# Patient Record
Sex: MALE | Race: WHITE
[De-identification: names, ages, dates, MRNs, and addresses within clinical notes are randomized per-mention and may not be internally consistent; named-entity substitution may affect disease eponyms.]

---

## 2017-06-08 ENCOUNTER — HOSPITAL ENCOUNTER (OUTPATIENT)
Dept: HOSPITAL 79 - RT | Age: 57
End: 2017-06-08
Attending: INTERNAL MEDICINE
Payer: MEDICARE

## 2017-06-08 DIAGNOSIS — R09.02: Primary | ICD-10-CM

## 2017-07-27 ENCOUNTER — APPOINTMENT (OUTPATIENT)
Dept: GENERAL RADIOLOGY | Facility: HOSPITAL | Age: 57
End: 2017-07-27

## 2017-07-27 ENCOUNTER — HOSPITAL ENCOUNTER (EMERGENCY)
Facility: HOSPITAL | Age: 57
Discharge: HOME | End: 2017-07-27
Attending: EMERGENCY MEDICINE | Admitting: EMERGENCY MEDICINE

## 2017-07-27 VITALS
WEIGHT: 296 LBS | RESPIRATION RATE: 18 BRPM | HEART RATE: 82 BPM | DIASTOLIC BLOOD PRESSURE: 76 MMHG | OXYGEN SATURATION: 97 % | SYSTOLIC BLOOD PRESSURE: 116 MMHG | BODY MASS INDEX: 34.95 KG/M2 | TEMPERATURE: 97.9 F | HEIGHT: 77 IN

## 2017-07-27 DIAGNOSIS — J18.9 PNEUMONIA OF BOTH LOWER LOBES DUE TO INFECTIOUS ORGANISM: Primary | ICD-10-CM

## 2017-07-27 LAB
A-A DO2: 15.2 MMHG (ref 0–300)
ALBUMIN SERPL-MCNC: 4.7 G/DL (ref 3.5–5)
ALBUMIN/GLOB SERPL: 1.5 G/DL (ref 1.5–2.5)
ALP SERPL-CCNC: 91 U/L (ref 40–129)
ALT SERPL W P-5'-P-CCNC: 43 U/L (ref 10–44)
ANION GAP SERPL CALCULATED.3IONS-SCNC: 6.8 MMOL/L (ref 3.6–11.2)
ARTERIAL PATENCY WRIST A: POSITIVE
AST SERPL-CCNC: 30 U/L (ref 10–34)
ATMOSPHERIC PRESS: 727 MMHG
BASE EXCESS BLDA CALC-SCNC: 0.8 MMOL/L
BASOPHILS # BLD AUTO: 0.03 10*3/MM3 (ref 0–0.3)
BASOPHILS NFR BLD AUTO: 0.4 % (ref 0–2)
BDY SITE: ABNORMAL
BILIRUB SERPL-MCNC: 0.7 MG/DL (ref 0.2–1.8)
BODY TEMPERATURE: 98.6 C
BUN BLD-MCNC: 14 MG/DL (ref 7–21)
BUN/CREAT SERPL: 13.3 (ref 7–25)
CALCIUM SPEC-SCNC: 10 MG/DL (ref 7.7–10)
CHLORIDE SERPL-SCNC: 103 MMOL/L (ref 99–112)
CO2 SERPL-SCNC: 28.2 MMOL/L (ref 24.3–31.9)
COHGB MFR BLD: 1.8 % (ref 0–5)
CREAT BLD-MCNC: 1.05 MG/DL (ref 0.43–1.29)
CRP SERPL-MCNC: 8.67 MG/DL (ref 0–0.99)
D-LACTATE SERPL-SCNC: 1.2 MMOL/L (ref 0.5–2)
DEPRECATED RDW RBC AUTO: 44 FL (ref 37–54)
EOSINOPHIL # BLD AUTO: 0.43 10*3/MM3 (ref 0–0.7)
EOSINOPHIL NFR BLD AUTO: 5.1 % (ref 0–5)
ERYTHROCYTE [DISTWIDTH] IN BLOOD BY AUTOMATED COUNT: 14.5 % (ref 11.5–14.5)
GFR SERPL CREATININE-BSD FRML MDRD: 72 ML/MIN/1.73
GLOBULIN UR ELPH-MCNC: 3.2 GM/DL
GLUCOSE BLD-MCNC: 131 MG/DL (ref 70–110)
HCO3 BLDA-SCNC: 24.2 MMOL/L (ref 22–26)
HCT VFR BLD AUTO: 46.5 % (ref 42–52)
HCT VFR BLD CALC: 44 % (ref 42–52)
HGB BLD-MCNC: 14.9 G/DL (ref 14–18)
HGB BLDA-MCNC: 14.9 G/DL (ref 12–16)
HOROWITZ INDEX BLD+IHG-RTO: 21 %
IMM GRANULOCYTES # BLD: 0.02 10*3/MM3 (ref 0–0.03)
IMM GRANULOCYTES NFR BLD: 0.2 % (ref 0–0.5)
LYMPHOCYTES # BLD AUTO: 2.51 10*3/MM3 (ref 1–3)
LYMPHOCYTES NFR BLD AUTO: 29.7 % (ref 21–51)
MCH RBC QN AUTO: 26.5 PG (ref 27–33)
MCHC RBC AUTO-ENTMCNC: 32 G/DL (ref 33–37)
MCV RBC AUTO: 82.6 FL (ref 80–94)
METHGB BLD QL: 0.2 % (ref 0–3)
MODALITY: ABNORMAL
MONOCYTES # BLD AUTO: 0.78 10*3/MM3 (ref 0.1–0.9)
MONOCYTES NFR BLD AUTO: 9.2 % (ref 0–10)
NEUTROPHILS # BLD AUTO: 4.69 10*3/MM3 (ref 1.4–6.5)
NEUTROPHILS NFR BLD AUTO: 55.4 % (ref 30–70)
OSMOLALITY SERPL CALC.SUM OF ELEC: 278 MOSM/KG (ref 273–305)
OXYHGB MFR BLDV: 95.1 % (ref 85–100)
PCO2 BLDA: 35.2 MM HG (ref 35–45)
PH BLDA: 7.46 PH UNITS (ref 7.35–7.45)
PLATELET # BLD AUTO: 174 10*3/MM3 (ref 130–400)
PMV BLD AUTO: 9.8 FL (ref 6–10)
PO2 BLDA: 85.4 MM HG (ref 80–100)
POTASSIUM BLD-SCNC: 3.8 MMOL/L (ref 3.5–5.3)
PROT SERPL-MCNC: 7.9 G/DL (ref 6–8)
RBC # BLD AUTO: 5.63 10*6/MM3 (ref 4.7–6.1)
SAO2 % BLDCOA: 97 % (ref 90–100)
SODIUM BLD-SCNC: 138 MMOL/L (ref 135–153)
TROPONIN I SERPL-MCNC: <0.006 NG/ML
WBC NRBC COR # BLD: 8.46 10*3/MM3 (ref 4.5–12.5)

## 2017-07-27 PROCEDURE — 25010000002 CEFTRIAXONE: Performed by: PHYSICIAN ASSISTANT

## 2017-07-27 PROCEDURE — 86140 C-REACTIVE PROTEIN: CPT | Performed by: PHYSICIAN ASSISTANT

## 2017-07-27 PROCEDURE — 94640 AIRWAY INHALATION TREATMENT: CPT

## 2017-07-27 PROCEDURE — 83605 ASSAY OF LACTIC ACID: CPT | Performed by: PHYSICIAN ASSISTANT

## 2017-07-27 PROCEDURE — 80053 COMPREHEN METABOLIC PANEL: CPT | Performed by: PHYSICIAN ASSISTANT

## 2017-07-27 PROCEDURE — 93010 ELECTROCARDIOGRAM REPORT: CPT | Performed by: INTERNAL MEDICINE

## 2017-07-27 PROCEDURE — 82805 BLOOD GASES W/O2 SATURATION: CPT | Performed by: PHYSICIAN ASSISTANT

## 2017-07-27 PROCEDURE — 82375 ASSAY CARBOXYHB QUANT: CPT | Performed by: PHYSICIAN ASSISTANT

## 2017-07-27 PROCEDURE — 71020 XR CHEST 2 VW: CPT | Performed by: RADIOLOGY

## 2017-07-27 PROCEDURE — 96361 HYDRATE IV INFUSION ADD-ON: CPT

## 2017-07-27 PROCEDURE — 93005 ELECTROCARDIOGRAM TRACING: CPT | Performed by: PHYSICIAN ASSISTANT

## 2017-07-27 PROCEDURE — 36600 WITHDRAWAL OF ARTERIAL BLOOD: CPT | Performed by: PHYSICIAN ASSISTANT

## 2017-07-27 PROCEDURE — 85025 COMPLETE CBC W/AUTO DIFF WBC: CPT | Performed by: PHYSICIAN ASSISTANT

## 2017-07-27 PROCEDURE — 87040 BLOOD CULTURE FOR BACTERIA: CPT | Performed by: PHYSICIAN ASSISTANT

## 2017-07-27 PROCEDURE — 96365 THER/PROPH/DIAG IV INF INIT: CPT

## 2017-07-27 PROCEDURE — 71020 HC CHEST PA AND LATERAL: CPT

## 2017-07-27 PROCEDURE — 84484 ASSAY OF TROPONIN QUANT: CPT | Performed by: PHYSICIAN ASSISTANT

## 2017-07-27 PROCEDURE — 99283 EMERGENCY DEPT VISIT LOW MDM: CPT

## 2017-07-27 PROCEDURE — 83050 HGB METHEMOGLOBIN QUAN: CPT | Performed by: PHYSICIAN ASSISTANT

## 2017-07-27 RX ORDER — GUAIFENESIN 600 MG/1
1200 TABLET, EXTENDED RELEASE ORAL 2 TIMES DAILY
COMMUNITY

## 2017-07-27 RX ORDER — ALBUTEROL SULFATE 90 UG/1
2 AEROSOL, METERED RESPIRATORY (INHALATION) EVERY 4 HOURS PRN
Qty: 1 INHALER | Refills: 0 | Status: SHIPPED | OUTPATIENT
Start: 2017-07-27

## 2017-07-27 RX ORDER — IPRATROPIUM BROMIDE AND ALBUTEROL SULFATE 2.5; .5 MG/3ML; MG/3ML
3 SOLUTION RESPIRATORY (INHALATION) ONCE
Status: COMPLETED | OUTPATIENT
Start: 2017-07-27 | End: 2017-07-27

## 2017-07-27 RX ORDER — HYDROCODONE BITARTRATE AND ACETAMINOPHEN 7.5; 325 MG/1; MG/1
1 TABLET ORAL EVERY 8 HOURS PRN
COMMUNITY

## 2017-07-27 RX ORDER — LEVOFLOXACIN 500 MG/1
500 TABLET, FILM COATED ORAL DAILY
COMMUNITY

## 2017-07-27 RX ORDER — DOXYCYCLINE 100 MG/1
100 CAPSULE ORAL 2 TIMES DAILY
Qty: 20 CAPSULE | Refills: 0 | Status: SHIPPED | OUTPATIENT
Start: 2017-07-27 | End: 2017-08-06

## 2017-07-27 RX ORDER — SODIUM CHLORIDE 0.9 % (FLUSH) 0.9 %
10 SYRINGE (ML) INJECTION AS NEEDED
Status: DISCONTINUED | OUTPATIENT
Start: 2017-07-27 | End: 2017-07-27 | Stop reason: HOSPADM

## 2017-07-27 RX ORDER — RANITIDINE HCL 75 MG
75 TABLET ORAL 2 TIMES DAILY
COMMUNITY

## 2017-07-27 RX ADMIN — CEFTRIAXONE 1 G: 1 INJECTION, POWDER, FOR SOLUTION INTRAMUSCULAR; INTRAVENOUS at 15:12

## 2017-07-27 RX ADMIN — SODIUM CHLORIDE 1000 ML: 9 INJECTION, SOLUTION INTRAVENOUS at 13:02

## 2017-07-27 RX ADMIN — IPRATROPIUM BROMIDE AND ALBUTEROL SULFATE 3 ML: .5; 3 SOLUTION RESPIRATORY (INHALATION) at 13:10

## 2017-07-27 NOTE — DISCHARGE INSTRUCTIONS

## 2017-07-27 NOTE — ED PROVIDER NOTES
Subjective   HPI Comments: 57-year-old male who presents to the ED today due to having pneumonia.  He states he has been sick for about 6 days with a productive cough of yellow and green sputum as well as shortness of breath.  He saw his primary care provider 2 days ago and was diagnosed with a left-sided pneumonia.  He was started on Levaquin.  He states he has taken 2 doses but is not getting any better.  He states since starting the Levaquin he has felt very shaky so he decided to not take a dose today.  He states he is having bilateral lower chest pain as well as shortness of breath.  He states at times he also has a headache and feels dizzy.  He denies any sick contacts.  He has not been using any inhalers.  He states he does have a history of black lung disease.  He states he has had a subjective fever and has been having sweats.    Patient is a 57 y.o. male presenting with URI.   History provided by:  Patient  URI   Presenting symptoms: congestion, cough and fever    Severity:  Moderate  Onset quality:  Gradual  Duration:  6 days  Timing:  Constant  Progression:  Unchanged  Chronicity:  New  Relieved by:  Nothing  Worsened by:  Nothing  Ineffective treatments: Levaquin.  Associated symptoms: headaches    Risk factors: chronic respiratory disease    Risk factors: no recent travel and no sick contacts        Review of Systems   Constitutional: Positive for diaphoresis and fever.   HENT: Positive for congestion.    Eyes: Negative.    Respiratory: Positive for cough and shortness of breath.    Cardiovascular: Positive for chest pain.   Gastrointestinal: Negative.    Genitourinary: Negative.    Musculoskeletal: Negative.    Skin: Negative.    Neurological: Positive for dizziness, tremors and headaches.   Psychiatric/Behavioral: Negative.    All other systems reviewed and are negative.      Past Medical History:   Diagnosis Date   • Black lung disease    • GERD (gastroesophageal reflux disease)        No Known  Allergies    History reviewed. No pertinent surgical history.    History reviewed. No pertinent family history.    Social History     Social History   • Marital status:      Spouse name: N/A   • Number of children: N/A   • Years of education: N/A     Social History Main Topics   • Smoking status: Current Every Day Smoker   • Smokeless tobacco: Current User     Types: Chew   • Alcohol use No   • Drug use: No   • Sexual activity: Defer     Other Topics Concern   • None     Social History Narrative   • None           Objective   Physical Exam   Constitutional: He is oriented to person, place, and time. He appears well-developed and well-nourished. No distress.   Pt is mildly shaky   HENT:   Head: Normocephalic and atraumatic.   Right Ear: External ear normal.   Left Ear: External ear normal.   Nose: Nose normal.   Mouth/Throat: Oropharynx is clear and moist.   Eyes: Conjunctivae and EOM are normal. Pupils are equal, round, and reactive to light.   Neck: Normal range of motion. Neck supple.   Cardiovascular: Normal rate, regular rhythm, normal heart sounds and intact distal pulses.    Pulmonary/Chest: Effort normal and breath sounds normal. No stridor. No respiratory distress. He has no wheezes. He exhibits no tenderness.   Abdominal: Soft. Bowel sounds are normal. There is no tenderness.   Musculoskeletal: Normal range of motion. He exhibits no edema.   Neurological: He is alert and oriented to person, place, and time.   Skin: Skin is warm and dry.   Psychiatric: He has a normal mood and affect. His behavior is normal. Judgment and thought content normal.   Nursing note and vitals reviewed.      Procedures         ED Course  ED Course   Value Comment By Time   ECG 12 Lead 12:59 - sinus rhythm, rate of 77, no acute ischemia per MARAH Hawk 07/27 1300    Pt found to have bilateral pneumonia on x-ray.  Pt is not hypoxia, lab work is unremarkable.  He states he is feeling better after fluids and a  neb treatment.  Will discharge the patient home.  He and his wife are requesting an antibiotic change because he is concerned about possible side effects. MARAH Cabrera 07/27 1530                  MDM  Number of Diagnoses or Management Options  Pneumonia of both lower lobes due to infectious organism:      Amount and/or Complexity of Data Reviewed  Clinical lab tests: reviewed  Tests in the radiology section of CPT®: reviewed  Tests in the medicine section of CPT®: reviewed    Patient Progress  Patient progress: improved      Final diagnoses:   Pneumonia of both lower lobes due to infectious organism            MARAH Cabrera  07/27/17 1640

## 2017-08-01 LAB — BACTERIA SPEC AEROBE CULT: NORMAL

## 2017-08-07 LAB — BACTERIA SPEC AEROBE CULT: NORMAL

## 2022-12-15 ENCOUNTER — APPOINTMENT (OUTPATIENT)
Dept: GENERAL RADIOLOGY | Facility: HOSPITAL | Age: 62
End: 2022-12-15

## 2022-12-15 ENCOUNTER — HOSPITAL ENCOUNTER (EMERGENCY)
Facility: HOSPITAL | Age: 62
Discharge: HOME | End: 2022-12-15
Attending: STUDENT IN AN ORGANIZED HEALTH CARE EDUCATION/TRAINING PROGRAM | Admitting: STUDENT IN AN ORGANIZED HEALTH CARE EDUCATION/TRAINING PROGRAM

## 2022-12-15 VITALS
BODY MASS INDEX: 34.36 KG/M2 | TEMPERATURE: 97.8 F | SYSTOLIC BLOOD PRESSURE: 142 MMHG | RESPIRATION RATE: 16 BRPM | WEIGHT: 291 LBS | HEART RATE: 101 BPM | HEIGHT: 77 IN | DIASTOLIC BLOOD PRESSURE: 91 MMHG | OXYGEN SATURATION: 94 %

## 2022-12-15 DIAGNOSIS — J18.9 PNEUMONIA OF BOTH LUNGS DUE TO INFECTIOUS ORGANISM, UNSPECIFIED PART OF LUNG: ICD-10-CM

## 2022-12-15 DIAGNOSIS — R00.0 SINUS TACHYCARDIA: Primary | ICD-10-CM

## 2022-12-15 DIAGNOSIS — J60 BLACK LUNG DISEASE: ICD-10-CM

## 2022-12-15 LAB
ALBUMIN SERPL-MCNC: 3.62 G/DL (ref 3.5–5.2)
ALBUMIN/GLOB SERPL: 1.1 G/DL
ALP SERPL-CCNC: 90 U/L (ref 39–117)
ALT SERPL W P-5'-P-CCNC: 27 U/L (ref 1–41)
ANION GAP SERPL CALCULATED.3IONS-SCNC: 13.6 MMOL/L (ref 5–15)
APTT PPP: 32.8 SECONDS (ref 26.5–34.5)
AST SERPL-CCNC: 22 U/L (ref 1–40)
BASOPHILS # BLD AUTO: 0.05 10*3/MM3 (ref 0–0.2)
BASOPHILS NFR BLD AUTO: 0.3 % (ref 0–1.5)
BILIRUB SERPL-MCNC: 0.7 MG/DL (ref 0–1.2)
BUN SERPL-MCNC: 13 MG/DL (ref 8–23)
BUN/CREAT SERPL: 14.4 (ref 7–25)
CALCIUM SPEC-SCNC: 10.2 MG/DL (ref 8.6–10.5)
CHLORIDE SERPL-SCNC: 101 MMOL/L (ref 98–107)
CO2 SERPL-SCNC: 22.4 MMOL/L (ref 22–29)
CREAT SERPL-MCNC: 0.9 MG/DL (ref 0.76–1.27)
CRP SERPL-MCNC: 10.72 MG/DL (ref 0–0.5)
D-LACTATE SERPL-SCNC: 1.9 MMOL/L (ref 0.5–2)
DEPRECATED RDW RBC AUTO: 43.1 FL (ref 37–54)
EGFRCR SERPLBLD CKD-EPI 2021: 95.4 ML/MIN/1.73
EOSINOPHIL # BLD AUTO: 0.09 10*3/MM3 (ref 0–0.4)
EOSINOPHIL NFR BLD AUTO: 0.6 % (ref 0.3–6.2)
ERYTHROCYTE [DISTWIDTH] IN BLOOD BY AUTOMATED COUNT: 15.1 % (ref 12.3–15.4)
FLUAV SUBTYP SPEC NAA+PROBE: NOT DETECTED
FLUBV RNA ISLT QL NAA+PROBE: NOT DETECTED
GLOBULIN UR ELPH-MCNC: 3.4 GM/DL
GLUCOSE SERPL-MCNC: 156 MG/DL (ref 65–99)
HCT VFR BLD AUTO: 50.9 % (ref 37.5–51)
HGB BLD-MCNC: 15.9 G/DL (ref 13–17.7)
HOLD SPECIMEN: NORMAL
HOLD SPECIMEN: NORMAL
IMM GRANULOCYTES # BLD AUTO: 0.06 10*3/MM3 (ref 0–0.05)
IMM GRANULOCYTES NFR BLD AUTO: 0.4 % (ref 0–0.5)
INR PPP: 1.04 (ref 0.9–1.1)
LYMPHOCYTES # BLD AUTO: 1.25 10*3/MM3 (ref 0.7–3.1)
LYMPHOCYTES NFR BLD AUTO: 8.5 % (ref 19.6–45.3)
MCH RBC QN AUTO: 25.6 PG (ref 26.6–33)
MCHC RBC AUTO-ENTMCNC: 31.2 G/DL (ref 31.5–35.7)
MCV RBC AUTO: 81.8 FL (ref 79–97)
MONOCYTES # BLD AUTO: 1.12 10*3/MM3 (ref 0.1–0.9)
MONOCYTES NFR BLD AUTO: 7.6 % (ref 5–12)
NEUTROPHILS NFR BLD AUTO: 12.18 10*3/MM3 (ref 1.7–7)
NEUTROPHILS NFR BLD AUTO: 82.6 % (ref 42.7–76)
NRBC BLD AUTO-RTO: 0 /100 WBC (ref 0–0.2)
PLATELET # BLD AUTO: 283 10*3/MM3 (ref 140–450)
PMV BLD AUTO: 10.4 FL (ref 6–12)
POTASSIUM SERPL-SCNC: 4.1 MMOL/L (ref 3.5–5.2)
PROT SERPL-MCNC: 7 G/DL (ref 6–8.5)
PROTHROMBIN TIME: 13.8 SECONDS (ref 12.1–14.7)
QT INTERVAL: 300 MS
QTC INTERVAL: 409 MS
RBC # BLD AUTO: 6.22 10*6/MM3 (ref 4.14–5.8)
SARS-COV-2 RNA PNL SPEC NAA+PROBE: NOT DETECTED
SODIUM SERPL-SCNC: 137 MMOL/L (ref 136–145)
TROPONIN T SERPL-MCNC: <0.01 NG/ML (ref 0–0.03)
WBC NRBC COR # BLD: 14.75 10*3/MM3 (ref 3.4–10.8)
WHOLE BLOOD HOLD COAG: NORMAL
WHOLE BLOOD HOLD SPECIMEN: NORMAL

## 2022-12-15 PROCEDURE — 99284 EMERGENCY DEPT VISIT MOD MDM: CPT

## 2022-12-15 PROCEDURE — 93005 ELECTROCARDIOGRAM TRACING: CPT | Performed by: INTERNAL MEDICINE

## 2022-12-15 PROCEDURE — 83605 ASSAY OF LACTIC ACID: CPT | Performed by: STUDENT IN AN ORGANIZED HEALTH CARE EDUCATION/TRAINING PROGRAM

## 2022-12-15 PROCEDURE — 96375 TX/PRO/DX INJ NEW DRUG ADDON: CPT

## 2022-12-15 PROCEDURE — 85730 THROMBOPLASTIN TIME PARTIAL: CPT | Performed by: STUDENT IN AN ORGANIZED HEALTH CARE EDUCATION/TRAINING PROGRAM

## 2022-12-15 PROCEDURE — 93010 ELECTROCARDIOGRAM REPORT: CPT | Performed by: SPECIALIST

## 2022-12-15 PROCEDURE — 36415 COLL VENOUS BLD VENIPUNCTURE: CPT

## 2022-12-15 PROCEDURE — 85025 COMPLETE CBC W/AUTO DIFF WBC: CPT | Performed by: STUDENT IN AN ORGANIZED HEALTH CARE EDUCATION/TRAINING PROGRAM

## 2022-12-15 PROCEDURE — 93005 ELECTROCARDIOGRAM TRACING: CPT | Performed by: STUDENT IN AN ORGANIZED HEALTH CARE EDUCATION/TRAINING PROGRAM

## 2022-12-15 PROCEDURE — 93010 ELECTROCARDIOGRAM REPORT: CPT | Performed by: INTERNAL MEDICINE

## 2022-12-15 PROCEDURE — 96365 THER/PROPH/DIAG IV INF INIT: CPT

## 2022-12-15 PROCEDURE — 86140 C-REACTIVE PROTEIN: CPT | Performed by: STUDENT IN AN ORGANIZED HEALTH CARE EDUCATION/TRAINING PROGRAM

## 2022-12-15 PROCEDURE — 85610 PROTHROMBIN TIME: CPT | Performed by: STUDENT IN AN ORGANIZED HEALTH CARE EDUCATION/TRAINING PROGRAM

## 2022-12-15 PROCEDURE — 87636 SARSCOV2 & INF A&B AMP PRB: CPT | Performed by: STUDENT IN AN ORGANIZED HEALTH CARE EDUCATION/TRAINING PROGRAM

## 2022-12-15 PROCEDURE — 25010000002 CEFTRIAXONE PER 250 MG: Performed by: STUDENT IN AN ORGANIZED HEALTH CARE EDUCATION/TRAINING PROGRAM

## 2022-12-15 PROCEDURE — 87040 BLOOD CULTURE FOR BACTERIA: CPT | Performed by: STUDENT IN AN ORGANIZED HEALTH CARE EDUCATION/TRAINING PROGRAM

## 2022-12-15 PROCEDURE — 25010000002 METHYLPREDNISOLONE PER 125 MG: Performed by: STUDENT IN AN ORGANIZED HEALTH CARE EDUCATION/TRAINING PROGRAM

## 2022-12-15 PROCEDURE — 71045 X-RAY EXAM CHEST 1 VIEW: CPT | Performed by: RADIOLOGY

## 2022-12-15 PROCEDURE — 71045 X-RAY EXAM CHEST 1 VIEW: CPT

## 2022-12-15 PROCEDURE — 84484 ASSAY OF TROPONIN QUANT: CPT | Performed by: STUDENT IN AN ORGANIZED HEALTH CARE EDUCATION/TRAINING PROGRAM

## 2022-12-15 PROCEDURE — 80053 COMPREHEN METABOLIC PANEL: CPT | Performed by: STUDENT IN AN ORGANIZED HEALTH CARE EDUCATION/TRAINING PROGRAM

## 2022-12-15 RX ORDER — AZITHROMYCIN 250 MG/1
250 TABLET, FILM COATED ORAL DAILY
Qty: 4 TABLET | Refills: 0 | Status: SHIPPED | OUTPATIENT
Start: 2022-12-14 | End: 2022-12-18

## 2022-12-15 RX ORDER — CEFDINIR 300 MG/1
300 CAPSULE ORAL 2 TIMES DAILY
Qty: 12 CAPSULE | Refills: 0 | Status: SHIPPED | OUTPATIENT
Start: 2022-12-16 | End: 2022-12-22

## 2022-12-15 RX ORDER — AZITHROMYCIN 250 MG/1
500 TABLET, FILM COATED ORAL ONCE
Status: COMPLETED | OUTPATIENT
Start: 2022-12-15 | End: 2022-12-15

## 2022-12-15 RX ORDER — METHYLPREDNISOLONE SODIUM SUCCINATE 125 MG/2ML
125 INJECTION, POWDER, LYOPHILIZED, FOR SOLUTION INTRAMUSCULAR; INTRAVENOUS ONCE
Status: COMPLETED | OUTPATIENT
Start: 2022-12-15 | End: 2022-12-15

## 2022-12-15 RX ORDER — METOPROLOL SUCCINATE 25 MG/1
25 TABLET, EXTENDED RELEASE ORAL ONCE
Status: COMPLETED | OUTPATIENT
Start: 2022-12-15 | End: 2022-12-15

## 2022-12-15 RX ORDER — SODIUM CHLORIDE 0.9 % (FLUSH) 0.9 %
10 SYRINGE (ML) INJECTION AS NEEDED
Status: DISCONTINUED | OUTPATIENT
Start: 2022-12-15 | End: 2022-12-15 | Stop reason: HOSPADM

## 2022-12-15 RX ORDER — METOPROLOL SUCCINATE 25 MG/1
25 TABLET, EXTENDED RELEASE ORAL DAILY
Qty: 30 TABLET | Refills: 0 | Status: SHIPPED | OUTPATIENT
Start: 2022-12-16 | End: 2023-01-15

## 2022-12-15 RX ORDER — ASPIRIN 81 MG/1
324 TABLET, CHEWABLE ORAL ONCE
Status: COMPLETED | OUTPATIENT
Start: 2022-12-15 | End: 2022-12-15

## 2022-12-15 RX ADMIN — METOPROLOL SUCCINATE 25 MG: 25 TABLET, EXTENDED RELEASE ORAL at 17:40

## 2022-12-15 RX ADMIN — ASPIRIN 324 MG: 81 TABLET, CHEWABLE ORAL at 14:29

## 2022-12-15 RX ADMIN — METHYLPREDNISOLONE SODIUM SUCCINATE 125 MG: 125 INJECTION, POWDER, FOR SOLUTION INTRAMUSCULAR; INTRAVENOUS at 15:45

## 2022-12-15 RX ADMIN — AZITHROMYCIN 500 MG: 250 TABLET, FILM COATED ORAL at 15:45

## 2022-12-15 RX ADMIN — CEFTRIAXONE SODIUM 1 G: 1 INJECTION, POWDER, FOR SOLUTION INTRAMUSCULAR; INTRAVENOUS at 15:46

## 2022-12-15 NOTE — ED PROVIDER NOTES
[facility]  emergency department encounter        Pt Name: Rory Rios Jr.  MRN: [id number]  Birthdate 1960  Date of evaluation: 12/15/2022    Chief Complaint   Patient presents with   • Chest Pain   • Shortness of Breath             HISTORY OF PRESENT ILLNESS:   Rory Rios Jr. is a 62 y.o. male PMH DM, GERD, black lung disease.  Patient wears CPAP nightly.    Patient presents for smothering/shortness of breath.  Onset 2 to 3 days ago, constant, progressively worsening.  Patient endorses cough and productive sputum.  Patient reports he had multiple brief transient episodes of chest pain last night and this morning.  Pains are right-sided, pinpoint, sharp.  Last episode at 6 AM this morning.  Denies fever chills congestion rhinorrhea abdominal pain nausea vomiting diarrhea dysuria.    Patient recently started by a primary care provider on lisinopril 5 mg daily for blood pressure and propranolol 10 mg once daily for tachycardia.      No other exacerbating or alleviating factors other than as noted above.  Severity: Severe    PCP: Alejandro Monique MD          REVIEW OF SYSTEMS:     Review of Systems   Constitutional: Negative for fever.   HENT: Negative for congestion and rhinorrhea.    Eyes: Negative for visual disturbance.   Respiratory: Positive for cough and shortness of breath.    Cardiovascular: Positive for chest pain (Not active). Negative for leg swelling.   Gastrointestinal: Negative for abdominal pain, nausea and vomiting.   Genitourinary: Negative for dysuria.   Musculoskeletal: Negative for myalgias.   Skin: Negative for rash.   Neurological: Negative for headaches.   Psychiatric/Behavioral: Negative for confusion.       Review of systems otherwise as per HPI.          PREVIOUS HISTORY:         Past Medical History:   Diagnosis Date   • Black lung disease (CMS/HCC)    • GERD (gastroesophageal reflux disease)          No past surgical history on file.        Social History      Socioeconomic History   • Marital status:    Tobacco Use   • Smoking status: Every Day   • Smokeless tobacco: Current     Types: Chew   Substance and Sexual Activity   • Alcohol use: No   • Drug use: No   • Sexual activity: Defer         No family history on file.      Current Outpatient Medications   Medication Instructions   • albuterol (PROVENTIL HFA;VENTOLIN HFA) 108 (90 BASE) MCG/ACT inhaler 2 puffs, Inhalation, Every 4 Hours PRN   • azithromycin (ZITHROMAX) 250 mg, Oral, Daily   • [START ON 12/16/2022] cefdinir (OMNICEF) 300 mg, Oral, 2 Times Daily   • esomeprazole (NEXIUM) 20 mg, Oral, Every Morning Before Breakfast   • guaiFENesin (MUCINEX) 1,200 mg, Oral, 2 Times Daily   • HYDROcodone-acetaminophen (NORCO) 7.5-325 MG per tablet 1 tablet, Oral, Every 8 Hours PRN   • levoFLOXacin (LEVAQUIN) 500 mg, Oral, Daily   • [START ON 12/16/2022] metoprolol succinate XL (TOPROL-XL) 25 mg, Oral, Daily   • raNITIdine (ZANTAC) 75 mg, Oral, 2 Times Daily         Allergies:  Patient has no known allergies.    Medications, allergies and past medical, surgical, family, and social history reviewed.        PHYSICAL EXAM:     Patient Vitals for the past 24 hrs:   BP Temp Temp src Pulse Resp SpO2 Height Weight   12/15/22 1750 142/91 97.8 °F (36.6 °C) Oral 101 16 94 % -- --   12/15/22 1740 132/90 -- -- 93 -- 92 % -- --   12/15/22 1716 139/91 -- -- 96 -- 92 % -- --   12/15/22 1701 142/71 -- -- 93 -- 92 % -- --   12/15/22 1646 (!) 138/102 -- -- (!) 137 -- 94 % -- --   12/15/22 1631 135/82 -- -- (!) 154 -- 96 % -- --   12/15/22 1621 -- -- -- 90 -- 97 % -- --   12/15/22 1620 -- -- -- 109 -- 95 % -- --   12/15/22 1619 -- -- -- 105 -- 94 % -- --   12/15/22 1618 -- -- -- 97 -- 93 % -- --   12/15/22 1617 -- -- -- 90 -- 94 % -- --   12/15/22 1616 124/81 -- -- 104 -- 93 % -- --   12/15/22 1602 -- -- -- 104 -- 93 % -- --   12/15/22 1601 128/80 -- -- 117 -- 94 % -- --   12/15/22 1600 -- -- -- 99 -- 93 % -- --   12/15/22 1547 -- --  "-- 107 -- -- -- --   12/15/22 1546 124/81 -- -- 112 -- 96 % -- --   12/15/22 1531 132/80 -- -- 105 -- 96 % -- --   12/15/22 1530 -- -- -- 113 -- 97 % -- --   12/15/22 1319 115/68 97.8 °F (36.6 °C) Tympanic 106 20 95 % 195.6 cm (77\") 132 kg (291 lb)       Physical Exam  Vitals and nursing note reviewed.   Constitutional:       General: He is not in acute distress.     Appearance: Normal appearance.   HENT:      Head: Normocephalic and atraumatic.   Eyes:      Extraocular Movements: Extraocular movements intact.      Conjunctiva/sclera: Conjunctivae normal.   Cardiovascular:      Rate and Rhythm: Regular rhythm. Tachycardia present.   Pulmonary:      Effort: Pulmonary effort is normal. No respiratory distress.      Breath sounds: No stridor. No wheezing, rhonchi or rales.   Abdominal:      General: Abdomen is flat. There is no distension.   Musculoskeletal:         General: No deformity. Normal range of motion.      Cervical back: Normal range of motion. No rigidity.      Right lower leg: No edema.      Left lower leg: No edema.   Skin:     Coloration: Skin is not jaundiced.      Findings: No rash.   Neurological:      General: No focal deficit present.      Mental Status: He is alert and oriented to person, place, and time.   Psychiatric:         Mood and Affect: Mood normal.         Behavior: Behavior normal.             COMPLETED DIAGNOSTIC STUDIES AND INTERVENTIONS:     Lab Results (last 24 hours)     Procedure Component Value Units Date/Time    COVID PRE-OP / PRE-PROCEDURE SCREENING ORDER (NO ISOLATION) - Swab, Nasopharynx [[id number]]  (Normal) Collected: 12/15/22 1356    Specimen: Swab from Nasopharynx Updated: 12/15/22 1448    Narrative:      The following orders were created for panel order COVID PRE-OP / PRE-PROCEDURE SCREENING ORDER (NO ISOLATION) - Swab, Nasopharynx.  Procedure                               Abnormality         Status                     ---------                               -----------    "      ------                     COVID-19 and FLU A/B PCR...[[id number]]  Normal              Final result                 Please view results for these tests on the individual orders.    aPTT [[id number]]  (Normal) Collected: 12/15/22 1356    Specimen: Blood Updated: 12/15/22 1422     PTT 32.8 seconds     Narrative:      PTT Heparin Therapeutic Range:  59 - 95 seconds      CBC & Differential [[id number]]  (Abnormal) Collected: 12/15/22 1356    Specimen: Blood Updated: 12/15/22 1411    Narrative:      The following orders were created for panel order CBC & Differential.  Procedure                               Abnormality         Status                     ---------                               -----------         ------                     CBC Auto Differential[[id number]]        Abnormal            Final result                 Please view results for these tests on the individual orders.    Protime-INR [[id number]]  (Normal) Collected: 12/15/22 1356    Specimen: Blood Updated: 12/15/22 1422     Protime 13.8 Seconds      INR 1.04    Narrative:      Suggested INR therapeutic range for stable oral anticoagulant therapy:    Low Intensity therapy:   1.5-2.0  Moderate Intensity therapy:   2.0-3.0  High Intensity therapy:   2.5-4.0    COVID-19 and FLU A/B PCR - Swab, Nasopharynx [[id number]]  (Normal) Collected: 12/15/22 1356    Specimen: Swab from Nasopharynx Updated: 12/15/22 1448     COVID19 Not Detected     Influenza A PCR Not Detected     Influenza B PCR Not Detected    Narrative:      Fact sheet for providers: https://www.fda.gov/media/571881/download    Fact sheet for patients: https://www.fda.gov/media/503811/download    Test performed by PCR.    CBC Auto Differential [[id number]]  (Abnormal) Collected: 12/15/22 1356    Specimen: Blood Updated: 12/15/22 1411     WBC 14.75 10*3/mm3      RBC 6.22 10*6/mm3      Hemoglobin 15.9 g/dL      Hematocrit 50.9 %      MCV 81.8 fL      MCH 25.6 pg      MCHC 31.2 g/dL      RDW 15.1 %       RDW-SD 43.1 fl      MPV 10.4 fL      Platelets 283 10*3/mm3      Neutrophil % 82.6 %      Lymphocyte % 8.5 %      Monocyte % 7.6 %      Eosinophil % 0.6 %      Basophil % 0.3 %      Immature Grans % 0.4 %      Neutrophils, Absolute 12.18 10*3/mm3      Lymphocytes, Absolute 1.25 10*3/mm3      Monocytes, Absolute 1.12 10*3/mm3      Eosinophils, Absolute 0.09 10*3/mm3      Basophils, Absolute 0.05 10*3/mm3      Immature Grans, Absolute 0.06 10*3/mm3      nRBC 0.0 /100 WBC     Comprehensive Metabolic Panel [[id number]]  (Abnormal) Collected: 12/15/22 1445    Specimen: Blood from Hand, Left Updated: 12/15/22 1517     Glucose 156 mg/dL      BUN 13 mg/dL      Creatinine 0.90 mg/dL      Sodium 137 mmol/L      Potassium 4.1 mmol/L      Chloride 101 mmol/L      CO2 22.4 mmol/L      Calcium 10.2 mg/dL      Total Protein 7.0 g/dL      Albumin 3.62 g/dL      ALT (SGPT) 27 U/L      AST (SGOT) 22 U/L      Alkaline Phosphatase 90 U/L      Total Bilirubin 0.7 mg/dL      Globulin 3.4 gm/dL      A/G Ratio 1.1 g/dL      BUN/Creatinine Ratio 14.4     Anion Gap 13.6 mmol/L      eGFR 95.4 mL/min/1.73      Comment: National Kidney Foundation and American Society of Nephrology (ASN) Task Force recommended calculation based on the Chronic Kidney Disease Epidemiology Collaboration (CKD-EPI) equation refit without adjustment for race.       Narrative:      GFR Normal >60  Chronic Kidney Disease <60  Kidney Failure <15      Troponin [[id number]]  (Normal) Collected: 12/15/22 1445    Specimen: Blood from Hand, Left Updated: 12/15/22 1517     Troponin T <0.010 ng/mL     Narrative:      Troponin T Reference Range:  <= 0.03 ng/mL-   Negative for AMI  >0.03 ng/mL-     Abnormal for myocardial necrosis.  Clinicians would have to utilize clinical acumen, EKG, Troponin and serial changes to determine if it is an Acute Myocardial Infarction or myocardial injury due to an underlying chronic condition.       Results may be falsely decreased if patient  taking Biotin.      C-reactive Protein [[id number]]  (Abnormal) Collected: 12/15/22 1445    Specimen: Blood from Hand, Left Updated: 12/15/22 1643     C-Reactive Protein 10.72 mg/dL     Lactic Acid, Plasma [[id number]]  (Normal) Collected: 12/15/22 1544    Specimen: Blood from Arm, Right Updated: 12/15/22 1614     Lactate 1.9 mmol/L     Blood Culture - Blood, Arm, Right [[id number]] Collected: 12/15/22 1544    Specimen: Blood from Arm, Right Updated: 12/15/22 1553    Blood Culture - Blood, Hand, Right [[id number]] Collected: 12/15/22 1544    Specimen: Blood from Hand, Right Updated: 12/15/22 1553            XR Chest 1 View   Final Result   Extensive bilateral consolidation       This report was finalized on 12/15/2022 2:49 PM by Dr. Jamie Smith MD.                New Medications Ordered This Visit   Medications   • aspirin chewable tablet 324 mg   • methylPREDNISolone sodium succinate (SOLU-Medrol) injection 125 mg   • cefTRIAXone (ROCEPHIN) 1 g in sodium chloride 0.9 % 100 mL IVPB-VTB   • azithromycin (ZITHROMAX) tablet 500 mg   • metoprolol succinate XL (TOPROL-XL) 24 hr tablet 25 mg   • metoprolol succinate XL (TOPROL-XL) 25 MG 24 hr tablet     Sig: Take 1 tablet by mouth Daily for 30 days.     Dispense:  30 tablet     Refill:  0   • cefdinir (OMNICEF) 300 MG capsule     Sig: Take 1 capsule by mouth 2 (Two) Times a Day for 6 days.     Dispense:  12 capsule     Refill:  0   • azithromycin (ZITHROMAX) 250 MG tablet     Sig: Take 1 tablet by mouth Daily for 4 days.     Dispense:  4 tablet     Refill:  0         Procedures            MEDICAL DECISION-MAKING AND ED COURSE:     ED Course as of 12/15/22 2005   u Dec 15, 2022   1313 EKG at 1309 sinus tachycardia 112 bpm, , QRS 70, QTc 409, questionable axis based on this EKG, no significant ST deviation or T wave abnormalities concerning for acute ischemia.  No STEMI. [KP]   1452 COVID19: Not Detected [KP]   1452 Influenza A PCR: Not Detected [KP]   1452 Influenza  B PCR: Not Detected [KP]   1452 XR Chest 1 View  Extensive bilateral consolidation [KP]   1516 62-year-old male with PMH significant for black lung disease presents with shortness of breath smothering over the past 2 days consistent with exacerbation of his prior lung disease.  Reports his imaging chronically appears consistent with pneumonia.  He has concomitant cough and productive sputum.  Imaging with bilateral consolidations significantly worse compared to prior imaging in 2017.  No recent hospitalizations.  We will treat prickly for community-acquired pneumonia initially treated with ceftriaxone and azithromycin.  Patient denies active chest pain though he noted having some chest pains last night and this morning, last episode 6 AM.  Pains atypical.  Initial troponin negative, EKG nonischemic.  Heart score 2. [KP]   1518 Troponin T: <0.010 [KP]   1518 Creatinine: 0.90 [KP]   1636 Patient noted to have heart rate up to 140.  Remained steady for multiple minutes at 140/141.  Heart rate then dropped back into the 90s and then jumping into the teens, 120s, 130s on occasion.  Appears to be going in and out of atrial fibrillation and flutter from sinus.  Repeat EKG pending.  Patient denies prior major bleeding or predisposition.  Takes an occasional aspirin but otherwise no regular use of antiplatelet medications or NSAIDs.  Estimates drinking 1 alcoholic beverage a year. [KP]   2002 Multiple EKGs completed, none showing evidence of irregular rhythm. EKGs sent to Dr. Powers, cardiology, reported sinus tach. Recommended discontinue propranolol 10mg daily and start Metoprolol XL 25mg daily, first dose in ED. Pt to f/u with primary care for discussion of heart monitor. Pt will take abx cefdinir and azithromycin for pna. Will return here for recurrent, new onset, or worsening symptoms. Pt agreeable to plan, all questions answered. HR 90s at time of discharge. Last episode of chest pain roughly 12 hours prior to  arrival, and not consistent with ACS. No indication for repeat troponin. [KP]      ED Course User Index  [KP] Bud Fernandez MD       ?      FINAL IMPRESSION:       1. Sinus tachycardia    2. Pneumonia of both lungs due to infectious organism, unspecified part of lung    3. Black lung disease (HCC)         The complaints listed here are new problems to this examiner.      FOLLOW-UP  No follow-up provider specified.    DISPOSITION  ED Disposition     ED Disposition   Discharge    Condition   Stable    Comment   --                 Please note that portions of this note were completed with a voice recognition program.      Bud Fernandez MD  20:05 EST  12/15/2022             Bud Fernandez MD  12/15/22 2005

## 2022-12-15 NOTE — DISCHARGE INSTRUCTIONS
Take antibiotic as prescribed for pneumonia.  Follow-up with your lung doctor.    For her heart rate, recommend discontinue propanolol.  Take metoprolol XL 25 mg once daily.  Please follow-up with your primary care provider to discuss.  Also recommend that you discuss with your primary care provider regarding an outpatient heart monitor.  Please inform your primary care provider that there was evidence of irregular heart rate but we were not able to clearly catch evidence of atrial fibrillation versus premature contractions.    Do not hesitate to return here for any new onset or worsening symptoms.

## 2022-12-17 LAB
QT INTERVAL: 274 MS
QT INTERVAL: 282 MS
QT INTERVAL: 316 MS
QTC INTERVAL: 403 MS
QTC INTERVAL: 415 MS
QTC INTERVAL: 494 MS

## 2022-12-19 DIAGNOSIS — J41.0 SIMPLE CHRONIC BRONCHITIS: Primary | ICD-10-CM

## 2022-12-20 LAB
BACTERIA SPEC AEROBE CULT: NORMAL
BACTERIA SPEC AEROBE CULT: NORMAL

## 2023-02-09 ENCOUNTER — APPOINTMENT (OUTPATIENT)
Dept: GENERAL RADIOLOGY | Facility: HOSPITAL | Age: 63
End: 2023-02-09
Payer: MEDICARE

## 2023-02-09 ENCOUNTER — APPOINTMENT (OUTPATIENT)
Dept: CT IMAGING | Facility: HOSPITAL | Age: 63
End: 2023-02-09
Payer: MEDICARE

## 2023-02-09 ENCOUNTER — HOSPITAL ENCOUNTER (EMERGENCY)
Facility: HOSPITAL | Age: 63
Discharge: HOME | End: 2023-02-09
Attending: STUDENT IN AN ORGANIZED HEALTH CARE EDUCATION/TRAINING PROGRAM | Admitting: STUDENT IN AN ORGANIZED HEALTH CARE EDUCATION/TRAINING PROGRAM
Payer: MEDICARE

## 2023-02-09 VITALS
WEIGHT: 295 LBS | DIASTOLIC BLOOD PRESSURE: 81 MMHG | OXYGEN SATURATION: 94 % | BODY MASS INDEX: 34.83 KG/M2 | HEART RATE: 95 BPM | RESPIRATION RATE: 20 BRPM | HEIGHT: 77 IN | TEMPERATURE: 97.9 F | SYSTOLIC BLOOD PRESSURE: 123 MMHG

## 2023-02-09 DIAGNOSIS — R07.9 NONSPECIFIC CHEST PAIN: ICD-10-CM

## 2023-02-09 DIAGNOSIS — J18.9 PNEUMONIA OF BOTH LUNGS DUE TO INFECTIOUS ORGANISM, UNSPECIFIED PART OF LUNG: Primary | ICD-10-CM

## 2023-02-09 LAB
A-A DO2: 111.8 MMHG (ref 0–300)
ALBUMIN SERPL-MCNC: 3.9 G/DL (ref 3.5–5.2)
ALBUMIN/GLOB SERPL: 1.2 G/DL
ALP SERPL-CCNC: 91 U/L (ref 39–117)
ALT SERPL W P-5'-P-CCNC: 26 U/L (ref 1–41)
ANION GAP SERPL CALCULATED.3IONS-SCNC: 10.9 MMOL/L (ref 5–15)
APTT PPP: 31.4 SECONDS (ref 26.5–34.5)
ARTERIAL PATENCY WRIST A: ABNORMAL
AST SERPL-CCNC: 23 U/L (ref 1–40)
ATMOSPHERIC PRESS: 724 MMHG
BASE EXCESS BLDA CALC-SCNC: 2.5 MMOL/L (ref 0–2)
BASOPHILS # BLD AUTO: 0.03 10*3/MM3 (ref 0–0.2)
BASOPHILS NFR BLD AUTO: 0.2 % (ref 0–1.5)
BDY SITE: ABNORMAL
BILIRUB SERPL-MCNC: 0.8 MG/DL (ref 0–1.2)
BODY TEMPERATURE: 0 C
BUN SERPL-MCNC: 10 MG/DL (ref 8–23)
BUN/CREAT SERPL: 12.8 (ref 7–25)
CALCIUM SPEC-SCNC: 9.8 MG/DL (ref 8.6–10.5)
CHLORIDE SERPL-SCNC: 97 MMOL/L (ref 98–107)
CO2 BLDA-SCNC: 29.2 MMOL/L (ref 22–33)
CO2 SERPL-SCNC: 27.1 MMOL/L (ref 22–29)
COHGB MFR BLD: 1.5 % (ref 0–5)
CREAT SERPL-MCNC: 0.78 MG/DL (ref 0.76–1.27)
CRP SERPL-MCNC: 7.33 MG/DL (ref 0–0.5)
D DIMER PPP FEU-MCNC: 1.61 MCGFEU/ML (ref 0–0.65)
D-LACTATE SERPL-SCNC: 2.3 MMOL/L (ref 0.5–2)
D-LACTATE SERPL-SCNC: 2.5 MMOL/L (ref 0.5–2)
DEPRECATED RDW RBC AUTO: 44 FL (ref 37–54)
EGFRCR SERPLBLD CKD-EPI 2021: 99 ML/MIN/1.73
EOSINOPHIL # BLD AUTO: 0.02 10*3/MM3 (ref 0–0.4)
EOSINOPHIL NFR BLD AUTO: 0.2 % (ref 0.3–6.2)
ERYTHROCYTE [DISTWIDTH] IN BLOOD BY AUTOMATED COUNT: 14.9 % (ref 12.3–15.4)
FLUAV RNA RESP QL NAA+PROBE: NOT DETECTED
FLUBV RNA RESP QL NAA+PROBE: NOT DETECTED
GAS FLOW AIRWAY: 3.5 LPM
GEN 5 2HR TROPONIN T REFLEX: 32 NG/L
GLOBULIN UR ELPH-MCNC: 3.2 GM/DL
GLUCOSE SERPL-MCNC: 142 MG/DL (ref 65–99)
HCO3 BLDA-SCNC: 27.8 MMOL/L (ref 20–26)
HCT VFR BLD AUTO: 49 % (ref 37.5–51)
HCT VFR BLD CALC: 47.9 % (ref 38–51)
HGB BLD-MCNC: 15.5 G/DL (ref 13–17.7)
HGB BLDA-MCNC: 15.6 G/DL (ref 14–18)
HOLD SPECIMEN: NORMAL
IMM GRANULOCYTES # BLD AUTO: 0.06 10*3/MM3 (ref 0–0.05)
IMM GRANULOCYTES NFR BLD AUTO: 0.5 % (ref 0–0.5)
INHALED O2 CONCENTRATION: 34 %
INR PPP: 1.04 (ref 0.9–1.1)
LYMPHOCYTES # BLD AUTO: 0.9 10*3/MM3 (ref 0.7–3.1)
LYMPHOCYTES NFR BLD AUTO: 7 % (ref 19.6–45.3)
Lab: ABNORMAL
MCH RBC QN AUTO: 25.8 PG (ref 26.6–33)
MCHC RBC AUTO-ENTMCNC: 31.6 G/DL (ref 31.5–35.7)
MCV RBC AUTO: 81.7 FL (ref 79–97)
METHGB BLD QL: 0.1 % (ref 0–3)
MODALITY: ABNORMAL
MONOCYTES # BLD AUTO: 0.63 10*3/MM3 (ref 0.1–0.9)
MONOCYTES NFR BLD AUTO: 4.9 % (ref 5–12)
NEUTROPHILS NFR BLD AUTO: 11.16 10*3/MM3 (ref 1.7–7)
NEUTROPHILS NFR BLD AUTO: 87.2 % (ref 42.7–76)
NOTE: ABNORMAL
NRBC BLD AUTO-RTO: 0 /100 WBC (ref 0–0.2)
NT-PROBNP SERPL-MCNC: 444.4 PG/ML (ref 0–900)
OXYHGB MFR BLDV: 93.2 % (ref 94–99)
PCO2 BLDA: 44.2 MM HG (ref 35–45)
PCO2 TEMP ADJ BLD: ABNORMAL MM[HG]
PH BLDA: 7.41 PH UNITS (ref 7.35–7.45)
PH, TEMP CORRECTED: ABNORMAL
PLATELET # BLD AUTO: 245 10*3/MM3 (ref 140–450)
PMV BLD AUTO: 9.3 FL (ref 6–12)
PO2 BLDA: 69.3 MM HG (ref 83–108)
PO2 TEMP ADJ BLD: ABNORMAL MM[HG]
POTASSIUM SERPL-SCNC: 4.1 MMOL/L (ref 3.5–5.2)
PROT SERPL-MCNC: 7.1 G/DL (ref 6–8.5)
PROTHROMBIN TIME: 13.8 SECONDS (ref 12.1–14.7)
RBC # BLD AUTO: 6 10*6/MM3 (ref 4.14–5.8)
SAO2 % BLDCOA: 94.7 % (ref 94–99)
SARS-COV-2 RNA RESP QL NAA+PROBE: NOT DETECTED
SODIUM SERPL-SCNC: 135 MMOL/L (ref 136–145)
TROPONIN T DELTA: 1 NG/L
TROPONIN T SERPL HS-MCNC: 31 NG/L
VENTILATOR MODE: ABNORMAL
WBC NRBC COR # BLD: 12.8 10*3/MM3 (ref 3.4–10.8)
WHOLE BLOOD HOLD COAG: NORMAL
WHOLE BLOOD HOLD SPECIMEN: NORMAL

## 2023-02-09 PROCEDURE — 93010 ELECTROCARDIOGRAM REPORT: CPT | Performed by: INTERNAL MEDICINE

## 2023-02-09 PROCEDURE — 83880 ASSAY OF NATRIURETIC PEPTIDE: CPT | Performed by: STUDENT IN AN ORGANIZED HEALTH CARE EDUCATION/TRAINING PROGRAM

## 2023-02-09 PROCEDURE — 80053 COMPREHEN METABOLIC PANEL: CPT | Performed by: STUDENT IN AN ORGANIZED HEALTH CARE EDUCATION/TRAINING PROGRAM

## 2023-02-09 PROCEDURE — 82805 BLOOD GASES W/O2 SATURATION: CPT

## 2023-02-09 PROCEDURE — 36600 WITHDRAWAL OF ARTERIAL BLOOD: CPT

## 2023-02-09 PROCEDURE — 0 IOPAMIDOL PER 1 ML: Performed by: STUDENT IN AN ORGANIZED HEALTH CARE EDUCATION/TRAINING PROGRAM

## 2023-02-09 PROCEDURE — 94640 AIRWAY INHALATION TREATMENT: CPT

## 2023-02-09 PROCEDURE — 87636 SARSCOV2 & INF A&B AMP PRB: CPT | Performed by: STUDENT IN AN ORGANIZED HEALTH CARE EDUCATION/TRAINING PROGRAM

## 2023-02-09 PROCEDURE — 71275 CT ANGIOGRAPHY CHEST: CPT

## 2023-02-09 PROCEDURE — 85730 THROMBOPLASTIN TIME PARTIAL: CPT | Performed by: STUDENT IN AN ORGANIZED HEALTH CARE EDUCATION/TRAINING PROGRAM

## 2023-02-09 PROCEDURE — 82375 ASSAY CARBOXYHB QUANT: CPT

## 2023-02-09 PROCEDURE — 71045 X-RAY EXAM CHEST 1 VIEW: CPT

## 2023-02-09 PROCEDURE — 36415 COLL VENOUS BLD VENIPUNCTURE: CPT

## 2023-02-09 PROCEDURE — 25010000002 AZITHROMYCIN PER 500 MG: Performed by: STUDENT IN AN ORGANIZED HEALTH CARE EDUCATION/TRAINING PROGRAM

## 2023-02-09 PROCEDURE — 93005 ELECTROCARDIOGRAM TRACING: CPT | Performed by: STUDENT IN AN ORGANIZED HEALTH CARE EDUCATION/TRAINING PROGRAM

## 2023-02-09 PROCEDURE — 85025 COMPLETE CBC W/AUTO DIFF WBC: CPT | Performed by: STUDENT IN AN ORGANIZED HEALTH CARE EDUCATION/TRAINING PROGRAM

## 2023-02-09 PROCEDURE — 85379 FIBRIN DEGRADATION QUANT: CPT | Performed by: STUDENT IN AN ORGANIZED HEALTH CARE EDUCATION/TRAINING PROGRAM

## 2023-02-09 PROCEDURE — 71275 CT ANGIOGRAPHY CHEST: CPT | Performed by: RADIOLOGY

## 2023-02-09 PROCEDURE — 84484 ASSAY OF TROPONIN QUANT: CPT | Performed by: STUDENT IN AN ORGANIZED HEALTH CARE EDUCATION/TRAINING PROGRAM

## 2023-02-09 PROCEDURE — 71045 X-RAY EXAM CHEST 1 VIEW: CPT | Performed by: RADIOLOGY

## 2023-02-09 PROCEDURE — 99284 EMERGENCY DEPT VISIT MOD MDM: CPT

## 2023-02-09 PROCEDURE — 83605 ASSAY OF LACTIC ACID: CPT | Performed by: STUDENT IN AN ORGANIZED HEALTH CARE EDUCATION/TRAINING PROGRAM

## 2023-02-09 PROCEDURE — 85610 PROTHROMBIN TIME: CPT | Performed by: STUDENT IN AN ORGANIZED HEALTH CARE EDUCATION/TRAINING PROGRAM

## 2023-02-09 PROCEDURE — 83050 HGB METHEMOGLOBIN QUAN: CPT

## 2023-02-09 PROCEDURE — 87040 BLOOD CULTURE FOR BACTERIA: CPT | Performed by: STUDENT IN AN ORGANIZED HEALTH CARE EDUCATION/TRAINING PROGRAM

## 2023-02-09 PROCEDURE — 94799 UNLISTED PULMONARY SVC/PX: CPT

## 2023-02-09 PROCEDURE — 96367 TX/PROPH/DG ADDL SEQ IV INF: CPT

## 2023-02-09 PROCEDURE — 86140 C-REACTIVE PROTEIN: CPT | Performed by: STUDENT IN AN ORGANIZED HEALTH CARE EDUCATION/TRAINING PROGRAM

## 2023-02-09 PROCEDURE — 25010000002 CEFTRIAXONE PER 250 MG: Performed by: STUDENT IN AN ORGANIZED HEALTH CARE EDUCATION/TRAINING PROGRAM

## 2023-02-09 PROCEDURE — 96365 THER/PROPH/DIAG IV INF INIT: CPT

## 2023-02-09 RX ORDER — ASPIRIN 81 MG/1
324 TABLET, CHEWABLE ORAL ONCE
Status: COMPLETED | OUTPATIENT
Start: 2023-02-09 | End: 2023-02-09

## 2023-02-09 RX ORDER — AZITHROMYCIN 500 MG/1
500 TABLET, FILM COATED ORAL DAILY
Qty: 4 TABLET | Refills: 0 | Status: SHIPPED | OUTPATIENT
Start: 2023-02-09 | End: 2023-02-13

## 2023-02-09 RX ORDER — CEFDINIR 300 MG/1
300 CAPSULE ORAL 2 TIMES DAILY
Qty: 14 CAPSULE | Refills: 0 | Status: SHIPPED | OUTPATIENT
Start: 2023-02-09 | End: 2023-02-16

## 2023-02-09 RX ORDER — SODIUM CHLORIDE 0.9 % (FLUSH) 0.9 %
10 SYRINGE (ML) INJECTION AS NEEDED
Status: DISCONTINUED | OUTPATIENT
Start: 2023-02-09 | End: 2023-02-09 | Stop reason: HOSPADM

## 2023-02-09 RX ORDER — IPRATROPIUM BROMIDE AND ALBUTEROL SULFATE 2.5; .5 MG/3ML; MG/3ML
3 SOLUTION RESPIRATORY (INHALATION) ONCE
Status: COMPLETED | OUTPATIENT
Start: 2023-02-09 | End: 2023-02-09

## 2023-02-09 RX ADMIN — ASPIRIN 243 MG: 81 TABLET, CHEWABLE ORAL at 11:54

## 2023-02-09 RX ADMIN — IPRATROPIUM BROMIDE AND ALBUTEROL SULFATE 3 ML: .5; 2.5 SOLUTION RESPIRATORY (INHALATION) at 12:11

## 2023-02-09 RX ADMIN — SODIUM CHLORIDE, POTASSIUM CHLORIDE, SODIUM LACTATE AND CALCIUM CHLORIDE 1000 ML: 600; 310; 30; 20 INJECTION, SOLUTION INTRAVENOUS at 14:39

## 2023-02-09 RX ADMIN — IOPAMIDOL 86 ML: 755 INJECTION, SOLUTION INTRAVENOUS at 12:50

## 2023-02-09 RX ADMIN — AZITHROMYCIN MONOHYDRATE 500 MG: 500 INJECTION, POWDER, LYOPHILIZED, FOR SOLUTION INTRAVENOUS at 14:39

## 2023-02-09 RX ADMIN — CEFTRIAXONE 2 G: 2 INJECTION, POWDER, FOR SOLUTION INTRAMUSCULAR; INTRAVENOUS at 15:51

## 2023-02-09 NOTE — ED PROVIDER NOTES
"  [facility]  emergency department encounter        Pt Name: Rory Rios Jr.  MRN: [id number]  Birthdate 1960  Date of evaluation: 2/9/2023    Chief Complaint   Patient presents with   • Shortness of Breath   • Chest Pain             HISTORY OF PRESENT ILLNESS:   Rory Rios Jr. is a 63 y.o. male PMH black lung disease, GERD, Paroxysmal atrial tachycardia/atrial flutter, HTN, arthritis, nocturnal hypoxemia, COOPER on CPAP, COPD, T2DM, complicated pneumoconiosis, TMJ dysfunction, degenerative disc disease.  Chronic hypoxic respiratory failure on 3 L baseline nasal cannula at home.    Patient presents with predominant complaint of chest pain and smothering sensation.  New onset last night while at rest watching TV.  Symptoms constant, progressively worsening.  Pain right anterior chest, nonradiating, nondescript, patient only able to describe it as \"it hurts\".  Worse with deep respiration.  Endorses chills throughout last night.  Worsening of his chronic cough with productive sputum.  Endorses headache, myalgias, rhinorrhea.  No abdominal pain nausea vomiting diarrhea dysuria, leg swelling.  Family reports patient was so short of breath he had difficulty speaking and walking across her room.    Tachypneic and tachycardic on arrival.  Patient had single dose of baby aspirin this morning.    Patient declines prior cardiac catheterization.  Stress test 2 weeks ago reported by family as reassuring.        PCP: Alejandro Monique MD          REVIEW OF SYSTEMS:     Review of Systems   Constitutional: Positive for chills. Negative for fever.   HENT: Positive for rhinorrhea. Negative for congestion.    Respiratory: Positive for cough and shortness of breath.    Cardiovascular: Positive for chest pain. Negative for leg swelling.   Gastrointestinal: Negative for abdominal pain, nausea and vomiting.   Genitourinary: Negative for dysuria.   Musculoskeletal: Positive for myalgias.   Neurological: Positive for " "headaches.   Psychiatric/Behavioral: Negative for confusion.       Review of systems otherwise as per HPI.          PREVIOUS HISTORY:         Past Medical History:   Diagnosis Date   • Black lung disease (CMS/HCC)    • GERD (gastroesophageal reflux disease)          No past surgical history on file.        Social History     Socioeconomic History   • Marital status:    Tobacco Use   • Smoking status: Every Day   • Smokeless tobacco: Current     Types: Chew   Substance and Sexual Activity   • Alcohol use: No   • Drug use: No   • Sexual activity: Defer         No family history on file.      Current Outpatient Medications   Medication Instructions   • albuterol (PROVENTIL HFA;VENTOLIN HFA) 108 (90 BASE) MCG/ACT inhaler 2 puffs, Inhalation, Every 4 Hours PRN   • azithromycin (ZITHROMAX) 500 mg, Oral, Daily   • cefdinir (OMNICEF) 300 mg, Oral, 2 Times Daily   • esomeprazole (NEXIUM) 20 mg, Oral, Every Morning Before Breakfast   • guaiFENesin (MUCINEX) 1,200 mg, Oral, 2 Times Daily   • HYDROcodone-acetaminophen (NORCO) 7.5-325 MG per tablet 1 tablet, Oral, Every 8 Hours PRN   • levoFLOXacin (LEVAQUIN) 500 mg, Oral, Daily   • metoprolol succinate XL (TOPROL-XL) 25 mg, Oral, Daily   • raNITIdine (ZANTAC) 75 mg, Oral, 2 Times Daily         Allergies:  Patient has no known allergies.          PHYSICAL EXAM:     Patient Vitals for the past 24 hrs:   BP Temp Temp src Pulse Resp SpO2 Height Weight   02/09/23 1640 123/81 97.9 °F (36.6 °C) Oral 95 20 94 % -- --   02/09/23 1219 -- -- -- 100 24 -- -- --   02/09/23 1211 -- -- -- 106 24 94 % -- --   02/09/23 1125 135/88 96.8 °F (36 °C) Infrared 106 22 95 % 195.6 cm (77\") 134 kg (295 lb)       Physical Exam  Vitals and nursing note reviewed.   Constitutional:       General: He is not in acute distress.     Appearance: Normal appearance. He is obese. He is ill-appearing. He is not toxic-appearing.   HENT:      Head: Normocephalic and atraumatic.   Eyes:      Extraocular " Movements: Extraocular movements intact.      Conjunctiva/sclera: Conjunctivae normal.   Cardiovascular:      Rate and Rhythm: Regular rhythm. Tachycardia present.   Pulmonary:      Effort: No respiratory distress.      Breath sounds: No wheezing or rales.      Comments: Not in respiratory distress but tachypneic with increased respiratory effort.  Lungs clear, no increased expiratory phase or wheeze noted.  Abdominal:      General: Abdomen is flat. There is no distension.   Musculoskeletal:         General: No deformity. Normal range of motion.      Cervical back: Normal range of motion. No rigidity.      Right lower leg: No edema.      Left lower leg: No edema.   Skin:     Coloration: Skin is not jaundiced.      Findings: No rash.   Neurological:      General: No focal deficit present.      Mental Status: He is alert and oriented to person, place, and time.   Psychiatric:         Mood and Affect: Mood normal.         Behavior: Behavior normal.             COMPLETED DIAGNOSTIC STUDIES AND INTERVENTIONS:     Lab Results (last 24 hours)     Procedure Component Value Units Date/Time    Blood Gas, Arterial With Co-Ox [[id number]]  (Abnormal) Collected: 02/09/23 1142    Specimen: Arterial Blood Updated: 02/09/23 1143     Site Left Brachial     Mike's Test N/A     pH, Arterial 7.407 pH units      pCO2, Arterial 44.2 mm Hg      pO2, Arterial 69.3 mm Hg      Comment: 84 Value below reference range        HCO3, Arterial 27.8 mmol/L      Comment: 83 Value above reference range        Base Excess, Arterial 2.5 mmol/L      O2 Saturation, Arterial 94.7 %      Hemoglobin, Blood Gas 15.6 g/dL      Hematocrit, Blood Gas 47.9 %      Oxyhemoglobin 93.2 %      Comment: 84 Value below reference range        Methemoglobin 0.10 %      Carboxyhemoglobin 1.5 %      A-a DO2 111.8 mmHg      CO2 Content 29.2 mmol/L      Temperature 0.0 C      Barometric Pressure for Blood Gas 724 mmHg      Modality Nasal Cannula     FIO2 34 %      Flow Rate  3.5 lpm      Ventilator Mode NA     Note --     Collected by [id number]     Comment: Meter: [id number]     :  [id number]        pH, Temp Corrected --     pCO2, Temperature Corrected --     pO2, Temperature Corrected --    aPTT [[id number]]  (Normal) Collected: 02/09/23 1155    Specimen: Blood from Arm, Right Updated: 02/09/23 1217     PTT 31.4 seconds     Narrative:      PTT Heparin Therapeutic Range:  59 - 95 seconds      CBC & Differential [[id number]]  (Abnormal) Collected: 02/09/23 1155    Specimen: Blood from Arm, Right Updated: 02/09/23 1204    Narrative:      The following orders were created for panel order CBC & Differential.  Procedure                               Abnormality         Status                     ---------                               -----------         ------                     CBC Auto Differential[[id number]]        Abnormal            Final result                 Please view results for these tests on the individual orders.    Comprehensive Metabolic Panel [[id number]]  (Abnormal) Collected: 02/09/23 1155    Specimen: Blood from Arm, Right Updated: 02/09/23 1232     Glucose 142 mg/dL      BUN 10 mg/dL      Creatinine 0.78 mg/dL      Sodium 135 mmol/L      Potassium 4.1 mmol/L      Chloride 97 mmol/L      CO2 27.1 mmol/L      Calcium 9.8 mg/dL      Total Protein 7.1 g/dL      Albumin 3.9 g/dL      ALT (SGPT) 26 U/L      AST (SGOT) 23 U/L      Alkaline Phosphatase 91 U/L      Total Bilirubin 0.8 mg/dL      Globulin 3.2 gm/dL      A/G Ratio 1.2 g/dL      BUN/Creatinine Ratio 12.8     Anion Gap 10.9 mmol/L      eGFR 99.0 mL/min/1.73     Narrative:      GFR Normal >60  Chronic Kidney Disease <60  Kidney Failure <15      High Sensitivity Troponin T [[id number]]  (Abnormal) Collected: 02/09/23 1155    Specimen: Blood from Arm, Right Updated: 02/09/23 1233     HS Troponin T 31 ng/L     Narrative:      High Sensitive Troponin T Reference Range:  <10.0 ng/L- Negative Female for AMI  <15.0  ng/L- Negative Male for AMI  >=10 - Abnormal Female indicating possible myocardial injury.  >=15 - Abnormal Male indicating possible myocardial injury.   Clinicians would have to utilize clinical acumen, EKG, Troponin, and serial changes to determine if it is an Acute Myocardial Infarction or myocardial injury due to an underlying chronic condition.         Protime-INR [[id number]]  (Normal) Collected: 02/09/23 1155    Specimen: Blood from Arm, Right Updated: 02/09/23 1217     Protime 13.8 Seconds      INR 1.04    Narrative:      Suggested INR therapeutic range for stable oral anticoagulant therapy:    Low Intensity therapy:   1.5-2.0  Moderate Intensity therapy:   2.0-3.0  High Intensity therapy:   2.5-4.0    CBC Auto Differential [[id number]]  (Abnormal) Collected: 02/09/23 1155    Specimen: Blood from Arm, Right Updated: 02/09/23 1204     WBC 12.80 10*3/mm3      RBC 6.00 10*6/mm3      Hemoglobin 15.5 g/dL      Hematocrit 49.0 %      MCV 81.7 fL      MCH 25.8 pg      MCHC 31.6 g/dL      RDW 14.9 %      RDW-SD 44.0 fl      MPV 9.3 fL      Platelets 245 10*3/mm3      Neutrophil % 87.2 %      Lymphocyte % 7.0 %      Monocyte % 4.9 %      Eosinophil % 0.2 %      Basophil % 0.2 %      Immature Grans % 0.5 %      Neutrophils, Absolute 11.16 10*3/mm3      Lymphocytes, Absolute 0.90 10*3/mm3      Monocytes, Absolute 0.63 10*3/mm3      Eosinophils, Absolute 0.02 10*3/mm3      Basophils, Absolute 0.03 10*3/mm3      Immature Grans, Absolute 0.06 10*3/mm3      nRBC 0.0 /100 WBC     BNP [[id number]]  (Normal) Collected: 02/09/23 1155    Specimen: Blood from Arm, Right Updated: 02/09/23 1230     proBNP 444.4 pg/mL     Narrative:      Among patients with dyspnea, NT-proBNP is highly sensitive for the detection of acute congestive heart failure. In addition NT-proBNP of <300 pg/ml effectively rules out acute congestive heart failure with 99% negative predictive value.      D-dimer, Quantitative [[id number]]  (Abnormal) Collected:  "02/09/23 1155    Specimen: Blood from Arm, Right Updated: 02/09/23 1217     D-Dimer, Quantitative 1.61 MCGFEU/mL     Narrative:      According to the assay 's published package insert, a normal (<0.50 MCGFEU/mL) D-dimer result in conjunction with a non-high clinical probability assessment, excludes deep vein thrombosis (DVT) and pulmonary embolism (PE) with high sensitivity.    D-dimer values increase with age and this can make VTE exclusion of an older population difficult. To address this, the [employer] College [employer], based on best available evidence and recent guidelines, recommends that clinicians use age-adjusted D-dimer thresholds in patients greater than 50 years of age with: a) a low probability of PE who do not meet all Pulmonary Embolism Rule Out Criteria, or b) in those with intermediate probability of PE.   The formula for an age-adjusted D-dimer cut-off is \"age/100\".  For example, a 60 year old patient would have an age-adjusted cut-off of 0.60 MCGFEU/mL and an 80 year old 0.80 MCGFEU/mL.    C-reactive Protein [[id number]]  (Abnormal) Collected: 02/09/23 1155    Specimen: Blood from Arm, Right Updated: 02/09/23 1309     C-Reactive Protein 7.33 mg/dL     Lactic Acid, Plasma [[id number]]  (Abnormal) Collected: 02/09/23 1155    Specimen: Blood from Arm, Right Updated: 02/09/23 1425     Lactate 2.3 mmol/L     COVID PRE-OP / PRE-PROCEDURE SCREENING ORDER (NO ISOLATION) - Swab, Nasopharynx [[id number]]  (Normal) Collected: 02/09/23 1157    Specimen: Swab from Nasopharynx Updated: 02/09/23 1223    Narrative:      The following orders were created for panel order COVID PRE-OP / PRE-PROCEDURE SCREENING ORDER (NO ISOLATION) - Swab, Nasopharynx.  Procedure                               Abnormality         Status                     ---------                               -----------         ------                     COVID-19 and FLU A/B PCR...[[id number]]  Normal              Final result           "       Please view results for these tests on the individual orders.    COVID-19 and FLU A/B PCR - Swab, Nasopharynx [[id number]]  (Normal) Collected: 02/09/23 1157    Specimen: Swab from Nasopharynx Updated: 02/09/23 1223     COVID19 Not Detected     Influenza A PCR Not Detected     Influenza B PCR Not Detected    Narrative:      Fact sheet for providers: https://www.fda.gov/media/985170/download    Fact sheet for patients: https://www.fda.gov/media/956937/download    Test performed by PCR.    Blood Culture - Blood, Arm, Left [[id number]] Collected: 02/09/23 1354    Specimen: Blood from Arm, Left Updated: 02/09/23 1435    High Sensitivity Troponin T 2Hr [[id number]]  (Abnormal) Collected: 02/09/23 1359    Specimen: Blood from Hand, Left Updated: 02/09/23 1428     HS Troponin T 32 ng/L      Troponin T Delta 1 ng/L     Narrative:      High Sensitive Troponin T Reference Range:  <10.0 ng/L- Negative Female for AMI  <15.0 ng/L- Negative Male for AMI  >=10 - Abnormal Female indicating possible myocardial injury.  >=15 - Abnormal Male indicating possible myocardial injury.   Clinicians would have to utilize clinical acumen, EKG, Troponin, and serial changes to determine if it is an Acute Myocardial Infarction or myocardial injury due to an underlying chronic condition.         Blood Culture - Blood, Hand, Left [[id number]] Collected: 02/09/23 1359    Specimen: Blood from Hand, Left Updated: 02/09/23 1435    STAT Lactic Acid, Reflex [[id number]]  (Abnormal) Collected: 02/09/23 1541    Specimen: Blood from Hand, Left Updated: 02/09/23 1728     Lactate 2.5 mmol/L             CT Angiogram Chest Pulmonary Embolism   Final Result   No PE   1.  Small left pleural effusion and tiny right pleural effusion.   2.  Bilateral hilar region fairly consolidative appearing airspace   disease possibly representing bilateral pneumonia.   3.  Right lower lobe pulmonary nodule measuring 1.7 cm that should be   followed up on resolution of  symptoms.       This report was finalized on 2/9/2023 1:12 PM by Dr. Sanya Zavala MD.          XR Chest 1 View   Final Result   1.  Again is parahilar slightly worsened bilateral airspace disease.   2.  Small left pleural effusion.       This report was finalized on 2/9/2023 12:05 PM by Dr. Sanya Zavala MD.                New Medications Ordered This Visit   Medications   • aspirin chewable tablet 324 mg   • ipratropium-albuterol (DUO-NEB) nebulizer solution 3 mL   • iopamidol (ISOVUE-370) 76 % injection 100 mL   • cefTRIAXone (ROCEPHIN) 2 g in sodium chloride 0.9 % 100 mL IVPB-VTB   • azithromycin (ZITHROMAX) 500 mg in sodium chloride 0.9 % 250 mL IVPB-VTB   • lactated ringers bolus 1,000 mL   • cefdinir (OMNICEF) 300 MG capsule     Sig: Take 1 capsule by mouth 2 (Two) Times a Day for 7 days.     Dispense:  14 capsule     Refill:  0   • azithromycin (ZITHROMAX) 500 MG tablet     Sig: Take 1 tablet by mouth Daily for 4 days.     Dispense:  4 tablet     Refill:  0         Procedures            MEDICAL DECISION-MAKING AND ED COURSE:     ED Course as of 02/09/23 2018   u Feb 09, 2023   1132 EKG at 11:24 AM sinus tachycardia 107 bpm, , QRS 66, QTc 437, regular axis, poor R wave progression, no significant ST deviation or T wave abnormalities concerning for acute ischemia. [KP]   1152 63-year-old male with prolific right anterior nonradiating chest pain and smothering sensation, short of breath tachypneic.  Endorses chills headache myalgias.  Predominant concern for pneumonia, less likely VTE.  No history of CHF, no leg swelling.  Symptoms not typical of ACS, EKG nonischemic, troponins pending.  D-dimer pending. [KP]   1153 Modality: Nasal Cannula [KP]   1153 Flow Rate: 3.5 [KP]   1153 pCO2, Arterial: 44.2 [KP]   1153 pO2, Arterial(!): 69.3 [KP]   1153 O2 Saturation, Arterial: 94.7 [KP]   1208 XR Chest 1 View  1.  Again is parahilar slightly worsened bilateral airspace disease.  2.  Small left pleural effusion.  [KP]   1221 D-Dimer, Quant(!): 1.61 [KP]   1253 HS Troponin T(!): 31 [KP]   1254 Heart Score 4 [KP]   1254 COVID19: Not Detected [KP]   1254 Influenza A PCR: Not Detected [KP]   1254 Influenza B PCR: Not Detected [KP]   1254 proBNP: 444.4 [KP]   1254 Creatinine: 0.78 [KP]   1336 C-Reactive Protein(!): 7.33 [KP]   1336 CT Angiogram Chest Pulmonary Embolism  1.  Small left pleural effusion and tiny right pleural effusion.  2.  Bilateral hilar region fairly consolidative appearing airspace  disease possibly representing bilateral pneumonia.  3.  Right lower lobe pulmonary nodule measuring 1.7 cm that should be  followed up on resolution of symptoms. [KP]   1337 Treat for community-acquired pneumonia, ceftriaxone and azithromycin ordered.  1 L LR IVF.  Blood cultures and lactic acid ordered. [KP]   2016 Discussed case with Dr. Powers, no need for admission given recent reassuring stress test and symptoms not consistent with ACS. Discussed and offered admission. Pt ambulated feeling significantly better than this morning and chose to be discharged home and will maintain 3L NC, return as needed for worsening or new onset symptoms, otherwise will follow-up with pcp in a timely manner. Cefdinir and azithromycin for discharge. [KP]      ED Course User Index  [KP] Bud Fernandez MD       ?      FINAL IMPRESSION:       1. Pneumonia of both lungs due to infectious organism, unspecified part of lung    2. Nonspecific chest pain         The complaints listed here are new problems to this examiner.       Medication List      New Prescriptions    azithromycin 500 MG tablet  Commonly known as: ZITHROMAX  Take 1 tablet by mouth Daily for 4 days.     cefdinir 300 MG capsule  Commonly known as: OMNICEF  Take 1 capsule by mouth 2 (Two) Times a Day for 7 days.           Where to Get Your Medications      These medications were sent to [id number] - [place], [place] - [facility] [address] [facility] [address]  - [phone]  - [phone] FX  [address] [facility] [address]    Phone: [phone]   · azithromycin 500 MG tablet  · cefdinir 300 MG capsule         FOLLOW-UP  Alejandro Monique MD  [address] [place] [id number] [phone]    Schedule an appointment as soon as possible for a visit       [facility] Emergency Department  [address] [place] [id number] [phone]    If symptoms worsen      DISPOSITION  ED Disposition     ED Disposition   Discharge    Condition   Stable    Comment   --                 Please note that portions of this note were completed with a voice recognition program.      Bud Fernandez MD  20:18 EST  2/9/2023             Bud Fernandez MD  02/09/23 2018

## 2023-02-09 NOTE — DISCHARGE INSTRUCTIONS
Take antibiotics as prescribed.  Do not hesitate to call 911 or return here for any new onset or worsening symptoms.

## 2023-02-09 NOTE — ED NOTES
Pt ambulated around the room at this time on his normal 3L via NC, pt tolerated walking test and denies SOA or fatigue.  Provider made aware.

## 2023-02-13 LAB
QT INTERVAL: 328 MS
QTC INTERVAL: 437 MS

## 2023-02-14 LAB
BACTERIA SPEC AEROBE CULT: NORMAL
BACTERIA SPEC AEROBE CULT: NORMAL

## 2023-04-20 ENCOUNTER — HOSPITAL ENCOUNTER (INPATIENT)
Facility: HOSPITAL | Age: 63
LOS: 7 days | Discharge: HOME | DRG: 187 | End: 2023-04-27
Attending: EMERGENCY MEDICINE | Admitting: STUDENT IN AN ORGANIZED HEALTH CARE EDUCATION/TRAINING PROGRAM
Payer: OTHER MISCELLANEOUS

## 2023-04-20 ENCOUNTER — APPOINTMENT (OUTPATIENT)
Dept: GENERAL RADIOLOGY | Facility: HOSPITAL | Age: 63
DRG: 187 | End: 2023-04-20
Payer: OTHER MISCELLANEOUS

## 2023-04-20 DIAGNOSIS — R06.89 DYSPNEA AND RESPIRATORY ABNORMALITIES: ICD-10-CM

## 2023-04-20 DIAGNOSIS — J84.10 PULMONARY FIBROSIS: ICD-10-CM

## 2023-04-20 DIAGNOSIS — J96.11 CHRONIC RESPIRATORY FAILURE WITH HYPOXIA: ICD-10-CM

## 2023-04-20 DIAGNOSIS — J60 COAL WORKERS PNEUMOCONIOSIS: ICD-10-CM

## 2023-04-20 DIAGNOSIS — R91.8 LUNG MASS: ICD-10-CM

## 2023-04-20 DIAGNOSIS — R06.00 DYSPNEA AND RESPIRATORY ABNORMALITIES: ICD-10-CM

## 2023-04-20 DIAGNOSIS — J60 BLACK LUNG: ICD-10-CM

## 2023-04-20 DIAGNOSIS — J90 RECURRENT LEFT PLEURAL EFFUSION: Primary | ICD-10-CM

## 2023-04-20 PROBLEM — I47.19 PAT (PAROXYSMAL ATRIAL TACHYCARDIA): Status: ACTIVE | Noted: 2023-02-07

## 2023-04-20 PROBLEM — I47.1 PAT (PAROXYSMAL ATRIAL TACHYCARDIA): Status: ACTIVE | Noted: 2023-02-07

## 2023-04-20 PROBLEM — I10 PRIMARY HYPERTENSION: Status: ACTIVE | Noted: 2023-02-07

## 2023-04-20 PROBLEM — J44.9 CHRONIC OBSTRUCTIVE PULMONARY DISEASE: Status: ACTIVE | Noted: 2023-04-20

## 2023-04-20 LAB
ALBUMIN SERPL-MCNC: 3.9 G/DL (ref 3.5–5.2)
ALBUMIN/GLOB SERPL: 1.3 G/DL
ALP SERPL-CCNC: 85 U/L (ref 39–117)
ALT SERPL W P-5'-P-CCNC: 20 U/L (ref 1–41)
ANION GAP SERPL CALCULATED.3IONS-SCNC: 10 MMOL/L (ref 5–15)
AST SERPL-CCNC: 20 U/L (ref 1–40)
BASOPHILS # BLD AUTO: 0.06 10*3/MM3 (ref 0–0.2)
BASOPHILS NFR BLD AUTO: 0.7 % (ref 0–1.5)
BILIRUB SERPL-MCNC: 0.3 MG/DL (ref 0–1.2)
BUN SERPL-MCNC: 11 MG/DL (ref 8–23)
BUN/CREAT SERPL: 11.8 (ref 7–25)
CALCIUM SPEC-SCNC: 9.8 MG/DL (ref 8.6–10.5)
CHLORIDE SERPL-SCNC: 104 MMOL/L (ref 98–107)
CO2 SERPL-SCNC: 28 MMOL/L (ref 22–29)
CREAT SERPL-MCNC: 0.93 MG/DL (ref 0.76–1.27)
CRP SERPL-MCNC: 1.15 MG/DL (ref 0–0.5)
DEPRECATED RDW RBC AUTO: 44 FL (ref 37–54)
EGFRCR SERPLBLD CKD-EPI 2021: 91.1 ML/MIN/1.73
EOSINOPHIL # BLD AUTO: 0.41 10*3/MM3 (ref 0–0.4)
EOSINOPHIL NFR BLD AUTO: 4.7 % (ref 0.3–6.2)
ERYTHROCYTE [DISTWIDTH] IN BLOOD BY AUTOMATED COUNT: 14.6 % (ref 12.3–15.4)
ERYTHROCYTE [SEDIMENTATION RATE] IN BLOOD: 67 MM/HR (ref 0–20)
GLOBULIN UR ELPH-MCNC: 2.9 GM/DL
GLUCOSE BLDC GLUCOMTR-MCNC: 86 MG/DL (ref 70–130)
GLUCOSE SERPL-MCNC: 96 MG/DL (ref 65–99)
HCT VFR BLD AUTO: 45.8 % (ref 37.5–51)
HGB BLD-MCNC: 13.7 G/DL (ref 13–17.7)
HOLD SPECIMEN: NORMAL
IMM GRANULOCYTES # BLD AUTO: 0.02 10*3/MM3 (ref 0–0.05)
IMM GRANULOCYTES NFR BLD AUTO: 0.2 % (ref 0–0.5)
LYMPHOCYTES # BLD AUTO: 1.72 10*3/MM3 (ref 0.7–3.1)
LYMPHOCYTES NFR BLD AUTO: 19.9 % (ref 19.6–45.3)
MCH RBC QN AUTO: 24.9 PG (ref 26.6–33)
MCHC RBC AUTO-ENTMCNC: 29.9 G/DL (ref 31.5–35.7)
MCV RBC AUTO: 83.1 FL (ref 79–97)
MONOCYTES # BLD AUTO: 0.74 10*3/MM3 (ref 0.1–0.9)
MONOCYTES NFR BLD AUTO: 8.6 % (ref 5–12)
NEUTROPHILS NFR BLD AUTO: 5.7 10*3/MM3 (ref 1.7–7)
NEUTROPHILS NFR BLD AUTO: 65.9 % (ref 42.7–76)
NRBC BLD AUTO-RTO: 0 /100 WBC (ref 0–0.2)
NT-PROBNP SERPL-MCNC: 117.7 PG/ML (ref 0–900)
PLATELET # BLD AUTO: 248 10*3/MM3 (ref 140–450)
PMV BLD AUTO: 9.3 FL (ref 6–12)
POTASSIUM SERPL-SCNC: 3.8 MMOL/L (ref 3.5–5.2)
PROT SERPL-MCNC: 6.8 G/DL (ref 6–8.5)
QT INTERVAL: 382 MS
QTC INTERVAL: 438 MS
RBC # BLD AUTO: 5.51 10*6/MM3 (ref 4.14–5.8)
SODIUM SERPL-SCNC: 142 MMOL/L (ref 136–145)
TROPONIN T SERPL HS-MCNC: 21 NG/L
WBC NRBC COR # BLD: 8.65 10*3/MM3 (ref 3.4–10.8)
WHOLE BLOOD HOLD COAG: NORMAL
WHOLE BLOOD HOLD SPECIMEN: NORMAL

## 2023-04-20 PROCEDURE — 83880 ASSAY OF NATRIURETIC PEPTIDE: CPT

## 2023-04-20 PROCEDURE — 93005 ELECTROCARDIOGRAM TRACING: CPT | Performed by: PEDIATRICS

## 2023-04-20 PROCEDURE — 94640 AIRWAY INHALATION TREATMENT: CPT

## 2023-04-20 PROCEDURE — 99284 EMERGENCY DEPT VISIT MOD MDM: CPT

## 2023-04-20 PROCEDURE — 99222 1ST HOSP IP/OBS MODERATE 55: CPT | Performed by: PEDIATRICS

## 2023-04-20 PROCEDURE — 85652 RBC SED RATE AUTOMATED: CPT | Performed by: NURSE PRACTITIONER

## 2023-04-20 PROCEDURE — 94660 CPAP INITIATION&MGMT: CPT

## 2023-04-20 PROCEDURE — 82962 GLUCOSE BLOOD TEST: CPT

## 2023-04-20 PROCEDURE — 71045 X-RAY EXAM CHEST 1 VIEW: CPT

## 2023-04-20 PROCEDURE — G0378 HOSPITAL OBSERVATION PER HR: HCPCS

## 2023-04-20 PROCEDURE — 86140 C-REACTIVE PROTEIN: CPT | Performed by: NURSE PRACTITIONER

## 2023-04-20 PROCEDURE — 94799 UNLISTED PULMONARY SVC/PX: CPT

## 2023-04-20 PROCEDURE — 80053 COMPREHEN METABOLIC PANEL: CPT

## 2023-04-20 PROCEDURE — 85025 COMPLETE CBC W/AUTO DIFF WBC: CPT

## 2023-04-20 PROCEDURE — 84484 ASSAY OF TROPONIN QUANT: CPT

## 2023-04-20 PROCEDURE — 94761 N-INVAS EAR/PLS OXIMETRY MLT: CPT

## 2023-04-20 PROCEDURE — 93005 ELECTROCARDIOGRAM TRACING: CPT

## 2023-04-20 RX ORDER — ACETAMINOPHEN 325 MG/1
650 TABLET ORAL EVERY 4 HOURS PRN
Status: DISCONTINUED | OUTPATIENT
Start: 2023-04-20 | End: 2023-04-27 | Stop reason: HOSPADM

## 2023-04-20 RX ORDER — GLYBURIDE-METFORMIN HYDROCHLORIDE 5; 500 MG/1; MG/1
1 TABLET ORAL
Status: ON HOLD | COMMUNITY
End: 2023-04-20

## 2023-04-20 RX ORDER — METOPROLOL SUCCINATE 50 MG/1
50 TABLET, EXTENDED RELEASE ORAL DAILY
Status: DISCONTINUED | OUTPATIENT
Start: 2023-04-21 | End: 2023-04-20

## 2023-04-20 RX ORDER — NABUMETONE 750 MG/1
750 TABLET, FILM COATED ORAL 2 TIMES DAILY
COMMUNITY
End: 2023-04-27 | Stop reason: HOSPADM

## 2023-04-20 RX ORDER — ONDANSETRON 2 MG/ML
4 INJECTION INTRAMUSCULAR; INTRAVENOUS EVERY 6 HOURS PRN
Status: DISCONTINUED | OUTPATIENT
Start: 2023-04-20 | End: 2023-04-27 | Stop reason: HOSPADM

## 2023-04-20 RX ORDER — BUSPIRONE HYDROCHLORIDE 5 MG/1
5 TABLET ORAL NIGHTLY
Status: DISCONTINUED | OUTPATIENT
Start: 2023-04-20 | End: 2023-04-27 | Stop reason: HOSPADM

## 2023-04-20 RX ORDER — GLIPIZIDE 5 MG/1
5 TABLET, FILM COATED, EXTENDED RELEASE ORAL EVERY MORNING
COMMUNITY

## 2023-04-20 RX ORDER — BUSPIRONE HYDROCHLORIDE 5 MG/1
5 TABLET ORAL
COMMUNITY

## 2023-04-20 RX ORDER — LISINOPRIL 10 MG/1
10 TABLET ORAL NIGHTLY
Status: DISCONTINUED | OUTPATIENT
Start: 2023-04-20 | End: 2023-04-27 | Stop reason: HOSPADM

## 2023-04-20 RX ORDER — PANTOPRAZOLE SODIUM 40 MG/1
40 TABLET, DELAYED RELEASE ORAL DAILY
Status: ON HOLD | COMMUNITY
End: 2023-04-20

## 2023-04-20 RX ORDER — INSULIN LISPRO 100 [IU]/ML
0-9 INJECTION, SOLUTION INTRAVENOUS; SUBCUTANEOUS
Status: DISCONTINUED | OUTPATIENT
Start: 2023-04-20 | End: 2023-04-27 | Stop reason: HOSPADM

## 2023-04-20 RX ORDER — AMOXICILLIN 250 MG
2 CAPSULE ORAL 2 TIMES DAILY PRN
Status: DISCONTINUED | OUTPATIENT
Start: 2023-04-20 | End: 2023-04-27 | Stop reason: HOSPADM

## 2023-04-20 RX ORDER — BISACODYL 10 MG
10 SUPPOSITORY, RECTAL RECTAL DAILY PRN
Status: DISCONTINUED | OUTPATIENT
Start: 2023-04-20 | End: 2023-04-27 | Stop reason: HOSPADM

## 2023-04-20 RX ORDER — IPRATROPIUM BROMIDE AND ALBUTEROL SULFATE 2.5; .5 MG/3ML; MG/3ML
3 SOLUTION RESPIRATORY (INHALATION)
Status: DISCONTINUED | OUTPATIENT
Start: 2023-04-20 | End: 2023-04-27 | Stop reason: HOSPADM

## 2023-04-20 RX ORDER — IPRATROPIUM BROMIDE AND ALBUTEROL SULFATE 2.5; .5 MG/3ML; MG/3ML
3 SOLUTION RESPIRATORY (INHALATION) EVERY 6 HOURS PRN
Status: DISCONTINUED | OUTPATIENT
Start: 2023-04-20 | End: 2023-04-27 | Stop reason: HOSPADM

## 2023-04-20 RX ORDER — OMEPRAZOLE 40 MG/1
40 CAPSULE, DELAYED RELEASE ORAL DAILY
COMMUNITY

## 2023-04-20 RX ORDER — SODIUM CHLORIDE 0.9 % (FLUSH) 0.9 %
10 SYRINGE (ML) INJECTION EVERY 12 HOURS SCHEDULED
Status: DISCONTINUED | OUTPATIENT
Start: 2023-04-20 | End: 2023-04-27 | Stop reason: HOSPADM

## 2023-04-20 RX ORDER — ROFLUMILAST 250 UG/1
250 TABLET ORAL DAILY
COMMUNITY

## 2023-04-20 RX ORDER — METOPROLOL SUCCINATE 50 MG/1
50 TABLET, EXTENDED RELEASE ORAL DAILY
Status: DISCONTINUED | OUTPATIENT
Start: 2023-04-20 | End: 2023-04-27 | Stop reason: HOSPADM

## 2023-04-20 RX ORDER — POLYETHYLENE GLYCOL 3350 17 G/17G
17 POWDER, FOR SOLUTION ORAL DAILY PRN
Status: DISCONTINUED | OUTPATIENT
Start: 2023-04-20 | End: 2023-04-27 | Stop reason: HOSPADM

## 2023-04-20 RX ORDER — HYDROXYZINE HYDROCHLORIDE 25 MG/1
25 TABLET, FILM COATED ORAL 3 TIMES DAILY PRN
Status: DISCONTINUED | OUTPATIENT
Start: 2023-04-20 | End: 2023-04-27 | Stop reason: HOSPADM

## 2023-04-20 RX ORDER — SODIUM CHLORIDE 9 MG/ML
40 INJECTION, SOLUTION INTRAVENOUS AS NEEDED
Status: DISCONTINUED | OUTPATIENT
Start: 2023-04-20 | End: 2023-04-27 | Stop reason: HOSPADM

## 2023-04-20 RX ORDER — DEXTROSE MONOHYDRATE 25 G/50ML
25 INJECTION, SOLUTION INTRAVENOUS
Status: DISCONTINUED | OUTPATIENT
Start: 2023-04-20 | End: 2023-04-27 | Stop reason: HOSPADM

## 2023-04-20 RX ORDER — IBUPROFEN 800 MG/1
800 TABLET ORAL 3 TIMES DAILY
COMMUNITY

## 2023-04-20 RX ORDER — HYDROCODONE BITARTRATE AND ACETAMINOPHEN 7.5; 325 MG/1; MG/1
1 TABLET ORAL EVERY 8 HOURS PRN
Status: DISCONTINUED | OUTPATIENT
Start: 2023-04-20 | End: 2023-04-27 | Stop reason: HOSPADM

## 2023-04-20 RX ORDER — AZITHROMYCIN 500 MG/1
500 TABLET, FILM COATED ORAL 3 TIMES WEEKLY
Status: ON HOLD | COMMUNITY
End: 2023-04-20

## 2023-04-20 RX ORDER — KETOROLAC TROMETHAMINE 15 MG/ML
15 INJECTION, SOLUTION INTRAMUSCULAR; INTRAVENOUS EVERY 6 HOURS PRN
Status: DISPENSED | OUTPATIENT
Start: 2023-04-20 | End: 2023-04-22

## 2023-04-20 RX ORDER — ACETAMINOPHEN 650 MG/1
650 SUPPOSITORY RECTAL EVERY 4 HOURS PRN
Status: DISCONTINUED | OUTPATIENT
Start: 2023-04-20 | End: 2023-04-27 | Stop reason: HOSPADM

## 2023-04-20 RX ORDER — ACETAMINOPHEN 160 MG/5ML
650 SOLUTION ORAL EVERY 4 HOURS PRN
Status: DISCONTINUED | OUTPATIENT
Start: 2023-04-20 | End: 2023-04-27 | Stop reason: HOSPADM

## 2023-04-20 RX ORDER — SODIUM CHLORIDE 0.9 % (FLUSH) 0.9 %
10 SYRINGE (ML) INJECTION AS NEEDED
Status: DISCONTINUED | OUTPATIENT
Start: 2023-04-20 | End: 2023-04-27 | Stop reason: HOSPADM

## 2023-04-20 RX ORDER — NICOTINE POLACRILEX 4 MG
15 LOZENGE BUCCAL
Status: DISCONTINUED | OUTPATIENT
Start: 2023-04-20 | End: 2023-04-27 | Stop reason: HOSPADM

## 2023-04-20 RX ORDER — LISINOPRIL 10 MG/1
10 TABLET ORAL NIGHTLY
COMMUNITY

## 2023-04-20 RX ORDER — BISACODYL 5 MG/1
5 TABLET, DELAYED RELEASE ORAL DAILY PRN
Status: DISCONTINUED | OUTPATIENT
Start: 2023-04-20 | End: 2023-04-27 | Stop reason: HOSPADM

## 2023-04-20 RX ORDER — IBUPROFEN 600 MG/1
1 TABLET ORAL
Status: DISCONTINUED | OUTPATIENT
Start: 2023-04-20 | End: 2023-04-27 | Stop reason: HOSPADM

## 2023-04-20 RX ORDER — ONDANSETRON 4 MG/1
4 TABLET, FILM COATED ORAL EVERY 6 HOURS PRN
Status: DISCONTINUED | OUTPATIENT
Start: 2023-04-20 | End: 2023-04-27 | Stop reason: HOSPADM

## 2023-04-20 RX ORDER — ASPIRIN 81 MG/1
81 TABLET ORAL DAILY
COMMUNITY

## 2023-04-20 RX ORDER — PANTOPRAZOLE SODIUM 40 MG/1
40 TABLET, DELAYED RELEASE ORAL
Status: DISCONTINUED | OUTPATIENT
Start: 2023-04-20 | End: 2023-04-27 | Stop reason: HOSPADM

## 2023-04-20 RX ORDER — IPRATROPIUM BROMIDE AND ALBUTEROL SULFATE 2.5; .5 MG/3ML; MG/3ML
3 SOLUTION RESPIRATORY (INHALATION) EVERY 4 HOURS PRN
COMMUNITY

## 2023-04-20 RX ADMIN — BUSPIRONE HYDROCHLORIDE 5 MG: 5 TABLET ORAL at 20:22

## 2023-04-20 RX ADMIN — Medication 10 ML: at 20:22

## 2023-04-20 RX ADMIN — LISINOPRIL 10 MG: 10 TABLET ORAL at 20:22

## 2023-04-20 RX ADMIN — PANTOPRAZOLE SODIUM 40 MG: 40 TABLET, DELAYED RELEASE ORAL at 18:31

## 2023-04-20 RX ADMIN — METOPROLOL SUCCINATE 50 MG: 50 TABLET, EXTENDED RELEASE ORAL at 19:39

## 2023-04-20 RX ADMIN — IPRATROPIUM BROMIDE AND ALBUTEROL SULFATE 3 ML: 2.5; .5 SOLUTION RESPIRATORY (INHALATION) at 21:26

## 2023-04-20 NOTE — PLAN OF CARE
Problem: Adult Inpatient Plan of Care  Goal: Plan of Care Review  Outcome: Ongoing, Progressing  Goal: Patient-Specific Goal (Individualized)  Outcome: Ongoing, Progressing  Goal: Absence of Hospital-Acquired Illness or Injury  Outcome: Ongoing, Progressing  Intervention: Identify and Manage Fall Risk  Recent Flowsheet Documentation  Taken 4/20/2023 1800 by Zee Bryant RN  Safety Promotion/Fall Prevention:   activity supervised   assistive device/personal items within reach   clutter free environment maintained   elopement precautions   fall prevention program maintained  Intervention: Prevent Skin Injury  Recent Flowsheet Documentation  Taken 4/20/2023 1800 by Zee Bryant RN  Body Position: position changed independently  Intervention: Prevent and Manage VTE (Venous Thromboembolism) Risk  Recent Flowsheet Documentation  Taken 4/20/2023 1800 by Zee Bryant RN  Activity Management: ambulated in room  Intervention: Prevent Infection  Recent Flowsheet Documentation  Taken 4/20/2023 1800 by Zee Bryant RN  Infection Prevention:   environmental surveillance performed   equipment surfaces disinfected   hand hygiene promoted   rest/sleep promoted  Goal: Optimal Comfort and Wellbeing  Outcome: Ongoing, Progressing  Intervention: Provide Person-Centered Care  Recent Flowsheet Documentation  Taken 4/20/2023 1600 by Zee Bryant RN  Trust Relationship/Rapport:   care explained   choices provided   questions answered  Goal: Readiness for Transition of Care  Outcome: Ongoing, Progressing     Problem: Diabetes Comorbidity  Goal: Blood Glucose Level Within Targeted Range  Outcome: Ongoing, Progressing     Problem: Hypertension Comorbidity  Goal: Blood Pressure in Desired Range  Outcome: Ongoing, Progressing  Intervention: Maintain Blood Pressure Management  Recent Flowsheet Documentation  Taken 4/20/2023 1800 by Zee Bryant RN  Medication Review/Management: medications reviewed      Problem: Obstructive Sleep Apnea Risk or Actual Comorbidity Management  Goal: Unobstructed Breathing During Sleep  Outcome: Ongoing, Progressing     Problem: Pain Chronic (Persistent) (Comorbidity Management)  Goal: Acceptable Pain Control and Functional Ability  Outcome: Ongoing, Progressing  Intervention: Manage Persistent Pain  Recent Flowsheet Documentation  Taken 4/20/2023 1800 by Zee Bryant, RN  Medication Review/Management: medications reviewed  Taken 4/20/2023 1600 by Zee Bryant RN  Sleep/Rest Enhancement: awakenings minimized   Goal Outcome Evaluation:

## 2023-04-20 NOTE — H&P
[facility] Medicine Services  HISTORY AND PHYSICAL    Patient Name: Rory Riso Jr.  : 1960  MRN: [id number]  Primary Care Physician: Alejandro Monique MD  Date of admission: 2023    Subjective   Subjective     Chief Complaint:  SOB    HPI:  Rory Rios Jr. is a 63 y.o. male with PMH of black lung, GERD, DM recent thoracentesis 23 with 2L taken off, home oxygen 2L. Patient has been noticing increasing soa since thoracentesis. Yesterday he had severe  dyspnea with any ADL's and left sided chest pain with any deep breath. He stayed that way all day. He went to bed  Early and wore his cpap. This am he felt ok when he woke up but when he got up to get dressed got very SOA again. They called their pulmonologist who recommended for them to come to ED since they had a appt with  for possible VATS. He has been using his inhaler and nebs and had to increase his oxygen up to 3-4 L due to SOA; he states fluid from thoracentesis was exudate and cytology and microbiology was all negative. He has been worked up by Cardiology for CHF and cleared. He has been noticing some LE mild edema. He denies any fevers, chills, N/V/D.     In ED: K 3.8, creat 0.93, WBC 8.65, hg b5.51        Review of Systems   Constitutional: Negative for chills and fever.   Respiratory: Positive for cough and shortness of breath.    Cardiovascular: Positive for chest pain. Negative for leg swelling.   Gastrointestinal: Negative for diarrhea, nausea and vomiting.   Genitourinary: Negative for dysuria.            Personal History     Past Medical History:   Diagnosis Date   • Arthritis    • Black lung    • Black lung disease    • Diabetes mellitus    • GERD (gastroesophageal reflux disease)    • Hypertension    • Pneumonia              Past Surgical History:   Procedure Laterality Date   • FOOT SURGERY Left        Family History:  family history includes Diabetes in his brother, mother, and sister; Heart  disease in his brother; Heart failure in his father.     Social History:  reports that he has never smoked. He has never been exposed to tobacco smoke. His smokeless tobacco use includes chew. He reports that he does not drink alcohol and does not use drugs.  Social History     Social History Narrative   • Not on file       Medications:  HYDROcodone-acetaminophen, albuterol sulfate HFA, esomeprazole, lisinopril, metoprolol succinate XL, and omeprazole    No Known Allergies    Objective   Objective     Vital Signs:   Temp:  [97.4 °F (36.3 °C)] 97.4 °F (36.3 °C)  Heart Rate:  [76-84] 84  Resp:  [22] 22  BP: (125-134)/(76-89) 134/89    Physical Exam   Constitutional: Awake, alert  Eyes: PERRLA, sclerae anicteric, no conjunctival injection  HENT: NCAT, mucous membranes moist  Neck: Supple, no thyromegaly, no lymphadenopathy, trachea midline  Respiratory: RML, RLL decreased  nonlabored respirations 2L 94%  Cardiovascular: RRR, no murmurs, rubs, or gallops, palpable pedal pulses bilaterally  Gastrointestinal: Positive bowel sounds, soft, nontender, nondistended  Musculoskeletal: mild bilateral ankle edema, no clubbing or cyanosis to extremities  Psychiatric: Appropriate affect, cooperative  Neurologic: Oriented x 3, strength symmetric in all extremities, Cranial Nerves grossly intact to confrontation, speech clear  Skin: No rashes      Result Review:  I have personally reviewed the results from the time of this admission to 4/20/2023 17:19 EDT and agree with these findings:  [x]  Laboratory list / accordion  []  Microbiology  []  Radiology  []  EKG/Telemetry   []  Cardiology/Vascular   []  Pathology  []  Old records  []  Other:  Most notable findings include:     LAB RESULTS:      Lab 04/20/23  1430   WBC 8.65   HEMOGLOBIN 13.7   HEMATOCRIT 45.8   PLATELETS 248   NEUTROS ABS 5.70   IMMATURE GRANS (ABS) 0.02   LYMPHS ABS 1.72   MONOS ABS 0.74   EOS ABS 0.41*   MCV 83.1   SED RATE 67*         Lab 04/20/23  1430   SODIUM 142    POTASSIUM 3.8   CHLORIDE 104   CO2 28.0   ANION GAP 10.0   BUN 11   CREATININE 0.93   EGFR 91.1   GLUCOSE 96   CALCIUM 9.8         Lab 04/20/23  1430   TOTAL PROTEIN 6.8   ALBUMIN 3.9   GLOBULIN 2.9   ALT (SGPT) 20   AST (SGOT) 20   BILIRUBIN 0.3   ALK PHOS 85         Lab 04/20/23  1430   PROBNP 117.7   HSTROP T 21*                 Brief Urine Lab Results     None        Microbiology Results (last 10 days)     ** No results found for the last 240 hours. **          XR Chest 1 View    Result Date: 4/20/2023  XR CHEST 1 VW Date of Exam: 4/20/2023 2:13 PM EDT Indication: SOA triage protocol. Comparison: Chest CT 2/9/2023 , chest radiograph 2/9/2023. Findings: Heart size is stable. The masslike perihilar infiltrates in the lungs appear unchanged on the right and slightly improved on the left. There is at least a moderate dependent left-sided pleural effusion with some compressive atelectasis in the left lung base.      Impression: Impression: 1. No change in the masslike nodular appearing infiltrate in the right perihilar region. 2. Improvement in the left perihilar infiltrate compared with the last study with improvement in aeration at the left base. 3. Moderate pleural effusion, unchanged. Electronically Signed: Miguelito Deras  4/20/2023 2:22 PM EDT  Workstation ID: [id number]          Assessment & Plan   Assessment & Plan       Recurrent left pleural effusion    Recurrent left Pleural effusion  Black Lung   - Left thoracentesis on 4/5 with 1.5L removed exudate and cytology and microbiology was all negative  -- consult CTS for possible VATS   - NEBs prn and oxygen to keep sat > 90%  -- npo after midnight for possible surgery in am   -- check sed rate and CRP  -- toradol and lortab for pain     HTN  -- cont lisinopril and toprol     GERD  -- cont PPI     DM  -- hold home meds  --MDSSI        DVT prophylaxis:  [facility]     CODE STATUS:  Full code   Level Of Support Discussed With: Patient  Code Status (Patient has no pulse  and is not breathing): CPR (Attempt to Resuscitate)  Medical Interventions (Patient has pulse or is breathing): Full Support      Expected Discharge  Expected Discharge Date and Time     Expected Discharge Date Expected Discharge Time    Apr 23, 2023             This note has been completed as part of a split-shared workflow.     Signature: Electronically signed by Agnieszka Lemus, DILMA, 04/20/23, 4:04 PM EDT.  Time spent by APC 75 min

## 2023-04-21 ENCOUNTER — APPOINTMENT (OUTPATIENT)
Dept: CT IMAGING | Facility: HOSPITAL | Age: 63
DRG: 187 | End: 2023-04-21
Payer: OTHER MISCELLANEOUS

## 2023-04-21 LAB
ANION GAP SERPL CALCULATED.3IONS-SCNC: 11 MMOL/L (ref 5–15)
BUN SERPL-MCNC: 11 MG/DL (ref 8–23)
BUN/CREAT SERPL: 15.1 (ref 7–25)
CALCIUM SPEC-SCNC: 9.3 MG/DL (ref 8.6–10.5)
CHLORIDE SERPL-SCNC: 106 MMOL/L (ref 98–107)
CO2 SERPL-SCNC: 25 MMOL/L (ref 22–29)
CREAT SERPL-MCNC: 0.73 MG/DL (ref 0.76–1.27)
DEPRECATED RDW RBC AUTO: 44.5 FL (ref 37–54)
EGFRCR SERPLBLD CKD-EPI 2021: 101 ML/MIN/1.73
ERYTHROCYTE [DISTWIDTH] IN BLOOD BY AUTOMATED COUNT: 14.6 % (ref 12.3–15.4)
GLUCOSE BLDC GLUCOMTR-MCNC: 104 MG/DL (ref 70–130)
GLUCOSE BLDC GLUCOMTR-MCNC: 141 MG/DL (ref 70–130)
GLUCOSE BLDC GLUCOMTR-MCNC: 96 MG/DL (ref 70–130)
GLUCOSE SERPL-MCNC: 102 MG/DL (ref 65–99)
HBA1C MFR BLD: 6.3 % (ref 4.8–5.6)
HCT VFR BLD AUTO: 42.7 % (ref 37.5–51)
HGB BLD-MCNC: 12.6 G/DL (ref 13–17.7)
MCH RBC QN AUTO: 24.6 PG (ref 26.6–33)
MCHC RBC AUTO-ENTMCNC: 29.5 G/DL (ref 31.5–35.7)
MCV RBC AUTO: 83.2 FL (ref 79–97)
PLATELET # BLD AUTO: 206 10*3/MM3 (ref 140–450)
PMV BLD AUTO: 9.3 FL (ref 6–12)
POTASSIUM SERPL-SCNC: 4.4 MMOL/L (ref 3.5–5.2)
QT INTERVAL: 306 MS
QTC INTERVAL: 475 MS
RBC # BLD AUTO: 5.13 10*6/MM3 (ref 4.14–5.8)
SODIUM SERPL-SCNC: 142 MMOL/L (ref 136–145)
WBC NRBC COR # BLD: 7.87 10*3/MM3 (ref 3.4–10.8)

## 2023-04-21 PROCEDURE — 82962 GLUCOSE BLOOD TEST: CPT

## 2023-04-21 PROCEDURE — G0378 HOSPITAL OBSERVATION PER HR: HCPCS

## 2023-04-21 PROCEDURE — 83036 HEMOGLOBIN GLYCOSYLATED A1C: CPT | Performed by: INTERNAL MEDICINE

## 2023-04-21 PROCEDURE — 94799 UNLISTED PULMONARY SVC/PX: CPT

## 2023-04-21 PROCEDURE — 99232 SBSQ HOSP IP/OBS MODERATE 35: CPT | Performed by: INTERNAL MEDICINE

## 2023-04-21 PROCEDURE — 25510000001 IOPAMIDOL 61 % SOLUTION: Performed by: INTERNAL MEDICINE

## 2023-04-21 PROCEDURE — 80048 BASIC METABOLIC PNL TOTAL CA: CPT | Performed by: NURSE PRACTITIONER

## 2023-04-21 PROCEDURE — 71270 CT THORAX DX C-/C+: CPT

## 2023-04-21 PROCEDURE — 25010000002 KETOROLAC TROMETHAMINE PER 15 MG: Performed by: NURSE PRACTITIONER

## 2023-04-21 PROCEDURE — 99222 1ST HOSP IP/OBS MODERATE 55: CPT | Performed by: THORACIC SURGERY (CARDIOTHORACIC VASCULAR SURGERY)

## 2023-04-21 PROCEDURE — 94660 CPAP INITIATION&MGMT: CPT

## 2023-04-21 PROCEDURE — 85027 COMPLETE CBC AUTOMATED: CPT | Performed by: NURSE PRACTITIONER

## 2023-04-21 RX ADMIN — KETOROLAC TROMETHAMINE 15 MG: 15 INJECTION, SOLUTION INTRAMUSCULAR; INTRAVENOUS at 09:21

## 2023-04-21 RX ADMIN — PANTOPRAZOLE SODIUM 40 MG: 40 TABLET, DELAYED RELEASE ORAL at 08:28

## 2023-04-21 RX ADMIN — PANTOPRAZOLE SODIUM 40 MG: 40 TABLET, DELAYED RELEASE ORAL at 18:15

## 2023-04-21 RX ADMIN — IPRATROPIUM BROMIDE AND ALBUTEROL SULFATE 3 ML: .5; 2.5 SOLUTION RESPIRATORY (INHALATION) at 15:44

## 2023-04-21 RX ADMIN — LISINOPRIL 10 MG: 10 TABLET ORAL at 20:39

## 2023-04-21 RX ADMIN — BUSPIRONE HYDROCHLORIDE 5 MG: 5 TABLET ORAL at 20:39

## 2023-04-21 RX ADMIN — HYDROXYZINE HYDROCHLORIDE 25 MG: 25 TABLET ORAL at 18:19

## 2023-04-21 RX ADMIN — IPRATROPIUM BROMIDE AND ALBUTEROL SULFATE 3 ML: .5; 2.5 SOLUTION RESPIRATORY (INHALATION) at 19:24

## 2023-04-21 RX ADMIN — METOPROLOL SUCCINATE 50 MG: 50 TABLET, EXTENDED RELEASE ORAL at 08:28

## 2023-04-21 RX ADMIN — IOPAMIDOL 85 ML: 612 INJECTION, SOLUTION INTRAVENOUS at 13:09

## 2023-04-21 RX ADMIN — Medication 10 ML: at 08:28

## 2023-04-21 NOTE — PLAN OF CARE
Goal Outcome Evaluation:   Spoke with patient Resting in bed 2l o2 nsr no complaints at this time.     Problem: Adult Inpatient Plan of Care  Goal: Plan of Care Review  Outcome: Ongoing, Progressing  Goal: Patient-Specific Goal (Individualized)  Outcome: Ongoing, Progressing  Goal: Absence of Hospital-Acquired Illness or Injury  Outcome: Ongoing, Progressing  Intervention: Identify and Manage Fall Risk  Recent Flowsheet Documentation  Taken 4/21/2023 1800 by Zee Bryant RN  Safety Promotion/Fall Prevention:   activity supervised   assistive device/personal items within reach   clutter free environment maintained   elopement precautions   fall prevention program maintained  Taken 4/21/2023 1600 by Zee Bryant RN  Safety Promotion/Fall Prevention:   activity supervised   assistive device/personal items within reach   clutter free environment maintained   fall prevention program maintained   elopement precautions  Taken 4/21/2023 1400 by Zee Bryant RN  Safety Promotion/Fall Prevention:   activity supervised   assistive device/personal items within reach   clutter free environment maintained   elopement precautions   fall prevention program maintained  Taken 4/21/2023 1200 by Zee Bryant RN  Safety Promotion/Fall Prevention:   assistive device/personal items within reach   activity supervised   clutter free environment maintained   elopement precautions   fall prevention program maintained  Taken 4/21/2023 1000 by Zee Bryant RN  Safety Promotion/Fall Prevention:   activity supervised   assistive device/personal items within reach   clutter free environment maintained   elopement precautions   fall prevention program maintained  Taken 4/21/2023 0800 by Zee Bryant RN  Safety Promotion/Fall Prevention:   activity supervised   assistive device/personal items within reach   clutter free environment maintained   fall prevention program maintained   elopement  precautions  Intervention: Prevent Skin Injury  Recent Flowsheet Documentation  Taken 4/21/2023 1800 by Zee Bryant RN  Body Position: position changed independently  Taken 4/21/2023 1600 by Zee Bryant RN  Body Position: position changed independently  Taken 4/21/2023 1400 by Zee Bryant RN  Body Position: position changed independently  Taken 4/21/2023 1200 by Zee Bryant RN  Body Position: position changed independently  Taken 4/21/2023 1000 by Zee Bryant RN  Body Position: position changed independently  Taken 4/21/2023 0800 by Zee Bryant RN  Body Position: position changed independently  Skin Protection:   adhesive use limited   tubing/devices free from skin contact  Intervention: Prevent and Manage VTE (Venous Thromboembolism) Risk  Recent Flowsheet Documentation  Taken 4/21/2023 1800 by Zee Bryant RN  Activity Management: activity encouraged  Taken 4/21/2023 1600 by Zee Bryant RN  Activity Management: up ad justen  Taken 4/21/2023 1400 by Zee Bryant RN  Activity Management: activity encouraged  Taken 4/21/2023 1200 by Zee Bryant RN  Activity Management: activity encouraged  Taken 4/21/2023 1000 by Zee Bryant RN  Activity Management: activity encouraged  Taken 4/21/2023 0800 by Zee Bryant RN  Activity Management: activity encouraged  Range of Motion: active ROM (range of motion) encouraged  Intervention: Prevent Infection  Recent Flowsheet Documentation  Taken 4/21/2023 1800 by Zee Bryant RN  Infection Prevention:   environmental surveillance performed   equipment surfaces disinfected   hand hygiene promoted   rest/sleep promoted  Taken 4/21/2023 1600 by Zee Bryant RN  Infection Prevention:   environmental surveillance performed   equipment surfaces disinfected   hand hygiene promoted   rest/sleep promoted  Taken 4/21/2023 1400 by Zee Bryant RN  Infection Prevention:   environmental surveillance  performed   equipment surfaces disinfected   hand hygiene promoted   rest/sleep promoted  Taken 4/21/2023 1200 by Zee Bryant RN  Infection Prevention:   environmental surveillance performed   equipment surfaces disinfected   hand hygiene promoted   rest/sleep promoted  Taken 4/21/2023 1000 by Zee Bryant RN  Infection Prevention:   environmental surveillance performed   equipment surfaces disinfected   hand hygiene promoted   rest/sleep promoted  Taken 4/21/2023 0800 by Zee Bryant RN  Infection Prevention:   environmental surveillance performed   equipment surfaces disinfected   hand hygiene promoted   rest/sleep promoted  Goal: Optimal Comfort and Wellbeing  Outcome: Ongoing, Progressing  Intervention: Provide Person-Centered Care  Recent Flowsheet Documentation  Taken 4/21/2023 0800 by Zee Bryant RN  Trust Relationship/Rapport:   care explained   choices provided   questions answered   questions encouraged  Goal: Readiness for Transition of Care  Outcome: Ongoing, Progressing     Problem: COPD (Chronic Obstructive Pulmonary Disease) Comorbidity  Goal: Maintenance of COPD Symptom Control  Outcome: Ongoing, Progressing  Intervention: Maintain COPD-Symptom Control  Recent Flowsheet Documentation  Taken 4/21/2023 1800 by Zee Bryant RN  Medication Review/Management: medications reviewed  Taken 4/21/2023 1600 by Zee Bryant RN  Medication Review/Management: medications reviewed  Taken 4/21/2023 1200 by Zee Bryant RN  Medication Review/Management: medications reviewed  Taken 4/21/2023 1000 by Zee Bryant RN  Medication Review/Management: medications reviewed  Taken 4/21/2023 0800 by Zee Bryant RN  Supportive Measures:   active listening utilized   verbalization of feelings encouraged  Medication Review/Management: medications reviewed     Problem: Obstructive Sleep Apnea Risk or Actual Comorbidity Management  Goal: Unobstructed Breathing During  Sleep  Outcome: Ongoing, Progressing     Problem: Pain Chronic (Persistent) (Comorbidity Management)  Goal: Acceptable Pain Control and Functional Ability  Outcome: Ongoing, Progressing  Intervention: Manage Persistent Pain  Recent Flowsheet Documentation  Taken 4/21/2023 1800 by Zee Bryant RN  Medication Review/Management: medications reviewed  Taken 4/21/2023 1600 by Zee Bryant RN  Medication Review/Management: medications reviewed  Taken 4/21/2023 1200 by Zee Bryant RN  Medication Review/Management: medications reviewed  Taken 4/21/2023 1000 by Zee Bryant RN  Medication Review/Management: medications reviewed  Taken 4/21/2023 0800 by Zee Bryant RN  Medication Review/Management: medications reviewed  Intervention: Optimize Psychosocial Wellbeing  Recent Flowsheet Documentation  Taken 4/21/2023 0800 by Zee Bryant RN  Supportive Measures:   active listening utilized   verbalization of feelings encouraged  Diversional Activities:   smartphone   television

## 2023-04-21 NOTE — PROGRESS NOTES
[facility] Medicine Services  PROGRESS NOTE    Patient Name: Rory Rios Jr.  : 1960  MRN: [id number]    Date of Admission: 2023  Primary Care Physician: Alejandro Monique MD    Subjective   Subjective     CC: Follow-up SOA    HPI: Patient had a rough night, feels smothered with worsening SOA    ROS:  Gen- No fevers, chills  CV- No chest pain, palpitations  Resp- No cough, +dyspnea  GI- No N/V/D, abd pain      Objective   Objective     Vital Signs:   Temp:  [97.7 °F (36.5 °C)-98 °F (36.7 °C)] 97.8 °F (36.6 °C)  Heart Rate:  [] 74  Resp:  [18-28] 22  BP: (112-137)/(68-80) 112/68  Flow (L/min):  [2.5-4] 4     Physical Exam:  Constitutional: No acute distress, awake, alert  HENT: NCAT, mucous membranes moist  Respiratory: Moderate labored respirations, diminished breath sounds at the bases, on 4 L NC  Cardiovascular: RRR, no murmurs, rubs, or gallops  Gastrointestinal: Positive bowel sounds, soft, nontender, nondistended  Musculoskeletal: No bilateral ankle edema  Psychiatric: Appropriate affect, cooperative  Neurologic: Oriented x 3, nonfocal  Skin: No rashes    Results Reviewed:  LAB RESULTS:      Lab 23  0442 23  1430   WBC 7.87 8.65   HEMOGLOBIN 12.6* 13.7   HEMATOCRIT 42.7 45.8   PLATELETS 206 248   NEUTROS ABS  --  5.70   IMMATURE GRANS (ABS)  --  0.02   LYMPHS ABS  --  1.72   MONOS ABS  --  0.74   EOS ABS  --  0.41*   MCV 83.2 83.1   SED RATE  --  67*   CRP  --  1.15*         Lab 23  0442 23  1430   SODIUM 142 142   POTASSIUM 4.4 3.8   CHLORIDE 106 104   CO2 25.0 28.0   ANION GAP 11.0 10.0   BUN 11 11   CREATININE 0.73* 0.93   EGFR 101.0 91.1   GLUCOSE 102* 96   CALCIUM 9.3 9.8   HEMOGLOBIN A1C 6.30*  --          Lab 23  1430   TOTAL PROTEIN 6.8   ALBUMIN 3.9   GLOBULIN 2.9   ALT (SGPT) 20   AST (SGOT) 20   BILIRUBIN 0.3   ALK PHOS 85         Lab 23  1430   PROBNP 117.7   HSTROP T 21*                 Brief Urine Lab Results      None          Microbiology Results Abnormal     None          CT Chest With & Without Contrast Diagnostic    Result Date: 4/21/2023  CT CHEST W WO CONTRAST DIAGNOSTIC Date of Exam: 4/21/2023 12:57 PM EDT Indication: Lung nodule, > 8mm lung mass, pleural effusion. Comparison: 4/20/2023 chest radiograph. Technique: Axial CT images were obtained of the chest before and after the uneventful intravenous administration of 85 mL Isovue-300.  Reconstructed coronal and sagittal images were also obtained. Automated exposure control and iterative construction methods were used. Findings: History indicates black lung with increasing dyspnea, some tachycardia. Today's study shows extensive irregular perihilar masses involving not only the upper lobes but also the right and left lower lobes, with adjacent irregular nodular disease and reticular interstitial disease, typical of PMF. Most of the areas of involvement would be indistinguishable from pulmonary neoplasm.  There is an unusually discrete and rounded area of the left-sided mass, in the superior left lower lobe, best appreciated on image 46 series 2, which measures approximately 4.5 cm in diameter. This is most likely to represent masslike PMF as well, although superimposed neoplasm would appear similar.  There are shotty mediastinal lymph nodes, not unusual for inhalational disease, and no clearly pathologic mediastinal adenopathy. No hilar adenopathy is seen distinct from the patient's presumed PMF. There is no pericardial effusion or right pleural effusion but there is a large apparently free-flowing left pleural effusion. No debris or hematocrit layer is seen. The airways appear to be normally patent except for mild narrowing where they extend through the areas of presumed PMF, and, notably, where they appear truncated as they extend into the left lower lobe mass. Please refer to axial image 48 series 2 and axial image 52 series 2. Included images of the upper abdomen  show normal liver morphology and mild fatty liver change. No significant inabilities are seen in the included portions of the spleen gallbladder, adrenal glands or pleural poles. There is mild fatty liver change as is  often seen in older patients. No ascites is identified. Bony structures appear to be intact.     Impression: Impression: 1. Extensive masslike perihilar disease, consistent with history of longstanding inhalational disease and progressive massive fibrosis. 2. Asymmetrically rounded and masslike area associated with left perihilar PMF in the left lower lobe, 4.5 cm in diameter, which may also reflect advanced PMF, but at least potentially could represent superimposed neoplasm, as discussed above. 3. Large, free-flowing left pleural effusion of uncertain significance. Electronically Signed: Mitch Sears  4/21/2023 3:48 PM EDT  Workstation ID: [id number]    XR Chest 1 View    Result Date: 4/20/2023  XR CHEST 1 VW Date of Exam: 4/20/2023 2:13 PM EDT Indication: SOA triage protocol. Comparison: Chest CT 2/9/2023 , chest radiograph 2/9/2023. Findings: Heart size is stable. The masslike perihilar infiltrates in the lungs appear unchanged on the right and slightly improved on the left. There is at least a moderate dependent left-sided pleural effusion with some compressive atelectasis in the left lung base.      Impression: Impression: 1. No change in the masslike nodular appearing infiltrate in the right perihilar region. 2. Improvement in the left perihilar infiltrate compared with the last study with improvement in aeration at the left base. 3. Moderate pleural effusion, unchanged. Electronically Signed: Miguelito Deras  4/20/2023 2:22 PM EDT  Workstation ID: [id number]          Current medications:  Scheduled Meds:busPIRone, 5 mg, Oral, Nightly  insulin lispro, 0-9 Units, Subcutaneous, TID AC  ipratropium-albuterol, 3 mL, Nebulization, 4x Daily - RT  lisinopril, 10 mg, Oral, Nightly  metoprolol succinate XL, 50 mg,  Oral, Daily  pantoprazole, 40 mg, Oral, BID AC  sodium chloride, 10 mL, Intravenous, Q12H      Continuous Infusions:   PRN Meds:.•  acetaminophen **OR** acetaminophen **OR** acetaminophen  •  senna-docusate sodium **AND** polyethylene glycol **AND** bisacodyl **AND** bisacodyl  •  Calcium Replacement - Follow Nurse / BPA Driven Protocol  •  dextrose  •  dextrose  •  glucagon (human recombinant)  •  HYDROcodone-acetaminophen  •  hydrOXYzine  •  ipratropium-albuterol  •  ketorolac  •  Magnesium Standard Dose Replacement - Follow Nurse / BPA Driven Protocol  •  ondansetron **OR** ondansetron  •  Phosphorus Replacement - Follow Nurse / BPA Driven Protocol  •  Potassium Replacement - Follow Nurse / BPA Driven Protocol  •  sodium chloride  •  sodium chloride  •  sodium chloride    Assessment & Plan   Assessment & Plan     Active Hospital Problems    Diagnosis  POA   • **Recurrent left pleural effusion [J90]  Yes   • Black lung [J60]  Yes   • Chronic obstructive pulmonary disease [J44.9]  Yes   • PAT (paroxysmal atrial tachycardia) [I47.1]  Yes   • Primary hypertension [I10]  Yes      Resolved Hospital Problems   No resolved problems to display.        Brief Hospital Course to date:  Rory Rios Jr. is a 63 y.o. male with history of paroxysmal atrial tachycardia, hypertension, COPD on 2 L NC, black lung, recent pleural effusion status post thoracentesis who presents with progressively worsening SOA     Recurrent left pleural effusions  -Checks x-ray shows moderate left pleural effusion  -He is s/p recent thoracentesis 4/5/2023,  fluid was exudative, cytology and microbiology were all negative  -CTS following, repeat CT chest shows extensive mass like perihilar disease, large left pleural effusion  -Per CTS, plan for chest tube placement today     Chronic respiratory failure with hypoxia  COPD/black lung disease 14  COOPER on CPAP  -Patient on 2 L at baseline, currently on 4 L NC, wean as able  -Continue CPAP  nightly     Hypertension  -BP currently stable, continue lisinopril and metoprolol     Paroxysmal atrial tachycardia  -Continue metoprolol     Well controlled type 2 diabetes with A1C 6.3%  -Continue SSI for now     Expected Discharge Location and Transportation: home vs rehab  Expected Discharge   Expected Discharge Date and Time     Expected Discharge Date Expected Discharge Time    Apr 25, 2023            DVT prophylaxis:  Mechanical DVT prophylaxis orders are present.     AM-PAC 6 Clicks Score (PT): 19 (04/21/23 0800)    CODE STATUS:   Code Status and Medical Interventions:   Ordered at: 04/20/23 1713     Level Of Support Discussed With:    Patient     Code Status (Patient has no pulse and is not breathing):    CPR (Attempt to Resuscitate)     Medical Interventions (Patient has pulse or is breathing):    Full Support       Shlomo Roblero MD  04/22/23

## 2023-04-21 NOTE — PLAN OF CARE
Problem: Adult Inpatient Plan of Care  Goal: Plan of Care Review  Outcome: Ongoing, Progressing  Flowsheets (Taken 4/21/2023 0242)  Progress: no change  Plan of Care Reviewed With: patient  Outcome Evaluation: Pt resting comfortably in bed. Episode of SVT at beginning of shift, resolved independently. NPO since midnight, hopeful to discharge after procedure.  Goal: Patient-Specific Goal (Individualized)  Outcome: Ongoing, Progressing  Flowsheets (Taken 4/21/2023 0242)  Patient-Specific Goals (Include Timeframe): Pt will remain free of respiratory distress  Individualized Care Needs: meds  Anxieties, Fears or Concerns: none verbalized  Goal: Absence of Hospital-Acquired Illness or Injury  Outcome: Ongoing, Progressing  Intervention: Identify and Manage Fall Risk  Recent Flowsheet Documentation  Taken 4/21/2023 0200 by Jaz Vidales, EMILY  Safety Promotion/Fall Prevention:   safety round/check completed   room organization consistent   nonskid shoes/slippers when out of bed   clutter free environment maintained   assistive device/personal items within reach  Taken 4/21/2023 0000 by Jaz Vidales, RN  Safety Promotion/Fall Prevention:   safety round/check completed   room organization consistent   nonskid shoes/slippers when out of bed   clutter free environment maintained   assistive device/personal items within reach   lighting adjusted  Taken 4/20/2023 2200 by Jaz Vidales, RN  Safety Promotion/Fall Prevention:   safety round/check completed   room organization consistent   nonskid shoes/slippers when out of bed   clutter free environment maintained   assistive device/personal items within reach   lighting adjusted  Taken 4/20/2023 1930 by Jaz Vidales, RN  Safety Promotion/Fall Prevention:   safety round/check completed   room organization consistent   nonskid shoes/slippers when out of bed   fall prevention program maintained   clutter free environment maintained   assistive device/personal items within  reach  Intervention: Prevent Skin Injury  Recent Flowsheet Documentation  Taken 4/21/2023 0200 by Jaz Vidales RN  Body Position: position changed independently  Taken 4/21/2023 0000 by Jaz Vidales RN  Body Position: position changed independently  Taken 4/20/2023 2200 by Jaz Vidales RN  Body Position: position changed independently  Taken 4/20/2023 1930 by Jaz Vidales RN  Body Position: position changed independently  Intervention: Prevent and Manage VTE (Venous Thromboembolism) Risk  Recent Flowsheet Documentation  Taken 4/21/2023 0200 by Jaz Vidales RN  Activity Management:   up ad justen   activity minimized  Taken 4/21/2023 0000 by Jaz Vidales RN  Activity Management: activity minimized  Taken 4/20/2023 2200 by Jaz Vidales RN  Activity Management:   activity minimized   up ad justen  Taken 4/20/2023 1930 by Jaz Vidales RN  Activity Management:   ambulated in room   activity minimized  Intervention: Prevent Infection  Recent Flowsheet Documentation  Taken 4/21/2023 0200 by Jaz Vidales RN  Infection Prevention:   single patient room provided   rest/sleep promoted   hand hygiene promoted   environmental surveillance performed  Taken 4/21/2023 0000 by Jaz Vidales RN  Infection Prevention:   single patient room provided   rest/sleep promoted   hand hygiene promoted   environmental surveillance performed  Taken 4/20/2023 2200 by Jaz Vidales RN  Infection Prevention:   single patient room provided   rest/sleep promoted   hand hygiene promoted   environmental surveillance performed  Taken 4/20/2023 1930 by Jaz Vdiales RN  Infection Prevention:   single patient room provided   rest/sleep promoted   hand hygiene promoted   environmental surveillance performed  Goal: Optimal Comfort and Wellbeing  Outcome: Ongoing, Progressing  Intervention: Provide Person-Centered Care  Recent Flowsheet Documentation  Taken 4/20/2023 1930 by Jaz Vidales RN  Trust Relationship/Rapport:   care explained    choices provided   emotional support provided   empathic listening provided   questions encouraged   questions answered   reassurance provided   thoughts/feelings acknowledged  Goal: Readiness for Transition of Care  Outcome: Ongoing, Progressing     Problem: COPD (Chronic Obstructive Pulmonary Disease) Comorbidity  Goal: Maintenance of COPD Symptom Control  Outcome: Ongoing, Progressing  Intervention: Maintain COPD-Symptom Control  Recent Flowsheet Documentation  Taken 4/20/2023 1930 by Jaz Vidales RN  Medication Review/Management: medications reviewed   Goal Outcome Evaluation:  Plan of Care Reviewed With: patient        Progress: no change  Outcome Evaluation: Pt resting comfortably in bed. Episode of SVT at beginning of shift, resolved independently. NPO since midnight, hopeful to discharge after procedure.

## 2023-04-21 NOTE — CASE MANAGEMENT/SOCIAL WORK
Discharge Planning Assessment  [facility]     Patient Name: Rory Rios Jr.  MRN: [id number]  Today's Date: 4/21/2023    Admit Date: 4/20/2023    Plan: Home at DC   Discharge Needs Assessment     Row Name 04/21/23 1109       Living Environment    People in Home alone    Current Living Arrangements home    Living Arrangement Comments Lives in a mobile home. A few steps to enter. Has family support. Family assists with cleaning.       Discharge Needs Assessment    Equipment Currently Used at Home nebulizer;oxygen;cpap    Equipment Needed After Discharge none    Discharge Coordination/Progress Denies current use of HH or outpt servcies. Has 02 at 2L thru Workers [employer]. Unsure what company. Has portable tanks.               Discharge Plan     Row Name 04/21/23 1112       Plan    Plan Home at DC    Patient/Family in Agreement with Plan yes    Plan Comments I spoke with the pt. He denies any DC needs at this time.    Final Discharge Disposition Code 01 - home or self-care    Row Name 04/21/23 0921       Plan    Final Discharge Disposition Code 01 - home or self-care              Continued Care and Services - Admitted Since 4/20/2023    Coordination has not been started for this encounter.       Expected Discharge Date and Time     Expected Discharge Date Expected Discharge Time    Apr 25, 2023          Demographic Summary    No documentation.                Functional Status     Row Name 04/21/23 1109       Functional Status    Usual Activity Tolerance good       Functional Status, IADL    Meal Preparation independent    Housekeeping assistive person    Laundry assistive person    Shopping assistive person               Psychosocial    No documentation.                Abuse/Neglect    No documentation.                Legal    No documentation.                Substance Abuse    No documentation.                Patient Forms    No documentation.                   Preeti Alejo RN

## 2023-04-21 NOTE — PROGRESS NOTES
[facility]  PROGRESS NOTE    Patient Name: Rory Rios Jr.  : 1960  MRN: [id number]    Date of Admission: 2023  Primary Care Physician: Alejandro Monique MD    Subjective   Subjective     CC: Follow-up SOA    HPI: No acute events overnight, patient continues to endorse some SOA, worse with inspiration    ROS:  Gen- No fevers, chills  CV- No chest pain, palpitations  Resp- No cough, + dyspnea  GI- No N/V/D, abd pain    Objective   Objective     Vital Signs:   Temp:  [97.4 °F (36.3 °C)-98.1 °F (36.7 °C)] 98.1 °F (36.7 °C)  Heart Rate:  [] 75  Resp:  [18-22] 18  BP: (125-144)/() 132/79  Flow (L/min):  [2.5-3] 2.5     Physical Exam:  Constitutional: No acute distress, awake, alert  HENT: NCAT, mucous membranes moist  Respiratory: Nonlabored respirations, on 2 L NC  Cardiovascular: RRR, no murmurs, rubs, or gallops  Gastrointestinal: Positive bowel sounds, soft, nontender, nondistended  Musculoskeletal: No bilateral ankle edema  Psychiatric: Appropriate affect, cooperative  Neurologic: Oriented x 3, nonfocal  Skin: No rashes    Results Reviewed:  LAB RESULTS:      Lab 23  0442 23  1430   WBC 7.87 8.65   HEMOGLOBIN 12.6* 13.7   HEMATOCRIT 42.7 45.8   PLATELETS 206 248   NEUTROS ABS  --  5.70   IMMATURE GRANS (ABS)  --  0.02   LYMPHS ABS  --  1.72   MONOS ABS  --  0.74   EOS ABS  --  0.41*   MCV 83.2 83.1   SED RATE  --  67*   CRP  --  1.15*         Lab 23  0442 23  1430   SODIUM 142 142   POTASSIUM 4.4 3.8   CHLORIDE 106 104   CO2 25.0 28.0   ANION GAP 11.0 10.0   BUN 11 11   CREATININE 0.73* 0.93   EGFR 101.0 91.1   GLUCOSE 102* 96   CALCIUM 9.3 9.8         Lab 23  1430   TOTAL PROTEIN 6.8   ALBUMIN 3.9   GLOBULIN 2.9   ALT (SGPT) 20   AST (SGOT) 20   BILIRUBIN 0.3   ALK PHOS 85         Lab 23  1430   PROBNP 117.7   HSTROP T 21*                 Brief Urine Lab Results     None          Microbiology Results Abnormal      None          XR Chest 1 View    Result Date: 4/20/2023  XR CHEST 1 VW Date of Exam: 4/20/2023 2:13 PM EDT Indication: SOA triage protocol. Comparison: Chest CT 2/9/2023 , chest radiograph 2/9/2023. Findings: Heart size is stable. The masslike perihilar infiltrates in the lungs appear unchanged on the right and slightly improved on the left. There is at least a moderate dependent left-sided pleural effusion with some compressive atelectasis in the left lung base.      Impression: Impression: 1. No change in the masslike nodular appearing infiltrate in the right perihilar region. 2. Improvement in the left perihilar infiltrate compared with the last study with improvement in aeration at the left base. 3. Moderate pleural effusion, unchanged. Electronically Signed: Miguelito Deras  4/20/2023 2:22 PM EDT  Workstation ID: [id number]          Current medications:  Scheduled Meds:busPIRone, 5 mg, Oral, Nightly  insulin lispro, 0-9 Units, Subcutaneous, TID AC  ipratropium-albuterol, 3 mL, Nebulization, 4x Daily - RT  lisinopril, 10 mg, Oral, Nightly  metoprolol succinate XL, 50 mg, Oral, Daily  pantoprazole, 40 mg, Oral, BID AC  sodium chloride, 10 mL, Intravenous, Q12H      Continuous Infusions:   PRN Meds:.•  acetaminophen **OR** acetaminophen **OR** acetaminophen  •  senna-docusate sodium **AND** polyethylene glycol **AND** bisacodyl **AND** bisacodyl  •  Calcium Replacement - Follow Nurse / BPA Driven Protocol  •  dextrose  •  dextrose  •  glucagon (human recombinant)  •  HYDROcodone-acetaminophen  •  hydrOXYzine  •  ipratropium-albuterol  •  ketorolac  •  Magnesium Standard Dose Replacement - Follow Nurse / BPA Driven Protocol  •  ondansetron **OR** ondansetron  •  Phosphorus Replacement - Follow Nurse / BPA Driven Protocol  •  Potassium Replacement - Follow Nurse / BPA Driven Protocol  •  sodium chloride  •  sodium chloride  •  sodium chloride    Assessment & Plan   Assessment & Plan     Active Hospital Problems     Diagnosis  POA   • **Recurrent left pleural effusion [J90]  Yes   • Black lung [J60]  Yes   • Chronic obstructive pulmonary disease [J44.9]  Yes   • PAT (paroxysmal atrial tachycardia) [I47.1]  Yes   • Primary hypertension [I10]  Yes      Resolved Hospital Problems   No resolved problems to display.        Brief Hospital Course to date:  Rory Rios Jr. is a 63 y.o. male with history of paroxysmal atrial tachycardia, hypertension, COPD on 2 L NC, black lung, recent pleural effusion status post thoracentesis who presents with progressively worsening SOA    Recurrent left pleural effusions  -Checks x-ray shows moderate left pleural effusion  -He is s/p recent thoracentesis 4/5/2023,  fluid was exudative, cytology and microbiology were all negative  -CTS following, plan to repeat CT chest, he may require chest tube placement, may ultimately require VATS with a wedge resection and possible pleurodesis    Chronic respiratory failure with hypoxia  COPD/black lung disease 14  COOPER on CPAP  -Patient on 2 L at baseline, currently stable  -Continue CPAP nightly    Hypertension  -BP currently stable, continue lisinopril and metoprolol    Paroxysmal atrial tachycardia  -Continue metoprolol    Type 2 diabetes  -Control unknown, follow-up A1c  -Continue SSI for now    Expected Discharge Location and Transportation: Home versus rehab  Expected Discharge   Expected Discharge Date and Time     Expected Discharge Date Expected Discharge Time    Apr 23, 2023            DVT prophylaxis:  Mechanical DVT prophylaxis orders are present.     AM-PAC 6 Clicks Score (PT): 24 (04/20/23 1930)    CODE STATUS:   Code Status and Medical Interventions:   Ordered at: 04/20/23 1713     Level Of Support Discussed With:    Patient     Code Status (Patient has no pulse and is not breathing):    CPR (Attempt to Resuscitate)     Medical Interventions (Patient has pulse or is breathing):    Full Support       Shlomo Roblero MD  04/21/23

## 2023-04-21 NOTE — CONSULTS
CTS Consult    Patient Care Team:  Alejandro Monique MD as PCP - General (General Surgery)      Requesting physician: Alexandria Rivero    Reason for Consultation: Recurrent pleural effusion    Chief complaint: Dyspnea    HPI  Patient is a 63-year-old male with a history of black lung, recent pleural effusion status post thoracentesis on 4/5/2023 with approximately 2 L removed, COPD on home oxygen of 2 L nasal cannula who was scheduled to see Dr. Solis in office on 5/2/2023 for evaluation of his recurrent pleural effusion.  The pleural fluid removed at that time was exudative with no malignant cells identified on cytology and cultures were negative.  According to the patient's family, the patient did have a CT of the chest performed on 4/6/2023 which revealed a new left upper lobe nodule. A CT of the chest from 2/9/2023 revealed a 1.7 cm right lower lobe nodule.  The patient presented to the [facility] emergency department on 4/20/2023 with worsening dyspnea with some lower extremity edema.  The patient had a chest x-ray performed which revealed a moderate left pleural effusion.  We have been asked to evaluate patient for possible VATS with evacuation of pleural effusion and possible pleurodesis.  Currently, patient does admit to dyspnea and pleuritic chest pain however denies fever, chills or hemoptysis.  Of note, the patient was treated for community-acquired pneumonia on an outpatient basis with antibiotics in December and again in January.  In addition the patient has been noted to have intermittent runs of nonsustained tachycardia      Review of Systems    A comprehensive review of systems was negative except for:   Constitutional: Denies fever, chills or recent weight loss or weight gain  Respiratory: Has cough, hemoptysis, recent pneumonia, bronchitis or history of asthma  Cardiovascular: Admits to mild pedal edema, denies orthopnea paroxysmal nocturnal dyspnea  Gastrointestinal: Denies  nausea, vomiting, hematemesis, hematochezia, melena or history peptic ulcer disease  Hematologic/lymphatic: Denies history of coagulopathy, thrombogenic sorter, history of deep vein thrombosis or pulmonary embolus  Neurological: Denies history of headache, cerebrovascular accident, transient ischemic attack or seizure disorder      History  Past Medical History:   Diagnosis Date   • Arthritis    • Black lung    • Black lung disease    • Diabetes mellitus    • GERD (gastroesophageal reflux disease)    • Hypertension    • Pneumonia      Past Surgical History:   Procedure Laterality Date   • FOOT SURGERY Left      Family History   Problem Relation Age of Onset   • Diabetes Mother    • Heart failure Father    • Diabetes Sister    • Heart disease Brother    • Diabetes Brother      Social History     Tobacco Use   • Smoking status: Never     Passive exposure: Never   • Smokeless tobacco: Current     Types: Chew   Vaping Use   • Vaping Use: Never used   Substance Use Topics   • Alcohol use: No   • Drug use: No     Medications Prior to Admission   Medication Sig Dispense Refill Last Dose   • albuterol (PROVENTIL HFA;VENTOLIN HFA) 108 (90 BASE) MCG/ACT inhaler Inhale 2 puffs Every 4 (Four) Hours As Needed for Wheezing or Shortness of Air. 1 inhaler 0 4/20/2023   • aspirin 81 MG EC tablet Take 1 tablet by mouth Daily. OTC   4/20/2023   • Budeson-Glycopyrrol-Formoterol (BREZTRI) 160-9-4.8 MCG/ACT aerosol inhaler Inhale 2 puffs 2 (Two) Times a Day.   4/20/2023   • busPIRone (BUSPAR) 5 MG tablet Take 1 tablet by mouth every night at bedtime.   4/19/2023   • glipizide (GLUCOTROL XL) 5 MG ER tablet Take 1 tablet by mouth Every Morning.   4/20/2023   • HYDROcodone-acetaminophen (NORCO) 7.5-325 MG per tablet Take 1 tablet by mouth 3 (Three) Times a Day.   4/20/2023   • ibuprofen (ADVIL,MOTRIN) 800 MG tablet Take 1 tablet by mouth 3 (Three) Times a Day.   4/20/2023   • ipratropium-albuterol (DUO-NEB) 0.5-2.5 mg/3 ml nebulizer Take 3  mL by nebulization Every 4 (Four) Hours As Needed for Wheezing or Shortness of Air.   4/19/2023   • lisinopril (PRINIVIL,ZESTRIL) 10 MG tablet Take 1 tablet by mouth Every Night.   4/19/2023   • metFORMIN (GLUCOPHAGE) 500 MG tablet Take 1 tablet by mouth 2 (Two) Times a Day With Meals.   4/20/2023   • metoprolol succinate XL (TOPROL-XL) 25 MG 24 hr tablet Take 1 tablet by mouth Daily for 30 days. (Patient taking differently: Take 2 tablets by mouth Every Morning.) 30 tablet 0 4/20/2023   • nabumetone (RELAFEN) 750 MG tablet Take 1 tablet by mouth 2 (Two) Times a Day.   4/20/2023   • omeprazole (priLOSEC) 40 MG capsule Take 1 capsule by mouth Daily.   4/20/2023   • roflumilast (DALIRESP) 250 MCG tablet tablet Take 1 tablet by mouth Daily.   4/20/2023     Allergies:  Patient has no known allergies.    Objective    Vital Signs  Temp:  [97.4 °F (36.3 °C)-98.2 °F (36.8 °C)] 98.2 °F (36.8 °C)  Heart Rate:  [] 75  Resp:  [18-22] 18  BP: (125-144)/() 136/88    Physical Exam:  General Appearance: Well-developed, well-nourished in no acute distress  HEENT: Normocephalic, atraumatic, PERRLA, EOMs intact with mucous membranes moist with no scleral icterus  Neck: Neck is supple without bruits with no thyromegaly or lymphadenopathy  Lungs: Respirations even and unlabored and with diminished bilateral breath sounds with mild expiratory wheezing present  Heart: Regular rate and rhythm with no murmurs, rubs or gallops.  Normal S1-S2 with no jugular venous distention appreciated  Abdomen: Soft, nontender/nondistended with normoactive bowel sounds no rebound or guarding and no organomegaly appreciated  Extremities: Warm with good color and well-perfused with no bilateral lower extremity edema  Pulses: 2+ carotid pulses bilaterally, 2+ radial pulses bilaterally, 2+ femoral pulses bilaterally, 2+ dorsalis pedis and posterior tibialis pulses bilaterally  Skin: No clubbing or cyanosis with no lesions or rashes  appreciated  Neurologic: Alert and oriented x3 with cranial nerves II through XII grossly intact with no motor or sensory deficits appreciated            Data Review:  Results from last 7 days   Lab Units 04/21/23  0442   WBC 10*3/mm3 7.87   HEMOGLOBIN g/dL 12.6*   HEMATOCRIT % 42.7   PLATELETS 10*3/mm3 206     Results from last 7 days   Lab Units 04/21/23  0442   SODIUM mmol/L 142   POTASSIUM mmol/L 4.4   CHLORIDE mmol/L 106   CO2 mmol/L 25.0   BUN mg/dL 11   CREATININE mg/dL 0.73*   GLUCOSE mg/dL 102*   CALCIUM mg/dL 9.3     Coagulation: No results found for: INR, APTT        Imaging Results (Last 72 Hours)     Procedure Component Value Units Date/Time    XR Chest 1 View [[id number]] Collected: 04/20/23 1416     Updated: 04/20/23 1425    Narrative:      XR CHEST 1 VW    Date of Exam: 4/20/2023 2:13 PM EDT    Indication: SOA triage protocol.    Comparison: Chest CT 2/9/2023 , chest radiograph 2/9/2023.    Findings:  Heart size is stable. The masslike perihilar infiltrates in the lungs appear unchanged on the right and slightly improved on the left. There is at least a moderate dependent left-sided pleural effusion with some compressive atelectasis in the left lung   base.         Impression:      Impression:    1. No change in the masslike nodular appearing infiltrate in the right perihilar region.  2. Improvement in the left perihilar infiltrate compared with the last study with improvement in aeration at the left base.  3. Moderate pleural effusion, unchanged.      Electronically Signed: Miguelito Deras    4/20/2023 2:22 PM EDT    Workstation ID: [id number]            Assessment:        Recurrent left pleural effusion    PAT (paroxysmal atrial tachycardia)    Black lung    Chronic obstructive pulmonary disease    Primary hypertension    Moderate recurrent left pleural effusion  Left upper lobe nodule  Right lower lobe nodule    Plan:  We will repeat a CT of the chest to further characterize the nature of this intra  thoracic process and evaluate the left upper lobe nodule.  Patient may require chest tube placement for drainage of the pleural effusion however may ultimately require a VATS with a wedge resection and possible pleurodesis.  We will await the results of the CT of the chest to further guide decision making.      MARAH Newton  04/21/23  07:39 EDT

## 2023-04-22 ENCOUNTER — APPOINTMENT (OUTPATIENT)
Dept: CT IMAGING | Facility: HOSPITAL | Age: 63
DRG: 187 | End: 2023-04-22
Payer: OTHER MISCELLANEOUS

## 2023-04-22 PROBLEM — R91.8 LUNG MASS: Status: ACTIVE | Noted: 2023-04-20

## 2023-04-22 LAB
GLUCOSE BLDC GLUCOMTR-MCNC: 101 MG/DL (ref 70–130)
GLUCOSE BLDC GLUCOMTR-MCNC: 132 MG/DL (ref 70–130)
GLUCOSE BLDC GLUCOMTR-MCNC: 97 MG/DL (ref 70–130)

## 2023-04-22 PROCEDURE — 94799 UNLISTED PULMONARY SVC/PX: CPT

## 2023-04-22 PROCEDURE — 0B9J8ZX DRAINAGE OF LEFT LOWER LUNG LOBE, VIA NATURAL OR ARTIFICIAL OPENING ENDOSCOPIC, DIAGNOSTIC: ICD-10-PCS | Performed by: RADIOLOGY

## 2023-04-22 PROCEDURE — 99223 1ST HOSP IP/OBS HIGH 75: CPT | Performed by: INTERNAL MEDICINE

## 2023-04-22 PROCEDURE — 94660 CPAP INITIATION&MGMT: CPT

## 2023-04-22 PROCEDURE — 99232 SBSQ HOSP IP/OBS MODERATE 35: CPT | Performed by: INTERNAL MEDICINE

## 2023-04-22 PROCEDURE — 32557 INSERT CATH PLEURA W/ IMAGE: CPT | Performed by: RADIOLOGY

## 2023-04-22 PROCEDURE — 93010 ELECTROCARDIOGRAM REPORT: CPT | Performed by: INTERNAL MEDICINE

## 2023-04-22 PROCEDURE — 82962 GLUCOSE BLOOD TEST: CPT

## 2023-04-22 PROCEDURE — 0 LIDOCAINE 1 % SOLUTION: Performed by: RADIOLOGY

## 2023-04-22 PROCEDURE — 93005 ELECTROCARDIOGRAM TRACING: CPT | Performed by: INTERNAL MEDICINE

## 2023-04-22 PROCEDURE — 75989 ABSCESS DRAINAGE UNDER X-RAY: CPT

## 2023-04-22 PROCEDURE — G0378 HOSPITAL OBSERVATION PER HR: HCPCS

## 2023-04-22 PROCEDURE — C1729 CATH, DRAINAGE: HCPCS

## 2023-04-22 PROCEDURE — 07D78ZX EXTRACTION OF THORAX LYMPHATIC, VIA NATURAL OR ARTIFICIAL OPENING ENDOSCOPIC, DIAGNOSTIC: ICD-10-PCS | Performed by: RADIOLOGY

## 2023-04-22 RX ORDER — LIDOCAINE HYDROCHLORIDE 10 MG/ML
10 INJECTION, SOLUTION INFILTRATION; PERINEURAL ONCE
Status: COMPLETED | OUTPATIENT
Start: 2023-04-22 | End: 2023-04-22

## 2023-04-22 RX ORDER — BUDESONIDE 0.5 MG/2ML
0.5 INHALANT ORAL
Status: DISCONTINUED | OUTPATIENT
Start: 2023-04-22 | End: 2023-04-27 | Stop reason: HOSPADM

## 2023-04-22 RX ORDER — LORAZEPAM 0.5 MG/1
0.5 TABLET ORAL EVERY 6 HOURS PRN
Status: DISCONTINUED | OUTPATIENT
Start: 2023-04-22 | End: 2023-04-27 | Stop reason: HOSPADM

## 2023-04-22 RX ADMIN — PANTOPRAZOLE SODIUM 40 MG: 40 TABLET, DELAYED RELEASE ORAL at 08:19

## 2023-04-22 RX ADMIN — HYDROCODONE BITARTRATE AND ACETAMINOPHEN 1 TABLET: 7.5; 325 TABLET ORAL at 08:19

## 2023-04-22 RX ADMIN — PANTOPRAZOLE SODIUM 40 MG: 40 TABLET, DELAYED RELEASE ORAL at 17:42

## 2023-04-22 RX ADMIN — Medication 10 ML: at 08:19

## 2023-04-22 RX ADMIN — IPRATROPIUM BROMIDE AND ALBUTEROL SULFATE 3 ML: .5; 2.5 SOLUTION RESPIRATORY (INHALATION) at 20:53

## 2023-04-22 RX ADMIN — IPRATROPIUM BROMIDE AND ALBUTEROL SULFATE 3 ML: .5; 2.5 SOLUTION RESPIRATORY (INHALATION) at 16:06

## 2023-04-22 RX ADMIN — METOPROLOL SUCCINATE 50 MG: 50 TABLET, EXTENDED RELEASE ORAL at 08:19

## 2023-04-22 RX ADMIN — IPRATROPIUM BROMIDE AND ALBUTEROL SULFATE 3 ML: .5; 2.5 SOLUTION RESPIRATORY (INHALATION) at 09:35

## 2023-04-22 RX ADMIN — Medication 10 ML: at 20:04

## 2023-04-22 RX ADMIN — HYDROXYZINE HYDROCHLORIDE 25 MG: 25 TABLET ORAL at 01:37

## 2023-04-22 RX ADMIN — LISINOPRIL 10 MG: 10 TABLET ORAL at 20:04

## 2023-04-22 RX ADMIN — BUDESONIDE 0.5 MG: 0.5 SUSPENSION RESPIRATORY (INHALATION) at 20:53

## 2023-04-22 RX ADMIN — BUSPIRONE HYDROCHLORIDE 5 MG: 5 TABLET ORAL at 20:04

## 2023-04-22 RX ADMIN — IPRATROPIUM BROMIDE AND ALBUTEROL SULFATE 3 ML: 2.5; .5 SOLUTION RESPIRATORY (INHALATION) at 01:46

## 2023-04-22 RX ADMIN — LIDOCAINE HYDROCHLORIDE 10 ML: 10 INJECTION, SOLUTION INFILTRATION; PERINEURAL at 13:56

## 2023-04-22 NOTE — PLAN OF CARE
Goal Outcome Evaluation:   Patient resting at side of the bed.     Problem: Adult Inpatient Plan of Care  Goal: Plan of Care Review  Outcome: Ongoing, Progressing  Goal: Patient-Specific Goal (Individualized)  Outcome: Ongoing, Progressing  Goal: Absence of Hospital-Acquired Illness or Injury  Outcome: Ongoing, Progressing  Intervention: Identify and Manage Fall Risk  Recent Flowsheet Documentation  Taken 4/22/2023 1800 by Zee Bryant RN  Safety Promotion/Fall Prevention:   activity supervised   assistive device/personal items within reach   clutter free environment maintained   elopement precautions   fall prevention program maintained  Taken 4/22/2023 1600 by Zee Bryant RN  Safety Promotion/Fall Prevention:   activity supervised   assistive device/personal items within reach   clutter free environment maintained   elopement precautions   fall prevention program maintained  Taken 4/22/2023 1400 by Zee Bryant RN  Safety Promotion/Fall Prevention:   activity supervised   assistive device/personal items within reach   elopement precautions   clutter free environment maintained   fall prevention program maintained  Taken 4/22/2023 1200 by Zee Bryant RN  Safety Promotion/Fall Prevention:   activity supervised   assistive device/personal items within reach   elopement precautions   clutter free environment maintained   fall prevention program maintained  Taken 4/22/2023 0800 by Zee Bryant RN  Safety Promotion/Fall Prevention:   activity supervised   clutter free environment maintained   assistive device/personal items within reach   elopement precautions   fall prevention program maintained  Intervention: Prevent Skin Injury  Recent Flowsheet Documentation  Taken 4/22/2023 1800 by Zee Bryant RN  Body Position: position changed independently  Taken 4/22/2023 1600 by Zee Bryant RN  Body Position: position changed independently  Taken 4/22/2023 1400 by Annette  EMILY Koch  Body Position: position changed independently  Taken 4/22/2023 1200 by Zee Bryant RN  Body Position: position changed independently  Taken 4/22/2023 1000 by Zee Bryant RN  Body Position: position changed independently  Taken 4/22/2023 0800 by Zee Bryant RN  Body Position: position changed independently  Skin Protection:   adhesive use limited   tubing/devices free from skin contact  Intervention: Prevent and Manage VTE (Venous Thromboembolism) Risk  Recent Flowsheet Documentation  Taken 4/22/2023 1800 by Zee Bryant RN  Activity Management: activity encouraged  Taken 4/22/2023 1400 by Zee Bryant RN  Activity Management: back to bed  Taken 4/22/2023 1200 by Zee Bryant RN  Activity Management: up ad justen  Taken 4/22/2023 1000 by Zee Bryant RN  Activity Management: activity encouraged  Taken 4/22/2023 0800 by Zee Bryant RN  Activity Management: up ad justen  Range of Motion: active ROM (range of motion) encouraged  Intervention: Prevent Infection  Recent Flowsheet Documentation  Taken 4/22/2023 1800 by Zee Bryant RN  Infection Prevention:   environmental surveillance performed   equipment surfaces disinfected   hand hygiene promoted   rest/sleep promoted  Taken 4/22/2023 1600 by Zee Bryant RN  Infection Prevention:   environmental surveillance performed   equipment surfaces disinfected   hand hygiene promoted   rest/sleep promoted  Taken 4/22/2023 1400 by Zee Bryant RN  Infection Prevention:   equipment surfaces disinfected   environmental surveillance performed   hand hygiene promoted   rest/sleep promoted  Taken 4/22/2023 1200 by Zee Bryant RN  Infection Prevention:   environmental surveillance performed   equipment surfaces disinfected   hand hygiene promoted   rest/sleep promoted  Taken 4/22/2023 1000 by Zee Bryant RN  Infection Prevention:   environmental surveillance performed   equipment surfaces  disinfected   hand hygiene promoted   rest/sleep promoted  Taken 4/22/2023 0800 by Zee Bryant RN  Infection Prevention:   environmental surveillance performed   equipment surfaces disinfected   hand hygiene promoted   rest/sleep promoted  Goal: Optimal Comfort and Wellbeing  Outcome: Ongoing, Progressing  Intervention: Monitor Pain and Promote Comfort  Recent Flowsheet Documentation  Taken 4/22/2023 0800 by Zee Bryant RN  Pain Management Interventions: see MAR  Intervention: Provide Person-Centered Care  Recent Flowsheet Documentation  Taken 4/22/2023 0800 by Zee Bryant RN  Trust Relationship/Rapport:   care explained   choices provided   questions answered   questions encouraged  Goal: Readiness for Transition of Care  Outcome: Ongoing, Progressing     Problem: COPD (Chronic Obstructive Pulmonary Disease) Comorbidity  Goal: Maintenance of COPD Symptom Control  Outcome: Ongoing, Progressing  Intervention: Maintain COPD-Symptom Control  Recent Flowsheet Documentation  Taken 4/22/2023 1800 by Zee Bryant RN  Medication Review/Management: medications reviewed  Taken 4/22/2023 1600 by Zee Bryant RN  Medication Review/Management: medications reviewed  Taken 4/22/2023 1400 by Zee Bryant RN  Medication Review/Management: medications reviewed  Taken 4/22/2023 1200 by Zee Bryant RN  Medication Review/Management: medications reviewed  Taken 4/22/2023 1000 by Zee Bryant RN  Medication Review/Management: medications reviewed  Taken 4/22/2023 0800 by Zee Bryant RN  Supportive Measures:   active listening utilized   verbalization of feelings encouraged  Medication Review/Management: medications reviewed     Problem: Hypertension Comorbidity  Goal: Blood Pressure in Desired Range  Outcome: Ongoing, Progressing  Intervention: Maintain Blood Pressure Management  Recent Flowsheet Documentation  Taken 4/22/2023 1800 by Zee Bryant RN  Medication  Review/Management: medications reviewed  Taken 4/22/2023 1600 by Zee Bryant RN  Medication Review/Management: medications reviewed  Taken 4/22/2023 1400 by Zee Bryant RN  Medication Review/Management: medications reviewed  Taken 4/22/2023 1200 by Zee Bryant RN  Medication Review/Management: medications reviewed  Taken 4/22/2023 1000 by Zee Bryant RN  Medication Review/Management: medications reviewed  Taken 4/22/2023 0800 by Zee Bryant RN  Medication Review/Management: medications reviewed     Problem: Diabetes Comorbidity  Goal: Blood Glucose Level Within Targeted Range  Outcome: Ongoing, Progressing     Problem: Obstructive Sleep Apnea Risk or Actual Comorbidity Management  Goal: Unobstructed Breathing During Sleep  Outcome: Ongoing, Progressing

## 2023-04-22 NOTE — PROCEDURES
"Utilizing CT guidance, a 10 Irish locking pigtail drainage catheter was placed into the left-sided pleural effusion without difficulty.  Yellowish pleural fluid was encountered, with a sample sent for cytology, per the referring service.  The catheter was sutured into place, position into the \"locked\" position, and a sterile dressing was applied.  The catheter is draining to an [facility] Pleur-evac system.  There were no immediate complications.  "

## 2023-04-22 NOTE — NURSING NOTE
10 Malay locking, Left sided CT Guided pigtail catheter chest tube placed by Dr June. 5 mls removed from pleural space and sent to lab. No sedation given.  Patient tolerated well. Report called to nurse.

## 2023-04-22 NOTE — PROGRESS NOTES
* No surgery found *       LOS: 0 days   Patient Care Team:  Alejandro Monique MD as PCP - General (General Surgery)    Chief complaint: Left pleural effusion    Subjective  Much more short of breath this morning    Objective    Vital Signs  Temp:  [97.7 °F (36.5 °C)-98.4 °F (36.9 °C)] 98.4 °F (36.9 °C)  Heart Rate:  [] 88  Resp:  [18-28] 20  BP: (110-137)/(68-79) 110/72    Physical Exam:   General Appearance: alert, appears stated age and cooperative   Lungs: Diminished on the left   heart: Regular rate and rhythm        Results   Results from last 7 days   Lab Units 04/21/23  0442   WBC 10*3/mm3 7.87   HEMOGLOBIN g/dL 12.6*   HEMATOCRIT % 42.7   PLATELETS 10*3/mm3 206     Results from last 7 days   Lab Units 04/21/23  0442   SODIUM mmol/L 142   POTASSIUM mmol/L 4.4   CHLORIDE mmol/L 106   CO2 mmol/L 25.0   BUN mg/dL 11   CREATININE mg/dL 0.73*   GLUCOSE mg/dL 102*   CALCIUM mg/dL 9.3               Assessment      Recurrent left pleural effusion    PAT (paroxysmal atrial tachycardia)    Black lung    Chronic obstructive pulmonary disease    Primary hypertension    Patient is much more symptomatic with shortness of breath this morning  Awaiting pulmonary consult  Plan   IR chest tube placement for left pleural effusion    Magan Doshi PA-C  04/22/23  11:41 EDT

## 2023-04-22 NOTE — CONSULTS
INTENSIVIST / PULMONARY INITIAL VISIT (CONSULT / H&P) NOTE     Hospital:  LOS: 0 days   Mr. Rory Rios Jr., 63 y.o. 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  Was told by his Pulmonologist he needs CTS evaluation.    CT Chest reviewed and compared to 2/9/23.  Showed bilateral masses and nodular changes most consistent with PMF.  There is abrupt cut off of the LLL Bronchus with surrounding atelectasis vs mass.  Most of the RLL appears atelectatic.  There is a large left pleural effusion.  Overall about the same as February, except now there are slightly less inflammatory changes.     Patient has had increased JAUREGUI since December.  Started on Oxygen in February.  Has had several rounds of abx.    Pleural Fluid drained earlier this month by his primary Pulmonologist back home.  Reportedly 1.5 liters removed.  Pleural fluid studies in [facility] show a bloody exudate with predominance of mononuclear cells 93%.  Bacterial culture and GS neg.  Cytology with acute and chronic inflammation.    Patient states he did get significant relief after thoracentesis for about 1-2 weeks.      Past Medical History:   Diagnosis Date   • Arthritis    • Black lung    • Black lung disease    • Diabetes mellitus    • GERD (gastroesophageal reflux disease)    • Hypertension    • Pneumonia      Past Surgical History:   Procedure Laterality Date   • FOOT SURGERY Left      Family History   Problem Relation Age of Onset   • Diabetes Mother    • Heart failure Father    • Diabetes Sister    • Heart disease  Brother    • Diabetes Brother      Social History     Socioeconomic History   • Marital status:    Tobacco Use   • Smoking status: Never     Passive exposure: Never   • Smokeless tobacco: Current     Types: Chew   Vaping Use   • Vaping Use: Never used   Substance and Sexual Activity   • Alcohol use: No   • Drug use: No   • Sexual activity: Defer     No Known Allergies  No current facility-administered medications on file prior to encounter.     Current Outpatient Medications on File Prior to Encounter   Medication Sig Dispense Refill   • albuterol (PROVENTIL HFA;VENTOLIN HFA) 108 (90 BASE) MCG/ACT inhaler Inhale 2 puffs Every 4 (Four) Hours As Needed for Wheezing or Shortness of Air. 1 inhaler 0   • aspirin 81 MG EC tablet Take 1 tablet by mouth Daily. OTC     • Budeson-Glycopyrrol-Formoterol (BREZTRI) 160-9-4.8 MCG/ACT aerosol inhaler Inhale 2 puffs 2 (Two) Times a Day.     • busPIRone (BUSPAR) 5 MG tablet Take 1 tablet by mouth every night at bedtime.     • glipizide (GLUCOTROL XL) 5 MG ER tablet Take 1 tablet by mouth Every Morning.     • HYDROcodone-acetaminophen (NORCO) 7.5-325 MG per tablet Take 1 tablet by mouth 3 (Three) Times a Day.     • ibuprofen (ADVIL,MOTRIN) 800 MG tablet Take 1 tablet by mouth 3 (Three) Times a Day.     • ipratropium-albuterol (DUO-NEB) 0.5-2.5 mg/3 ml nebulizer Take 3 mL by nebulization Every 4 (Four) Hours As Needed for Wheezing or Shortness of Air.     • lisinopril (PRINIVIL,ZESTRIL) 10 MG tablet Take 1 tablet by mouth Every Night.     • metFORMIN (GLUCOPHAGE) 500 MG tablet Take 1 tablet by mouth 2 (Two) Times a Day With Meals.     • metoprolol succinate XL (TOPROL-XL) 25 MG 24 hr tablet Take 1 tablet by mouth Daily for 30 days. (Patient taking differently: Take 2 tablets by mouth Every Morning.) 30 tablet 0   • nabumetone (RELAFEN) 750 MG tablet Take 1 tablet by mouth 2 (Two) Times a Day.     • omeprazole (priLOSEC) 40 MG capsule Take 1 capsule by mouth Daily.     •  roflumilast (DALIRESP) 250 MCG tablet tablet Take 1 tablet by mouth Daily.         ROS:    Per HPI, all other systems were reviewed and were negative          OBJECTIVE     Vital Sign Min/Max for last 24 hours  Temp  Min: 97.7 °F (36.5 °C)  Max: 98.4 °F (36.9 °C)   BP  Min: 110/72  Max: 137/79   Pulse  Min: 73  Max: 131   Resp  Min: 18  Max: 28   SpO2  Min: 92 %  Max: 99 %   No data recorded     Telemetry:              General Appearance:  No acute distress  Eyes:  No scleral icterus or pallor, pupils normal  Ears, Nose, Mouth, Throat:  Atraumatic, oropharynx clear  Neck:  Trachea midline, thyroid normal  Respiratory:  Diminished L > R to auscultation, normal effort  Cardiovascular:  Regular rate and rhythm, no murmurs, no peripheral edema  Gastrointestinal:  Soft, non-tender, non-distended, no hepatosplenomegaly  Skin:  Normal temperature, no rash  Psychiatric:  No agitation  Neuro:  No new focal neurologic deficits observed    SpO2: 92 % SpO2  Min: 92 %  Max: 99 %   Device:      Flow Rate:   No data recorded     Mechanical Ventilator Settings:                                         Intake/Ouptut 24 hrs (7:00AM - 6:59 AM)  Intake & Output (last 3 days)       04/19 0701 04/20 0700 04/20 0701  04/21 0700 04/21 0701  04/22 0700 04/22 0701  04/23 0700    P.O.   220     Total Intake(mL/kg)   220 (1.7)     Urine (mL/kg/hr)   300 (0.1) 300 (0.3)    Total Output   300 300    Net   -80 -300                    Lines, Drains & Airways     Active LDAs     Name Placement date Placement time Site Days    Peripheral IV 04/20/23 1431 Right Antecubital 04/20/23  1431  Antecubital  1    Chest Tube 1 Left Midaxillary 04/22/23  1339  Midaxillary  less than 1                Hematology:  Results from last 7 days   Lab Units 04/21/23  0442 04/20/23  1430   WBC 10*3/mm3 7.87 8.65   HEMOGLOBIN g/dL 12.6* 13.7   HEMATOCRIT % 42.7 45.8   PLATELETS 10*3/mm3 206 248     Electrolytes, Magnesium and Phosphorus:  Results from last 7 days   Lab  Units 04/21/23  0442 04/20/23  1430   SODIUM mmol/L 142 142   POTASSIUM mmol/L 4.4 3.8   CO2 mmol/L 25.0 28.0     Renal:  Results from last 7 days   Lab Units 04/21/23  0442 04/20/23  1430   CREATININE mg/dL 0.73* 0.93   BUN mg/dL 11 11     Estimated Creatinine Clearance: 151.3 mL/min (A) (by C-G formula based on SCr of 0.73 mg/dL (L)).  Estimated Creatinine Clearance: 151.3 mL/min (A) (by C-G formula based on SCr of 0.73 mg/dL (L)).  Hepatic:  Results from last 7 days   Lab Units 04/20/23  1430   ALK PHOS U/L 85   BILIRUBIN mg/dL 0.3   ALT (SGPT) U/L 20   AST (SGOT) U/L 20     Arterial Blood Gases:        Results from last 7 days   Lab Units 04/21/23  0442   HEMOGLOBIN A1C % 6.30*       Lab Results   Component Value Date    LACTATE 2.5 (C) 02/09/2023       CT Chest With & Without Contrast Diagnostic    Result Date: 4/21/2023  Narrative: CT CHEST W WO CONTRAST DIAGNOSTIC Date of Exam: 4/21/2023 12:57 PM EDT Indication: Lung nodule, > 8mm lung mass, pleural effusion. Comparison: 4/20/2023 chest radiograph. Technique: Axial CT images were obtained of the chest before and after the uneventful intravenous administration of 85 mL Isovue-300.  Reconstructed coronal and sagittal images were also obtained. Automated exposure control and iterative construction methods were used. Findings: History indicates black lung with increasing dyspnea, some tachycardia. Today's study shows extensive irregular perihilar masses involving not only the upper lobes but also the right and left lower lobes, with adjacent irregular nodular disease and reticular interstitial disease, typical of PMF. Most of the areas of involvement would be indistinguishable from pulmonary neoplasm.  There is an unusually discrete and rounded area of the left-sided mass, in the superior left lower lobe, best appreciated on image 46 series 2, which measures approximately 4.5 cm in diameter. This is most likely to represent masslike PMF as well, although  superimposed neoplasm would appear similar.  There are shotty mediastinal lymph nodes, not unusual for inhalational disease, and no clearly pathologic mediastinal adenopathy. No hilar adenopathy is seen distinct from the patient's presumed PMF. There is no pericardial effusion or right pleural effusion but there is a large apparently free-flowing left pleural effusion. No debris or hematocrit layer is seen. The airways appear to be normally patent except for mild narrowing where they extend through the areas of presumed PMF, and, notably, where they appear truncated as they extend into the left lower lobe mass. Please refer to axial image 48 series 2 and axial image 52 series 2. Included images of the upper abdomen show normal liver morphology and mild fatty liver change. No significant inabilities are seen in the included portions of the spleen gallbladder, adrenal glands or pleural poles. There is mild fatty liver change as is  often seen in older patients. No ascites is identified. Bony structures appear to be intact.     Impression: Impression: 1. Extensive masslike perihilar disease, consistent with history of longstanding inhalational disease and progressive massive fibrosis. 2. Asymmetrically rounded and masslike area associated with left perihilar PMF in the left lower lobe, 4.5 cm in diameter, which may also reflect advanced PMF, but at least potentially could represent superimposed neoplasm, as discussed above. 3. Large, free-flowing left pleural effusion of uncertain significance. Electronically Signed: Mitch Sears  4/21/2023 3:48 PM EDT  Workstation ID: [id number]    XR Chest 1 View    Result Date: 4/20/2023  Narrative: XR CHEST 1 VW Date of Exam: 4/20/2023 2:13 PM EDT Indication: SOA triage protocol. Comparison: Chest CT 2/9/2023 , chest radiograph 2/9/2023. Findings: Heart size is stable. The masslike perihilar infiltrates in the lungs appear unchanged on the right and slightly improved on the left. There  is at least a moderate dependent left-sided pleural effusion with some compressive atelectasis in the left lung base.      Impression: Impression: 1. No change in the masslike nodular appearing infiltrate in the right perihilar region. 2. Improvement in the left perihilar infiltrate compared with the last study with improvement in aeration at the left base. 3. Moderate pleural effusion, unchanged. Electronically Signed: Miguelito Deras  4/20/2023 2:22 PM EDT  Workstation ID: [id number]    XR Chest 1 View    Result Date: 4/3/2023  Narrative: PORTABLE CHEST HISTORY: Shortness of breath. COMPARISON: May 2021. FINDINGS: The heart is normal in size. There is ectasia of the aorta. There are irregular soft tissue density masses in the perihilar regions bilaterally. Given patient's history, these may represent conglomerate masses of silicosis. There is left base opacity with consolidation and a pleural effusion, pneumonia not excluded. There is no pneumothorax.     Impression: 1. Irregular soft tissue density masses in the perihilar regions bilaterally. May represent conglomerate masses of silicosis. 2. Left base consolidation and pleural effusion, pneumonia not excluded. Recommend follow-up evaluation, CT imaging may be beneficial. Images reviewed, interpreted, and dictated by Dr. Satinder Martinez. Transcribed by Jaz Mcqueen PA-C.    X-ray chest PA and lateral    Result Date: 4/17/2023  Narrative: TWO-VIEW CHEST. HISTORY: Left pleural effusion, shortness of air. COMPARISON: April 10, 2023. FINDINGS: The cardiac silhouette is normal in size. The mediastinum is unremarkable. There has been no change in the bilateral masslike nodular opacities or the interstitial opacities. There is a left pleural effusion that appears stable. There is no pneumothorax. There is no acute osseous abnormality.    Impression: No significant interval change. Images reviewed, interpreted, and dictated by Dr. Gauri Gtz. Transcribed by Tahmina  XANDER Isaacs.    X-ray chest PA and lateral    Result Date: 4/10/2023  Narrative: TWO-VIEW CHEST    4/10/2023 10:40 AM   HISTORY: Pleural effusion, thoracentesis four days ago. COMPARISON: April 6, 2023. FINDINGS: The heart is stable in size.  The lung fields demonstrate no significant change in the bilateral interstitial opacities, mass-like opacities and nodules. There is a small left pleural effusion. There is no pneumothorax. No acute osseus abnormality is identified.    Impression: There has been no significant interval change in  the opacities, likely related to pneumoconiosis. Small left pleural effusion. Images reviewed, interpreted, and dictated by Dr. Gauri Gtz. Transcribed by JOHNATHON Huerta (DEIDRA).    XR chest AP portable    Result Date: 4/6/2023  Narrative: PORTABLE CHEST      4/6/2023 5:49 AM HISTORY: Shortness of breath, pleural effusion. COMPARISON: April 3, 2023. FINDINGS: The heart is stable in size.  There has been interval improvement  in the lung fields. The significant masslike perihilar opacities persist. There is no pneumothorax.    Impression: There has been interval improvement . Continued follow up recommended. Images reviewed, interpreted, and dictated by Dr. SAM Espinal. Transcribed by Yolanda Logan PA-C.    CT chest with IV contrast    Result Date: 4/5/2023  Narrative: CT SCAN OF THE CHEST WITH CONTRAST    4/5/2023 3:56 PM HISTORY: Pneumoconiosis with left pleural effusion. COMPARISON: None. PROCEDURE: The patient was injected with IV contrast.  Axial images were obtained from the lung apex to the mid abdomen by computed tomography. This study was performed with techniques to keep radiation doses as low as reasonably achievable, (ALARA). Individualized dose reduction techniques using automated exposure control or adjustment of mA and/or kV according to the patient size were employed. FINDINGS: CHEST: There is no axillary adenopathy. There is  no hilar or mediastinal  adenopathy . Heart size is normal. There is  no pericardial effusion . Limited images of the upper abdomen  are unremarkable . There is a soft tissue masslike opacity in the right upper lobe measuring 6 cm. There is a left lower lobe perihilar masslike opacity measuring 5.6 cm. There are other areas of nodularity and interstitial change likely a combination of confluent fibrosis and interstitial lung disease superimposed ammonia and/or neoplasm is difficult to exclude. There is a small left pleural effusion.    Impression: Multifocal pulmonary opacities with interstitial change, some masslike opacities on fibrosis with confluent components. Small left pleural effusion. Recommend CT chest with contrast follow-up in 3-6 months. Images reviewed, interpreted, and dictated by Dr. Jeff Tao. Transcribed by Samuel Mark PA-C.      Relevant imaging studies and labs from 04/22/23 were reviewed    Medications (drips):       busPIRone, 5 mg, Oral, Nightly  insulin lispro, 0-9 Units, Subcutaneous, TID AC  ipratropium-albuterol, 3 mL, Nebulization, 4x Daily - RT  lisinopril, 10 mg, Oral, Nightly  metoprolol succinate XL, 50 mg, Oral, Daily  pantoprazole, 40 mg, Oral, BID AC  sodium chloride, 10 mL, Intravenous, Q12H        •  acetaminophen **OR** acetaminophen **OR** acetaminophen  •  senna-docusate sodium **AND** polyethylene glycol **AND** bisacodyl **AND** bisacodyl  •  Calcium Replacement - Follow Nurse / BPA Driven Protocol  •  dextrose  •  dextrose  •  glucagon (human recombinant)  •  HYDROcodone-acetaminophen  •  hydrOXYzine  •  ipratropium-albuterol  •  ketorolac  •  LORazepam  •  Magnesium Standard Dose Replacement - Follow Nurse / BPA Driven Protocol  •  ondansetron **OR** ondansetron  •  Phosphorus Replacement - Follow Nurse / BPA Driven Protocol  •  Potassium Replacement - Follow Nurse / BPA Driven Protocol  •  sodium chloride  •  sodium chloride  •  sodium chloride     Assessment & Plan   IMPRESSION / PLAN      Inpatient Problem List:  63 y.o.male:  Active Hospital Problems    Diagnosis    • **Recurrent left pleural effusion    • Black lung    • Chronic obstructive pulmonary disease    • PAT (paroxysmal atrial tachycardia)    • Primary hypertension         Hospital Course:  63 y.o.male with relevant PMH of lifetime non-smoking, 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    CT Chest reviewed and compared to 2/9/23.  Showed bilateral masses and nodular changes most consistent with PMF.  There is abrupt cut off of the LLL Bronchus with surrounding atelectasis vs mass.  Most of the RLL appears atelectatic.  There is a large left pleural effusion.  Overall about the same as February, except now there are slightly less inflammatory changes.     Had large left pleural effusion drained earlier this month ~1.5 liters, bloody / mononuclear exudate w/ neg bacterial culture and cytology.    Impression:    Overall looks like severe CWP with advanced PMF.  With h/o lifetime non-smoking, doubt malignancy, but cannot rule this out as there is abrupt cut off of LLL.  Pleural effusion could be secondary to atelectasis of LLL or a primary problem.    Plan:    CWP w/ PMF  Bilateral Lung Masses  Recurrent Large Left Effusion  Atelectasis LLL  Lifetime Non-Smoker  Likely Trapped Lung    Underwent pigtail drainage of left pleural effusion today by IR.  Agree with Dr. Solis, need to r/o malignancy.  Plan for Bronch / EBUS Monday.  Also agree w/ PleurX if he gets symptomatic relief from draining pleural fluid, which would be palliative in nature.    Very high risk for any thoracic surgery given his extensive CWP.    Start nebulized pulmicort and duoneb.      Would benefit from referral to [facility] long term.    Please keep NPO after midnight Sunday night and hold blood thinners    High Risk, continue to follow        Saroj Marie MD  Intensive Care Medicine  04/22/23 13:44 EDT

## 2023-04-22 NOTE — PLAN OF CARE
Problem: Adult Inpatient Plan of Care  Goal: Plan of Care Review  Outcome: Ongoing, Progressing  Flowsheets (Taken 4/22/2023 0415)  Progress: no change  Plan of Care Reviewed With: patient  Outcome Evaluation: Pt had episode of SVT resolved spontaneously. Pt sleeping in bed at this time, no sign of distress.  Goal: Patient-Specific Goal (Individualized)  Outcome: Ongoing, Progressing  Flowsheets (Taken 4/21/2023 0242)  Patient-Specific Goals (Include Timeframe): Pt will remain free of respiratory distress  Individualized Care Needs: meds  Anxieties, Fears or Concerns: none verbalized  Goal: Absence of Hospital-Acquired Illness or Injury  Outcome: Ongoing, Progressing  Intervention: Identify and Manage Fall Risk  Recent Flowsheet Documentation  Taken 4/22/2023 0400 by Jaz Vidales, RN  Safety Promotion/Fall Prevention:   safety round/check completed   room organization consistent   nonskid shoes/slippers when out of bed   lighting adjusted   clutter free environment maintained   assistive device/personal items within reach  Taken 4/22/2023 0210 by Jaz Vidales, RN  Safety Promotion/Fall Prevention:   safety round/check completed   room organization consistent   nonskid shoes/slippers when out of bed   lighting adjusted   clutter free environment maintained   assistive device/personal items within reach  Taken 4/22/2023 0000 by Jaz Vidales, RN  Safety Promotion/Fall Prevention:   safety round/check completed   room organization consistent   nonskid shoes/slippers when out of bed   lighting adjusted   clutter free environment maintained   assistive device/personal items within reach  Taken 4/21/2023 2200 by Jaz Vidales, RN  Safety Promotion/Fall Prevention:   safety round/check completed   room organization consistent   nonskid shoes/slippers when out of bed   lighting adjusted   clutter free environment maintained   assistive device/personal items within reach  Taken 4/21/2023 1924 by Jaz Vidales, RN  Safety  Promotion/Fall Prevention:   safety round/check completed   room organization consistent   nonskid shoes/slippers when out of bed   lighting adjusted   clutter free environment maintained   assistive device/personal items within reach  Intervention: Prevent Skin Injury  Recent Flowsheet Documentation  Taken 4/22/2023 0400 by Jaz Vidales RN  Body Position: position changed independently  Taken 4/22/2023 0210 by Jaz Vidales RN  Body Position: position changed independently  Taken 4/22/2023 0000 by Jaz Vidales RN  Body Position: position changed independently  Taken 4/21/2023 2200 by Jaz Vidales RN  Body Position: position changed independently  Taken 4/21/2023 1924 by Jaz Vidales RN  Body Position: position changed independently  Intervention: Prevent and Manage VTE (Venous Thromboembolism) Risk  Recent Flowsheet Documentation  Taken 4/22/2023 0400 by Jaz Vidales RN  Activity Management: up ad justen  Taken 4/22/2023 0210 by Jaz Vidales RN  Activity Management:   up ad justen   activity minimized  Taken 4/22/2023 0000 by Jaz Vidales RN  Activity Management: up ad justen  Taken 4/21/2023 2200 by Jaz Vidales RN  Activity Management: up ad justen  Taken 4/21/2023 1924 by Jaz Vidales RN  Activity Management:   ambulated in room   up ad justen  Intervention: Prevent Infection  Recent Flowsheet Documentation  Taken 4/22/2023 0400 by Jaz Vidales RN  Infection Prevention:   single patient room provided   rest/sleep promoted   hand hygiene promoted   environmental surveillance performed   cohorting utilized  Taken 4/22/2023 0210 by Jaz Vidales RN  Infection Prevention:   single patient room provided   rest/sleep promoted   hand hygiene promoted   environmental surveillance performed  Taken 4/22/2023 0000 by Jaz Vidales RN  Infection Prevention:   single patient room provided   rest/sleep promoted   hand hygiene promoted   environmental surveillance performed  Taken 4/21/2023 2200 by Jaz Vidales  RN  Infection Prevention:   single patient room provided   rest/sleep promoted   hand hygiene promoted   environmental surveillance performed  Taken 4/21/2023 1924 by Jaz Vidales RN  Infection Prevention:   single patient room provided   rest/sleep promoted   hand hygiene promoted   environmental surveillance performed  Goal: Optimal Comfort and Wellbeing  Outcome: Ongoing, Progressing  Intervention: Provide Person-Centered Care  Recent Flowsheet Documentation  Taken 4/21/2023 1924 by Jaz Vidales RN  Trust Relationship/Rapport:   care explained   choices provided   emotional support provided   empathic listening provided   questions answered   questions encouraged   reassurance provided   thoughts/feelings acknowledged  Goal: Readiness for Transition of Care  Outcome: Ongoing, Progressing     Problem: COPD (Chronic Obstructive Pulmonary Disease) Comorbidity  Goal: Maintenance of COPD Symptom Control  Outcome: Ongoing, Progressing  Intervention: Maintain COPD-Symptom Control  Recent Flowsheet Documentation  Taken 4/21/2023 1924 by Jaz Vidales RN  Supportive Measures:   active listening utilized   goal-setting facilitated   relaxation techniques promoted   verbalization of feelings encouraged  Medication Review/Management: medications reviewed   Goal Outcome Evaluation:  Plan of Care Reviewed With: patient        Progress: no change  Outcome Evaluation: Pt had episode of SVT resolved spontaneously. Pt sleeping in bed at this time, no sign of distress.

## 2023-04-23 ENCOUNTER — APPOINTMENT (OUTPATIENT)
Dept: GENERAL RADIOLOGY | Facility: HOSPITAL | Age: 63
DRG: 187 | End: 2023-04-23
Payer: OTHER MISCELLANEOUS

## 2023-04-23 LAB
GLUCOSE BLDC GLUCOMTR-MCNC: 105 MG/DL (ref 70–130)
GLUCOSE BLDC GLUCOMTR-MCNC: 120 MG/DL (ref 70–130)
GLUCOSE BLDC GLUCOMTR-MCNC: 129 MG/DL (ref 70–130)

## 2023-04-23 PROCEDURE — G0378 HOSPITAL OBSERVATION PER HR: HCPCS

## 2023-04-23 PROCEDURE — 94799 UNLISTED PULMONARY SVC/PX: CPT

## 2023-04-23 PROCEDURE — 97162 PT EVAL MOD COMPLEX 30 MIN: CPT

## 2023-04-23 PROCEDURE — 99232 SBSQ HOSP IP/OBS MODERATE 35: CPT | Performed by: INTERNAL MEDICINE

## 2023-04-23 PROCEDURE — 71045 X-RAY EXAM CHEST 1 VIEW: CPT

## 2023-04-23 PROCEDURE — 94761 N-INVAS EAR/PLS OXIMETRY MLT: CPT

## 2023-04-23 PROCEDURE — 82962 GLUCOSE BLOOD TEST: CPT

## 2023-04-23 PROCEDURE — 99231 SBSQ HOSP IP/OBS SF/LOW 25: CPT | Performed by: PHYSICIAN ASSISTANT

## 2023-04-23 RX ADMIN — BUSPIRONE HYDROCHLORIDE 5 MG: 5 TABLET ORAL at 21:44

## 2023-04-23 RX ADMIN — LISINOPRIL 10 MG: 10 TABLET ORAL at 21:44

## 2023-04-23 RX ADMIN — Medication 10 ML: at 08:47

## 2023-04-23 RX ADMIN — Medication 10 ML: at 21:44

## 2023-04-23 RX ADMIN — PANTOPRAZOLE SODIUM 40 MG: 40 TABLET, DELAYED RELEASE ORAL at 16:30

## 2023-04-23 RX ADMIN — IPRATROPIUM BROMIDE AND ALBUTEROL SULFATE 3 ML: .5; 2.5 SOLUTION RESPIRATORY (INHALATION) at 20:49

## 2023-04-23 RX ADMIN — IPRATROPIUM BROMIDE AND ALBUTEROL SULFATE 3 ML: .5; 2.5 SOLUTION RESPIRATORY (INHALATION) at 13:41

## 2023-04-23 RX ADMIN — IPRATROPIUM BROMIDE AND ALBUTEROL SULFATE 3 ML: .5; 2.5 SOLUTION RESPIRATORY (INHALATION) at 08:42

## 2023-04-23 RX ADMIN — BUDESONIDE 0.5 MG: 0.5 SUSPENSION RESPIRATORY (INHALATION) at 08:42

## 2023-04-23 RX ADMIN — PANTOPRAZOLE SODIUM 40 MG: 40 TABLET, DELAYED RELEASE ORAL at 08:47

## 2023-04-23 RX ADMIN — BUDESONIDE 0.5 MG: 0.5 SUSPENSION RESPIRATORY (INHALATION) at 20:49

## 2023-04-23 NOTE — THERAPY DISCHARGE NOTE
Patient Name: Rory Rios Jr.  : 1960    MRN: [id number]                              Today's Date: 2023       Admit Date: 2023    Visit Dx:     ICD-10-CM ICD-9-CM   1. Recurrent left pleural effusion  J90 511.9   2. Dyspnea and respiratory abnormalities  R06.00 786.09    R06.89    3. Black lung  J60 500   4. Chronic respiratory failure with hypoxia  J96.11 518.83     799.02   5. Lung mass  R91.8 786.6     Patient Active Problem List   Diagnosis   • Recurrent left pleural effusion   • PAT (paroxysmal atrial tachycardia)   • Black lung   • Chronic obstructive pulmonary disease   • Primary hypertension   • Lung mass     Past Medical History:   Diagnosis Date   • Arthritis    • Black lung    • Black lung disease    • Diabetes mellitus    • GERD (gastroesophageal reflux disease)    • Hypertension    • Pneumonia      Past Surgical History:   Procedure Laterality Date   • FOOT SURGERY Left       General Information     Row Name 23 1547          Physical Therapy Time and Intention    Document Type evaluation  -     Mode of Treatment physical therapy  -     Row Name 23 1547          General Information    Patient Profile Reviewed yes  -LS     Prior Level of Function independent:;all household mobility;ADL's  -LS     Existing Precautions/Restrictions oxygen therapy device and L/min;other (see comments)  L chest tube  -LS     Row Name 23 1547          Living Environment    People in Home alone  -LS     Row Name 23 1547          Home Main Entrance    Number of Stairs, Main Entrance five  -LS     Row Name 23 1547          Stairs Within Home, Primary    Number of Stairs, Within Home, Primary none  -LS     Row Name 23 1547          Cognition    Orientation Status (Cognition) oriented x 4  -LS     Row Name 23 1547          Safety Issues, Functional Mobility    Impairments Affecting Function (Mobility) shortness of breath;endurance/activity tolerance  -LS            User Key  (r) = Recorded By, (t) = Taken By, (c) = Cosigned By    Initials Name Provider Type    Cesilia Lucas, PT Physical Therapist               Mobility     Row Name 04/23/23 1548          Bed Mobility    Bed Mobility supine-sit  -LS     Supine-Sit [place] (Bed Mobility) modified independence  -LS     Assistive Device (Bed Mobility) head of bed elevated  -LS     Row Name 04/23/23 1548          Sit-Stand Transfer    Sit-Stand [place] (Transfers) independent  -LS     Row Name 04/23/23 1548          Gait/Stairs (Locomotion)    [place]mit Level (Gait) independent  -LS     Distance in Feet (Gait) 300  -LS     Deviations/Abnormal Patterns (Gait) blake decreased  -LS           User Key  (r) = Recorded By, (t) = Taken By, (c) = Cosigned By    Initials Name Provider Type    Cesilia Lucas, PT Physical Therapist               Obj/Interventions     Row Name 04/23/23 1549          Range of Motion Comprehensive    General Range of Motion bilateral lower extremity ROM WFL  -LS     Row Name 04/23/23 1549          Strength Comprehensive (MMT)    General Manual Muscle Testing (MMT) Assessment lower extremity strength deficits identified  -LS     Comment, General Manual Muscle Testing (MMT) Assessment BLE grossly 4+/5  -LS     Row Name 04/23/23 1549          Balance    Balance Assessment sitting static balance;standing static balance  -LS     Static Sitting Balance independent  -LS     Position, Sitting Balance unsupported;sitting edge of bed  -LS     Static Standing Balance independent  -LS     Position/Device Used, Standing Balance unsupported  -LS     Row Name 04/23/23 1549          Sensory Assessment (Somatosensory)    Sensory Assessment (Somatosensory) LE sensation intact  -LS           User Key  (r) = Recorded By, (t) = Taken By, (c) = Cosigned By    Initials Name Provider Type    Cesilia Lucas, PT Physical Therapist               Goals/Plan    No documentation.                Clinical Impression      Row Name 04/23/23 1549          Pain    Pretreatment Pain Rating 5/10  -LS     Posttreatment Pain Rating 7/10  -LS     Pain Location - Side/Orientation Left  -LS     Pain Location incisional  -LS     Pain Location - chest  -LS     Pre/Posttreatment Pain Comment chest tube insertion; tolerated  -LS     Pain Intervention(s) Repositioned;Ambulation/increased activity  -LS     Row Name 04/23/23 1549          Plan of Care Review    Plan of Care Reviewed With patient  -LS     Progress no change  -LS     Outcome Evaluation PT initial evaluation completed. No further PT goals established as pt demonstrates safety and appears at baseline re: functional mobility. Able to ambulate 300 ft independently with good safety awareness on 3L NC. Edu pt re: home safety and benefit of additional gait with NSG. Recommend d/c home with assist. Will d/c PT.  -     Row Name 04/23/23 1549          Therapy Assessment/Plan (PT)    Patient/Family Therapy Goals Statement (PT) return to PLOF  -LS     Criteria for Skilled Interventions Met (PT) no problems identified which require skilled intervention  -LS     Therapy Frequency (PT) evaluation only  -LS     Row Name 04/23/23 1549          Vital Signs    Pre Systolic BP Rehab 99  -LS     Pre Treatment Diastolic BP 62  -LS     Pretreatment Heart Rate (beats/min) 96  -LS     Posttreatment Heart Rate (beats/min) 101  -LS     Pre SpO2 (%) 91  -LS     O2 Delivery Pre Treatment nasal cannula  -LS     Intra SpO2 (%) 88  -LS     O2 Delivery Intra Treatment nasal cannula  -LS     Post SpO2 (%) 91  -LS     O2 Delivery Post Treatment nasal cannula  -LS     Pre Patient Position Supine  -LS     Intra Patient Position Standing  -LS     Post Patient Position Sitting  -LS     Row Name 04/23/23 1549          Positioning and Restraints    Pre-Treatment Position in bed  -LS     Post Treatment Position chair  -LS     In Chair notified nsg;reclined;call light within reach;encouraged to call for assist;exit alarm  on;waffle cushion;legs elevated;with family/caregiver  -           User Key  (r) = Recorded By, (t) = Taken By, (c) = Cosigned By    Initials Name Provider Type    Cesilia Lucas, PT Physical Therapist               Outcome Measures     Row Name 04/23/23 1557 04/23/23 0847       How much help from another person do you currently need...    Turning from your back to your side while in flat bed without using bedrails? 4  -LS 4  -BU    Moving from lying on back to sitting on the side of a flat bed without bedrails? 4  -LS 4  -BU    Moving to and from a bed to a chair (including a wheelchair)? 4  -LS 4  -BU    Standing up from a chair using your arms (e.g., wheelchair, bedside chair)? 4  -LS 4  -BU    Climbing 3-5 steps with a railing? 3  -LS 3  -BU    To walk in hospital room? 4  -LS 3  -BU    AM-PAC 6 Clicks Score (PT) 23  -LS 22  -BU    Highest level of mobility 7 --> Walked 25 feet or more  -LS 7 --> Walked 25 feet or more  -BU          User Key  (r) = Recorded By, (t) = Taken By, (c) = Cosigned By    Initials Name Provider Type    Cesilia Lucas, PT Physical Therapist    Treva Richey RN Registered Nurse              Physical Therapy Education     Title: PT OT SLP Therapies (Done)     Topic: Physical Therapy (Done)     Point: Mobility training (Done)     Learning Progress Summary           Patient Acceptance, E,D, VU,DU by  at 4/23/2023 1558                   Point: Home exercise program (Done)     Learning Progress Summary           Patient Acceptance, E,D, VU,DU by  at 4/23/2023 1558                   Point: Body mechanics (Done)     Learning Progress Summary           Patient Acceptance, E,D, VU,DU by  at 4/23/2023 1558                   Point: Precautions (Done)     Learning Progress Summary           Patient Acceptance, E,D, VU,DU by  at 4/23/2023 1558                               User Key     Initials Effective Dates Name Provider Type [facility] 02/03/23 -  Cesilia Hi, PT  Physical Therapist PT              PT Recommendation and Plan     Plan of Care Reviewed With: patient  Progress: no change  Outcome Evaluation: PT initial evaluation completed. No further PT goals established as pt demonstrates safety and appears at baseline re: functional mobility. Able to ambulate 300 ft independently with good safety awareness on 3L NC. Edu pt re: home safety and benefit of additional gait with NSG. Recommend d/c home with assist. Will d/c PT.     Time Calculation:    PT Charges     Row Name 04/23/23 1558             Time Calculation    Start Time 1458  -LS      PT Received On 04/23/23  -LS      PT Goal Re-Cert Due Date 05/03/23  -LS         Untimed Charges    PT Eval/Re-eval Minutes 35  -LS         Total Minutes    Untimed Charges Total Minutes 35  -LS       Total Minutes 35  -LS            User Key  (r) = Recorded By, (t) = Taken By, (c) = Cosigned By    Initials Name Provider Type    LS Cesilia Hi, PT Physical Therapist              Therapy Charges for Today     Code Description Service Date Service Provider Modifiers Qty    94377354583 HC PT EVAL MOD COMPLEXITY 3 4/23/2023 Cesilia Hi, PT GP 1    [id number]  PT THER SUPP EA 15 MIN 4/23/2023 Cesilia Hi, PT GP 2          PT G-Codes  AM-PAC 6 Clicks Score (PT): 23    PT Discharge Summary  Anticipated Discharge Disposition (PT): home with assist  Reason for Discharge: At baseline function, Independent  Outcomes Achieved: Refer to plan of care for updates on goals achieved    Cesilia Hi, PT  4/23/2023

## 2023-04-23 NOTE — PROGRESS NOTES
CTS Progress Note      Chief Complaint: Left pleural effusion      Subjective  Sitting up on side of the bed.  Conversant.  Pleasant.  Wants to go home soon as possible.      Objective    Physical Exam:   Vital Signs   Temp:  [97.9 °F (36.6 °C)-99.4 °F (37.4 °C)] 99.2 °F (37.3 °C)  Heart Rate:  [] 75  Resp:  [14-20] 16  BP: ()/(63-78) 92/68   GEN: NAD   CV: Regular rate and rhythm    RESP: Decreased bases scattered rales   EXT: Warm to touch no significant edema       CT Output:  Output by Drain (mL) 04/22/23 0701 - 04/22/23 1900 04/22/23 1901 - 04/23/23 0700 Range Total   Chest Tube 1 Left Midaxillary 2700  2700      Results     Results from last 7 days   Lab Units 04/21/23  0442   WBC 10*3/mm3 7.87   HEMOGLOBIN g/dL 12.6*   HEMATOCRIT % 42.7   PLATELETS 10*3/mm3 206     Results from last 7 days   Lab Units 04/21/23  0442   SODIUM mmol/L 142   POTASSIUM mmol/L 4.4   CHLORIDE mmol/L 106   CO2 mmol/L 25.0   BUN mg/dL 11   CREATININE mg/dL 0.73*   GLUCOSE mg/dL 102*   CALCIUM mg/dL 9.3                 Assessment & Plan     Recurrent left pleural effusion status post left CT-guided chest tube placement 4/22/2023  4.5 cm left lower lobe mass    Plan   Repeat chest x-ray today and daily  Monitor output from chest tube  Pulmonary medicine following with plan for bronc and EBUS tomorrow  Possible Pleurx catheter.  Patient expressing desire to be discharged home as soon as possible.  However patient and family are very interested in possibly having a Pleurx catheter for symptomatic relief at home.  If PleurX catheter is warranted would need to clamp current small bore chest tube to allow for reaccumulation for safe implantation    MARAH Vegas  04/23/23  07:00 EDT

## 2023-04-23 NOTE — PLAN OF CARE
Goal Outcome Evaluation:  Plan of Care Reviewed With: patient, spouse    Pt resting comfortably in chair. Meds given as ordered. Chest tube remains in place. No c/o pain. On 4L O2 to maintain sats >90%. 1A OOB. Fall precautions in place. Call bell within reach. Will continue to assess.

## 2023-04-23 NOTE — PROGRESS NOTES
[facility] Medicine Services  PROGRESS NOTE    Patient Name: Rory Rios Jr.  : 1960  MRN: [id number]    Date of Admission: 2023  Primary Care Physician: Alejandro Monique MD    Subjective   Subjective     CC: Follow-up SOA    HPI: Patient states that he is doing much better after chest tube placement, he did get some sleep, breathing is much improved    ROS:  Gen- No fevers, chills  CV- No chest pain, palpitations  Resp- No cough, dyspnea  GI- No N/V/D, abd pain      Objective   Objective     Vital Signs:   Temp:  [97.9 °F (36.6 °C)-99.4 °F (37.4 °C)] 99.2 °F (37.3 °C)  Heart Rate:  [] 85  Resp:  [14-20] 14  BP: ()/(63-78) 92/68  Flow (L/min):  [3-4] 3     Physical Exam:  Constitutional: No acute distress, awake, alert  HENT: NCAT, mucous membranes moist  Respiratory: Nonlabored respirations, left chest tube in place, currently on 3 L NC  Cardiovascular: RRR, no murmurs, rubs, or gallops  Gastrointestinal: Positive bowel sounds, soft, nontender, nondistended  Musculoskeletal: No bilateral ankle edema  Psychiatric: Appropriate affect, cooperative  Neurologic: Oriented x 3, nonfocal  Skin: No rashes    Results Reviewed:  LAB RESULTS:      Lab 23  0442 23  1430   WBC 7.87 8.65   HEMOGLOBIN 12.6* 13.7   HEMATOCRIT 42.7 45.8   PLATELETS 206 248   NEUTROS ABS  --  5.70   IMMATURE GRANS (ABS)  --  0.02   LYMPHS ABS  --  1.72   MONOS ABS  --  0.74   EOS ABS  --  0.41*   MCV 83.2 83.1   SED RATE  --  67*   CRP  --  1.15*         Lab 23  0442 23  1430   SODIUM 142 142   POTASSIUM 4.4 3.8   CHLORIDE 106 104   CO2 25.0 28.0   ANION GAP 11.0 10.0   BUN 11 11   CREATININE 0.73* 0.93   EGFR 101.0 91.1   GLUCOSE 102* 96   CALCIUM 9.3 9.8   HEMOGLOBIN A1C 6.30*  --          Lab 23  1430   TOTAL PROTEIN 6.8   ALBUMIN 3.9   GLOBULIN 2.9   ALT (SGPT) 20   AST (SGOT) 20   BILIRUBIN 0.3   ALK PHOS 85         Lab 23  1430   PROBNP 117.7   HSTROP T 21*                  Brief Urine Lab Results     None          Microbiology Results Abnormal     None          CT Chest With & Without Contrast Diagnostic    Result Date: 4/21/2023  CT CHEST W WO CONTRAST DIAGNOSTIC Date of Exam: 4/21/2023 12:57 PM EDT Indication: Lung nodule, > 8mm lung mass, pleural effusion. Comparison: 4/20/2023 chest radiograph. Technique: Axial CT images were obtained of the chest before and after the uneventful intravenous administration of 85 mL Isovue-300.  Reconstructed coronal and sagittal images were also obtained. Automated exposure control and iterative construction methods were used. Findings: History indicates black lung with increasing dyspnea, some tachycardia. Today's study shows extensive irregular perihilar masses involving not only the upper lobes but also the right and left lower lobes, with adjacent irregular nodular disease and reticular interstitial disease, typical of PMF. Most of the areas of involvement would be indistinguishable from pulmonary neoplasm.  There is an unusually discrete and rounded area of the left-sided mass, in the superior left lower lobe, best appreciated on image 46 series 2, which measures approximately 4.5 cm in diameter. This is most likely to represent masslike PMF as well, although superimposed neoplasm would appear similar.  There are shotty mediastinal lymph nodes, not unusual for inhalational disease, and no clearly pathologic mediastinal adenopathy. No hilar adenopathy is seen distinct from the patient's presumed PMF. There is no pericardial effusion or right pleural effusion but there is a large apparently free-flowing left pleural effusion. No debris or hematocrit layer is seen. The airways appear to be normally patent except for mild narrowing where they extend through the areas of presumed PMF, and, notably, where they appear truncated as they extend into the left lower lobe mass. Please refer to axial image 48 series 2 and axial image 52  series 2. Included images of the upper abdomen show normal liver morphology and mild fatty liver change. No significant inabilities are seen in the included portions of the spleen gallbladder, adrenal glands or pleural poles. There is mild fatty liver change as is  often seen in older patients. No ascites is identified. Bony structures appear to be intact.     Impression: Impression: 1. Extensive masslike perihilar disease, consistent with history of longstanding inhalational disease and progressive massive fibrosis. 2. Asymmetrically rounded and masslike area associated with left perihilar PMF in the left lower lobe, 4.5 cm in diameter, which may also reflect advanced PMF, but at least potentially could represent superimposed neoplasm, as discussed above. 3. Large, free-flowing left pleural effusion of uncertain significance. Electronically Signed: Mitch Sears  4/21/2023 3:48 PM EDT  Workstation ID: [id number]          Current medications:  Scheduled Meds:budesonide, 0.5 mg, Nebulization, BID - RT  busPIRone, 5 mg, Oral, Nightly  insulin lispro, 0-9 Units, Subcutaneous, TID AC  ipratropium-albuterol, 3 mL, Nebulization, 4x Daily - RT  lisinopril, 10 mg, Oral, Nightly  metoprolol succinate XL, 50 mg, Oral, Daily  pantoprazole, 40 mg, Oral, BID AC  sodium chloride, 10 mL, Intravenous, Q12H      Continuous Infusions:   PRN Meds:.•  acetaminophen **OR** acetaminophen **OR** acetaminophen  •  senna-docusate sodium **AND** polyethylene glycol **AND** bisacodyl **AND** bisacodyl  •  Calcium Replacement - Follow Nurse / BPA Driven Protocol  •  dextrose  •  dextrose  •  glucagon (human recombinant)  •  HYDROcodone-acetaminophen  •  hydrOXYzine  •  ipratropium-albuterol  •  LORazepam  •  Magnesium Standard Dose Replacement - Follow Nurse / BPA Driven Protocol  •  ondansetron **OR** ondansetron  •  Phosphorus Replacement - Follow Nurse / BPA Driven Protocol  •  Potassium Replacement - Follow Nurse / BPA Driven Protocol  •  sodium  chloride  •  sodium chloride  •  sodium chloride    Assessment & Plan   Assessment & Plan     Active Hospital Problems    Diagnosis  POA   • **Recurrent left pleural effusion [J90]  Yes   • Black lung [J60]  Yes   • Chronic obstructive pulmonary disease [J44.9]  Yes   • Lung mass [R91.8]  Unknown   • PAT (paroxysmal atrial tachycardia) [I47.1]  Yes   • Primary hypertension [I10]  Yes      Resolved Hospital Problems   No resolved problems to display.        Brief Hospital Course to date:  Rory Rios Jr. is a 63 y.o. male with history of paroxysmal atrial tachycardia, hypertension, COPD on 2 L NC, black lung, recent pleural effusion status post thoracentesis who presents with progressively worsening SOA     Recurrent left pleural effusions  -Checks x-ray shows moderate left pleural effusion  -He is s/p recent thoracentesis 4/5/2023,  fluid was exudative, cytology and microbiology were all negative at that time  -CTS following, repeat CT chest shows extensive mass like perihilar disease, large left pleural effusion  -He is s/p pigtail drainage by IR, ~2.7 L out , follow-up cytology, he may need Pleurx catheter placement  -Pulmonary is following, plan for bronch/EBUS tomorrow 4/24/2023      Chronic respiratory failure with hypoxia  COPD/black lung disease   COOPER on CPAP  -Patient on 2 L at baseline, currently on 3 L NC, wean as able  -Continue CPAP nightly     Hypertension  -BP currently stable, continue lisinopril and metoprolol     Paroxysmal atrial tachycardia  -Continue metoprolol     Well controlled type 2 diabetes with A1C 6.3%  -Continue SSI for now    Expected Discharge Location and Transportation: Home  Expected Discharge   Expected Discharge Date and Time     Expected Discharge Date Expected Discharge Time    Apr 25, 2023            DVT prophylaxis:  Mechanical DVT prophylaxis orders are present.     AM-PAC 6 Clicks Score (PT): 24 (04/22/23 2004)    CODE STATUS:   Code Status and Medical Interventions:    Ordered at: 04/20/23 171     Level Of Support Discussed With:    Patient     Code Status (Patient has no pulse and is not breathing):    CPR (Attempt to Resuscitate)     Medical Interventions (Patient has pulse or is breathing):    Full Support       Shlomo Roblero MD  04/23/23

## 2023-04-23 NOTE — PLAN OF CARE
Goal Outcome Evaluation:  Plan of Care Reviewed With: patient        Progress: no change  Outcome Evaluation: PT initial evaluation completed. No further PT goals established as pt demonstrates safety and appears at baseline re: functional mobility. Able to ambulate 300 ft independently with good safety awareness on 3L NC. Edu pt re: home safety and benefit of additional gait with NSG. Recommend d/c home with assist. Will d/c PT.

## 2023-04-24 ENCOUNTER — APPOINTMENT (OUTPATIENT)
Dept: GENERAL RADIOLOGY | Facility: HOSPITAL | Age: 63
DRG: 187 | End: 2023-04-24
Payer: OTHER MISCELLANEOUS

## 2023-04-24 ENCOUNTER — ANESTHESIA (OUTPATIENT)
Dept: GASTROENTEROLOGY | Facility: HOSPITAL | Age: 63
DRG: 187 | End: 2023-04-24
Payer: OTHER MISCELLANEOUS

## 2023-04-24 LAB
GLUCOSE BLDC GLUCOMTR-MCNC: 133 MG/DL (ref 70–130)
GLUCOSE BLDC GLUCOMTR-MCNC: 159 MG/DL (ref 70–130)
QT INTERVAL: 396 MS
QTC INTERVAL: 433 MS

## 2023-04-24 PROCEDURE — 71045 X-RAY EXAM CHEST 1 VIEW: CPT

## 2023-04-24 PROCEDURE — 31625 BRONCHOSCOPY W/BIOPSY(S): CPT | Performed by: INTERNAL MEDICINE

## 2023-04-24 PROCEDURE — 25010000002 ONDANSETRON PER 1 MG: Performed by: NURSE ANESTHETIST, CERTIFIED REGISTERED

## 2023-04-24 PROCEDURE — 25010000002 PROPOFOL 10 MG/ML EMULSION: Performed by: NURSE ANESTHETIST, CERTIFIED REGISTERED

## 2023-04-24 PROCEDURE — 87205 SMEAR GRAM STAIN: CPT | Performed by: INTERNAL MEDICINE

## 2023-04-24 PROCEDURE — 25010000002 DEXAMETHASONE PER 1 MG: Performed by: NURSE ANESTHETIST, CERTIFIED REGISTERED

## 2023-04-24 PROCEDURE — 99232 SBSQ HOSP IP/OBS MODERATE 35: CPT | Performed by: INTERNAL MEDICINE

## 2023-04-24 PROCEDURE — 31652 BRONCH EBUS SAMPLNG 1/2 NODE: CPT | Performed by: INTERNAL MEDICINE

## 2023-04-24 PROCEDURE — C1726 CATH, BAL DIL, NON-VASCULAR: HCPCS | Performed by: INTERNAL MEDICINE

## 2023-04-24 PROCEDURE — 0W9B30Z DRAINAGE OF LEFT PLEURAL CAVITY WITH DRAINAGE DEVICE, PERCUTANEOUS APPROACH: ICD-10-PCS | Performed by: INTERNAL MEDICINE

## 2023-04-24 PROCEDURE — 87102 FUNGUS ISOLATION CULTURE: CPT | Performed by: INTERNAL MEDICINE

## 2023-04-24 PROCEDURE — 87070 CULTURE OTHR SPECIMN AEROBIC: CPT | Performed by: INTERNAL MEDICINE

## 2023-04-24 PROCEDURE — 87116 MYCOBACTERIA CULTURE: CPT | Performed by: INTERNAL MEDICINE

## 2023-04-24 PROCEDURE — 82962 GLUCOSE BLOOD TEST: CPT

## 2023-04-24 PROCEDURE — 87206 SMEAR FLUORESCENT/ACID STAI: CPT | Performed by: INTERNAL MEDICINE

## 2023-04-24 PROCEDURE — 25010000002 FENTANYL CITRATE (PF) 100 MCG/2ML SOLUTION: Performed by: NURSE ANESTHETIST, CERTIFIED REGISTERED

## 2023-04-24 PROCEDURE — 94799 UNLISTED PULMONARY SVC/PX: CPT

## 2023-04-24 PROCEDURE — 31623 DX BRONCHOSCOPE/BRUSH: CPT | Performed by: INTERNAL MEDICINE

## 2023-04-24 PROCEDURE — 88305 TISSUE EXAM BY PATHOLOGIST: CPT | Performed by: INTERNAL MEDICINE

## 2023-04-24 PROCEDURE — 25010000002 NEOSTIGMINE 10 MG/10ML SOLUTION: Performed by: NURSE ANESTHETIST, CERTIFIED REGISTERED

## 2023-04-24 RX ORDER — SODIUM CHLORIDE 9 MG/ML
40 INJECTION, SOLUTION INTRAVENOUS AS NEEDED
Status: CANCELLED | OUTPATIENT
Start: 2023-04-24

## 2023-04-24 RX ORDER — FENTANYL CITRATE 50 UG/ML
INJECTION, SOLUTION INTRAMUSCULAR; INTRAVENOUS AS NEEDED
Status: DISCONTINUED | OUTPATIENT
Start: 2023-04-24 | End: 2023-04-24 | Stop reason: SURG

## 2023-04-24 RX ORDER — PROPOFOL 10 MG/ML
VIAL (ML) INTRAVENOUS AS NEEDED
Status: DISCONTINUED | OUTPATIENT
Start: 2023-04-24 | End: 2023-04-24 | Stop reason: SURG

## 2023-04-24 RX ORDER — ROCURONIUM BROMIDE 10 MG/ML
INJECTION, SOLUTION INTRAVENOUS AS NEEDED
Status: DISCONTINUED | OUTPATIENT
Start: 2023-04-24 | End: 2023-04-24 | Stop reason: SURG

## 2023-04-24 RX ORDER — GLYCOPYRROLATE 0.2 MG/ML
INJECTION INTRAMUSCULAR; INTRAVENOUS AS NEEDED
Status: DISCONTINUED | OUTPATIENT
Start: 2023-04-24 | End: 2023-04-24 | Stop reason: SURG

## 2023-04-24 RX ORDER — SODIUM CHLORIDE, SODIUM LACTATE, POTASSIUM CHLORIDE, CALCIUM CHLORIDE 600; 310; 30; 20 MG/100ML; MG/100ML; MG/100ML; MG/100ML
9 INJECTION, SOLUTION INTRAVENOUS CONTINUOUS
Status: CANCELLED | OUTPATIENT
Start: 2023-04-24

## 2023-04-24 RX ORDER — LIDOCAINE HYDROCHLORIDE 10 MG/ML
0.5 INJECTION, SOLUTION EPIDURAL; INFILTRATION; INTRACAUDAL; PERINEURAL ONCE AS NEEDED
Status: CANCELLED | OUTPATIENT
Start: 2023-04-24

## 2023-04-24 RX ORDER — LIDOCAINE HYDROCHLORIDE 10 MG/ML
INJECTION, SOLUTION EPIDURAL; INFILTRATION; INTRACAUDAL; PERINEURAL AS NEEDED
Status: DISCONTINUED | OUTPATIENT
Start: 2023-04-24 | End: 2023-04-24 | Stop reason: SURG

## 2023-04-24 RX ORDER — FAMOTIDINE 10 MG/ML
20 INJECTION, SOLUTION INTRAVENOUS ONCE
Status: CANCELLED | OUTPATIENT
Start: 2023-04-24 | End: 2023-04-24

## 2023-04-24 RX ORDER — SODIUM CHLORIDE 0.9 % (FLUSH) 0.9 %
10 SYRINGE (ML) INJECTION AS NEEDED
Status: CANCELLED | OUTPATIENT
Start: 2023-04-24

## 2023-04-24 RX ORDER — DEXAMETHASONE SODIUM PHOSPHATE 4 MG/ML
INJECTION, SOLUTION INTRA-ARTICULAR; INTRALESIONAL; INTRAMUSCULAR; INTRAVENOUS; SOFT TISSUE AS NEEDED
Status: DISCONTINUED | OUTPATIENT
Start: 2023-04-24 | End: 2023-04-24 | Stop reason: SURG

## 2023-04-24 RX ORDER — FAMOTIDINE 20 MG/1
20 TABLET, FILM COATED ORAL ONCE
Status: CANCELLED | OUTPATIENT
Start: 2023-04-24 | End: 2023-04-24

## 2023-04-24 RX ORDER — PHENYLEPHRINE HCL IN 0.9% NACL 1 MG/10 ML
SYRINGE (ML) INTRAVENOUS AS NEEDED
Status: DISCONTINUED | OUTPATIENT
Start: 2023-04-24 | End: 2023-04-24 | Stop reason: SURG

## 2023-04-24 RX ORDER — MIDAZOLAM HYDROCHLORIDE 1 MG/ML
1 INJECTION INTRAMUSCULAR; INTRAVENOUS
Status: CANCELLED | OUTPATIENT
Start: 2023-04-24

## 2023-04-24 RX ORDER — ONDANSETRON 2 MG/ML
4 INJECTION INTRAMUSCULAR; INTRAVENOUS ONCE AS NEEDED
Status: DISCONTINUED | OUTPATIENT
Start: 2023-04-24 | End: 2023-04-24 | Stop reason: HOSPADM

## 2023-04-24 RX ORDER — NEOSTIGMINE METHYLSULFATE 1 MG/ML
INJECTION, SOLUTION INTRAVENOUS AS NEEDED
Status: DISCONTINUED | OUTPATIENT
Start: 2023-04-24 | End: 2023-04-24 | Stop reason: SURG

## 2023-04-24 RX ORDER — SODIUM CHLORIDE, SODIUM LACTATE, POTASSIUM CHLORIDE, CALCIUM CHLORIDE 600; 310; 30; 20 MG/100ML; MG/100ML; MG/100ML; MG/100ML
INJECTION, SOLUTION INTRAVENOUS CONTINUOUS PRN
Status: DISCONTINUED | OUTPATIENT
Start: 2023-04-24 | End: 2023-04-24 | Stop reason: SURG

## 2023-04-24 RX ORDER — EPHEDRINE SULFATE 50 MG/ML
INJECTION INTRAVENOUS AS NEEDED
Status: DISCONTINUED | OUTPATIENT
Start: 2023-04-24 | End: 2023-04-24 | Stop reason: SURG

## 2023-04-24 RX ORDER — MIDAZOLAM HYDROCHLORIDE 1 MG/ML
0.5 INJECTION INTRAMUSCULAR; INTRAVENOUS
Status: CANCELLED | OUTPATIENT
Start: 2023-04-24

## 2023-04-24 RX ORDER — IPRATROPIUM BROMIDE AND ALBUTEROL SULFATE 2.5; .5 MG/3ML; MG/3ML
3 SOLUTION RESPIRATORY (INHALATION) ONCE AS NEEDED
Status: COMPLETED | OUTPATIENT
Start: 2023-04-24 | End: 2023-04-24

## 2023-04-24 RX ORDER — SODIUM CHLORIDE 0.9 % (FLUSH) 0.9 %
10 SYRINGE (ML) INJECTION EVERY 12 HOURS SCHEDULED
Status: CANCELLED | OUTPATIENT
Start: 2023-04-24

## 2023-04-24 RX ORDER — ONDANSETRON 2 MG/ML
INJECTION INTRAMUSCULAR; INTRAVENOUS AS NEEDED
Status: DISCONTINUED | OUTPATIENT
Start: 2023-04-24 | End: 2023-04-24 | Stop reason: SURG

## 2023-04-24 RX ADMIN — SODIUM CHLORIDE, POTASSIUM CHLORIDE, SODIUM LACTATE AND CALCIUM CHLORIDE: 600; 310; 30; 20 INJECTION, SOLUTION INTRAVENOUS at 09:03

## 2023-04-24 RX ADMIN — Medication 100 MCG: at 09:15

## 2023-04-24 RX ADMIN — PANTOPRAZOLE SODIUM 40 MG: 40 TABLET, DELAYED RELEASE ORAL at 17:18

## 2023-04-24 RX ADMIN — ROCURONIUM BROMIDE 50 MG: 10 SOLUTION INTRAVENOUS at 09:07

## 2023-04-24 RX ADMIN — GLYCOPYRROLATE 0.4 MG: 0.2 INJECTION INTRAMUSCULAR; INTRAVENOUS at 10:01

## 2023-04-24 RX ADMIN — PANTOPRAZOLE SODIUM 40 MG: 40 TABLET, DELAYED RELEASE ORAL at 11:43

## 2023-04-24 RX ADMIN — BUSPIRONE HYDROCHLORIDE 5 MG: 5 TABLET ORAL at 22:18

## 2023-04-24 RX ADMIN — BUDESONIDE 0.5 MG: 0.5 SUSPENSION RESPIRATORY (INHALATION) at 20:45

## 2023-04-24 RX ADMIN — SENNOSIDES AND DOCUSATE SODIUM 2 TABLET: 8.6; 5 TABLET ORAL at 11:43

## 2023-04-24 RX ADMIN — Medication 200 MCG: at 09:30

## 2023-04-24 RX ADMIN — LISINOPRIL 10 MG: 10 TABLET ORAL at 22:18

## 2023-04-24 RX ADMIN — DEXAMETHASONE SODIUM PHOSPHATE 8 MG: 4 INJECTION, SOLUTION INTRAMUSCULAR; INTRAVENOUS at 09:14

## 2023-04-24 RX ADMIN — EPHEDRINE SULFATE 10 MG: 50 INJECTION INTRAVENOUS at 09:34

## 2023-04-24 RX ADMIN — LIDOCAINE HYDROCHLORIDE 50 MG: 10 INJECTION, SOLUTION EPIDURAL; INFILTRATION; INTRACAUDAL; PERINEURAL at 09:07

## 2023-04-24 RX ADMIN — PROPOFOL 200 MG: 10 INJECTION, EMULSION INTRAVENOUS at 09:07

## 2023-04-24 RX ADMIN — Medication 10 ML: at 11:44

## 2023-04-24 RX ADMIN — SENNOSIDES AND DOCUSATE SODIUM 2 TABLET: 8.6; 5 TABLET ORAL at 22:18

## 2023-04-24 RX ADMIN — Medication 100 MCG: at 09:20

## 2023-04-24 RX ADMIN — HYDROCODONE BITARTRATE AND ACETAMINOPHEN 1 TABLET: 7.5; 325 TABLET ORAL at 04:47

## 2023-04-24 RX ADMIN — IPRATROPIUM BROMIDE AND ALBUTEROL SULFATE 3 ML: .5; 3 SOLUTION RESPIRATORY (INHALATION) at 10:27

## 2023-04-24 RX ADMIN — POLYETHYLENE GLYCOL 3350 17 G: 17 POWDER, FOR SOLUTION ORAL at 17:53

## 2023-04-24 RX ADMIN — ONDANSETRON 4 MG: 2 INJECTION INTRAMUSCULAR; INTRAVENOUS at 09:14

## 2023-04-24 RX ADMIN — IPRATROPIUM BROMIDE AND ALBUTEROL SULFATE 3 ML: .5; 2.5 SOLUTION RESPIRATORY (INHALATION) at 20:45

## 2023-04-24 RX ADMIN — NEOSTIGMINE 3 MG: 1 INJECTION INTRAVENOUS at 10:01

## 2023-04-24 RX ADMIN — Medication 10 ML: at 22:19

## 2023-04-24 RX ADMIN — FENTANYL CITRATE 100 MCG: 0.05 INJECTION, SOLUTION INTRAMUSCULAR; INTRAVENOUS at 09:07

## 2023-04-24 RX ADMIN — IPRATROPIUM BROMIDE AND ALBUTEROL SULFATE 3 ML: .5; 2.5 SOLUTION RESPIRATORY (INHALATION) at 13:21

## 2023-04-24 NOTE — PROGRESS NOTES
Pulmonary/Critical Care Follow-up     LOS: 0 days   Patient Care Team:  Alejandro Monique MD as PCP - General (General Surgery)        Chief Complaint   Patient presents with   • Shortness of Breath     Subjective      History reviewed and updated in EMR as indicated.  No fevers or chills.    Interval History:     Patient seen in endoscopy prior to procedure.  No new complaints.  He reports chronic dyspnea with recent worsening.    History taken from: Patient, chart    PMH/FH/Social History were reviewed and updated appropriately in the electronic medical record.     Review of Systems:    Review of 14 systems was completed with positives and pertinent negatives noted in the subjective section.  All other systems reviewed and are negative.         Objective     Vital Signs  Temp:  [97.8 °F (36.6 °C)-98.8 °F (37.1 °C)] 98.8 °F (37.1 °C)  Heart Rate:  [77-89] 89  Resp:  [12-18] 12  BP: ()/(54-79) 127/76  No intake/output data recorded.  Body mass index is 34.15 kg/m².     IV drips:      Physical Exam:     Constitutional:   Alert, in no acute distress   Head:   Normocephalic, atraumatic   Eyes:           Lids and lashes normal, conjunctivae and sclerae normal.  PER   ENMT:  Ears appear intact with no abnormalities noted     Lips normal.     Neck:  Trachea midline, no JVD   Lungs/Resp:    Normal effort, symmetric chest rise, no crepitus, rales bilaterally.  Left chest tube in place with clear drainage.  No air leak.              Heart/CV:   Regular rhythm and normal rate, no murmur   Abdomen/GI:    Soft, nontender, nondistended   :    Deferred   Extremities/MSK:  No clubbing or cyanosis.  No edema.     Pulses:  Pulses palpable and equal bilaterally   Skin:  No bleeding, bruising or rash   Heme/Lymph:  No cervical or supraclavicular adenopathy.   Neurologic:    Psychiatric:    Moves all extremities with no obvious focal motor deficit.  Cranial nerves 2 - 12 grossly intact  Non-agitated, normal affect.    The above  physical exam findings were reviewed and reflect my exam findings as of today's exam.   Electronically signed by:  Kota Willard MD  04/24/23  09:03 EDT      Results Review:     I reviewed the patient's new clinical results.   Results from last 7 days   Lab Units 04/21/23  0442 04/20/23  1430   SODIUM mmol/L 142 142   POTASSIUM mmol/L 4.4 3.8   CHLORIDE mmol/L 106 104   CO2 mmol/L 25.0 28.0   BUN mg/dL 11 11   CREATININE mg/dL 0.73* 0.93   CALCIUM mg/dL 9.3 9.8   BILIRUBIN mg/dL  --  0.3   ALK PHOS U/L  --  85   ALT (SGPT) U/L  --  20   AST (SGOT) U/L  --  20   GLUCOSE mg/dL 102* 96     Results from last 7 days   Lab Units 04/21/23  0442 04/20/23  1430   WBC 10*3/mm3 7.87 8.65   HEMOGLOBIN g/dL 12.6* 13.7   HEMATOCRIT % 42.7 45.8   PLATELETS 10*3/mm3 206 248               I reviewed the patient's new imaging including images and reports.    Reviewed images including CT chest which shows bilateral lung masses and left pleural effusion.    Medication Review:   budesonide, 0.5 mg, Nebulization, BID - RT  busPIRone, 5 mg, Oral, Nightly  insulin lispro, 0-9 Units, Subcutaneous, TID AC  ipratropium-albuterol, 3 mL, Nebulization, 4x Daily - RT  lisinopril, 10 mg, Oral, Nightly  metoprolol succinate XL, 50 mg, Oral, Daily  pantoprazole, 40 mg, Oral, BID AC  sodium chloride, 10 mL, Intravenous, Q12H           Assessment & Plan         Recurrent left pleural effusion    PAT (paroxysmal atrial tachycardia)    Black lung    Chronic obstructive pulmonary disease    Primary hypertension    Lung mass    63 y.o. former , lifetime non-smoker, history of CWP with PMF.  Has left pleural effusion with recent chest tube placement.  Chest tube remains in place.  Concern for underlying malignancy seen by Dr. Marie over the weekend who recommended bronchoscopy.    I discussed bronchoscopy with endobronchial ultrasound-guided biopsies with the patient, including risk of bleeding, respiratory failure, pneumothorax, and  death.  Patient is agreeable to proceed.    Plan:  1.  For bilateral lung masses: Plan for bronchoscopy with endobronchial ultrasound-guided biopsies for diagnosis.  Suspect PMF from CWP.  I will plan to biopsy of the left lower lobe for sure where there appears to be a bronchial cutoff.  2.  For pleural effusion: Left chest tube in place managed by CT surgery.          Electronically signed by:    Kota Willard MD  04/24/23  09:03 EDT      *. Please note that portions of this note were completed with [employer] - a voice recognition program.

## 2023-04-24 NOTE — OP NOTE
Bronchoscopy Procedural Note       Date of Operation: 4/24/2023    Indication: This is a 63 y.o. male with history of coal workers pneumoconiosis admitted with worsening dyspnea and found to have left pleural effusion along with bilateral lung masses.    Pre-op Diagnosis: Pleural effusion.  Bilateral lung masses.    Post-op Diagnosis: Pleural effusion.  Bilateral lung masses.  Endobronchial obstruction of superior segment of left lower lobe.    Surgeon: Kota Willard MD      Referring Physician: Saroj Marie MD    Procedures Performed:  Flexible videobronchoscopy  Bronchial washings  Endobronchial ultrasound-guided TBNA of station 10 R  Endobronchial ultrasound-guided TBNA of station 11 L.  Endobronchial brushing left lower lobe superior segment.  Endobronchial biopsies left lower lobe superior segment.    Anesthesia: General endotracheal, per anesthesia    Estimated Blood Loss: <5 ml    Description of Procedure:    Informed consent was obtained after the risks and benefits of the procedure, including the risk of bleeding, pneumothorax, medication reaction, and death were described.  A signed informed consent form was placed on the chart prior to the procedure.      The patient was brought to the endoscopy room where he was intubated by anesthesia who monitored the patient throughout the case.    A flexible video bronchoscope was inserted through the existing endotracheal tube and advanced to the trachea.  The trachea was normal in appearance.  Main sajan was sharp.  The right bronchial tree was examined to at least the segmental level.  There was some mild narrowing at the takeoff of the right upper lobe, as well as the right middle and lower lobes.  There is black staining of the bronchial mucosa at these locations.  There were no discrete endobronchial lesions seen and there was normal bronchial anatomy and no discrete endobronchial lesions within the right bronchial tree.    The left bronchial  tree was examined to at least the segmental level.  There was normal bronchial anatomy.  There was some submucosal dark staining at several secondary sajan.  There was narrowing and abnormal appearing mucosa at the takeoff of the superior segment of the left lower lobe.  There were scant, clear secretions throughout.  I then removed the flexible video bronchoscope.    I inserted the endobronchial ultrasound scope and did a brief survey of mediastinal and hilar lymph node stations.  There was abnormal tissue consistent with PMF within the right zach and middle lobe/lower lobe takeoffs.  Additionally, there was lymphadenopathy at the 11 L interlobar location.  Several different areas had abnormal tissue but no adequate biopsy window secondary to blood vessels in between the abnormal tissue and the airway.  I was able to do a few passes of a 21-gauge TBNA needle using linear endobronchial ultrasound at stations 10 R and 11 L.  There is a little bit of oozing of blood which was self-limited.  I did 3 passes at each location with adequate tissue obtained.  I then removed the linear endobronchial ultrasound scope.  I was not able to get visualization of the left lower lobe suspected mass using linear ultrasound.    I reinserted the flexible video bronchoscope and advanced it to the left lower lobe.  I did an endobronchial brushing at the left lower lobe superior segment area of abnormal mucosa.  This was collected for cytology.  I then did a total of 4 endobronchial biopsies at the left lower lobe superior segment location.  There is a fair amount of oozing of blood after the biopsies which was controlled with cold saline.  I then did a brief airway inspection and cleaned up any residual secretions.  I removed the scope and turned the patient over to anesthesia and endoscopy staff for extubation and postprocedure monitoring.    There were no immediate complications.    Findings and Recommendations:  Specimens  obtained:  1.  TBNA station 10 R.  2.  TBNA station 11 L.  3.  Endobronchial brushing left lower lobe.  4.  Endobronchial biopsies left lower lobe.  5.  Bronchial washings.    I will continue to follow the patient during the hospitalization.      Kota Willard MD  Pulmonary and Critical Care Medicine   04/24/23 10:03 EDT

## 2023-04-24 NOTE — PLAN OF CARE
Goal Outcome Evaluation:  Plan of Care Reviewed With: patient, spouse     Pt resting in bed comfortably. Meds given as ordered. Chest tube remains in place. 3L O2 in place. Call bell within reach. Fall precautions in place. Will continue to assess.

## 2023-04-24 NOTE — CASE MANAGEMENT/SOCIAL WORK
Continued Stay Note  [facility]     Patient Name: Rory Rios Jr.  MRN: [id number]  Today's Date: 4/24/2023    Admit Date: 4/20/2023    Plan: Home at DC   Discharge Plan     Row Name 04/24/23 1504       Plan    Plan Home at DC    Patient/Family in Agreement with Plan other (see comments)    Plan Comments Went for a bronch today. I will continue to follow for DC planning needs.    Final Discharge Disposition Code 01 - home or self-care               Discharge Codes    No documentation.               Expected Discharge Date and Time     Expected Discharge Date Expected Discharge Time    Apr 27, 2023             Preeti Alejo RN

## 2023-04-24 NOTE — ANESTHESIA POSTPROCEDURE EVALUATION
Patient: Rory Rios Jr.    Procedure Summary     Date: 04/24/23 Room / Location:  [facility]    Anesthesia Start: 0903 Anesthesia Stop: 1017    Procedure: BRONCHOSCOPY WITH ENDOBRONCHIAL ULTRASOUND (Bronchus) Diagnosis:       Lung mass      (Lung mass [R91.8])    Surgeons: Kota Willard MD Provider: Rell Barahona MD    Anesthesia Type: general ASA Status: 3      /89  T 97F  SpO2 92% 2l nc  rr 14  Hr 95 sr    Anesthesia Type: general    Vitals  No vitals data found for the desired time range.          Post Anesthesia Care and Evaluation    Patient location during evaluation: PACU  Patient participation: complete - patient participated  Level of consciousness: awake and alert  Pain management: adequate    Airway patency: patent  Anesthetic complications: No anesthetic complications  PONV Status: none  Cardiovascular status: hemodynamically stable and acceptable  Respiratory status: nonlabored ventilation, acceptable and nasal cannula  Hydration status: acceptable

## 2023-04-24 NOTE — PLAN OF CARE
Goal Outcome Evaluation:           Progress: no change   No drainage noted from chest tube. Npo after mn for lung bx. Pt reported sharp pain in right lower lung, hydrocodone given for relief

## 2023-04-24 NOTE — ANESTHESIA PREPROCEDURE EVALUATION
Anesthesia Evaluation     Patient summary reviewed and Nursing notes reviewed   no history of anesthetic complications:  NPO Solid Status: > 8 hours  NPO Liquid Status: > 2 hours           Airway   Mallampati: II  TM distance: >3 FB  Neck ROM: full  No difficulty expected  Dental    (+) edentulous    Pulmonary - normal exam    breath sounds clear to auscultation  (+) COPD (Black lung) moderate, home oxygen (qhs), sleep apnea on CPAP,     ROS comment: Lung mass  Cardiovascular - normal exam    ECG reviewed  Rhythm: regular  Rate: normal    (+) hypertension,       Neuro/Psych- negative ROS  GI/Hepatic/Renal/Endo    (+)  GERD well controlled,  diabetes mellitus type 2,     Musculoskeletal     Abdominal    Substance History      OB/GYN          Other   arthritis,                      Anesthesia Plan    ASA 3     general     intravenous induction     Anesthetic plan, risks, benefits, and alternatives have been provided, discussed and informed consent has been obtained with: patient.    Plan discussed with CRNA.        CODE STATUS:    Level Of Support Discussed With: Patient  Code Status (Patient has no pulse and is not breathing): CPR (Attempt to Resuscitate)  Medical Interventions (Patient has pulse or is breathing): Full Support

## 2023-04-24 NOTE — ANESTHESIA PROCEDURE NOTES
Airway  Urgency: elective    Date/Time: 4/24/2023 9:09 AM  Airway not difficult    General Information and Staff    Patient location during procedure: OR  CRNA/CAA: Vitaly Higgins CRNA    Indications and Patient Condition  Indications for airway management: airway protection    Preoxygenated: yes  MILS not maintained throughout  Mask difficulty assessment: 2 - vent by mask + OA or adjuvant +/- NMBA    Final Airway Details  Final airway type: endotracheal airway      Successful airway: ETT  Cuffed: yes   Successful intubation technique: direct laryngoscopy  Endotracheal tube insertion site: oral  Blade: Yanes  Blade size: 2  ETT size (mm): 9.0  Cormack-Lehane Classification: grade I - full view of glottis  Placement verified by: chest auscultation and capnometry   Measured from: lips  ETT/EBT  to lips (cm): 24  Number of attempts at approach: 1  Assessment: lips, teeth, and gum same as pre-op and atraumatic intubation    Additional Comments  Negative epigastric sounds, Breath sound equal bilaterally with symmetric chest rise and fall

## 2023-04-24 NOTE — PROGRESS NOTES
[facility] Medicine Services  PROGRESS NOTE    Patient Name: Rory Rios Jr.  : 1960  MRN: [id number]    Date of Admission: 2023  Primary Care Physician: Alejandro Monique MD    Subjective   Subjective     CC: Follow-up SOA    HPI: No acute events overnight, patient is s/P bronchoscopy with EBUS this morning, he states that he feels okay, did endorse some mild nausea    ROS:  Gen- No fevers, chills  CV- No chest pain, palpitations  Resp- No cough, +dyspnea  GI- No N/V/D, abd pain       Objective   Objective     Vital Signs:   Temp:  [98 °F (36.7 °C)-98.8 °F (37.1 °C)] 98.8 °F (37.1 °C)  Heart Rate:  [77-90] 82  Resp:  [12-18] 16  BP: ()/(54-89) 99/61  Flow (L/min):  [3-6] 3     Physical Exam:  Constitutional: No acute distress, awake, alert  HENT: NCAT, mucous membranes moist  Respiratory: Nonlabored respirations, no wheezes, left chest tube in place on 4L NC  Cardiovascular: RRR, no murmurs, rubs, or gallops  Gastrointestinal: Positive bowel sounds, soft, nontender, nondistended  Musculoskeletal: No bilateral ankle edema  Psychiatric: Appropriate affect, cooperative  Neurologic: Oriented x 3, nonfocal  Skin: No rashes    Results Reviewed:  LAB RESULTS:      Lab 23  0442 23  1430   WBC 7.87 8.65   HEMOGLOBIN 12.6* 13.7   HEMATOCRIT 42.7 45.8   PLATELETS 206 248   NEUTROS ABS  --  5.70   IMMATURE GRANS (ABS)  --  0.02   LYMPHS ABS  --  1.72   MONOS ABS  --  0.74   EOS ABS  --  0.41*   MCV 83.2 83.1   SED RATE  --  67*   CRP  --  1.15*         Lab 23  0442 23  1430   SODIUM 142 142   POTASSIUM 4.4 3.8   CHLORIDE 106 104   CO2 25.0 28.0   ANION GAP 11.0 10.0   BUN 11 11   CREATININE 0.73* 0.93   EGFR 101.0 91.1   GLUCOSE 102* 96   CALCIUM 9.3 9.8   HEMOGLOBIN A1C 6.30*  --          Lab 23  1430   TOTAL PROTEIN 6.8   ALBUMIN 3.9   GLOBULIN 2.9   ALT (SGPT) 20   AST (SGOT) 20   BILIRUBIN 0.3   ALK PHOS 85         Lab 23  1430   PROBNP 117.7    HSTROP T 21*                 Brief Urine Lab Results     None          Microbiology Results Abnormal     None          XR Chest 1 View    Result Date: 4/24/2023  XR CHEST 1 VW Date of Exam: 4/24/2023 4:33 AM EDT Indication: follow up pleural effusion status post left CT-guided chest tube placement follow up Comparison: 4/23/2023 Findings: Patient's left-sided pigtail catheter is more faintly visible on today's exam, likely due to different film technique, but is stable in position. No pneumothorax is seen. Perihilar masslike changes typical of PMF are again noted. Pulmonary parenchymal disease pattern is stable. There is no evidence of significant effusion or other new chest disease.     Impression: Impression: Left pleural drain remains in place. Stable changes of pulmonary fibrosis. No new chest disease is seen. Electronically Signed: Mitch Sears  4/24/2023 8:36 AM EDT  Workstation ID: [id number]    XR Chest 1 View    Result Date: 4/23/2023  XR CHEST 1 VW Date of Exam: 4/23/2023 7:09 AM EDT Indication: follow up pleural effusion status post left CT-guided chest tube placement follow up Comparison: CT chest 4/22/2023, chest x-ray 4/20/2023 Findings: Multifocal masslike consolidations in the lungs largest in the right upper lobe and left and right perihilar locations. Background of septal thickening. Cardiomegaly. Small caliber chest tube seen in the lower left thorax. Small left pleural effusion. No  pneumothorax.     Impression: Impression: Decreased size of pleural effusion on the left. It is now small. Small caliber chest tube is present. No pneumothorax. No change in masslike bilateral parenchymal airspace opacities. Electronically Signed: Patti Williamson  4/23/2023 8:30 AM EDT  Workstation ID: [id number]    CT Guided Chest Tube    Result Date: 4/23/2023  Clinical indication: Pleural effusion : Dr. Bacilio June Procedure: CT-guided chest tube placement, left-sided Contrast usage: None Conscious sedation: None  "Complication: None apparent. Estimated blood loss: Negligible Procedure: Formal written consent for the procedure was obtained from the patient after explaining risks to include bleeding, infection, injury to lung/adjacent organs, pneumothorax, need for additional surgery/procedure.  The patient's left mid axillary region was prepped and draped in the usual, sterile fashion.  Local anesthesia with 1% lidocaine infiltration was achieved.  Utilizing CT guidance, a 10 Urdu locking pigtail drainage catheter was placed into the moderate size left pleural fluid collection without difficulty.  A sample of the pleural fluid was sent for cytology, per the referring service.  Catheter position was confirmed with \"fluoroscopy CT\", and the catheter was positioned in the \"locked\" position, sutured into place, and a sterile bandage was applied.  The catheter was placed to drain to an at[facility] Pleur-evac system.  There were no immediate complications. Impression: Successful CT-guided left-sided chest tube placement, with 10 Urdu \"locking\" pigtail drainage catheter placed into the left-sided pleural effusion without difficulty.  Sample was sent for cytology, per the referring service.          Current medications:  Scheduled Meds:budesonide, 0.5 mg, Nebulization, BID - RT  busPIRone, 5 mg, Oral, Nightly  insulin lispro, 0-9 Units, Subcutaneous, TID AC  ipratropium-albuterol, 3 mL, Nebulization, 4x Daily - RT  lisinopril, 10 mg, Oral, Nightly  metoprolol succinate XL, 50 mg, Oral, Daily  pantoprazole, 40 mg, Oral, BID AC  sodium chloride, 10 mL, Intravenous, Q12H      Continuous Infusions:   PRN Meds:.•  acetaminophen **OR** acetaminophen **OR** acetaminophen  •  senna-docusate sodium **AND** polyethylene glycol **AND** bisacodyl **AND** bisacodyl  •  Calcium Replacement - Follow Nurse / BPA Driven Protocol  •  dextrose  •  dextrose  •  glucagon (human recombinant)  •  HYDROcodone-acetaminophen  •  hydrOXYzine  •  " ipratropium-albuterol  •  LORazepam  •  Magnesium Standard Dose Replacement - Follow Nurse / BPA Driven Protocol  •  ondansetron **OR** ondansetron  •  Phosphorus Replacement - Follow Nurse / BPA Driven Protocol  •  Potassium Replacement - Follow Nurse / BPA Driven Protocol  •  sodium chloride  •  sodium chloride  •  sodium chloride    Assessment & Plan   Assessment & Plan     Active Hospital Problems    Diagnosis  POA   • **Recurrent left pleural effusion [J90]  Yes   • Black lung [J60]  Yes   • Chronic obstructive pulmonary disease [J44.9]  Yes   • Lung mass [R91.8]  Unknown   • PAT (paroxysmal atrial tachycardia) [I47.1]  Yes   • Primary hypertension [I10]  Yes      Resolved Hospital Problems   No resolved problems to display.        Brief Hospital Course to date:  Rory Rios Jr. is a 63 y.o. male with history of paroxysmal atrial tachycardia, hypertension, COPD on 2 L NC, black lung, recent pleural effusion status post thoracentesis who presents with progressively worsening SOA     Recurrent left pleural effusions  -Checks x-ray shows moderate left pleural effusion  -He is s/p recent thoracentesis 4/5/2023,  fluid was exudative, cytology and microbiology were all negative at that time  -CTS following, repeat CT chest shows extensive mass like perihilar disease, large left pleural effusion  -He is s/p pigtail drainage by IR,  has drained about 3 L out , follow-up cytology, he may need Pleurx catheter placement  -Pulmonary is following, he is s/p bronch/EBUS today 4/24/2023, f/u biopsies and cultures      Chronic respiratory failure with hypoxia  COPD/black lung disease   COOPER on CPAP  -Patient on 2 L at baseline, currently on 4 L NC, wean as able  -Continue CPAP nightly     Hypertension  -BP currently stable, continue lisinopril and metoprolol     Paroxysmal atrial tachycardia  -Continue metoprolol     Well controlled type 2 diabetes with A1C 6.3%  -Continue SSI for now    Expected Discharge Location and  Transportation: Home  Expected Discharge   Expected Discharge Date and Time     Expected Discharge Date Expected Discharge Time    Apr 27, 2023            DVT prophylaxis:  Mechanical DVT prophylaxis orders are present.     AM-PAC 6 Clicks Score (PT): 22 (04/24/23 0805)    CODE STATUS:   Code Status and Medical Interventions:   Ordered at: 04/20/23 1713     Level Of Support Discussed With:    Patient     Code Status (Patient has no pulse and is not breathing):    CPR (Attempt to Resuscitate)     Medical Interventions (Patient has pulse or is breathing):    Full Support       Shlomo Roblero MD  04/24/23

## 2023-04-24 NOTE — PROGRESS NOTES
CTS Progress Note      Chief Complaint: Left pleural effusion      Subjective  No acute events overnight. EBUS today.     Objective    Physical Exam:   Vital Signs   Temp:  [97.8 °F (36.6 °C)-98.8 °F (37.1 °C)] 98.8 °F (37.1 °C)  Heart Rate:  [77-89] 89  Resp:  [16-18] 16  BP: ()/(54-79) 99/68   GEN: NAD   CV: Regular rate and rhythm    RESP: Decreased bases scattered rales   EXT: Warm to touch no significant edema    CT Output: No drainage documented in the past 24 hours, no airleak  Output by Drain (mL) 04/23/23 0701 - 04/23/23 1900 04/23/23 1901 - 04/24/23 0700 04/24/23 0701 - 04/24/23 0811 Range Total   Chest Tube 1 Left Midaxillary          Results     Results from last 7 days   Lab Units 04/21/23  0442   WBC 10*3/mm3 7.87   HEMOGLOBIN g/dL 12.6*   HEMATOCRIT % 42.7   PLATELETS 10*3/mm3 206     Results from last 7 days   Lab Units 04/21/23  0442   SODIUM mmol/L 142   POTASSIUM mmol/L 4.4   CHLORIDE mmol/L 106   CO2 mmol/L 25.0   BUN mg/dL 11   CREATININE mg/dL 0.73*   GLUCOSE mg/dL 102*   CALCIUM mg/dL 9.3           Assessment & Plan   Recurrent left pleural effusion status post left CT-guided chest tube placement 4/22/2023  4.5 cm left lower lobe mass    Plan   Continue chest tube  Daily chest x-ray  No output documented from chest tube-need to determine output over the past 24 hours  EBUS per pulmonary today  Patient/family discussing possible Pleurx catheter placement    Romy Roque, APRN  04/24/23  08:11 EDT

## 2023-04-25 ENCOUNTER — APPOINTMENT (OUTPATIENT)
Dept: GENERAL RADIOLOGY | Facility: HOSPITAL | Age: 63
DRG: 187 | End: 2023-04-25
Payer: OTHER MISCELLANEOUS

## 2023-04-25 LAB
CYTO UR: NORMAL
GIE STN SPEC: NORMAL
GLUCOSE BLDC GLUCOMTR-MCNC: 113 MG/DL (ref 70–130)
GLUCOSE BLDC GLUCOMTR-MCNC: 133 MG/DL (ref 70–130)
GLUCOSE BLDC GLUCOMTR-MCNC: 139 MG/DL (ref 70–130)
LAB AP CASE REPORT: NORMAL
LAB AP CLINICAL INFORMATION: NORMAL
PATH REPORT.FINAL DX SPEC: NORMAL
PATH REPORT.GROSS SPEC: NORMAL
REF LAB TEST METHOD: NORMAL
REF LAB TEST METHOD: NORMAL

## 2023-04-25 PROCEDURE — 99232 SBSQ HOSP IP/OBS MODERATE 35: CPT | Performed by: INTERNAL MEDICINE

## 2023-04-25 PROCEDURE — 94761 N-INVAS EAR/PLS OXIMETRY MLT: CPT

## 2023-04-25 PROCEDURE — 94799 UNLISTED PULMONARY SVC/PX: CPT

## 2023-04-25 PROCEDURE — 71045 X-RAY EXAM CHEST 1 VIEW: CPT

## 2023-04-25 PROCEDURE — 82962 GLUCOSE BLOOD TEST: CPT

## 2023-04-25 RX ADMIN — PANTOPRAZOLE SODIUM 40 MG: 40 TABLET, DELAYED RELEASE ORAL at 08:39

## 2023-04-25 RX ADMIN — IPRATROPIUM BROMIDE AND ALBUTEROL SULFATE 3 ML: .5; 2.5 SOLUTION RESPIRATORY (INHALATION) at 12:59

## 2023-04-25 RX ADMIN — IPRATROPIUM BROMIDE AND ALBUTEROL SULFATE 3 ML: .5; 2.5 SOLUTION RESPIRATORY (INHALATION) at 08:48

## 2023-04-25 RX ADMIN — METOPROLOL SUCCINATE 50 MG: 50 TABLET, EXTENDED RELEASE ORAL at 08:39

## 2023-04-25 RX ADMIN — IPRATROPIUM BROMIDE AND ALBUTEROL SULFATE 3 ML: .5; 2.5 SOLUTION RESPIRATORY (INHALATION) at 20:48

## 2023-04-25 RX ADMIN — BUDESONIDE 0.5 MG: 0.5 SUSPENSION RESPIRATORY (INHALATION) at 08:48

## 2023-04-25 RX ADMIN — LISINOPRIL 10 MG: 10 TABLET ORAL at 21:27

## 2023-04-25 RX ADMIN — BUSPIRONE HYDROCHLORIDE 5 MG: 5 TABLET ORAL at 21:27

## 2023-04-25 RX ADMIN — Medication 10 ML: at 08:41

## 2023-04-25 RX ADMIN — BUDESONIDE 0.5 MG: 0.5 SUSPENSION RESPIRATORY (INHALATION) at 20:49

## 2023-04-25 RX ADMIN — PANTOPRAZOLE SODIUM 40 MG: 40 TABLET, DELAYED RELEASE ORAL at 16:33

## 2023-04-25 RX ADMIN — Medication 10 ML: at 21:27

## 2023-04-25 NOTE — DISCHARGE PLACEMENT REQUEST
"Utilization Review [phone]  Rory Rios Jr. (63 y.o. Male)     Date of Birth   1960    Social Security Number       Address   [address]  [place] [id number]    Home Phone   [phone]    MRN   [id number]       Druze   None    Marital Status                               Admission Date   4/20/23    Admission Type   Emergency    Admitting Provider   Shlomo Roblero MD    Attending Provider   Shlomo Roblero MD    Department, Room/Bed   [address], S514/1       Discharge Date       Discharge Disposition       Discharge Destination                               Attending Provider: Shlomo Roblero MD    Allergies: No Known Allergies    Isolation: None   Infection: None   Code Status: CPR    Ht: 195.6 cm (77\")   Wt: 131 kg (288 lb)    Admission Cmt: None   Principal Problem: Recurrent left pleural effusion [J90]                 Active Insurance as of 4/20/2023     Primary Coverage     Payor Plan Insurance Group Employer/Plan Group    WORKERS COMPENSATION [id number]     Payor Plan Address Payor Plan Phone Number Payor Plan Fax Number Effective Dates    [address] [phone]  6/25/2009 - None Entered    [facility] [place] [id number]       Subscriber Name Subscriber Birth Date Member ID       RORY RIOS JR. 1960 [id number]           Secondary Coverage     Payor Plan Insurance Group Employer/Plan Group    MEDICARE MEDICARE A & B      Payor Plan Address Payor Plan Phone Number Payor Plan Fax Number Effective Dates    [address] [phone]  4/1/2013 - None Entered    [facility] [id number]       Subscriber Name Subscriber Birth Date Member ID       RORY RIOS JR. 1960 [id number]                 Emergency Contacts      (Rel.) Home Phone Work Phone Mobile Phone    GabrielBamWhitney (Spouse) [phone] -- [phone]    AROLDOKATIE (Daughter) [phone] -- --               History & Physical      Agnieszka Lemus, APRN at 04/20/23 " [phone]     Attestation signed by Alexandria Ponce MD at 04/20/23 5487        Attending   Admission Attestation       I have performed an independent face-to-face diagnostic evaluation including performing an independent physical examination as documented here.  The documented plan of care above was reviewed and developed with the advanced practice clinician (APC).      Brief Summary Statement:   Rory Rios Jr. is a 63 y.o. male with a hx of black lung, recent pleural effusion s/p thoracentesis earlier this month, on home O2 of 2L, presents with worsening SOB and JAUREGUI.  Recently pulmonologist referred him to Dr. Solis--appointment isn't until beginning of May.  Pulmonologist recommended he go to ER to be evaluated with CT surg c/s.  O2 at home has been higher due to his SOB.  C/o some new LE edema.  Has had some more frequent runs of tachycardia at home as well.    In the ED, CXR with L pleural effusion.    Remainder of detailed HPI is as noted by APC and has been reviewed and/or edited by me for completeness.    Attending Physical Exam:  Temp:  [97.4 °F (36.3 °C)-97.8 °F (36.6 °C)] 97.8 °F (36.6 °C)  Heart Rate:  [] 81  Resp:  [20-22] 20  BP: (125-144)/() 144/107  Flow (L/min):  [2.5-3] 2.5    Constitutional: Awake, alert, uncomfortable  HENT: NCAT, mucous membranes moist  Respiratory: Dec BS on L base posteriorly, nonlabored respirations   Cardiovascular: RRR, no murmurs,Gastrointestinal: Positive bowel sounds, soft, nontender, nondistended  Musculoskeletal: 1+ bilateral ankle edema, no clubbing or cyanosis to extremities  Psychiatric: Appropriate affect, cooperative  Neurologic: Oriented x 3, strength symmetric in all extremities, Cranial Nerves grossly intact to confrontation, speech clear  Skin: No rashes      Brief Assessment/Plan :  See detailed assessment and plan developed with APC which I have reviewed and/or edited for completeness.    L sided pleural effusion-recurrent  CT from [facility] with  mass like opacity in RUL and perihilar mass in LLL  COOPER  --CTS consult  --Restart home duonebs  --cont home CPAP    Paroxsymal atrial tachycardia--had episode earlier in the evening  --cont home metoprolol        Alexandria Ponce MD  23                                  [facility] Medicine Services  HISTORY AND PHYSICAL    Patient Name: Rory Rios Jr.  : 1960  MRN: [id number]  Primary Care Physician: Alejandro Monique MD  Date of admission: 2023    Subjective    Subjective     Chief Complaint:  SOB    HPI:  Rory Rios Jr. is a 63 y.o. male with PMH of black lung, GERD, DM recent thoracentesis 23 with 2L taken off, home oxygen 2L. Patient has been noticing increasing soa since thoracentesis. Yesterday he had severe  dyspnea with any ADL's and left sided chest pain with any deep breath. He stayed that way all day. He went to bed  Early and wore his cpap. This am he felt ok when he woke up but when he got up to get dressed got very SOA again. They called their pulmonologist who recommended for them to come to ED since they had a appt with  for possible VATS. He has been using his inhaler and nebs and had to increase his oxygen up to 3-4 L due to SOA; he states fluid from thoracentesis was exudate and cytology and microbiology was all negative. He has been worked up by Cardiology for CHF and cleared. He has been noticing some LE mild edema. He denies any fevers, chills, N/V/D.     In ED: K 3.8, creat 0.93, WBC 8.65, hg b5.51        Review of Systems   Constitutional: Negative for chills and fever.   Respiratory: Positive for cough and shortness of breath.    Cardiovascular: Positive for chest pain. Negative for leg swelling.   Gastrointestinal: Negative for diarrhea, nausea and vomiting.   Genitourinary: Negative for dysuria.            Personal History     Past Medical History:   Diagnosis Date   • Arthritis    • Black lung    • Black lung disease    •  Diabetes mellitus    • GERD (gastroesophageal reflux disease)    • Hypertension    • Pneumonia              Past Surgical History:   Procedure Laterality Date   • FOOT SURGERY Left        Family History:  family history includes Diabetes in his brother, mother, and sister; Heart disease in his brother; Heart failure in his father.     Social History:  reports that he has never smoked. He has never been exposed to tobacco smoke. His smokeless tobacco use includes chew. He reports that he does not drink alcohol and does not use drugs.  Social History     Social History Narrative   • Not on file       Medications:  HYDROcodone-acetaminophen, albuterol sulfate HFA, esomeprazole, lisinopril, metoprolol succinate XL, and omeprazole    No Known Allergies    Objective    Objective     Vital Signs:   Temp:  [97.4 °F (36.3 °C)] 97.4 °F (36.3 °C)  Heart Rate:  [76-84] 84  Resp:  [22] 22  BP: (125-134)/(76-89) 134/89    Physical Exam   Constitutional: Awake, alert  Eyes: PERRLA, sclerae anicteric, no conjunctival injection  HENT: NCAT, mucous membranes moist  Neck: Supple, no thyromegaly, no lymphadenopathy, trachea midline  Respiratory: RML, RLL decreased  nonlabored respirations 2L 94%  Cardiovascular: RRR, no murmurs, rubs, or gallops, palpable pedal pulses bilaterally  Gastrointestinal: Positive bowel sounds, soft, nontender, nondistended  Musculoskeletal: mild bilateral ankle edema, no clubbing or cyanosis to extremities  Psychiatric: Appropriate affect, cooperative  Neurologic: Oriented x 3, strength symmetric in all extremities, Cranial Nerves grossly intact to confrontation, speech clear  Skin: No rashes      Result Review:  I have personally reviewed the results from the time of this admission to 4/20/2023 17:19 EDT and agree with these findings:  [x]  Laboratory list / accordion  []  Microbiology  []  Radiology  []  EKG/Telemetry   []  Cardiology/Vascular   []  Pathology  []  Old records  []  Other:  Most notable  findings include:     LAB RESULTS:      Lab 04/20/23  1430   WBC 8.65   HEMOGLOBIN 13.7   HEMATOCRIT 45.8   PLATELETS 248   NEUTROS ABS 5.70   IMMATURE GRANS (ABS) 0.02   LYMPHS ABS 1.72   MONOS ABS 0.74   EOS ABS 0.41*   MCV 83.1   SED RATE 67*         Lab 04/20/23  1430   SODIUM 142   POTASSIUM 3.8   CHLORIDE 104   CO2 28.0   ANION GAP 10.0   BUN 11   CREATININE 0.93   EGFR 91.1   GLUCOSE 96   CALCIUM 9.8         Lab 04/20/23  1430   TOTAL PROTEIN 6.8   ALBUMIN 3.9   GLOBULIN 2.9   ALT (SGPT) 20   AST (SGOT) 20   BILIRUBIN 0.3   ALK PHOS 85         Lab 04/20/23  1430   PROBNP 117.7   HSTROP T 21*                 Brief Urine Lab Results     None        Microbiology Results (last 10 days)     ** No results found for the last 240 hours. **          XR Chest 1 View    Result Date: 4/20/2023  XR CHEST 1 VW Date of Exam: 4/20/2023 2:13 PM EDT Indication: SOA triage protocol. Comparison: Chest CT 2/9/2023 , chest radiograph 2/9/2023. Findings: Heart size is stable. The masslike perihilar infiltrates in the lungs appear unchanged on the right and slightly improved on the left. There is at least a moderate dependent left-sided pleural effusion with some compressive atelectasis in the left lung base.      Impression: Impression: 1. No change in the masslike nodular appearing infiltrate in the right perihilar region. 2. Improvement in the left perihilar infiltrate compared with the last study with improvement in aeration at the left base. 3. Moderate pleural effusion, unchanged. Electronically Signed: Miguelito Deras  4/20/2023 2:22 PM EDT  Workstation ID: [id number]          Assessment & Plan   Assessment & Plan       Recurrent left pleural effusion    Recurrent left Pleural effusion  Black Lung   - Left thoracentesis on 4/5 with 1.5L removed exudate and cytology and microbiology was all negative  -- consult CTS for possible VATS   - NEBs prn and oxygen to keep sat > 90%  -- npo after midnight for possible surgery in am   --  check sed rate and CRP  -- toradol and lortab for pain     HTN  -- cont lisinopril and toprol     GERD  -- cont PPI     DM  -- hold home meds  --MDSSI        DVT prophylaxis:  [facility]     CODE STATUS:  Full code   Level Of Support Discussed With: Patient  Code Status (Patient has no pulse and is not breathing): CPR (Attempt to Resuscitate)  Medical Interventions (Patient has pulse or is breathing): Full Support      Expected Discharge  Expected Discharge Date and Time     Expected Discharge Date Expected Discharge Time    2023             This note has been completed as part of a split-shared workflow.     Signature: Electronically signed by Agnieszka Lemus, APRN, 23, 4:04 PM EDT.  Time spent by APC 75 min                  Electronically signed by Alexandria Ponce MD at 23 2312          Physician Progress Notes (all)      Shlomo Roblero MD at 23 0717              [facility] Medicine Services  PROGRESS NOTE    Patient Name: Rory Rios Jr.  : 1960  MRN: [id number]    Date of Admission: 2023  Primary Care Physician: Alejandro Monique MD    Subjective   Subjective     CC: Follow-up SOA    HPI: No acute events overnight, patient rested well, did endorse some mild discomfort    ROS:  Gen- No fevers, chills  CV- No chest pain, palpitations  Resp- No cough, +dyspnea  GI- No N/V/D, abd pain    Objective   Objective     Vital Signs:   Temp:  [97 °F (36.1 °C)-98.8 °F (37.1 °C)] 97 °F (36.1 °C)  Heart Rate:  [] 96  Resp:  [12-18] 16  BP: ()/(57-89) 111/67  Flow (L/min):  [3-6] 3     Physical Exam:  Constitutional: No acute distress, awake, alert  HENT: NCAT, mucous membranes moist  Respiratory: Nonlabored respirations, diffuse coarse breath sounds, no wheezes, left chest tube in place, on 3 L NC  Cardiovascular: RRR, no murmurs, rubs, or gallops  Gastrointestinal: Positive bowel sounds, soft, nontender, nondistended  Musculoskeletal: No bilateral  ankle edema  Psychiatric: Appropriate affect, cooperative  Neurologic: Oriented x 3, nonfocal  Skin: No rashes      Results Reviewed:  LAB RESULTS:      Lab 04/21/23  0442 04/20/23  1430   WBC 7.87 8.65   HEMOGLOBIN 12.6* 13.7   HEMATOCRIT 42.7 45.8   PLATELETS 206 248   NEUTROS ABS  --  5.70   IMMATURE GRANS (ABS)  --  0.02   LYMPHS ABS  --  1.72   MONOS ABS  --  0.74   EOS ABS  --  0.41*   MCV 83.2 83.1   SED RATE  --  67*   CRP  --  1.15*         Lab 04/21/23  0442 04/20/23  1430   SODIUM 142 142   POTASSIUM 4.4 3.8   CHLORIDE 106 104   CO2 25.0 28.0   ANION GAP 11.0 10.0   BUN 11 11   CREATININE 0.73* 0.93   EGFR 101.0 91.1   GLUCOSE 102* 96   CALCIUM 9.3 9.8   HEMOGLOBIN A1C 6.30*  --          Lab 04/20/23  1430   TOTAL PROTEIN 6.8   ALBUMIN 3.9   GLOBULIN 2.9   ALT (SGPT) 20   AST (SGOT) 20   BILIRUBIN 0.3   ALK PHOS 85         Lab 04/20/23  1430   PROBNP 117.7   HSTROP T 21*                 Brief Urine Lab Results     None          Microbiology Results Abnormal     Procedure Component Value - Date/Time    Respiratory Culture - Wash, Bronchus [[id number]] Collected: 04/24/23 1004    Lab Status: Preliminary result Specimen: Wash from Bronchus Updated: 04/24/23 1246     Gram Stain Many (4+) Red blood cells      Few (2+) WBCs seen      No organisms seen          XR Chest 1 View    Result Date: 4/24/2023  XR CHEST 1 VW Date of Exam: 4/24/2023 4:33 AM EDT Indication: follow up pleural effusion status post left CT-guided chest tube placement follow up Comparison: 4/23/2023 Findings: Patient's left-sided pigtail catheter is more faintly visible on today's exam, likely due to different film technique, but is stable in position. No pneumothorax is seen. Perihilar masslike changes typical of PMF are again noted. Pulmonary parenchymal disease pattern is stable. There is no evidence of significant effusion or other new chest disease.     Impression: Impression: Left pleural drain remains in place. Stable changes of  pulmonary fibrosis. No new chest disease is seen. Electronically Signed: Mitch Sears  4/24/2023 8:36 AM EDT  Workstation ID: [id number]    XR Chest 1 View    Result Date: 4/23/2023  XR CHEST 1 VW Date of Exam: 4/23/2023 7:09 AM EDT Indication: follow up pleural effusion status post left CT-guided chest tube placement follow up Comparison: CT chest 4/22/2023, chest x-ray 4/20/2023 Findings: Multifocal masslike consolidations in the lungs largest in the right upper lobe and left and right perihilar locations. Background of septal thickening. Cardiomegaly. Small caliber chest tube seen in the lower left thorax. Small left pleural effusion. No  pneumothorax.     Impression: Impression: Decreased size of pleural effusion on the left. It is now small. Small caliber chest tube is present. No pneumothorax. No change in masslike bilateral parenchymal airspace opacities. Electronically Signed: Patti Teri  4/23/2023 8:30 AM EDT  Workstation ID: [id number]          Current medications:  Scheduled Meds:budesonide, 0.5 mg, Nebulization, BID - RT  busPIRone, 5 mg, Oral, Nightly  insulin lispro, 0-9 Units, Subcutaneous, TID AC  ipratropium-albuterol, 3 mL, Nebulization, 4x Daily - RT  lisinopril, 10 mg, Oral, Nightly  metoprolol succinate XL, 50 mg, Oral, Daily  pantoprazole, 40 mg, Oral, BID AC  sodium chloride, 10 mL, Intravenous, Q12H      Continuous Infusions:   PRN Meds:.•  acetaminophen **OR** acetaminophen **OR** acetaminophen  •  senna-docusate sodium **AND** polyethylene glycol **AND** bisacodyl **AND** bisacodyl  •  Calcium Replacement - Follow Nurse / BPA Driven Protocol  •  dextrose  •  dextrose  •  glucagon (human recombinant)  •  HYDROcodone-acetaminophen  •  hydrOXYzine  •  ipratropium-albuterol  •  LORazepam  •  Magnesium Standard Dose Replacement - Follow Nurse / BPA Driven Protocol  •  ondansetron **OR** ondansetron  •  Phosphorus Replacement - Follow Nurse / BPA Driven Protocol  •  Potassium Replacement - Follow Nurse  / BPA Driven Protocol  •  sodium chloride  •  sodium chloride  •  sodium chloride    Assessment & Plan   Assessment & Plan     Active Hospital Problems    Diagnosis  POA   • **Recurrent left pleural effusion [J90]  Yes   • Black lung [J60]  Yes   • Chronic obstructive pulmonary disease [J44.9]  Yes   • Lung mass [R91.8]  Unknown   • PAT (paroxysmal atrial tachycardia) [I47.1]  Yes   • Primary hypertension [I10]  Yes      Resolved Hospital Problems   No resolved problems to display.        Brief Hospital Course to date:  Rory Rios Jr. is a 63 y.o. male with history of paroxysmal atrial tachycardia, hypertension, COPD on 2 L NC, black lung, recent pleural effusion status post thoracentesis who presents with progressively worsening SOA     Recurrent left pleural effusions  -Chest x-ray shows moderate left pleural effusion  -He is s/p recent thoracentesis 4/5/2023,  fluid was exudative, cytology and microbiology were all negative at that time  -CTS following, repeat CT chest shows extensive mass like perihilar disease, large left pleural effusion  -He is s/p pigtail drainage by IR,  has drained about 3 L out, seems to have slowed down, follow-up cytology, he may need Pleurx catheter placement  -Pulmonary is following, he is s/p bronch/EBUS 4/24/2023, f/u biopsies and cultures      Chronic respiratory failure with hypoxia  COPD/black lung disease   COOPER on CPAP  -Patient on 2 L at baseline, currently on 4 L NC, wean as able  -Continue CPAP nightly     Hypertension  -BP currently stable, continue lisinopril and metoprolol     Paroxysmal atrial tachycardia  -Continue metoprolol     Well controlled type 2 diabetes with A1C 6.3%  -Continue SSI for now    Expected Discharge Location and Transportation: HOME  Expected Discharge   Expected Discharge Date: 04/27/23       DVT prophylaxis:  Mechanical DVT prophylaxis orders are present.     AM-PAC 6 Clicks Score (PT): 22 (04/24/23 0805)    CODE STATUS:   Code Status and Medical  Interventions:   Ordered at: 23 1713     Level Of Support Discussed With:    Patient     Code Status (Patient has no pulse and is not breathing):    CPR (Attempt to Resuscitate)     Medical Interventions (Patient has pulse or is breathing):    Full Support       Shlomo Roblero MD  23      Electronically signed by Shlomo Roblero MD at 23 0858     Shlomo Roblero MD at 23 1143              [facility] Medicine Services  PROGRESS NOTE    Patient Name: Rory Rios Jr.  : 1960  MRN: [id number]    Date of Admission: 2023  Primary Care Physician: Alejandro Monique MD    Subjective   Subjective     CC: Follow-up SOA    HPI: No acute events overnight, patient is s/P bronchoscopy with EBUS this morning, he states that he feels okay, did endorse some mild nausea    ROS:  Gen- No fevers, chills  CV- No chest pain, palpitations  Resp- No cough, +dyspnea  GI- No N/V/D, abd pain       Objective   Objective     Vital Signs:   Temp:  [98 °F (36.7 °C)-98.8 °F (37.1 °C)] 98.8 °F (37.1 °C)  Heart Rate:  [77-90] 82  Resp:  [12-18] 16  BP: ()/(54-89) 99/61  Flow (L/min):  [3-6] 3     Physical Exam:  Constitutional: No acute distress, awake, alert  HENT: NCAT, mucous membranes moist  Respiratory: Nonlabored respirations, no wheezes, left chest tube in place on 4L NC  Cardiovascular: RRR, no murmurs, rubs, or gallops  Gastrointestinal: Positive bowel sounds, soft, nontender, nondistended  Musculoskeletal: No bilateral ankle edema  Psychiatric: Appropriate affect, cooperative  Neurologic: Oriented x 3, nonfocal  Skin: No rashes    Results Reviewed:  LAB RESULTS:      Lab 23  0442 23  1430   WBC 7.87 8.65   HEMOGLOBIN 12.6* 13.7   HEMATOCRIT 42.7 45.8   PLATELETS 206 248   NEUTROS ABS  --  5.70   IMMATURE GRANS (ABS)  --  0.02   LYMPHS ABS  --  1.72   MONOS ABS  --  0.74   EOS ABS  --  0.41*   MCV 83.2 83.1   SED RATE  --  67*   CRP  --  1.15*         Lab  04/21/23  0442 04/20/23  1430   SODIUM 142 142   POTASSIUM 4.4 3.8   CHLORIDE 106 104   CO2 25.0 28.0   ANION GAP 11.0 10.0   BUN 11 11   CREATININE 0.73* 0.93   EGFR 101.0 91.1   GLUCOSE 102* 96   CALCIUM 9.3 9.8   HEMOGLOBIN A1C 6.30*  --          Lab 04/20/23  1430   TOTAL PROTEIN 6.8   ALBUMIN 3.9   GLOBULIN 2.9   ALT (SGPT) 20   AST (SGOT) 20   BILIRUBIN 0.3   ALK PHOS 85         Lab 04/20/23  1430   PROBNP 117.7   HSTROP T 21*                 Brief Urine Lab Results     None          Microbiology Results Abnormal     None          XR Chest 1 View    Result Date: 4/24/2023  XR CHEST 1 VW Date of Exam: 4/24/2023 4:33 AM EDT Indication: follow up pleural effusion status post left CT-guided chest tube placement follow up Comparison: 4/23/2023 Findings: Patient's left-sided pigtail catheter is more faintly visible on today's exam, likely due to different film technique, but is stable in position. No pneumothorax is seen. Perihilar masslike changes typical of PMF are again noted. Pulmonary parenchymal disease pattern is stable. There is no evidence of significant effusion or other new chest disease.     Impression: Impression: Left pleural drain remains in place. Stable changes of pulmonary fibrosis. No new chest disease is seen. Electronically Signed: Mitch Sears  4/24/2023 8:36 AM EDT  Workstation ID: [id number]    XR Chest 1 View    Result Date: 4/23/2023  XR CHEST 1 VW Date of Exam: 4/23/2023 7:09 AM EDT Indication: follow up pleural effusion status post left CT-guided chest tube placement follow up Comparison: CT chest 4/22/2023, chest x-ray 4/20/2023 Findings: Multifocal masslike consolidations in the lungs largest in the right upper lobe and left and right perihilar locations. Background of septal thickening. Cardiomegaly. Small caliber chest tube seen in the lower left thorax. Small left pleural effusion. No  pneumothorax.     Impression: Impression: Decreased size of pleural effusion on the left. It is now  "small. Small caliber chest tube is present. No pneumothorax. No change in masslike bilateral parenchymal airspace opacities. Electronically Signed: Patti Williamson  4/23/2023 8:30 AM EDT  Workstation ID: [id number]    CT Guided Chest Tube    Result Date: 4/23/2023  Clinical indication: Pleural effusion : Dr. Bacilio June Procedure: CT-guided chest tube placement, left-sided Contrast usage: None Conscious sedation: None Complication: None apparent. Estimated blood loss: Negligible Procedure: Formal written consent for the procedure was obtained from the patient after explaining risks to include bleeding, infection, injury to lung/adjacent organs, pneumothorax, need for additional surgery/procedure.  The patient's left mid axillary region was prepped and draped in the usual, sterile fashion.  Local anesthesia with 1% lidocaine infiltration was achieved.  Utilizing CT guidance, a 10 Tajik locking pigtail drainage catheter was placed into the moderate size left pleural fluid collection without difficulty.  A sample of the pleural fluid was sent for cytology, per the referring service.  Catheter position was confirmed with \"fluoroscopy CT\", and the catheter was positioned in the \"locked\" position, sutured into place, and a sterile bandage was applied.  The catheter was placed to drain to an at[facility] Pleur-evac system.  There were no immediate complications. Impression: Successful CT-guided left-sided chest tube placement, with 10 [place] \"locking\" pigtail drainage catheter placed into the left-sided pleural effusion without difficulty.  Sample was sent for cytology, per the referring service.          Current medications:  Scheduled Meds:budesonide, 0.5 mg, Nebulization, BID - RT  busPIRone, 5 mg, Oral, Nightly  insulin lispro, 0-9 Units, Subcutaneous, TID AC  ipratropium-albuterol, 3 mL, Nebulization, 4x Daily - RT  lisinopril, 10 mg, Oral, Nightly  metoprolol succinate XL, 50 mg, Oral, Daily  pantoprazole, 40 mg, Oral, " BID AC  sodium chloride, 10 mL, Intravenous, Q12H      Continuous Infusions:   PRN Meds:.•  acetaminophen **OR** acetaminophen **OR** acetaminophen  •  senna-docusate sodium **AND** polyethylene glycol **AND** bisacodyl **AND** bisacodyl  •  Calcium Replacement - Follow Nurse / BPA Driven Protocol  •  dextrose  •  dextrose  •  glucagon (human recombinant)  •  HYDROcodone-acetaminophen  •  hydrOXYzine  •  ipratropium-albuterol  •  LORazepam  •  Magnesium Standard Dose Replacement - Follow Nurse / BPA Driven Protocol  •  ondansetron **OR** ondansetron  •  Phosphorus Replacement - Follow Nurse / BPA Driven Protocol  •  Potassium Replacement - Follow Nurse / BPA Driven Protocol  •  sodium chloride  •  sodium chloride  •  sodium chloride    Assessment & Plan   Assessment & Plan     Active Hospital Problems    Diagnosis  POA   • **Recurrent left pleural effusion [J90]  Yes   • Black lung [J60]  Yes   • Chronic obstructive pulmonary disease [J44.9]  Yes   • Lung mass [R91.8]  Unknown   • PAT (paroxysmal atrial tachycardia) [I47.1]  Yes   • Primary hypertension [I10]  Yes      Resolved Hospital Problems   No resolved problems to display.        Brief Hospital Course to date:  Rory Rios Jr. is a 63 y.o. male with history of paroxysmal atrial tachycardia, hypertension, COPD on 2 L NC, black lung, recent pleural effusion status post thoracentesis who presents with progressively worsening SOA     Recurrent left pleural effusions  -Checks x-ray shows moderate left pleural effusion  -He is s/p recent thoracentesis 4/5/2023,  fluid was exudative, cytology and microbiology were all negative at that time  -CTS following, repeat CT chest shows extensive mass like perihilar disease, large left pleural effusion  -He is s/p pigtail drainage by IR,  has drained about 3 L out , follow-up cytology, he may need Pleurx catheter placement  -Pulmonary is following, he is s/p bronch/EBUS today 4/24/2023, f/u biopsies and cultures       Chronic respiratory failure with hypoxia  COPD/black lung disease   COOPER on CPAP  -Patient on 2 L at baseline, currently on 4 L NC, wean as able  -Continue CPAP nightly     Hypertension  -BP currently stable, continue lisinopril and metoprolol     Paroxysmal atrial tachycardia  -Continue metoprolol     Well controlled type 2 diabetes with A1C 6.3%  -Continue SSI for now    Expected Discharge Location and Transportation: Home  Expected Discharge   Expected Discharge Date and Time     Expected Discharge Date Expected Discharge Time    Apr 27, 2023            DVT prophylaxis:  Mechanical DVT prophylaxis orders are present.     AM-PAC 6 Clicks Score (PT): 22 (04/24/23 0805)    CODE STATUS:   Code Status and Medical Interventions:   Ordered at: 04/20/23 1713     Level Of Support Discussed With:    Patient     Code Status (Patient has no pulse and is not breathing):    CPR (Attempt to Resuscitate)     Medical Interventions (Patient has pulse or is breathing):    Full Support       Shlomo Roblero MD  04/24/23      Electronically signed by Shlomo Roblero MD at 04/24/23 1212     Kota Willard MD at 04/24/23 0903          Pulmonary/Critical Care Follow-up     LOS: 0 days   Patient Care Team:  Alejandro Monique MD as PCP - General (General Surgery)        Chief Complaint   Patient presents with   • Shortness of Breath     Subjective      History reviewed and updated in EMR as indicated.  No fevers or chills.    Interval History:     Patient seen in endoscopy prior to procedure.  No new complaints.  He reports chronic dyspnea with recent worsening.    History taken from: Patient, chart    PMH/FH/Social History were reviewed and updated appropriately in the electronic medical record.     Review of Systems:    Review of 14 systems was completed with positives and pertinent negatives noted in the subjective section.  All other systems reviewed and are negative.         Objective     Vital Signs  Temp:  [97.8 °F (36.6  °C)-98.8 °F (37.1 °C)] 98.8 °F (37.1 °C)  Heart Rate:  [77-89] 89  Resp:  [12-18] 12  BP: ()/(54-79) 127/76  No intake/output data recorded.  Body mass index is 34.15 kg/m².     IV drips:      Physical Exam:     Constitutional:   Alert, in no acute distress   Head:   Normocephalic, atraumatic   Eyes:           Lids and lashes normal, conjunctivae and sclerae normal.  PER   ENMT:  Ears appear intact with no abnormalities noted     Lips normal.     Neck:  Trachea midline, no JVD   Lungs/Resp:    Normal effort, symmetric chest rise, no crepitus, rales bilaterally.  Left chest tube in place with clear drainage.  No air leak.              Heart/CV:   Regular rhythm and normal rate, no murmur   Abdomen/GI:    Soft, nontender, nondistended   :    Deferred   Extremities/MSK:  No clubbing or cyanosis.  No edema.     Pulses:  Pulses palpable and equal bilaterally   Skin:  No bleeding, bruising or rash   Heme/Lymph:  No cervical or supraclavicular adenopathy.   Neurologic:    Psychiatric:    Moves all extremities with no obvious focal motor deficit.  Cranial nerves 2 - 12 grossly intact  Non-agitated, normal affect.    The above physical exam findings were reviewed and reflect my exam findings as of today's exam.   Electronically signed by:  Kota Willard MD  04/24/23  09:03 EDT      Results Review:     I reviewed the patient's new clinical results.   Results from last 7 days   Lab Units 04/21/23  0442 04/20/23  1430   SODIUM mmol/L 142 142   POTASSIUM mmol/L 4.4 3.8   CHLORIDE mmol/L 106 104   CO2 mmol/L 25.0 28.0   BUN mg/dL 11 11   CREATININE mg/dL 0.73* 0.93   CALCIUM mg/dL 9.3 9.8   BILIRUBIN mg/dL  --  0.3   ALK PHOS U/L  --  85   ALT (SGPT) U/L  --  20   AST (SGOT) U/L  --  20   GLUCOSE mg/dL 102* 96     Results from last 7 days   Lab Units 04/21/23  0442 04/20/23  1430   WBC 10*3/mm3 7.87 8.65   HEMOGLOBIN g/dL 12.6* 13.7   HEMATOCRIT % 42.7 45.8   PLATELETS 10*3/mm3 206 248               I reviewed the  patient's new imaging including images and reports.    Reviewed images including CT chest which shows bilateral lung masses and left pleural effusion.    Medication Review:   budesonide, 0.5 mg, Nebulization, BID - RT  busPIRone, 5 mg, Oral, Nightly  insulin lispro, 0-9 Units, Subcutaneous, TID AC  ipratropium-albuterol, 3 mL, Nebulization, 4x Daily - RT  lisinopril, 10 mg, Oral, Nightly  metoprolol succinate XL, 50 mg, Oral, Daily  pantoprazole, 40 mg, Oral, BID AC  sodium chloride, 10 mL, Intravenous, Q12H           Assessment & Plan         Recurrent left pleural effusion    PAT (paroxysmal atrial tachycardia)    Black lung    Chronic obstructive pulmonary disease    Primary hypertension    Lung mass    63 y.o. former , lifetime non-smoker, history of CWP with PMF.  Has left pleural effusion with recent chest tube placement.  Chest tube remains in place.  Concern for underlying malignancy seen by Dr. Marie over the weekend who recommended bronchoscopy.    I discussed bronchoscopy with endobronchial ultrasound-guided biopsies with the patient, including risk of bleeding, respiratory failure, pneumothorax, and death.  Patient is agreeable to proceed.    Plan:  1.  For bilateral lung masses: Plan for bronchoscopy with endobronchial ultrasound-guided biopsies for diagnosis.  Suspect PMF from CWP.  I will plan to biopsy of the left lower lobe for sure where there appears to be a bronchial cutoff.  2.  For pleural effusion: Left chest tube in place managed by CT surgery.          Electronically signed by:    Kota Willard MD  04/24/23  09:03 EDT      *. Please note that portions of this note were completed with [employer] - a voice recognition program.     Electronically signed by Kota Willard MD at 04/24/23 0935     Romy Roque APRN at 04/24/23 0811     Attestation signed by Miguelito Solis MD at 04/24/23 4296    I have personally evaluated and examined the patient.  The above  documentation has been reviewed and I agree.  Patient breathing better after drainage of pleural effusion.  Pulmonary plan for possible EBUS today.  Biopsies will dictate further care.  Possible PleurX for recurrent pleural effusion.                      CTS Progress Note      Chief Complaint: Left pleural effusion      Subjective  No acute events overnight. EBUS today.     Objective    Physical Exam:   Vital Signs   Temp:  [97.8 °F (36.6 °C)-98.8 °F (37.1 °C)] 98.8 °F (37.1 °C)  Heart Rate:  [77-89] 89  Resp:  [16-18] 16  BP: ()/(54-79) 99/68   GEN: NAD   CV: Regular rate and rhythm    RESP: Decreased bases scattered rales   EXT: Warm to touch no significant edema    CT Output: No drainage documented in the past 24 hours, no airleak  Output by Drain (mL) 04/23/23 0701 - 04/23/23 1900 04/23/23 1901 - 04/24/23 0700 04/24/23 0701 - 04/24/23 0811 Range Total   Chest Tube 1 Left Midaxillary          Results     Results from last 7 days   Lab Units 04/21/23  0442   WBC 10*3/mm3 7.87   HEMOGLOBIN g/dL 12.6*   HEMATOCRIT % 42.7   PLATELETS 10*3/mm3 206     Results from last 7 days   Lab Units 04/21/23  0442   SODIUM mmol/L 142   POTASSIUM mmol/L 4.4   CHLORIDE mmol/L 106   CO2 mmol/L 25.0   BUN mg/dL 11   CREATININE mg/dL 0.73*   GLUCOSE mg/dL 102*   CALCIUM mg/dL 9.3           Assessment & Plan   Recurrent left pleural effusion status post left CT-guided chest tube placement 4/22/2023  4.5 cm left lower lobe mass    Plan   Continue chest tube  Daily chest x-ray  No output documented from chest tube-need to determine output over the past 24 hours  EBUS per pulmonary today  Patient/family discussing possible Pleurx catheter placement    DILMA Lim  04/24/23  08:11 EDT                    Electronically signed by Miguelito Solis MD at 04/24/23 1506     Jorge Dutta PA at 04/23/23 0700     Attestation signed by Miguelito Solis MD at 04/23/23 0188    I have personally evaluated and examined the patient.   The above documentation has been reviewed and I agree.  Patient breathing much better after drainage of pleural effusion.  Pulmonary plan for possible EBUS tomorrow.  Biopsies will dictate further care.                      CTS Progress Note      Chief Complaint: Left pleural effusion      Subjective  Sitting up on side of the bed.  Conversant.  Pleasant.  Wants to go home soon as possible.      Objective    Physical Exam:   Vital Signs   Temp:  [97.9 °F (36.6 °C)-99.4 °F (37.4 °C)] 99.2 °F (37.3 °C)  Heart Rate:  [] 75  Resp:  [14-20] 16  BP: ()/(63-78) 92/68   GEN: NAD   CV: Regular rate and rhythm    RESP: Decreased bases scattered rales   EXT: Warm to touch no significant edema       CT Output:  Output by Drain (mL) 04/22/23 0701 - 04/22/23 1900 04/22/23 1901 - 04/23/23 0700 Range Total   Chest Tube 1 Left Midaxillary 2700  2700      Results     Results from last 7 days   Lab Units 04/21/23  0442   WBC 10*3/mm3 7.87   HEMOGLOBIN g/dL 12.6*   HEMATOCRIT % 42.7   PLATELETS 10*3/mm3 206     Results from last 7 days   Lab Units 04/21/23  0442   SODIUM mmol/L 142   POTASSIUM mmol/L 4.4   CHLORIDE mmol/L 106   CO2 mmol/L 25.0   BUN mg/dL 11   CREATININE mg/dL 0.73*   GLUCOSE mg/dL 102*   CALCIUM mg/dL 9.3                 Assessment & Plan     Recurrent left pleural effusion status post left CT-guided chest tube placement 4/22/2023  4.5 cm left lower lobe mass    Plan   Repeat chest x-ray today and daily  Monitor output from chest tube  Pulmonary medicine following with plan for bronc and EBUS tomorrow  Possible Pleurx catheter.  Patient expressing desire to be discharged home as soon as possible.  However patient and family are very interested in possibly having a Pleurx catheter for symptomatic relief at home.  If PleurX catheter is warranted would need to clamp current small bore chest tube to allow for reaccumulation for safe implantation    MARAH Vegas  04/23/23  07:00  EDT                    Electronically signed by Miguelito Solis MD at 23 1747     Shlomo Roblero MD at 23 0605              [facility] Medicine Services  PROGRESS NOTE    Patient Name: Rory Rios Jr.  : 1960  MRN: [id number]    Date of Admission: 2023  Primary Care Physician: Alejandro Monique MD    Subjective   Subjective     CC: Follow-up SOA    HPI: Patient states that he is doing much better after chest tube placement, he did get some sleep, breathing is much improved    ROS:  Gen- No fevers, chills  CV- No chest pain, palpitations  Resp- No cough, dyspnea  GI- No N/V/D, abd pain      Objective   Objective     Vital Signs:   Temp:  [97.9 °F (36.6 °C)-99.4 °F (37.4 °C)] 99.2 °F (37.3 °C)  Heart Rate:  [] 85  Resp:  [14-20] 14  BP: ()/(63-78) 92/68  Flow (L/min):  [3-4] 3     Physical Exam:  Constitutional: No acute distress, awake, alert  HENT: NCAT, mucous membranes moist  Respiratory: Nonlabored respirations, left chest tube in place, currently on 3 L NC  Cardiovascular: RRR, no murmurs, rubs, or gallops  Gastrointestinal: Positive bowel sounds, soft, nontender, nondistended  Musculoskeletal: No bilateral ankle edema  Psychiatric: Appropriate affect, cooperative  Neurologic: Oriented x 3, nonfocal  Skin: No rashes    Results Reviewed:  LAB RESULTS:      Lab 23  0442 23  1430   WBC 7.87 8.65   HEMOGLOBIN 12.6* 13.7   HEMATOCRIT 42.7 45.8   PLATELETS 206 248   NEUTROS ABS  --  5.70   IMMATURE GRANS (ABS)  --  0.02   LYMPHS ABS  --  1.72   MONOS ABS  --  0.74   EOS ABS  --  0.41*   MCV 83.2 83.1   SED RATE  --  67*   CRP  --  1.15*         Lab 23  0442 23  1430   SODIUM 142 142   POTASSIUM 4.4 3.8   CHLORIDE 106 104   CO2 25.0 28.0   ANION GAP 11.0 10.0   BUN 11 11   CREATININE 0.73* 0.93   EGFR 101.0 91.1   GLUCOSE 102* 96   CALCIUM 9.3 9.8   HEMOGLOBIN A1C 6.30*  --          Lab 23  1430   TOTAL PROTEIN 6.8   ALBUMIN 3.9    GLOBULIN 2.9   ALT (SGPT) 20   AST (SGOT) 20   BILIRUBIN 0.3   ALK PHOS 85         Lab 04/20/23  1430   PROBNP 117.7   HSTROP T 21*                 Brief Urine Lab Results     None          Microbiology Results Abnormal     None          CT Chest With & Without Contrast Diagnostic    Result Date: 4/21/2023  CT CHEST W WO CONTRAST DIAGNOSTIC Date of Exam: 4/21/2023 12:57 PM EDT Indication: Lung nodule, > 8mm lung mass, pleural effusion. Comparison: 4/20/2023 chest radiograph. Technique: Axial CT images were obtained of the chest before and after the uneventful intravenous administration of 85 mL Isovue-300.  Reconstructed coronal and sagittal images were also obtained. Automated exposure control and iterative construction methods were used. Findings: History indicates black lung with increasing dyspnea, some tachycardia. Today's study shows extensive irregular perihilar masses involving not only the upper lobes but also the right and left lower lobes, with adjacent irregular nodular disease and reticular interstitial disease, typical of PMF. Most of the areas of involvement would be indistinguishable from pulmonary neoplasm.  There is an unusually discrete and rounded area of the left-sided mass, in the superior left lower lobe, best appreciated on image 46 series 2, which measures approximately 4.5 cm in diameter. This is most likely to represent masslike PMF as well, although superimposed neoplasm would appear similar.  There are shotty mediastinal lymph nodes, not unusual for inhalational disease, and no clearly pathologic mediastinal adenopathy. No hilar adenopathy is seen distinct from the patient's presumed PMF. There is no pericardial effusion or right pleural effusion but there is a large apparently free-flowing left pleural effusion. No debris or hematocrit layer is seen. The airways appear to be normally patent except for mild narrowing where they extend through the areas of presumed PMF, and, notably,  where they appear truncated as they extend into the left lower lobe mass. Please refer to axial image 48 series 2 and axial image 52 series 2. Included images of the upper abdomen show normal liver morphology and mild fatty liver change. No significant inabilities are seen in the included portions of the spleen gallbladder, adrenal glands or pleural poles. There is mild fatty liver change as is  often seen in older patients. No ascites is identified. Bony structures appear to be intact.     Impression: Impression: 1. Extensive masslike perihilar disease, consistent with history of longstanding inhalational disease and progressive massive fibrosis. 2. Asymmetrically rounded and masslike area associated with left perihilar PMF in the left lower lobe, 4.5 cm in diameter, which may also reflect advanced PMF, but at least potentially could represent superimposed neoplasm, as discussed above. 3. Large, free-flowing left pleural effusion of uncertain significance. Electronically Signed: Mitch Sears  4/21/2023 3:48 PM EDT  Workstation ID: [id number]          Current medications:  Scheduled Meds:budesonide, 0.5 mg, Nebulization, BID - RT  busPIRone, 5 mg, Oral, Nightly  insulin lispro, 0-9 Units, Subcutaneous, TID AC  ipratropium-albuterol, 3 mL, Nebulization, 4x Daily - RT  lisinopril, 10 mg, Oral, Nightly  metoprolol succinate XL, 50 mg, Oral, Daily  pantoprazole, 40 mg, Oral, BID AC  sodium chloride, 10 mL, Intravenous, Q12H      Continuous Infusions:   PRN Meds:.•  acetaminophen **OR** acetaminophen **OR** acetaminophen  •  senna-docusate sodium **AND** polyethylene glycol **AND** bisacodyl **AND** bisacodyl  •  Calcium Replacement - Follow Nurse / BPA Driven Protocol  •  dextrose  •  dextrose  •  glucagon (human recombinant)  •  HYDROcodone-acetaminophen  •  hydrOXYzine  •  ipratropium-albuterol  •  LORazepam  •  Magnesium Standard Dose Replacement - Follow Nurse / BPA Driven Protocol  •  ondansetron **OR** ondansetron  •   Phosphorus Replacement - Follow Nurse / BPA Driven Protocol  •  Potassium Replacement - Follow Nurse / BPA Driven Protocol  •  sodium chloride  •  sodium chloride  •  sodium chloride    Assessment & Plan   Assessment & Plan     Active Hospital Problems    Diagnosis  POA   • **Recurrent left pleural effusion [J90]  Yes   • Black lung [J60]  Yes   • Chronic obstructive pulmonary disease [J44.9]  Yes   • Lung mass [R91.8]  Unknown   • PAT (paroxysmal atrial tachycardia) [I47.1]  Yes   • Primary hypertension [I10]  Yes      Resolved Hospital Problems   No resolved problems to display.        Brief Hospital Course to date:  Rory Rios Jr. is a 63 y.o. male with history of paroxysmal atrial tachycardia, hypertension, COPD on 2 L NC, black lung, recent pleural effusion status post thoracentesis who presents with progressively worsening SOA     Recurrent left pleural effusions  -Checks x-ray shows moderate left pleural effusion  -He is s/p recent thoracentesis 4/5/2023,  fluid was exudative, cytology and microbiology were all negative at that time  -CTS following, repeat CT chest shows extensive mass like perihilar disease, large left pleural effusion  -He is s/p pigtail drainage by IR, ~2.7 L out , follow-up cytology, he may need Pleurx catheter placement  -Pulmonary is following, plan for bronch/EBUS tomorrow 4/24/2023      Chronic respiratory failure with hypoxia  COPD/black lung disease   COOPER on CPAP  -Patient on 2 L at baseline, currently on 3 L NC, wean as able  -Continue CPAP nightly     Hypertension  -BP currently stable, continue lisinopril and metoprolol     Paroxysmal atrial tachycardia  -Continue metoprolol     Well controlled type 2 diabetes with A1C 6.3%  -Continue SSI for now    Expected Discharge Location and Transportation: Home  Expected Discharge   Expected Discharge Date and Time     Expected Discharge Date Expected Discharge Time    Apr 25, 2023            DVT prophylaxis:  Mechanical DVT prophylaxis  orders are present.     AM-PAC 6 Clicks Score (PT): 24 (04/22/23 2004)    CODE STATUS:   Code Status and Medical Interventions:   Ordered at: 04/20/23 1713     Level Of Support Discussed With:    Patient     Code Status (Patient has no pulse and is not breathing):    CPR (Attempt to Resuscitate)     Medical Interventions (Patient has pulse or is breathing):    Full Support       Shlomo Roblero MD  04/23/23      Electronically signed by Shlomo Roblero MD at 04/23/23 0859     Magan Doshi PA-C at 04/22/23 1141     Attestation signed by Miguelito Solis MD at 04/22/23 7579    I have personally evaluated and examined the patient.  The above documentation has been reviewed and I agree.  Patient with significant shortness of breath.  Will ask IR to place a left chest tube today.  Pulmonary evaluation for possible biopsy.                        * No surgery found *       LOS: 0 days   Patient Care Team:  Alejandro Monique MD as PCP - General (General Surgery)    Chief complaint: Left pleural effusion    Subjective  Much more short of breath this morning    Objective    Vital Signs  Temp:  [97.7 °F (36.5 °C)-98.4 °F (36.9 °C)] 98.4 °F (36.9 °C)  Heart Rate:  [] 88  Resp:  [18-28] 20  BP: (110-137)/(68-79) 110/72    Physical Exam:   General Appearance: alert, appears stated age and cooperative   Lungs: Diminished on the left   heart: Regular rate and rhythm        Results   Results from last 7 days   Lab Units 04/21/23  0442   WBC 10*3/mm3 7.87   HEMOGLOBIN g/dL 12.6*   HEMATOCRIT % 42.7   PLATELETS 10*3/mm3 206     Results from last 7 days   Lab Units 04/21/23  0442   SODIUM mmol/L 142   POTASSIUM mmol/L 4.4   CHLORIDE mmol/L 106   CO2 mmol/L 25.0   BUN mg/dL 11   CREATININE mg/dL 0.73*   GLUCOSE mg/dL 102*   CALCIUM mg/dL 9.3               Assessment      Recurrent left pleural effusion    PAT (paroxysmal atrial tachycardia)    Black lung    Chronic obstructive pulmonary disease    Primary  hypertension    Patient is much more symptomatic with shortness of breath this morning  Awaiting pulmonary consult  Plan   IR chest tube placement for left pleural effusion    Magan Doshi PA-C  23  11:41 EDT    Electronically signed by Miguelito Solis MD at 23 1733     Shlomo Roblero MD at 23 0853              [facility] Medicine Services  PROGRESS NOTE    Patient Name: Rory Rios Jr.  : 1960  MRN: [id number]    Date of Admission: 2023  Primary Care Physician: Alejandro Monique MD    Subjective   Subjective     CC: Follow-up SOA    HPI: Patient had a rough night, feels smothered with worsening SOA    ROS:  Gen- No fevers, chills  CV- No chest pain, palpitations  Resp- No cough, +dyspnea  GI- No N/V/D, abd pain      Objective   Objective     Vital Signs:   Temp:  [97.7 °F (36.5 °C)-98 °F (36.7 °C)] 97.8 °F (36.6 °C)  Heart Rate:  [] 74  Resp:  [18-28] 22  BP: (112-137)/(68-80) 112/68  Flow (L/min):  [2.5-4] 4     Physical Exam:  Constitutional: No acute distress, awake, alert  HENT: NCAT, mucous membranes moist  Respiratory: Moderate labored respirations, diminished breath sounds at the bases, on 4 L NC  Cardiovascular: RRR, no murmurs, rubs, or gallops  Gastrointestinal: Positive bowel sounds, soft, nontender, nondistended  Musculoskeletal: No bilateral ankle edema  Psychiatric: Appropriate affect, cooperative  Neurologic: Oriented x 3, nonfocal  Skin: No rashes    Results Reviewed:  LAB RESULTS:      Lab 23  0442 23  1430   WBC 7.87 8.65   HEMOGLOBIN 12.6* 13.7   HEMATOCRIT 42.7 45.8   PLATELETS 206 248   NEUTROS ABS  --  5.70   IMMATURE GRANS (ABS)  --  0.02   LYMPHS ABS  --  1.72   MONOS ABS  --  0.74   EOS ABS  --  0.41*   MCV 83.2 83.1   SED RATE  --  67*   CRP  --  1.15*         Lab 23  0442 23  1430   SODIUM 142 142   POTASSIUM 4.4 3.8   CHLORIDE 106 104   CO2 25.0 28.0   ANION GAP 11.0 10.0   BUN 11 11   CREATININE  0.73* 0.93   EGFR 101.0 91.1   GLUCOSE 102* 96   CALCIUM 9.3 9.8   HEMOGLOBIN A1C 6.30*  --          Lab 04/20/23  1430   TOTAL PROTEIN 6.8   ALBUMIN 3.9   GLOBULIN 2.9   ALT (SGPT) 20   AST (SGOT) 20   BILIRUBIN 0.3   ALK PHOS 85         Lab 04/20/23  1430   PROBNP 117.7   HSTROP T 21*                 Brief Urine Lab Results     None          Microbiology Results Abnormal     None          CT Chest With & Without Contrast Diagnostic    Result Date: 4/21/2023  CT CHEST W WO CONTRAST DIAGNOSTIC Date of Exam: 4/21/2023 12:57 PM EDT Indication: Lung nodule, > 8mm lung mass, pleural effusion. Comparison: 4/20/2023 chest radiograph. Technique: Axial CT images were obtained of the chest before and after the uneventful intravenous administration of 85 mL Isovue-300.  Reconstructed coronal and sagittal images were also obtained. Automated exposure control and iterative construction methods were used. Findings: History indicates black lung with increasing dyspnea, some tachycardia. Today's study shows extensive irregular perihilar masses involving not only the upper lobes but also the right and left lower lobes, with adjacent irregular nodular disease and reticular interstitial disease, typical of PMF. Most of the areas of involvement would be indistinguishable from pulmonary neoplasm.  There is an unusually discrete and rounded area of the left-sided mass, in the superior left lower lobe, best appreciated on image 46 series 2, which measures approximately 4.5 cm in diameter. This is most likely to represent masslike PMF as well, although superimposed neoplasm would appear similar.  There are shotty mediastinal lymph nodes, not unusual for inhalational disease, and no clearly pathologic mediastinal adenopathy. No hilar adenopathy is seen distinct from the patient's presumed PMF. There is no pericardial effusion or right pleural effusion but there is a large apparently free-flowing left pleural effusion. No debris or  hematocrit layer is seen. The airways appear to be normally patent except for mild narrowing where they extend through the areas of presumed PMF, and, notably, where they appear truncated as they extend into the left lower lobe mass. Please refer to axial image 48 series 2 and axial image 52 series 2. Included images of the upper abdomen show normal liver morphology and mild fatty liver change. No significant inabilities are seen in the included portions of the spleen gallbladder, adrenal glands or pleural poles. There is mild fatty liver change as is  often seen in older patients. No ascites is identified. Bony structures appear to be intact.     Impression: Impression: 1. Extensive masslike perihilar disease, consistent with history of longstanding inhalational disease and progressive massive fibrosis. 2. Asymmetrically rounded and masslike area associated with left perihilar PMF in the left lower lobe, 4.5 cm in diameter, which may also reflect advanced PMF, but at least potentially could represent superimposed neoplasm, as discussed above. 3. Large, free-flowing left pleural effusion of uncertain significance. Electronically Signed: Mitch Sears  4/21/2023 3:48 PM EDT  Workstation ID: [id number]    XR Chest 1 View    Result Date: 4/20/2023  XR CHEST 1 VW Date of Exam: 4/20/2023 2:13 PM EDT Indication: SOA triage protocol. Comparison: Chest CT 2/9/2023 , chest radiograph 2/9/2023. Findings: Heart size is stable. The masslike perihilar infiltrates in the lungs appear unchanged on the right and slightly improved on the left. There is at least a moderate dependent left-sided pleural effusion with some compressive atelectasis in the left lung base.      Impression: Impression: 1. No change in the masslike nodular appearing infiltrate in the right perihilar region. 2. Improvement in the left perihilar infiltrate compared with the last study with improvement in aeration at the left base. 3. Moderate pleural effusion,  unchanged. Electronically Signed: Miguelito Deras  4/20/2023 2:22 PM EDT  Workstation ID: [id number]          Current medications:  Scheduled Meds:busPIRone, 5 mg, Oral, Nightly  insulin lispro, 0-9 Units, Subcutaneous, TID AC  ipratropium-albuterol, 3 mL, Nebulization, 4x Daily - RT  lisinopril, 10 mg, Oral, Nightly  metoprolol succinate XL, 50 mg, Oral, Daily  pantoprazole, 40 mg, Oral, BID AC  sodium chloride, 10 mL, Intravenous, Q12H      Continuous Infusions:   PRN Meds:.•  acetaminophen **OR** acetaminophen **OR** acetaminophen  •  senna-docusate sodium **AND** polyethylene glycol **AND** bisacodyl **AND** bisacodyl  •  Calcium Replacement - Follow Nurse / BPA Driven Protocol  •  dextrose  •  dextrose  •  glucagon (human recombinant)  •  HYDROcodone-acetaminophen  •  hydrOXYzine  •  ipratropium-albuterol  •  ketorolac  •  Magnesium Standard Dose Replacement - Follow Nurse / BPA Driven Protocol  •  ondansetron **OR** ondansetron  •  Phosphorus Replacement - Follow Nurse / BPA Driven Protocol  •  Potassium Replacement - Follow Nurse / BPA Driven Protocol  •  sodium chloride  •  sodium chloride  •  sodium chloride    Assessment & Plan   Assessment & Plan     Active Hospital Problems    Diagnosis  POA   • **Recurrent left pleural effusion [J90]  Yes   • Black lung [J60]  Yes   • Chronic obstructive pulmonary disease [J44.9]  Yes   • PAT (paroxysmal atrial tachycardia) [I47.1]  Yes   • Primary hypertension [I10]  Yes      Resolved Hospital Problems   No resolved problems to display.        Brief Hospital Course to date:  Rory Rios Jr. is a 63 y.o. male with history of paroxysmal atrial tachycardia, hypertension, COPD on 2 L NC, black lung, recent pleural effusion status post thoracentesis who presents with progressively worsening SOA     Recurrent left pleural effusions  -Checks x-ray shows moderate left pleural effusion  -He is s/p recent thoracentesis 4/5/2023,  fluid was exudative, cytology and microbiology were  all negative  -CTS following, repeat CT chest shows extensive mass like perihilar disease, large left pleural effusion  -Per CTS, plan for chest tube placement today     Chronic respiratory failure with hypoxia  COPD/black lung disease 14  COOPER on CPAP  -Patient on 2 L at baseline, currently on 4 L NC, wean as able  -Continue CPAP nightly     Hypertension  -BP currently stable, continue lisinopril and metoprolol     Paroxysmal atrial tachycardia  -Continue metoprolol     Well controlled type 2 diabetes with A1C 6.3%  -Continue SSI for now     Expected Discharge Location and Transportation: home vs rehab  Expected Discharge   Expected Discharge Date and Time     Expected Discharge Date Expected Discharge Time    2023            DVT prophylaxis:  Mechanical DVT prophylaxis orders are present.     AM-PAC 6 Clicks Score (PT): 19 (23 0800)    CODE STATUS:   Code Status and Medical Interventions:   Ordered at: 23 1713     Level Of Support Discussed With:    Patient     Code Status (Patient has no pulse and is not breathing):    CPR (Attempt to Resuscitate)     Medical Interventions (Patient has pulse or is breathing):    Full Support       Shlomo Roblero MD  23      Electronically signed by Shlomo Roblero MD at 23 0837     Shlomo Roblero MD at 23 0728              [facility] Medicine Services  PROGRESS NOTE    Patient Name: Rory Rios Jr.  : 1960  MRN: [id number]    Date of Admission: 2023  Primary Care Physician: Alejandro Monique MD    Subjective   Subjective     CC: Follow-up SOA    HPI: No acute events overnight, patient continues to endorse some SOA, worse with inspiration    ROS:  Gen- No fevers, chills  CV- No chest pain, palpitations  Resp- No cough, + dyspnea  GI- No N/V/D, abd pain    Objective   Objective     Vital Signs:   Temp:  [97.4 °F (36.3 °C)-98.1 °F (36.7 °C)] 98.1 °F (36.7 °C)  Heart Rate:  [] 75  Resp:  [18-22] 18  BP:  (125-144)/() 132/79  Flow (L/min):  [2.5-3] 2.5     Physical Exam:  Constitutional: No acute distress, awake, alert  HENT: NCAT, mucous membranes moist  Respiratory: Nonlabored respirations, on 2 L NC  Cardiovascular: RRR, no murmurs, rubs, or gallops  Gastrointestinal: Positive bowel sounds, soft, nontender, nondistended  Musculoskeletal: No bilateral ankle edema  Psychiatric: Appropriate affect, cooperative  Neurologic: Oriented x 3, nonfocal  Skin: No rashes    Results Reviewed:  LAB RESULTS:      Lab 04/21/23  0442 04/20/23  1430   WBC 7.87 8.65   HEMOGLOBIN 12.6* 13.7   HEMATOCRIT 42.7 45.8   PLATELETS 206 248   NEUTROS ABS  --  5.70   IMMATURE GRANS (ABS)  --  0.02   LYMPHS ABS  --  1.72   MONOS ABS  --  0.74   EOS ABS  --  0.41*   MCV 83.2 83.1   SED RATE  --  67*   CRP  --  1.15*         Lab 04/21/23  0442 04/20/23  1430   SODIUM 142 142   POTASSIUM 4.4 3.8   CHLORIDE 106 104   CO2 25.0 28.0   ANION GAP 11.0 10.0   BUN 11 11   CREATININE 0.73* 0.93   EGFR 101.0 91.1   GLUCOSE 102* 96   CALCIUM 9.3 9.8         Lab 04/20/23  1430   TOTAL PROTEIN 6.8   ALBUMIN 3.9   GLOBULIN 2.9   ALT (SGPT) 20   AST (SGOT) 20   BILIRUBIN 0.3   ALK PHOS 85         Lab 04/20/23  1430   PROBNP 117.7   HSTROP T 21*                 Brief Urine Lab Results     None          Microbiology Results Abnormal     None          XR Chest 1 View    Result Date: 4/20/2023  XR CHEST 1 VW Date of Exam: 4/20/2023 2:13 PM EDT Indication: SOA triage protocol. Comparison: Chest CT 2/9/2023 , chest radiograph 2/9/2023. Findings: Heart size is stable. The masslike perihilar infiltrates in the lungs appear unchanged on the right and slightly improved on the left. There is at least a moderate dependent left-sided pleural effusion with some compressive atelectasis in the left lung base.      Impression: Impression: 1. No change in the masslike nodular appearing infiltrate in the right perihilar region. 2. Improvement in the left perihilar  infiltrate compared with the last study with improvement in aeration at the left base. 3. Moderate pleural effusion, unchanged. Electronically Signed: Miguelito Deras  4/20/2023 2:22 PM EDT  Workstation ID: [id number]          Current medications:  Scheduled Meds:busPIRone, 5 mg, Oral, Nightly  insulin lispro, 0-9 Units, Subcutaneous, TID AC  ipratropium-albuterol, 3 mL, Nebulization, 4x Daily - RT  lisinopril, 10 mg, Oral, Nightly  metoprolol succinate XL, 50 mg, Oral, Daily  pantoprazole, 40 mg, Oral, BID AC  sodium chloride, 10 mL, Intravenous, Q12H      Continuous Infusions:   PRN Meds:.•  acetaminophen **OR** acetaminophen **OR** acetaminophen  •  senna-docusate sodium **AND** polyethylene glycol **AND** bisacodyl **AND** bisacodyl  •  Calcium Replacement - Follow Nurse / BPA Driven Protocol  •  dextrose  •  dextrose  •  glucagon (human recombinant)  •  HYDROcodone-acetaminophen  •  hydrOXYzine  •  ipratropium-albuterol  •  ketorolac  •  Magnesium Standard Dose Replacement - Follow Nurse / BPA Driven Protocol  •  ondansetron **OR** ondansetron  •  Phosphorus Replacement - Follow Nurse / BPA Driven Protocol  •  Potassium Replacement - Follow Nurse / BPA Driven Protocol  •  sodium chloride  •  sodium chloride  •  sodium chloride    Assessment & Plan   Assessment & Plan     Active Hospital Problems    Diagnosis  POA   • **Recurrent left pleural effusion [J90]  Yes   • Black lung [J60]  Yes   • Chronic obstructive pulmonary disease [J44.9]  Yes   • PAT (paroxysmal atrial tachycardia) [I47.1]  Yes   • Primary hypertension [I10]  Yes      Resolved Hospital Problems   No resolved problems to display.        Brief Hospital Course to date:  Rory Rios Jr. is a 63 y.o. male with history of paroxysmal atrial tachycardia, hypertension, COPD on 2 L NC, black lung, recent pleural effusion status post thoracentesis who presents with progressively worsening SOA    Recurrent left pleural effusions  -Checks x-ray shows moderate  left pleural effusion  -He is s/p recent thoracentesis 4/5/2023,  fluid was exudative, cytology and microbiology were all negative  -CTS following, plan to repeat CT chest, he may require chest tube placement, may ultimately require VATS with a wedge resection and possible pleurodesis    Chronic respiratory failure with hypoxia  COPD/black lung disease 14  COOPER on CPAP  -Patient on 2 L at baseline, currently stable  -Continue CPAP nightly    Hypertension  -BP currently stable, continue lisinopril and metoprolol    Paroxysmal atrial tachycardia  -Continue metoprolol    Type 2 diabetes  -Control unknown, follow-up A1c  -Continue SSI for now    Expected Discharge Location and Transportation: Home versus rehab  Expected Discharge   Expected Discharge Date and Time     Expected Discharge Date Expected Discharge Time    Apr 23, 2023            DVT prophylaxis:  Mechanical DVT prophylaxis orders are present.     AM-PAC 6 Clicks Score (PT): 24 (04/20/23 1930)    CODE STATUS:   Code Status and Medical Interventions:   Ordered at: 04/20/23 1713     Level Of Support Discussed With:    Patient     Code Status (Patient has no pulse and is not breathing):    CPR (Attempt to Resuscitate)     Medical Interventions (Patient has pulse or is breathing):    Full Support       Shlomo Roblero MD  04/21/23      Electronically signed by Shlomo Roblero MD at 04/21/23 1100          Consult Notes (all)      Saroj Marie MD at 04/22/23 1344      Consult Orders    1. Inpatient Pulmonology Consult [[id number]] ordered by Miguelito Solis MD at 04/21/23 1656               INTENSIVIST / PULMONARY INITIAL VISIT (CONSULT / H&P) NOTE     Hospital:  LOS: 0 days   Mr. Rory Rios ., 63 y.o. 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  Was told by his Pulmonologist he needs CTS evaluation.    CT Chest reviewed and compared to 2/9/23.  Showed bilateral masses and nodular changes most consistent with PMF.  There is abrupt cut off of the LLL Bronchus with surrounding atelectasis vs mass.  Most of the RLL appears atelectatic.  There is a large left pleural effusion.  Overall about the same as February, except now there are slightly less inflammatory changes.     Patient has had increased JAUREGUI since December.  Started on Oxygen in February.  Has had several rounds of abx.    Pleural Fluid drained earlier this month by his primary Pulmonologist back home.  Reportedly 1.5 liters removed.  Pleural fluid studies in [facility] show a bloody exudate with predominance of mononuclear cells 93%.  Bacterial culture and GS neg.  Cytology with acute and chronic inflammation.    Patient states he did get significant relief after thoracentesis for about 1-2 weeks.      Past Medical History:   Diagnosis Date   • Arthritis    • Black lung    • Black lung disease    • Diabetes mellitus    • GERD (gastroesophageal reflux disease)    • Hypertension    • Pneumonia      Past Surgical History:   Procedure Laterality Date   • FOOT SURGERY Left      Family History   Problem Relation Age of Onset   • Diabetes Mother    • Heart failure Father    • Diabetes Sister    • Heart disease Brother    • Diabetes Brother      Social History     Socioeconomic History   • Marital status:    Tobacco Use   • Smoking status: Never     Passive exposure: Never   • Smokeless tobacco: Current     Types: Chew   Vaping Use   • Vaping Use: Never used   Substance and Sexual Activity   • Alcohol use: No   • Drug use: No   • Sexual activity: Defer     No Known  Allergies  No current facility-administered medications on file prior to encounter.     Current Outpatient Medications on File Prior to Encounter   Medication Sig Dispense Refill   • albuterol (PROVENTIL HFA;VENTOLIN HFA) 108 (90 BASE) MCG/ACT inhaler Inhale 2 puffs Every 4 (Four) Hours As Needed for Wheezing or Shortness of Air. 1 inhaler 0   • aspirin 81 MG EC tablet Take 1 tablet by mouth Daily. OTC     • Budeson-Glycopyrrol-Formoterol (BREZTRI) 160-9-4.8 MCG/ACT aerosol inhaler Inhale 2 puffs 2 (Two) Times a Day.     • busPIRone (BUSPAR) 5 MG tablet Take 1 tablet by mouth every night at bedtime.     • glipizide (GLUCOTROL XL) 5 MG ER tablet Take 1 tablet by mouth Every Morning.     • HYDROcodone-acetaminophen (NORCO) 7.5-325 MG per tablet Take 1 tablet by mouth 3 (Three) Times a Day.     • ibuprofen (ADVIL,MOTRIN) 800 MG tablet Take 1 tablet by mouth 3 (Three) Times a Day.     • ipratropium-albuterol (DUO-NEB) 0.5-2.5 mg/3 ml nebulizer Take 3 mL by nebulization Every 4 (Four) Hours As Needed for Wheezing or Shortness of Air.     • lisinopril (PRINIVIL,ZESTRIL) 10 MG tablet Take 1 tablet by mouth Every Night.     • metFORMIN (GLUCOPHAGE) 500 MG tablet Take 1 tablet by mouth 2 (Two) Times a Day With Meals.     • metoprolol succinate XL (TOPROL-XL) 25 MG 24 hr tablet Take 1 tablet by mouth Daily for 30 days. (Patient taking differently: Take 2 tablets by mouth Every Morning.) 30 tablet 0   • nabumetone (RELAFEN) 750 MG tablet Take 1 tablet by mouth 2 (Two) Times a Day.     • omeprazole (priLOSEC) 40 MG capsule Take 1 capsule by mouth Daily.     • roflumilast (DALIRESP) 250 MCG tablet tablet Take 1 tablet by mouth Daily.         ROS:    Per HPI, all other systems were reviewed and were negative       Objective   OBJECTIVE     Vital Sign Min/Max for last 24 hours  Temp  Min: 97.7 °F (36.5 °C)  Max: 98.4 °F (36.9 °C)   BP  Min: 110/72  Max: 137/79   Pulse  Min: 73  Max: 131   Resp  Min: 18  Max: 28   SpO2  Min: 92 %   Max: 99 %   No data recorded     Telemetry:              General Appearance:  No acute distress  Eyes:  No scleral icterus or pallor, pupils normal  Ears, Nose, Mouth, Throat:  Atraumatic, oropharynx clear  Neck:  Trachea midline, thyroid normal  Respiratory:  Diminished L > R to auscultation, normal effort  Cardiovascular:  Regular rate and rhythm, no murmurs, no peripheral edema  Gastrointestinal:  Soft, non-tender, non-distended, no hepatosplenomegaly  Skin:  Normal temperature, no rash  Psychiatric:  No agitation  Neuro:  No new focal neurologic deficits observed    SpO2: 92 % SpO2  Min: 92 %  Max: 99 %   Device:      Flow Rate:   No data recorded     Mechanical Ventilator Settings:                                         Intake/Ouptut 24 hrs (7:00AM - 6:59 AM)  Intake & Output (last 3 days)       04/19 0701 04/20 0700 04/20 0701 04/21 0700 04/21 0701 04/22 0700 04/22 0701 04/23 0700    P.O.   220     Total Intake(mL/kg)   220 (1.7)     Urine (mL/kg/hr)   300 (0.1) 300 (0.3)    Total Output   300 300    Net   -80 -300                    Lines, Drains & Airways     Active LDAs     Name Placement date Placement time Site Days    Peripheral IV 04/20/23 1431 Right Antecubital 04/20/23  1431  Antecubital  1    Chest Tube 1 Left Midaxillary 04/22/23  1339  Midaxillary  less than 1                Hematology:  Results from last 7 days   Lab Units 04/21/23  0442 04/20/23  1430   WBC 10*3/mm3 7.87 8.65   HEMOGLOBIN g/dL 12.6* 13.7   HEMATOCRIT % 42.7 45.8   PLATELETS 10*3/mm3 206 248     Electrolytes, Magnesium and Phosphorus:  Results from last 7 days   Lab Units 04/21/23  0442 04/20/23  1430   SODIUM mmol/L 142 142   POTASSIUM mmol/L 4.4 3.8   CO2 mmol/L 25.0 28.0     Renal:  Results from last 7 days   Lab Units 04/21/23  0442 04/20/23  1430   CREATININE mg/dL 0.73* 0.93   BUN mg/dL 11 11     Estimated Creatinine Clearance: 151.3 mL/min (A) (by C-G formula based on SCr of 0.73 mg/dL (L)).  Estimated Creatinine  Clearance: 151.3 mL/min (A) (by C-G formula based on SCr of 0.73 mg/dL (L)).  Hepatic:  Results from last 7 days   Lab Units 04/20/23  1430   ALK PHOS U/L 85   BILIRUBIN mg/dL 0.3   ALT (SGPT) U/L 20   AST (SGOT) U/L 20     Arterial Blood Gases:        Results from last 7 days   Lab Units 04/21/23  0442   HEMOGLOBIN A1C % 6.30*       Lab Results   Component Value Date    LACTATE 2.5 (C) 02/09/2023       CT Chest With & Without Contrast Diagnostic    Result Date: 4/21/2023  Narrative: CT CHEST W WO CONTRAST DIAGNOSTIC Date of Exam: 4/21/2023 12:57 PM EDT Indication: Lung nodule, > 8mm lung mass, pleural effusion. Comparison: 4/20/2023 chest radiograph. Technique: Axial CT images were obtained of the chest before and after the uneventful intravenous administration of 85 mL Isovue-300.  Reconstructed coronal and sagittal images were also obtained. Automated exposure control and iterative construction methods were used. Findings: History indicates black lung with increasing dyspnea, some tachycardia. Today's study shows extensive irregular perihilar masses involving not only the upper lobes but also the right and left lower lobes, with adjacent irregular nodular disease and reticular interstitial disease, typical of PMF. Most of the areas of involvement would be indistinguishable from pulmonary neoplasm.  There is an unusually discrete and rounded area of the left-sided mass, in the superior left lower lobe, best appreciated on image 46 series 2, which measures approximately 4.5 cm in diameter. This is most likely to represent masslike PMF as well, although superimposed neoplasm would appear similar.  There are shotty mediastinal lymph nodes, not unusual for inhalational disease, and no clearly pathologic mediastinal adenopathy. No hilar adenopathy is seen distinct from the patient's presumed PMF. There is no pericardial effusion or right pleural effusion but there is a large apparently free-flowing left pleural  effusion. No debris or hematocrit layer is seen. The airways appear to be normally patent except for mild narrowing where they extend through the areas of presumed PMF, and, notably, where they appear truncated as they extend into the left lower lobe mass. Please refer to axial image 48 series 2 and axial image 52 series 2. Included images of the upper abdomen show normal liver morphology and mild fatty liver change. No significant inabilities are seen in the included portions of the spleen gallbladder, adrenal glands or pleural poles. There is mild fatty liver change as is  often seen in older patients. No ascites is identified. Bony structures appear to be intact.     Impression: Impression: 1. Extensive masslike perihilar disease, consistent with history of longstanding inhalational disease and progressive massive fibrosis. 2. Asymmetrically rounded and masslike area associated with left perihilar PMF in the left lower lobe, 4.5 cm in diameter, which may also reflect advanced PMF, but at least potentially could represent superimposed neoplasm, as discussed above. 3. Large, free-flowing left pleural effusion of uncertain significance. Electronically Signed: Mitch Sears  4/21/2023 3:48 PM EDT  Workstation ID: [id number]    XR Chest 1 View    Result Date: 4/20/2023  Narrative: XR CHEST 1 VW Date of Exam: 4/20/2023 2:13 PM EDT Indication: SOA triage protocol. Comparison: Chest CT 2/9/2023 , chest radiograph 2/9/2023. Findings: Heart size is stable. The masslike perihilar infiltrates in the lungs appear unchanged on the right and slightly improved on the left. There is at least a moderate dependent left-sided pleural effusion with some compressive atelectasis in the left lung base.      Impression: Impression: 1. No change in the masslike nodular appearing infiltrate in the right perihilar region. 2. Improvement in the left perihilar infiltrate compared with the last study with improvement in aeration at the left base. 3.  Moderate pleural effusion, unchanged. Electronically Signed: Miguelito Deras  4/20/2023 2:22 PM EDT  Workstation ID: [id number]    XR Chest 1 View    Result Date: 4/3/2023  Narrative: PORTABLE CHEST HISTORY: Shortness of breath. COMPARISON: May 2021. FINDINGS: The heart is normal in size. There is ectasia of the aorta. There are irregular soft tissue density masses in the perihilar regions bilaterally. Given patient's history, these may represent conglomerate masses of silicosis. There is left base opacity with consolidation and a pleural effusion, pneumonia not excluded. There is no pneumothorax.     Impression: 1. Irregular soft tissue density masses in the perihilar regions bilaterally. May represent conglomerate masses of silicosis. 2. Left base consolidation and pleural effusion, pneumonia not excluded. Recommend follow-up evaluation, CT imaging may be beneficial. Images reviewed, interpreted, and dictated by Dr. Satinder Martinez. Transcribed by Jaz Mcqueen PA-C.    X-ray chest PA and lateral    Result Date: 4/17/2023  Narrative: TWO-VIEW CHEST. HISTORY: Left pleural effusion, shortness of air. COMPARISON: April 10, 2023. FINDINGS: The cardiac silhouette is normal in size. The mediastinum is unremarkable. There has been no change in the bilateral masslike nodular opacities or the interstitial opacities. There is a left pleural effusion that appears stable. There is no pneumothorax. There is no acute osseous abnormality.    Impression: No significant interval change. Images reviewed, interpreted, and dictated by Dr. Gauri Gtz. Transcribed by Tahmina Isaacs PA-C.    X-ray chest PA and lateral    Result Date: 4/10/2023  Narrative: TWO-VIEW CHEST    4/10/2023 10:40 AM   HISTORY: Pleural effusion, thoracentesis four days ago. COMPARISON: April 6, 2023. FINDINGS: The heart is stable in size.  The lung fields demonstrate no significant change in the bilateral interstitial opacities, mass-like opacities and nodules.  There is a small left pleural effusion. There is no pneumothorax. No acute osseus abnormality is identified.    Impression: There has been no significant interval change in  the opacities, likely related to pneumoconiosis. Small left pleural effusion. Images reviewed, interpreted, and dictated by Dr. Gauri Gtz. Transcribed by JOHNATHON Huerta (DEIDRA).    XR chest AP portable    Result Date: 4/6/2023  Narrative: PORTABLE CHEST      4/6/2023 5:49 AM HISTORY: Shortness of breath, pleural effusion. COMPARISON: April 3, 2023. FINDINGS: The heart is stable in size.  There has been interval improvement  in the lung fields. The significant masslike perihilar opacities persist. There is no pneumothorax.    Impression: There has been interval improvement . Continued follow up recommended. Images reviewed, interpreted, and dictated by Dr. SAM Espinal. Transcribed by Yolanda Logan PA-C.    CT chest with IV contrast    Result Date: 4/5/2023  Narrative: CT SCAN OF THE CHEST WITH CONTRAST    4/5/2023 3:56 PM HISTORY: Pneumoconiosis with left pleural effusion. COMPARISON: None. PROCEDURE: The patient was injected with IV contrast.  Axial images were obtained from the lung apex to the mid abdomen by computed tomography. This study was performed with techniques to keep radiation doses as low as reasonably achievable, (ALARA). Individualized dose reduction techniques using automated exposure control or adjustment of mA and/or kV according to the patient size were employed. FINDINGS: CHEST: There is no axillary adenopathy. There is  no hilar or mediastinal adenopathy . Heart size is normal. There is  no pericardial effusion . Limited images of the upper abdomen  are unremarkable . There is a soft tissue masslike opacity in the right upper lobe measuring 6 cm. There is a left lower lobe perihilar masslike opacity measuring 5.6 cm. There are other areas of nodularity and interstitial change likely a combination of confluent  fibrosis and interstitial lung disease superimposed ammonia and/or neoplasm is difficult to exclude. There is a small left pleural effusion.    Impression: Multifocal pulmonary opacities with interstitial change, some masslike opacities on fibrosis with confluent components. Small left pleural effusion. Recommend CT chest with contrast follow-up in 3-6 months. Images reviewed, interpreted, and dictated by Dr. Jeff Tao. Transcribed by Samuel Mark PA-C.      Relevant imaging studies and labs from 04/22/23 were reviewed    Medications (drips):       busPIRone, 5 mg, Oral, Nightly  insulin lispro, 0-9 Units, Subcutaneous, TID AC  ipratropium-albuterol, 3 mL, Nebulization, 4x Daily - RT  lisinopril, 10 mg, Oral, Nightly  metoprolol succinate XL, 50 mg, Oral, Daily  pantoprazole, 40 mg, Oral, BID AC  sodium chloride, 10 mL, Intravenous, Q12H        •  acetaminophen **OR** acetaminophen **OR** acetaminophen  •  senna-docusate sodium **AND** polyethylene glycol **AND** bisacodyl **AND** bisacodyl  •  Calcium Replacement - Follow Nurse / BPA Driven Protocol  •  dextrose  •  dextrose  •  glucagon (human recombinant)  •  HYDROcodone-acetaminophen  •  hydrOXYzine  •  ipratropium-albuterol  •  ketorolac  •  LORazepam  •  Magnesium Standard Dose Replacement - Follow Nurse / BPA Driven Protocol  •  ondansetron **OR** ondansetron  •  Phosphorus Replacement - Follow Nurse / BPA Driven Protocol  •  Potassium Replacement - Follow Nurse / BPA Driven Protocol  •  sodium chloride  •  sodium chloride  •  sodium chloride     Assessment & Plan   IMPRESSION / PLAN     Inpatient Problem List:  63 y.o.male:  Active Hospital Problems    Diagnosis    • **Recurrent left pleural effusion    • Black lung    • Chronic obstructive pulmonary disease    • PAT (paroxysmal atrial tachycardia)    • Primary hypertension         Hospital Course:  63 y.o.male with relevant PMH of lifetime non-smoking, 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    CT Chest reviewed and compared to 2/9/23.  Showed bilateral masses and nodular changes most consistent with PMF.  There is abrupt cut off of the LLL Bronchus with surrounding atelectasis vs mass.  Most of the RLL appears atelectatic.  There is a large left pleural effusion.  Overall about the same as February, except now there are slightly less inflammatory changes.     Had large left pleural effusion drained earlier this month ~1.5 liters, bloody / mononuclear exudate w/ neg bacterial culture and cytology.    Impression:    Overall looks like severe CWP with advanced PMF.  With h/o lifetime non-smoking, doubt malignancy, but cannot rule this out as there is abrupt cut off of LLL.  Pleural effusion could be secondary to atelectasis of LLL or a primary problem.    Plan:    CWP w/ PMF  Bilateral Lung Masses  Recurrent Large Left Effusion  Atelectasis LLL  Lifetime Non-Smoker  Likely Trapped Lung    Underwent pigtail drainage of left pleural effusion today by IR.  Agree with Dr. Solis, need to r/o malignancy.  Plan for Bronch / EBUS Monday.  Also agree w/ PleurX if he gets symptomatic relief from draining pleural fluid, which would be palliative in nature.    Very high risk for any thoracic surgery given his extensive CWP.    Start nebulized pulmicort and duoneb.      Would benefit from referral to [facility] long term.    Please keep NPO after midnight Sunday night and hold blood thinners    High Risk, continue to follow       Saroj Marie MD  Intensive Care Medicine  04/22/23 13:44 EDT         Electronically signed by Saroj Marie MD at 04/22/23 1409     Juaquin Yates PA at 04/21/23 2451      Consult Orders    1. Inpatient Cardiothoracic Surgery Consult [[id number]] ordered by Agnieszka Lemus, DILMA at 04/20/23 1617          Attestation signed by Miguelito Solis  MD SARI at 04/21/23 8342    I have personally evaluated and examined the patient.  The above documentation has been reviewed and I agree.  Briefly, this 63 year old  male with a history of hypertension, diabetes mellitus and black lung/coal worker's pneumoconiosis on home oxygen who presents after three months of shortness of breath.  He recently had a left pleural effusion drained at [facility] (data deficient).  The patient has also had previous CT scans at [facility].  Physical examination is remarkable for a  male on nasal cannula oxygen that has some dyspnea in conversation and decreased breath sounds on the left base.  CT scan of the chest demonstrates extensive bilateral hilar masses and a large left pleural effusion.  I had a long discussion with the patient, his ex-wife at the bedside and his daughter that was on facetime.  It is unclear if his CT scan findings are related to black lung or if there is a malignancy here.  I will ask pulmonary to evaluate the patient for possible bronchoscopy/navigational bronchoscopy.  His records and scans will need to be obtained for comparison from [facility] and [place].  This left pleural effusion may be manageable with a Left PleurX catheter.  Will continue to follow along.                    CTS Consult    Patient Care Team:  Alejandro Monique MD as PCP - General (General Surgery)      Requesting physician: Alexandria Rivero    Reason for Consultation: Recurrent pleural effusion    Chief complaint: Dyspnea    HPI  Patient is a 63-year-old male with a history of black lung, recent pleural effusion status post thoracentesis on 4/5/2023 with approximately 2 L removed, COPD on home oxygen of 2 L nasal cannula who was scheduled to see Dr. Solis in office on 5/2/2023 for evaluation of his recurrent pleural effusion.  The pleural fluid removed at that time was exudative with no malignant cells identified on cytology and cultures were  negative.  According to the patient's family, the patient did have a CT of the chest performed on 4/6/2023 which revealed a new left upper lobe nodule. A CT of the chest from 2/9/2023 revealed a 1.7 cm right lower lobe nodule.  The patient presented to the [facility] emergency department on 4/20/2023 with worsening dyspnea with some lower extremity edema.  The patient had a chest x-ray performed which revealed a moderate left pleural effusion.  We have been asked to evaluate patient for possible VATS with evacuation of pleural effusion and possible pleurodesis.  Currently, patient does admit to dyspnea and pleuritic chest pain however denies fever, chills or hemoptysis.  Of note, the patient was treated for community-acquired pneumonia on an outpatient basis with antibiotics in December and again in January.  In addition the patient has been noted to have intermittent runs of nonsustained tachycardia      Review of Systems    A comprehensive review of systems was negative except for:   Constitutional: Denies fever, chills or recent weight loss or weight gain  Respiratory: Has cough, hemoptysis, recent pneumonia, bronchitis or history of asthma  Cardiovascular: Admits to mild pedal edema, denies orthopnea paroxysmal nocturnal dyspnea  Gastrointestinal: Denies nausea, vomiting, hematemesis, hematochezia, melena or history peptic ulcer disease  Hematologic/lymphatic: Denies history of coagulopathy, thrombogenic sorter, history of deep vein thrombosis or pulmonary embolus  Neurological: Denies history of headache, cerebrovascular accident, transient ischemic attack or seizure disorder      History  Past Medical History:   Diagnosis Date   • Arthritis    • Black lung    • Black lung disease    • Diabetes mellitus    • GERD (gastroesophageal reflux disease)    • Hypertension    • Pneumonia      Past Surgical History:   Procedure Laterality Date   • FOOT SURGERY Left      Family History   Problem Relation Age  of Onset   • Diabetes Mother    • Heart failure Father    • Diabetes Sister    • Heart disease Brother    • Diabetes Brother      Social History     Tobacco Use   • Smoking status: Never     Passive exposure: Never   • Smokeless tobacco: Current     Types: Chew   Vaping Use   • Vaping Use: Never used   Substance Use Topics   • Alcohol use: No   • Drug use: No     Medications Prior to Admission   Medication Sig Dispense Refill Last Dose   • albuterol (PROVENTIL HFA;VENTOLIN HFA) 108 (90 BASE) MCG/ACT inhaler Inhale 2 puffs Every 4 (Four) Hours As Needed for Wheezing or Shortness of Air. 1 inhaler 0 4/20/2023   • aspirin 81 MG EC tablet Take 1 tablet by mouth Daily. OTC   4/20/2023   • Budeson-Glycopyrrol-Formoterol (BREZTRI) 160-9-4.8 MCG/ACT aerosol inhaler Inhale 2 puffs 2 (Two) Times a Day.   4/20/2023   • busPIRone (BUSPAR) 5 MG tablet Take 1 tablet by mouth every night at bedtime.   4/19/2023   • glipizide (GLUCOTROL XL) 5 MG ER tablet Take 1 tablet by mouth Every Morning.   4/20/2023   • HYDROcodone-acetaminophen (NORCO) 7.5-325 MG per tablet Take 1 tablet by mouth 3 (Three) Times a Day.   4/20/2023   • ibuprofen (ADVIL,MOTRIN) 800 MG tablet Take 1 tablet by mouth 3 (Three) Times a Day.   4/20/2023   • ipratropium-albuterol (DUO-NEB) 0.5-2.5 mg/3 ml nebulizer Take 3 mL by nebulization Every 4 (Four) Hours As Needed for Wheezing or Shortness of Air.   4/19/2023   • lisinopril (PRINIVIL,ZESTRIL) 10 MG tablet Take 1 tablet by mouth Every Night.   4/19/2023   • metFORMIN (GLUCOPHAGE) 500 MG tablet Take 1 tablet by mouth 2 (Two) Times a Day With Meals.   4/20/2023   • metoprolol succinate XL (TOPROL-XL) 25 MG 24 hr tablet Take 1 tablet by mouth Daily for 30 days. (Patient taking differently: Take 2 tablets by mouth Every Morning.) 30 tablet 0 4/20/2023   • nabumetone (RELAFEN) 750 MG tablet Take 1 tablet by mouth 2 (Two) Times a Day.   4/20/2023   • omeprazole (priLOSEC) 40 MG capsule Take 1 capsule by mouth Daily.    4/20/2023   • roflumilast (DALIRESP) 250 MCG tablet tablet Take 1 tablet by mouth Daily.   4/20/2023     Allergies:  Patient has no known allergies.    Objective    Vital Signs  Temp:  [97.4 °F (36.3 °C)-98.2 °F (36.8 °C)] 98.2 °F (36.8 °C)  Heart Rate:  [] 75  Resp:  [18-22] 18  BP: (125-144)/() 136/88    Physical Exam:  General Appearance: Well-developed, well-nourished in no acute distress  HEENT: Normocephalic, atraumatic, PERRLA, EOMs intact with mucous membranes moist with no scleral icterus  Neck: Neck is supple without bruits with no thyromegaly or lymphadenopathy  Lungs: Respirations even and unlabored and with diminished bilateral breath sounds with mild expiratory wheezing present  Heart: Regular rate and rhythm with no murmurs, rubs or gallops.  Normal S1-S2 with no jugular venous distention appreciated  Abdomen: Soft, nontender/nondistended with normoactive bowel sounds no rebound or guarding and no organomegaly appreciated  Extremities: Warm with good color and well-perfused with no bilateral lower extremity edema  Pulses: 2+ carotid pulses bilaterally, 2+ radial pulses bilaterally, 2+ femoral pulses bilaterally, 2+ dorsalis pedis and posterior tibialis pulses bilaterally  Skin: No clubbing or cyanosis with no lesions or rashes appreciated  Neurologic: Alert and oriented x3 with cranial nerves II through XII grossly intact with no motor or sensory deficits appreciated            Data Review:  Results from last 7 days   Lab Units 04/21/23  0442   WBC 10*3/mm3 7.87   HEMOGLOBIN g/dL 12.6*   HEMATOCRIT % 42.7   PLATELETS 10*3/mm3 206     Results from last 7 days   Lab Units 04/21/23  0442   SODIUM mmol/L 142   POTASSIUM mmol/L 4.4   CHLORIDE mmol/L 106   CO2 mmol/L 25.0   BUN mg/dL 11   CREATININE mg/dL 0.73*   GLUCOSE mg/dL 102*   CALCIUM mg/dL 9.3     Coagulation: No results found for: INR, APTT        Imaging Results (Last 72 Hours)     Procedure Component Value Units Date/Time    XR Chest  1 View [[id number]] Collected: 04/20/23 1416     Updated: 04/20/23 1425    Narrative:      XR CHEST 1 VW    Date of Exam: 4/20/2023 2:13 PM EDT    Indication: SOA triage protocol.    Comparison: Chest CT 2/9/2023 , chest radiograph 2/9/2023.    Findings:  Heart size is stable. The masslike perihilar infiltrates in the lungs appear unchanged on the right and slightly improved on the left. There is at least a moderate dependent left-sided pleural effusion with some compressive atelectasis in the left lung   base.         Impression:      Impression:    1. No change in the masslike nodular appearing infiltrate in the right perihilar region.  2. Improvement in the left perihilar infiltrate compared with the last study with improvement in aeration at the left base.  3. Moderate pleural effusion, unchanged.      Electronically Signed: Miguelito Deras    4/20/2023 2:22 PM EDT    Workstation ID: [id number]            Assessment:        Recurrent left pleural effusion    PAT (paroxysmal atrial tachycardia)    Black lung    Chronic obstructive pulmonary disease    Primary hypertension    Moderate recurrent left pleural effusion  Left upper lobe nodule  Right lower lobe nodule    Plan:  We will repeat a CT of the chest to further characterize the nature of this intra thoracic process and evaluate the left upper lobe nodule.  Patient may require chest tube placement for drainage of the pleural effusion however may ultimately require a VATS with a wedge resection and possible pleurodesis.  We will await the results of the CT of the chest to further guide decision making.      MARAH Newton  04/21/23  07:39 EDT            Electronically signed by Miguelito Solis MD at 04/21/23 2350

## 2023-04-25 NOTE — PROGRESS NOTES
Pulmonary/Critical Care Follow-up     LOS: 1 day   Patient Care Team:  Alejandro Monique MD as PCP - General (General Surgery)        Chief Complaint   Patient presents with   • Shortness of Breath     Subjective      History reviewed and updated in EMR as indicated.  No fevers or chills.    Interval History:     Patient had some mild scant hemoptysis overnight as expected after bronchoscopy yesterday.  No fevers or chills.  No nausea or vomiting.  Denies dyspnea worse than baseline.    History taken from: Patient, chart    PMH/FH/Social History were reviewed and updated appropriately in the electronic medical record.     Review of Systems:    Review of 14 systems was completed with positives and pertinent negatives noted in the subjective section.  All other systems reviewed and are negative.         Objective     Vital Signs  Temp:  [97 °F (36.1 °C)-98.1 °F (36.7 °C)] 97.9 °F (36.6 °C)  Heart Rate:  [] 91  Resp:  [16-18] 18  BP: ()/(63-84) 99/63  04/24 0701 - 04/25 0700  In: 850 [I.V.:850]  Out: 650 [Urine:600]  Body mass index is 34.15 kg/m².     IV drips:      Physical Exam:     Constitutional:   Alert, in no acute distress   Head:   Normocephalic, atraumatic   Eyes:           Lids and lashes normal, conjunctivae and sclerae normal.  PER   ENMT:  Ears appear intact with no abnormalities noted     Lips normal.     Neck:  Trachea midline, no JVD   Lungs/Resp:    Normal effort, symmetric chest rise, no crepitus, rales bilaterally.  Left chest tube in place with clear drainage.  No air leak.              Heart/CV:   Regular rhythm and normal rate, no murmur   Abdomen/GI:    Soft, nontender, nondistended   :    Deferred   Extremities/MSK:  No clubbing or cyanosis.  No edema.     Pulses:  Pulses palpable and equal bilaterally   Skin:  No bleeding, bruising or rash   Heme/Lymph:  No cervical or supraclavicular adenopathy.   Neurologic:    Psychiatric:    Moves all extremities with no obvious focal motor  deficit.  Cranial nerves 2 - 12 grossly intact  Non-agitated, normal affect.    The above physical exam findings were reviewed and reflect my exam findings as of today's exam.   Electronically signed by:  Kota Willard MD  04/25/23  15:07 EDT      Results Review:     I reviewed the patient's new clinical results.   Results from last 7 days   Lab Units 04/21/23  0442 04/20/23  1430   SODIUM mmol/L 142 142   POTASSIUM mmol/L 4.4 3.8   CHLORIDE mmol/L 106 104   CO2 mmol/L 25.0 28.0   BUN mg/dL 11 11   CREATININE mg/dL 0.73* 0.93   CALCIUM mg/dL 9.3 9.8   BILIRUBIN mg/dL  --  0.3   ALK PHOS U/L  --  85   ALT (SGPT) U/L  --  20   AST (SGOT) U/L  --  20   GLUCOSE mg/dL 102* 96     Results from last 7 days   Lab Units 04/21/23  0442 04/20/23  1430   WBC 10*3/mm3 7.87 8.65   HEMOGLOBIN g/dL 12.6* 13.7   HEMATOCRIT % 42.7 45.8   PLATELETS 10*3/mm3 206 248               I reviewed the patient's new imaging including images and reports.    Reviewed images including CT chest which shows bilateral lung masses and left pleural effusion.    Medication Review:   budesonide, 0.5 mg, Nebulization, BID - RT  busPIRone, 5 mg, Oral, Nightly  insulin lispro, 0-9 Units, Subcutaneous, TID AC  ipratropium-albuterol, 3 mL, Nebulization, 4x Daily - RT  lisinopril, 10 mg, Oral, Nightly  metoprolol succinate XL, 50 mg, Oral, Daily  pantoprazole, 40 mg, Oral, BID AC  sodium chloride, 10 mL, Intravenous, Q12H           Assessment & Plan         Recurrent left pleural effusion    PAT (paroxysmal atrial tachycardia)    Black lung    Chronic obstructive pulmonary disease    Primary hypertension    Lung mass    63 y.o. former , lifetime non-smoker, history of CWP with PMF.  Has left pleural effusion with recent chest tube placement.  Chest tube remains in place.  Concern for underlying malignancy seen by Dr. Marie over the weekend who recommended bronchoscopy.    Bronchoscopy with endobronchial ultrasound-guided biopsies done on  4/24/2023.  Awaiting biopsy results.    Plan:  1.  For bilateral lung masses: Plan for bronchoscopy with endobronchial ultrasound-guided biopsies for diagnosis done on 4/24/2023.  Suspect PMF from CWP.  Await biopsy results.  2.  For pleural effusion: Left chest tube in place managed by CT surgery.  Continuing.    Electronically signed by:    Kota Willard MD  04/25/23  15:07 EDT      *. Please note that portions of this note were completed with [employer] - a voice recognition program.

## 2023-04-25 NOTE — PROGRESS NOTES
[facility] Medicine Services  PROGRESS NOTE    Patient Name: Rory Rios Jr.  : 1960  MRN: [id number]    Date of Admission: 2023  Primary Care Physician: Alejandro Monique MD    Subjective   Subjective     CC: Follow-up SOA    HPI: No acute events overnight, patient rested well, did endorse some mild discomfort    ROS:  Gen- No fevers, chills  CV- No chest pain, palpitations  Resp- No cough, +dyspnea  GI- No N/V/D, abd pain    Objective   Objective     Vital Signs:   Temp:  [97 °F (36.1 °C)-98.8 °F (37.1 °C)] 97 °F (36.1 °C)  Heart Rate:  [] 96  Resp:  [12-18] 16  BP: ()/(57-89) 111/67  Flow (L/min):  [3-6] 3     Physical Exam:  Constitutional: No acute distress, awake, alert  HENT: NCAT, mucous membranes moist  Respiratory: Nonlabored respirations, diffuse coarse breath sounds, no wheezes, left chest tube in place, on 3 L NC  Cardiovascular: RRR, no murmurs, rubs, or gallops  Gastrointestinal: Positive bowel sounds, soft, nontender, nondistended  Musculoskeletal: No bilateral ankle edema  Psychiatric: Appropriate affect, cooperative  Neurologic: Oriented x 3, nonfocal  Skin: No rashes      Results Reviewed:  LAB RESULTS:      Lab 23  0442 23  1430   WBC 7.87 8.65   HEMOGLOBIN 12.6* 13.7   HEMATOCRIT 42.7 45.8   PLATELETS 206 248   NEUTROS ABS  --  5.70   IMMATURE GRANS (ABS)  --  0.02   LYMPHS ABS  --  1.72   MONOS ABS  --  0.74   EOS ABS  --  0.41*   MCV 83.2 83.1   SED RATE  --  67*   CRP  --  1.15*         Lab 23  0442 23  1430   SODIUM 142 142   POTASSIUM 4.4 3.8   CHLORIDE 106 104   CO2 25.0 28.0   ANION GAP 11.0 10.0   BUN 11 11   CREATININE 0.73* 0.93   EGFR 101.0 91.1   GLUCOSE 102* 96   CALCIUM 9.3 9.8   HEMOGLOBIN A1C 6.30*  --          Lab 23  1430   TOTAL PROTEIN 6.8   ALBUMIN 3.9   GLOBULIN 2.9   ALT (SGPT) 20   AST (SGOT) 20   BILIRUBIN 0.3   ALK PHOS 85         Lab 23  1430   PROBNP 117.7   HSTROP T 21*                  Brief Urine Lab Results     None          Microbiology Results Abnormal     Procedure Component Value - Date/Time    Respiratory Culture - Wash, Bronchus [[id number]] Collected: 04/24/23 1004    Lab Status: Preliminary result Specimen: Wash from Bronchus Updated: 04/24/23 1246     Gram Stain Many (4+) Red blood cells      Few (2+) WBCs seen      No organisms seen          XR Chest 1 View    Result Date: 4/24/2023  XR CHEST 1 VW Date of Exam: 4/24/2023 4:33 AM EDT Indication: follow up pleural effusion status post left CT-guided chest tube placement follow up Comparison: 4/23/2023 Findings: Patient's left-sided pigtail catheter is more faintly visible on today's exam, likely due to different film technique, but is stable in position. No pneumothorax is seen. Perihilar masslike changes typical of PMF are again noted. Pulmonary parenchymal disease pattern is stable. There is no evidence of significant effusion or other new chest disease.     Impression: Impression: Left pleural drain remains in place. Stable changes of pulmonary fibrosis. No new chest disease is seen. Electronically Signed: Mitch Sears  4/24/2023 8:36 AM EDT  Workstation ID: [id number]    XR Chest 1 View    Result Date: 4/23/2023  XR CHEST 1 VW Date of Exam: 4/23/2023 7:09 AM EDT Indication: follow up pleural effusion status post left CT-guided chest tube placement follow up Comparison: CT chest 4/22/2023, chest x-ray 4/20/2023 Findings: Multifocal masslike consolidations in the lungs largest in the right upper lobe and left and right perihilar locations. Background of septal thickening. Cardiomegaly. Small caliber chest tube seen in the lower left thorax. Small left pleural effusion. No  pneumothorax.     Impression: Impression: Decreased size of pleural effusion on the left. It is now small. Small caliber chest tube is present. No pneumothorax. No change in masslike bilateral parenchymal airspace opacities. Electronically Signed: Patti Williamson   4/23/2023 8:30 AM EDT  Workstation ID: [id number]          Current medications:  Scheduled Meds:budesonide, 0.5 mg, Nebulization, BID - RT  busPIRone, 5 mg, Oral, Nightly  insulin lispro, 0-9 Units, Subcutaneous, TID AC  ipratropium-albuterol, 3 mL, Nebulization, 4x Daily - RT  lisinopril, 10 mg, Oral, Nightly  metoprolol succinate XL, 50 mg, Oral, Daily  pantoprazole, 40 mg, Oral, BID AC  sodium chloride, 10 mL, Intravenous, Q12H      Continuous Infusions:   PRN Meds:.•  acetaminophen **OR** acetaminophen **OR** acetaminophen  •  senna-docusate sodium **AND** polyethylene glycol **AND** bisacodyl **AND** bisacodyl  •  Calcium Replacement - Follow Nurse / BPA Driven Protocol  •  dextrose  •  dextrose  •  glucagon (human recombinant)  •  HYDROcodone-acetaminophen  •  hydrOXYzine  •  ipratropium-albuterol  •  LORazepam  •  Magnesium Standard Dose Replacement - Follow Nurse / BPA Driven Protocol  •  ondansetron **OR** ondansetron  •  Phosphorus Replacement - Follow Nurse / BPA Driven Protocol  •  Potassium Replacement - Follow Nurse / BPA Driven Protocol  •  sodium chloride  •  sodium chloride  •  sodium chloride    Assessment & Plan   Assessment & Plan     Active Hospital Problems    Diagnosis  POA   • **Recurrent left pleural effusion [J90]  Yes   • Black lung [J60]  Yes   • Chronic obstructive pulmonary disease [J44.9]  Yes   • Lung mass [R91.8]  Unknown   • PAT (paroxysmal atrial tachycardia) [I47.1]  Yes   • Primary hypertension [I10]  Yes      Resolved Hospital Problems   No resolved problems to display.        Brief Hospital Course to date:  Rory Rios Jr. is a 63 y.o. male with history of paroxysmal atrial tachycardia, hypertension, COPD on 2 L NC, black lung, recent pleural effusion status post thoracentesis who presents with progressively worsening SOA     Recurrent left pleural effusions  -Chest x-ray shows moderate left pleural effusion  -He is s/p recent thoracentesis 4/5/2023,  fluid was exudative,  cytology and microbiology were all negative at that time  -CTS following, repeat CT chest shows extensive mass like perihilar disease, large left pleural effusion  -He is s/p pigtail drainage by IR,  has drained about 3 L out, seems to have slowed down, follow-up cytology, he may need Pleurx catheter placement  -Pulmonary is following, he is s/p bronch/EBUS 4/24/2023, f/u biopsies and cultures      Chronic respiratory failure with hypoxia  COPD/black lung disease   COOPER on CPAP  -Patient on 2 L at baseline, currently on 4 L NC, wean as able  -Continue CPAP nightly     Hypertension  -BP currently stable, continue lisinopril and metoprolol     Paroxysmal atrial tachycardia  -Continue metoprolol     Well controlled type 2 diabetes with A1C 6.3%  -Continue SSI for now    Expected Discharge Location and Transportation: HOME  Expected Discharge   Expected Discharge Date: 04/27/23       DVT prophylaxis:  Mechanical DVT prophylaxis orders are present.     AM-PAC 6 Clicks Score (PT): 22 (04/24/23 0805)    CODE STATUS:   Code Status and Medical Interventions:   Ordered at: 04/20/23 0752     Level Of Support Discussed With:    Patient     Code Status (Patient has no pulse and is not breathing):    CPR (Attempt to Resuscitate)     Medical Interventions (Patient has pulse or is breathing):    Full Support       Shlomo Roblero MD  04/25/23

## 2023-04-25 NOTE — PLAN OF CARE
Goal Outcome Evaluation:           Progress: no change  Outcome Evaluation: no new events, pt wore cpap through the night, restws well, daughter at bedside

## 2023-04-25 NOTE — PLAN OF CARE
Goal Outcome Evaluation:      Pt resting in bed comfortably. Meds given as ordered. No c/o pain. Fall precautions in place. Chest tube remains in place. Call bell within reach. Will continue to assess.

## 2023-04-25 NOTE — PROGRESS NOTES
CTS Progress Note      Chief Complaint: Left pleural effusion      Subjective  No complaints this morning.  On 3 L nasal cannula saturations 94%.    Objective    Physical Exam:   Vital Signs   Temp:  [97 °F (36.1 °C)-98.1 °F (36.7 °C)] 98.1 °F (36.7 °C)  Heart Rate:  [] 96  Resp:  [12-18] 18  BP: ()/(57-89) 114/68   GEN: NAD   CV: Regular rate and rhythm    RESP: Decreased bases scattered rales   EXT: Warm to touch no significant edema    CT Output: 50 cc/24 hrs, no airleak  Output by Drain (mL) 04/24/23 0701 - 04/24/23 1900 04/24/23 1901 - 04/25/23 0700 04/25/23 0701 - 04/25/23 0757 Range Total   Chest Tube 1 Left Midaxillary 50   50      Results     Results from last 7 days   Lab Units 04/21/23  0442   WBC 10*3/mm3 7.87   HEMOGLOBIN g/dL 12.6*   HEMATOCRIT % 42.7   PLATELETS 10*3/mm3 206     Results from last 7 days   Lab Units 04/21/23  0442   SODIUM mmol/L 142   POTASSIUM mmol/L 4.4   CHLORIDE mmol/L 106   CO2 mmol/L 25.0   BUN mg/dL 11   CREATININE mg/dL 0.73*   GLUCOSE mg/dL 102*   CALCIUM mg/dL 9.3           Assessment & Plan   Recurrent left pleural effusion status post left CT-guided chest tube placement 4/22/2023  4.5 cm left lower lobe mass    Plan   Patient still undecided about possible Pleurx catheter wants to discuss again with his daughter today.  If he decides to proceed with Pleurx we will clamp chest tube for 24 hours to allow for reaccumulation and plan for Pleurx placement.  Continue chest tube    Romy Roque, DILMA  04/25/23  07:57 EDT

## 2023-04-26 ENCOUNTER — APPOINTMENT (OUTPATIENT)
Dept: GENERAL RADIOLOGY | Facility: HOSPITAL | Age: 63
DRG: 187 | End: 2023-04-26
Payer: OTHER MISCELLANEOUS

## 2023-04-26 LAB
BACTERIA SPEC RESP CULT: NO GROWTH
GLUCOSE BLDC GLUCOMTR-MCNC: 110 MG/DL (ref 70–130)
GLUCOSE BLDC GLUCOMTR-MCNC: 97 MG/DL (ref 70–130)
GLUCOSE BLDC GLUCOMTR-MCNC: 99 MG/DL (ref 70–130)
GRAM STN SPEC: NORMAL

## 2023-04-26 PROCEDURE — 71045 X-RAY EXAM CHEST 1 VIEW: CPT

## 2023-04-26 PROCEDURE — 99232 SBSQ HOSP IP/OBS MODERATE 35: CPT | Performed by: STUDENT IN AN ORGANIZED HEALTH CARE EDUCATION/TRAINING PROGRAM

## 2023-04-26 PROCEDURE — 99232 SBSQ HOSP IP/OBS MODERATE 35: CPT | Performed by: INTERNAL MEDICINE

## 2023-04-26 PROCEDURE — 94799 UNLISTED PULMONARY SVC/PX: CPT

## 2023-04-26 PROCEDURE — 94761 N-INVAS EAR/PLS OXIMETRY MLT: CPT

## 2023-04-26 PROCEDURE — 82962 GLUCOSE BLOOD TEST: CPT

## 2023-04-26 RX ADMIN — BUDESONIDE 0.5 MG: 0.5 SUSPENSION RESPIRATORY (INHALATION) at 20:50

## 2023-04-26 RX ADMIN — Medication 10 ML: at 23:13

## 2023-04-26 RX ADMIN — IPRATROPIUM BROMIDE AND ALBUTEROL SULFATE 3 ML: .5; 2.5 SOLUTION RESPIRATORY (INHALATION) at 11:05

## 2023-04-26 RX ADMIN — BUDESONIDE 0.5 MG: 0.5 SUSPENSION RESPIRATORY (INHALATION) at 07:03

## 2023-04-26 RX ADMIN — LISINOPRIL 10 MG: 10 TABLET ORAL at 20:58

## 2023-04-26 RX ADMIN — IPRATROPIUM BROMIDE AND ALBUTEROL SULFATE 3 ML: .5; 2.5 SOLUTION RESPIRATORY (INHALATION) at 15:41

## 2023-04-26 RX ADMIN — IPRATROPIUM BROMIDE AND ALBUTEROL SULFATE 3 ML: .5; 2.5 SOLUTION RESPIRATORY (INHALATION) at 07:02

## 2023-04-26 RX ADMIN — IPRATROPIUM BROMIDE AND ALBUTEROL SULFATE 3 ML: .5; 2.5 SOLUTION RESPIRATORY (INHALATION) at 20:50

## 2023-04-26 RX ADMIN — PANTOPRAZOLE SODIUM 40 MG: 40 TABLET, DELAYED RELEASE ORAL at 09:41

## 2023-04-26 RX ADMIN — BUSPIRONE HYDROCHLORIDE 5 MG: 5 TABLET ORAL at 20:58

## 2023-04-26 NOTE — PROGRESS NOTES
Pulmonary/Critical Care Follow-up     LOS: 2 days   Patient Care Team:  Alejandro Monique MD as PCP - General (General Surgery)        Chief Complaint   Patient presents with   • Shortness of Breath     Subjective      History reviewed and updated in EMR as indicated.  No fevers or chills.    Interval History:     No hemoptysis.  Currently tolerating room air.  Left-sided chest tube was removed.  He has had some leaking of pleural fluid from the incision.    History taken from: Patient, chart    PMH/FH/Social History were reviewed and updated appropriately in the electronic medical record.     Review of Systems:    Review of 14 systems was completed with positives and pertinent negatives noted in the subjective section.  All other systems reviewed and are negative.         Objective     Vital Signs  Temp:  [97.4 °F (36.3 °C)-98.3 °F (36.8 °C)] 98.3 °F (36.8 °C)  Heart Rate:  [] 100  Resp:  [18-20] 18  BP: (104-122)/(66-80) 105/72  No intake/output data recorded.  Body mass index is 34.15 kg/m².     IV drips:      Physical Exam:     Constitutional:   Alert, in no acute distress   Head:   Normocephalic, atraumatic   Eyes:           Lids and lashes normal, conjunctivae and sclerae normal.  PER   ENMT:  Ears appear intact with no abnormalities noted     Lips normal.     Neck:  Trachea midline, no JVD   Lungs/Resp:    Normal effort, symmetric chest rise, no crepitus, rales bilaterally.  Left chest tube dressing in place.               Heart/CV:   Regular rhythm and normal rate, no murmur   Abdomen/GI:    Soft, nontender, nondistended   :    Deferred   Extremities/MSK:  No clubbing or cyanosis.  No edema.     Pulses:  Pulses palpable and equal bilaterally   Skin:  No bleeding, bruising or rash   Heme/Lymph:  No cervical or supraclavicular adenopathy.   Neurologic:    Psychiatric:    Moves all extremities with no obvious focal motor deficit.  Cranial nerves 2 - 12 grossly intact  Non-agitated, normal affect.    The  above physical exam findings were reviewed and reflect my exam findings as of today's exam.   Electronically signed by:  Kota Willard MD  04/26/23  18:03 EDT      Results Review:     I reviewed the patient's new clinical results.   Results from last 7 days   Lab Units 04/21/23  0442 04/20/23  1430   SODIUM mmol/L 142 142   POTASSIUM mmol/L 4.4 3.8   CHLORIDE mmol/L 106 104   CO2 mmol/L 25.0 28.0   BUN mg/dL 11 11   CREATININE mg/dL 0.73* 0.93   CALCIUM mg/dL 9.3 9.8   BILIRUBIN mg/dL  --  0.3   ALK PHOS U/L  --  85   ALT (SGPT) U/L  --  20   AST (SGOT) U/L  --  20   GLUCOSE mg/dL 102* 96     Results from last 7 days   Lab Units 04/21/23  0442 04/20/23  1430   WBC 10*3/mm3 7.87 8.65   HEMOGLOBIN g/dL 12.6* 13.7   HEMATOCRIT % 42.7 45.8   PLATELETS 10*3/mm3 206 248               I reviewed the patient's new imaging including images and reports.    Reviewed images including CT chest which shows bilateral lung masses and left pleural effusion.    Medication Review:   budesonide, 0.5 mg, Nebulization, BID - RT  busPIRone, 5 mg, Oral, Nightly  insulin lispro, 0-9 Units, Subcutaneous, TID AC  ipratropium-albuterol, 3 mL, Nebulization, 4x Daily - RT  lisinopril, 10 mg, Oral, Nightly  metoprolol succinate XL, 50 mg, Oral, Daily  pantoprazole, 40 mg, Oral, BID AC  sodium chloride, 10 mL, Intravenous, Q12H           Assessment & Plan         Recurrent left pleural effusion    PAT (paroxysmal atrial tachycardia)    Black lung    Chronic obstructive pulmonary disease    Primary hypertension    Lung mass    63 y.o. former , lifetime non-smoker, history of CWP with PMF.  Has left pleural effusion with recent chest tube placement.  Chest tube remains in place.  Concern for underlying malignancy seen by Dr. Marie over the weekend who recommended bronchoscopy.    Bronchoscopy with endobronchial ultrasound-guided biopsies done on 4/24/2023.  Awaiting biopsy results.    Plan:  1.  For bilateral lung masses:  Bronchoscopy with endobronchial ultrasound-guided biopsies for diagnosis done on 4/24/2023.  Suspect PMF from CWP.  Biopsies/endobronchial brushing without evidence of malignancy.  Patient's findings likely just represent PMF from CWP.  I agree with Dr. Marie that patient should be referred to [facility] transplant clinic to follow-up for his severe CWP/PMF.  2.  For pleural effusion: Left chest tube in place managed by CT surgery.  Removed today.  CT surgery following.    Call if additional questions or concerns.    Electronically signed by:    Kota Willard MD  04/26/23  18:03 EDT      *. Please note that portions of this note were completed with [employer] - a voice recognition program.

## 2023-04-26 NOTE — CASE MANAGEMENT/SOCIAL WORK
Continued Stay Note  [facility]     Patient Name: Rory Rios Jr.  MRN: [id number]  Today's Date: 4/26/2023    Admit Date: 4/20/2023    Plan: Home at DC   Discharge Plan     Row Name 04/26/23 1515       Plan    Plan Home at DC    Patient/Family in Agreement with Plan other (see comments)    Plan Comments No new DC needs identified at this time. Unsure of Pleurx need at this time.    Final Discharge Disposition Code 01 - home or self-care               Discharge Codes    No documentation.               Expected Discharge Date and Time     Expected Discharge Date Expected Discharge Time    Apr 27, 2023             Preeti Alejo RN

## 2023-04-26 NOTE — PLAN OF CARE
Problem: Adult Inpatient Plan of Care  Goal: Plan of Care Review  Outcome: Ongoing, Progressing  Flowsheets (Taken 4/26/2023 0656)  Progress: improving  Plan of Care Reviewed With: patient  Outcome Evaluation: Patient did well during night with no complaints. patient would like to have pleurex cath placed.  Goal: Patient-Specific Goal (Individualized)  Outcome: Ongoing, Progressing  Goal: Absence of Hospital-Acquired Illness or Injury  Outcome: Ongoing, Progressing  Intervention: Identify and Manage Fall Risk  Recent Flowsheet Documentation  Taken 4/26/2023 0600 by Cesilia Rey, RN  Safety Promotion/Fall Prevention:   toileting scheduled   safety round/check completed   room organization consistent   nonskid shoes/slippers when out of bed   lighting adjusted   fall prevention program maintained   clutter free environment maintained   activity supervised  Taken 4/26/2023 0400 by Cesilia Rey, RN  Safety Promotion/Fall Prevention:   toileting scheduled   safety round/check completed   room organization consistent   nonskid shoes/slippers when out of bed   lighting adjusted   fall prevention program maintained   clutter free environment maintained   activity supervised  Taken 4/26/2023 0200 by Cesilia Rey, RN  Safety Promotion/Fall Prevention:   toileting scheduled   safety round/check completed   room organization consistent   nonskid shoes/slippers when out of bed   lighting adjusted   fall prevention program maintained   clutter free environment maintained   activity supervised  Taken 4/26/2023 0000 by Cesilia Rey, RN  Safety Promotion/Fall Prevention:   toileting scheduled   safety round/check completed   room organization consistent   nonskid shoes/slippers when out of bed   lighting adjusted   fall prevention program maintained   clutter free environment maintained   activity supervised  Taken 4/25/2023 2200 by Cesilia Rey, RN  Safety Promotion/Fall Prevention:   toileting  scheduled   safety round/check completed   room organization consistent   nonskid shoes/slippers when out of bed   lighting adjusted   fall prevention program maintained   clutter free environment maintained   activity supervised  Taken 4/25/2023 2000 by Cesilia Rey RN  Safety Promotion/Fall Prevention:   toileting scheduled   safety round/check completed   room organization consistent   nonskid shoes/slippers when out of bed   lighting adjusted   fall prevention program maintained   clutter free environment maintained   activity supervised  Intervention: Prevent Skin Injury  Recent Flowsheet Documentation  Taken 4/26/2023 0600 by Cesilia Rey RN  Body Position: position changed independently  Taken 4/26/2023 0400 by Cesilia Rey RN  Body Position: position changed independently  Taken 4/26/2023 0200 by Cesilia Rey RN  Body Position: position changed independently  Taken 4/26/2023 0000 by eCsilia Rey RN  Body Position: position changed independently  Taken 4/25/2023 2200 by Cesilia Rey RN  Body Position: position changed independently  Taken 4/25/2023 2000 by Cesilia Rey RN  Body Position: position changed independently  Skin Protection:   adhesive use limited   tubing/devices free from skin contact  Intervention: Prevent and Manage VTE (Venous Thromboembolism) Risk  Recent Flowsheet Documentation  Taken 4/26/2023 0600 by Cesilia Rey RN  Activity Management: activity encouraged  Taken 4/26/2023 0400 by Cesilia Rey RN  Activity Management: activity encouraged  Taken 4/26/2023 0200 by Cesilia Rey RN  Activity Management: activity encouraged  Taken 4/26/2023 0000 by Cesilia Rey RN  Activity Management: activity encouraged  Taken 4/25/2023 2000 by Cesilia Rey RN  VTE Prevention/Management: sequential compression devices off  Range of Motion: active ROM (range of motion) encouraged  Intervention: Prevent Infection  Recent Flowsheet  Documentation  Taken 4/26/2023 0400 by Ceislia Rey RN  Infection Prevention:   rest/sleep promoted   single patient room provided   environmental surveillance performed  Taken 4/26/2023 0200 by Cesilia Rey RN  Infection Prevention:   rest/sleep promoted   single patient room provided   environmental surveillance performed  Taken 4/26/2023 0000 by Cesilia Rey RN  Infection Prevention:   single patient room provided   rest/sleep promoted   environmental surveillance performed  Taken 4/25/2023 2000 by Cesilia Rey RN  Infection Prevention:   rest/sleep promoted   single patient room provided   environmental surveillance performed  Goal: Optimal Comfort and Wellbeing  Outcome: Ongoing, Progressing  Intervention: Provide Person-Centered Care  Recent Flowsheet Documentation  Taken 4/25/2023 2000 by Cesilia Rey RN  Trust Relationship/Rapport:   care explained   questions encouraged   questions answered  Goal: Readiness for Transition of Care  Outcome: Ongoing, Progressing     Problem: COPD (Chronic Obstructive Pulmonary Disease) Comorbidity  Goal: Maintenance of COPD Symptom Control  Outcome: Ongoing, Progressing  Intervention: Maintain COPD-Symptom Control  Recent Flowsheet Documentation  Taken 4/26/2023 0400 by Cesliia Rey RN  Medication Review/Management: medications reviewed  Taken 4/26/2023 0200 by Cesilia Rey RN  Medication Review/Management: medications reviewed  Taken 4/26/2023 0000 by Cesilia Rey RN  Medication Review/Management: medications reviewed  Taken 4/25/2023 2300 by Cesilia Rey RN  Medication Review/Management: medications reviewed  Taken 4/25/2023 2200 by Cesilia Rey RN  Medication Review/Management: medications reviewed  Taken 4/25/2023 2000 by Cesilia Rey RN  Supportive Measures: active listening utilized  Medication Review/Management: medications reviewed     Problem: Diabetes Comorbidity  Goal: Blood Glucose Level Within  Targeted Range  Outcome: Ongoing, Progressing     Problem: Hypertension Comorbidity  Goal: Blood Pressure in Desired Range  Outcome: Ongoing, Progressing  Intervention: Maintain Blood Pressure Management  Recent Flowsheet Documentation  Taken 4/26/2023 0400 by Cesilia Rey RN  Medication Review/Management: medications reviewed  Taken 4/26/2023 0200 by Cesilia Rey RN  Medication Review/Management: medications reviewed  Taken 4/26/2023 0000 by Cesilia Rey RN  Medication Review/Management: medications reviewed  Taken 4/25/2023 2300 by Cesilia Rey RN  Medication Review/Management: medications reviewed  Taken 4/25/2023 2200 by Cesilia Rey RN  Medication Review/Management: medications reviewed  Taken 4/25/2023 2000 by Cesilia Rey RN  Medication Review/Management: medications reviewed     Problem: Obstructive Sleep Apnea Risk or Actual Comorbidity Management  Goal: Unobstructed Breathing During Sleep  Outcome: Ongoing, Progressing     Problem: Pain Chronic (Persistent) (Comorbidity Management)  Goal: Acceptable Pain Control and Functional Ability  Outcome: Ongoing, Progressing  Intervention: Manage Persistent Pain  Recent Flowsheet Documentation  Taken 4/26/2023 0600 by Cesilia Rey RN  Sleep/Rest Enhancement:   awakenings minimized   room darkened   relaxation techniques promoted   regular sleep/rest pattern promoted  Taken 4/26/2023 0400 by Cesilia Rey RN  Sleep/Rest Enhancement:   awakenings minimized   room darkened   relaxation techniques promoted   regular sleep/rest pattern promoted  Medication Review/Management: medications reviewed  Taken 4/26/2023 0200 by Cesilia Rey RN  Sleep/Rest Enhancement:   awakenings minimized   room darkened   relaxation techniques promoted   regular sleep/rest pattern promoted  Medication Review/Management: medications reviewed  Taken 4/26/2023 0000 by Cesilia Rey RN  Sleep/Rest Enhancement:   awakenings minimized   room  darkened   relaxation techniques promoted   regular sleep/rest pattern promoted  Medication Review/Management: medications reviewed  Taken 4/25/2023 2300 by Cesilia Rey, RN  Medication Review/Management: medications reviewed  Taken 4/25/2023 2200 by Cesilia Rey, RN  Sleep/Rest Enhancement:   awakenings minimized   room darkened   relaxation techniques promoted   regular sleep/rest pattern promoted  Medication Review/Management: medications reviewed  Taken 4/25/2023 2000 by Cesilia Rey, RN  Bowel Elimination Promotion: adequate fluid intake promoted  Sleep/Rest Enhancement:   awakenings minimized   room darkened   relaxation techniques promoted   regular sleep/rest pattern promoted  Medication Review/Management: medications reviewed  Intervention: Optimize Psychosocial Wellbeing  Recent Flowsheet Documentation  Taken 4/25/2023 2000 by Cesilia Rey, RN  Supportive Measures: active listening utilized  Diversional Activities: television  Family/Support System Care: support provided   Goal Outcome Evaluation:  Plan of Care Reviewed With: patient        Progress: improving  Outcome Evaluation: Patient did well during night with no complaints. patient would like to have pleurex cath placed.

## 2023-04-26 NOTE — PROGRESS NOTES
[facility] Medicine Services  PROGRESS NOTE    Patient Name: Rory Rios Jr.  : 1960  MRN: [id number]    Date of Admission: 2023  Primary Care Physician: Alejandro Monique MD    Subjective   Subjective     CC: Follow-up SOA    HPI:   Chest tube with 50mL documented.  Was on anywhere from room air to 3 L.  Chest tube was removed by CTS this morning and now chest tube site is leaking.  Reports unchanged chronic dyspnea.  Is on 3 L nasal cannula.  Told me that he was not on oxygen at home, but his ex-wife on the phone in the room told me he has been on continuous oxygen since February.    ROS:  Gen- No fevers, chills  CV- No chest pain, palpitations  Resp- No cough, + unchanged dyspnea  GI- No N/V/D, abd pain    Objective   Objective     Vital Signs:   Temp:  [97.4 °F (36.3 °C)-98.3 °F (36.8 °C)] 98 °F (36.7 °C)  Heart Rate:  [71-97] 94  Resp:  [18-20] 20  BP: ()/(63-80) 104/67  Flow (L/min):  [3] 3     Physical Exam:  Constitutional: No acute distress, awake, alert  HENT: NCAT, mucous membranes moist  Respiratory: Nonlabored respirations, diffuse coarse breath sounds, no wheezes, left chest tube removal site with serous fluid drainage, on 3L NC  Cardiovascular: RRR, no murmurs, rubs, or gallops  Gastrointestinal: Positive bowel sounds, soft, nontender, nondistended  Musculoskeletal: No bilateral ankle edema  Psychiatric: Appropriate affect, cooperative  Neurologic: Oriented x 3, nonfocal  Skin: No rashes      Results Reviewed:  LAB RESULTS:      Lab 23  0442 23  1430   WBC 7.87 8.65   HEMOGLOBIN 12.6* 13.7   HEMATOCRIT 42.7 45.8   PLATELETS 206 248   NEUTROS ABS  --  5.70   IMMATURE GRANS (ABS)  --  0.02   LYMPHS ABS  --  1.72   MONOS ABS  --  0.74   EOS ABS  --  0.41*   MCV 83.2 83.1   SED RATE  --  67*   CRP  --  1.15*         Lab 23  0442 23  1430   SODIUM 142 142   POTASSIUM 4.4 3.8   CHLORIDE 106 104   CO2 25.0 28.0   ANION GAP 11.0 10.0   BUN  11 11   CREATININE 0.73* 0.93   EGFR 101.0 91.1   GLUCOSE 102* 96   CALCIUM 9.3 9.8   HEMOGLOBIN A1C 6.30*  --          Lab 04/20/23  1430   TOTAL PROTEIN 6.8   ALBUMIN 3.9   GLOBULIN 2.9   ALT (SGPT) 20   AST (SGOT) 20   BILIRUBIN 0.3   ALK PHOS 85         Lab 04/20/23  1430   PROBNP 117.7   HSTROP T 21*                 Brief Urine Lab Results     None          Microbiology Results Abnormal     Procedure Component Value - Date/Time    Fungus Smear - Wash, Bronchus [[id number]] Collected: 04/24/23 1004    Lab Status: Final result Specimen: Wash from Bronchus Updated: 04/25/23 1346     Fungal Stain No fungal elements seen    AFB Culture - Wash, Bronchus [[id number]] Collected: 04/24/23 1004    Lab Status: Preliminary result Specimen: Wash from Bronchus Updated: 04/25/23 1219     AFB Stain No acid fast bacilli seen on concentrated smear    Respiratory Culture - Wash, Bronchus [[id number]] Collected: 04/24/23 1004    Lab Status: Preliminary result Specimen: Wash from Bronchus Updated: 04/25/23 1044     Respiratory Culture No growth     Gram Stain Many (4+) Red blood cells      Few (2+) WBCs seen      No organisms seen          XR Chest 1 View    Result Date: 4/26/2023  XR CHEST 1 VW Date of Exam: 4/26/2023 4:25 AM EDT Indication: follow up pleural effusion status post left CT-guided chest tube placement follow up Comparison: 4/25/2023, 4/24/2023 Findings: The heart appears enlarged. Perihilar airspace disease is present bilaterally. No significant effusion identified. Pulmonary vasculature appears unremarkable. No pneumothorax. No acute osseous abnormality identified. Left basilar pleural drain present.     Impression: Impression: Left basilar pleural drain. No significant effusion or evidence of pneumothorax. Perihilar airspace masses present bilaterally, similar as compared to the previous study Electronically Signed: Shantelle Grimm  4/26/2023 7:09 AM EDT  Workstation ID: [id number]    XR Chest 1 View    Result Date:  4/25/2023  XR CHEST 1 VW Date of Exam: 4/25/2023 5:30 AM EDT Indication: follow up pleural effusion status post left CT-guided chest tube placement follow up Comparison: 4/24/2023 Findings: Masslike opacities in both perihilar regions are again noted, stable from the prior exam. Patchy bibasilar disease, however, is improving. No pneumothorax, effusion or other new pulm parenchymal disease is seen.     Impression: Impression: 1. Stable right and left perihilar masses as previously described. 2. Improving bibasilar pulmonary interstitial disease, now mild in extent. Electronically Signed: Mitch Sears  4/25/2023 8:57 AM EDT  Workstation ID: [id number]          Current medications:  Scheduled Meds:budesonide, 0.5 mg, Nebulization, BID - RT  busPIRone, 5 mg, Oral, Nightly  insulin lispro, 0-9 Units, Subcutaneous, TID AC  ipratropium-albuterol, 3 mL, Nebulization, 4x Daily - RT  lisinopril, 10 mg, Oral, Nightly  metoprolol succinate XL, 50 mg, Oral, Daily  pantoprazole, 40 mg, Oral, BID AC  sodium chloride, 10 mL, Intravenous, Q12H      Continuous Infusions:   PRN Meds:.•  acetaminophen **OR** acetaminophen **OR** acetaminophen  •  senna-docusate sodium **AND** polyethylene glycol **AND** bisacodyl **AND** bisacodyl  •  Calcium Replacement - Follow Nurse / BPA Driven Protocol  •  dextrose  •  dextrose  •  glucagon (human recombinant)  •  HYDROcodone-acetaminophen  •  hydrOXYzine  •  ipratropium-albuterol  •  LORazepam  •  Magnesium Standard Dose Replacement - Follow Nurse / BPA Driven Protocol  •  ondansetron **OR** ondansetron  •  Phosphorus Replacement - Follow Nurse / BPA Driven Protocol  •  Potassium Replacement - Follow Nurse / BPA Driven Protocol  •  sodium chloride  •  sodium chloride  •  sodium chloride    Assessment & Plan   Assessment & Plan     Active Hospital Problems    Diagnosis  POA   • **Recurrent left pleural effusion [J90]  Yes   • Black lung [J60]  Yes   • Chronic obstructive pulmonary disease [J44.9]  Yes    • Lung mass [R91.8]  Unknown   • PAT (paroxysmal atrial tachycardia) [I47.1]  Yes   • Primary hypertension [I10]  Yes      Resolved Hospital Problems   No resolved problems to display.        Brief Hospital Course to date:  Rory Rios Jr. is a 63 y.o. male with history of paroxysmal atrial tachycardia, hypertension, COPD on 2 L NC, complicated coal workers pneumoconiosis with PMF, recent pleural effusion status post thoracentesis who presents with progressively worsening SOA.    This patient's problems and plans were partially entered by my partner and updated as appropriate by me 04/26/23.     Recurrent left pleural effusions  -Chest x-ray shows moderate left pleural effusion  -He is s/p recent thoracentesis 4/5/2023,  fluid was exudative, cytology and microbiology were all negative at that time  -CTS following, repeat CT chest shows extensive mass like perihilar disease, large left pleural effusion  -He is s/p pigtail drainage by IR,  has drained about 3 L out, seems to have slowed down, follow-up cytology was negative, he may need Pleurx catheter placement  -Pulmonary is following, he is s/p bronch/EBUS 4/24/2023, f/u biopsies which were negative for malignancy and cultures  -Discussed with Dr. 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 and recommended outpatient referral to  for lung transplant evaluation; this was discussed with the patient and his ex-wife with his permission      Chronic respiratory failure with hypoxia  COPD/black lung disease   COOPER on CPAP  -Patient on 2 L at baseline, currently on 3-4L NC, wean as able  -Continue CPAP nightly     Hypertension  -BP currently stable, continue lisinopril and metoprolol     Paroxysmal atrial tachycardia  -Continue metoprolol     Well controlled type 2 diabetes with A1C 6.3%  -Continue SSI for now    Expected Discharge Location and Transportation: HOME  Expected Discharge   Expected  Discharge Date: 04/27/23       DVT prophylaxis:  Mechanical DVT prophylaxis orders are present.     AM-PAC 6 Clicks Score (PT): 22 (04/25/23 0841)    CODE STATUS:   Code Status and Medical Interventions:   Ordered at: 04/20/23 7845     Level Of Support Discussed With:    Patient     Code Status (Patient has no pulse and is not breathing):    CPR (Attempt to Resuscitate)     Medical Interventions (Patient has pulse or is breathing):    Full Support       Belinda Mazariegos MD  04/26/23

## 2023-04-26 NOTE — PROGRESS NOTES
CTS Progress Note      Chief Complaint:Left pleural effusion      Subjective  Sitting up on the side of the bed.  Conversant.  Pleasant.  Says he has discussed a Pleurx catheter with his daughter and they wish to proceed if it is warranted  Complaining of cough with some discomfort on the right side this morning    Objective    Physical Exam:   Vital Signs   Temp:  [97.4 °F (36.3 °C)-98.3 °F (36.8 °C)] 98 °F (36.7 °C)  Heart Rate:  [71-97] 94  Resp:  [18-20] 20  BP: ()/(63-80) 104/67   GEN: NAD   CV: Regular rate and rhythm    RESP:  clear to auscultation bilaterally.  Breath sounds on the lower lobe appear improved       EXT: Warm to touch no significant edema   Neuro: Alert and oriented      CT Output:  Output by Drain (mL) 04/25/23 0701 - 04/25/23 1900 04/25/23 1901 - 04/26/23 0700 04/26/23 0701 - 04/26/23 0754 Range Total   Chest Tube 1 Left Midaxillary          Results     Results from last 7 days   Lab Units 04/21/23  0442   WBC 10*3/mm3 7.87   HEMOGLOBIN g/dL 12.6*   HEMATOCRIT % 42.7   PLATELETS 10*3/mm3 206     Results from last 7 days   Lab Units 04/21/23  0442   SODIUM mmol/L 142   POTASSIUM mmol/L 4.4   CHLORIDE mmol/L 106   CO2 mmol/L 25.0   BUN mg/dL 11   CREATININE mg/dL 0.73*   GLUCOSE mg/dL 102*   CALCIUM mg/dL 9.3           CXR: IMPRESSION:  Impression:  Left basilar pleural drain. No significant effusion or evidence of pneumothorax. Perihilar airspace masses present bilaterally, similar as compared to the previous study        EBUS 4/24/23  Final Diagnosis   1.  LYMPH NODE, STATION 10 R, TRANSBRONCHIAL NEEDLE ASPIRATION:  Fragments of bronchial wall including cartilage and benign peribronchial glands.  No lymphoid tissue identified.  Negative for tumor and granuloma.    2.  LYMPH NODE, STATION 10 L, TRANSBRONCHIAL NEEDLE ASPIRATION:  Minute fragment of anthracotic/fibrotic tissue.  No tumor or granuloma identified.  No lymphoid tissue identified.    3.  LUNG, LEFT LOWER LOBE,  BIOPSY:  Fibrosis and anthracosis with no tumor identified.  Portions of benign bronchial tissue.  No tumor or granuloma identified.       Assessment & Plan     Recurrent left pleural effusion status post CT-guided chest tube placed on 4/22/2023.    4.5 cm left lower lobe mass      Plan   Drainage from chest tubes appears to be slowing significantly.  Chest x-ray appears to show resolution of pleural effusion at this time.  At this point we will go ahead and clamp the chest tube and discussed with Dr. Solis whether we should discontinue the chest tube now and monitor for symptoms and imaging of effusions returning.  Or clamp chest tube leaving intact and follow.    MARAH Vegas  04/26/23  07:54 EDT

## 2023-04-26 NOTE — DISCHARGE SUMMARY
[facility] Medicine Services  DISCHARGE SUMMARY    Patient Name: Rory Rios Jr.  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          Hospital Course:  Rory Rios Jr. is a 63 y.o. male with history of paroxysmal atrial tachycardia, hypertension, COPD on 2 L NC, complicated coal workers pneumoconiosis with PMF, recent pleural effusion status post thoracentesis who presented with progressively worsening SOA.     This patient's problems and plans were partially entered by my partner and updated as appropriate by me 23.     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  -Chest x-ray shows moderate left pleural effusion on admission  -He is s/p recent thoracentesis 2023,  fluid was exudative, cytology and microbiology were all negative at that time  -CTS following, repeat CT chest shows extensive mass like perihilar disease, large left pleural effusion  -He is s/p pigtail drainage by IR,  has drained about 3 L out, seems to have slowed down, follow-up cytology was negative, he may need Pleurx catheter  placement in the future if recurs  -Pulmonary is following, he is s/p bronch/EBUS 4/24/2023, f/u biopsies which were negative for malignancy and cultures NGTD  -Discussed with Dr. 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 and recommended outpatient follow-up at  for lung transplant evaluation; this was discussed with the patient and his ex-wife with his permission  -Repeat CXR with no fluid reaccumulation and no issues with chest tube removal site (ie no more drainage), thus plan to DC home today  -Discussed with CT surgery on day of discharge, they recommended keeping his appointment on May 2 at 3 PM with Dr. Solis and can reevaluate if pleural fluid has reaccumulated and consider Pleurx at that time.  May require direct admission for Pleurx at that time with overnight monitoring, if fluid has recurred at that time.  CTS said to discharge home today and they will see next week in clinic.  -Discussed with patient and his daughter at bedside and high risk for continued progression of disease and death and also frequent readmissions.  If there are any issues with referral to  transplant clinic, patient to follow-up with primary care doctor or pulmonary outpatient for referral.      Chronic respiratory failure with hypoxia  COPD/black lung disease   COOPER on CPAP  -Patient on 2 L at baseline, currently on 3-4L NC, wean as able  -Continue CPAP nightly     Hypertension  -BP currently stable, continue lisinopril and metoprolol (at lower dose of 25mg XL as prescribed)     Paroxysmal atrial tachycardia  -Continue metoprolol as above     Well controlled type 2 diabetes with A1C 6.3%  -Continue SSI for now      Discharge Follow Up Recommendations for outpatient labs/diagnostics:  1. Follow-up with primary care doctor in 5-7 days regarding this hospitalization, for repeat labs (CBC, CMP), and chest x-ray. At this time, please ask your doctor for a referral  to  lung transplant evaluation/clinic  2. Follow-up with your usual lung doctor within 1-2 weeks for repeat chest imaging and management of your chronic lung disease  3. Follow-up with Dr. Solis on May 2nd as scheduled     Day of Discharge     HPI:   Minimal cough.  No drainage from chest tube removal site.  No overt chest pain.  No dyspnea on exertion, shower today.  Had a bowel movement today.  No GI or  symptoms.  Feels ready to go home today.    Review of Systems  Gen- No fevers, chills  CV- No chest pain, palpitations  Resp-as above  GI- No N/V/D, abd pain    Vital Signs:   Temp:  [97.8 °F (36.6 °C)-98.3 °F (36.8 °C)] 97.8 °F (36.6 °C)  Heart Rate:  [] 77  Resp:  [16-22] 16  BP: (102-117)/(62-80) 102/69  Flow (L/min):  [2] 2      Physical Exam:  Constitutional: No acute distress, awake, alert  HENT: NCAT, mucous membranes moist  Respiratory: Nonlabored respirations, diffuse coarse breath sounds, no wheezes, left chest tube removal site with no drainage, on 3L NC  Cardiovascular: RRR, no murmurs, rubs, or gallops  Gastrointestinal: Normoactive bowel sounds, soft, nontender, nondistended  Musculoskeletal: No bilateral ankle edema  Psychiatric: Appropriate affect, cooperative  Neurologic: PERRL, symmetric facies, moving all extremities equally  Skin: No rashes on exposed arms or legs    Pertinent  and/or Most Recent Results     LAB RESULTS:      Lab 04/27/23 0437 04/21/23  0442 04/20/23  1430   WBC 9.15 7.87 8.65   HEMOGLOBIN 13.0 12.6* 13.7   HEMATOCRIT 43.2 42.7 45.8   PLATELETS 245 206 248   NEUTROS ABS  --   --  5.70   IMMATURE GRANS (ABS)  --   --  0.02   LYMPHS ABS  --   --  1.72   MONOS ABS  --   --  0.74   EOS ABS  --   --  0.41*   MCV 82.4 83.2 83.1   SED RATE  --   --  67*   CRP  --   --  1.15*         Lab 04/27/23 0437 04/21/23  0442 04/20/23  1430   SODIUM 138 142 142   POTASSIUM 4.2 4.4 3.8   CHLORIDE 103 106 104   CO2 26.0 25.0 28.0   ANION GAP 9.0 11.0 10.0   BUN 17 11 11   CREATININE  0.79 0.73* 0.93   EGFR 98.6 101.0 91.1   GLUCOSE 126* 102* 96   CALCIUM 9.6 9.3 9.8   HEMOGLOBIN A1C  --  6.30*  --          Lab 04/20/23  1430   TOTAL PROTEIN 6.8   ALBUMIN 3.9   GLOBULIN 2.9   ALT (SGPT) 20   AST (SGOT) 20   BILIRUBIN 0.3   ALK PHOS 85         Lab 04/20/23  1430   PROBNP 117.7   HSTROP T 21*                 Brief Urine Lab Results     None        Microbiology Results (last 10 days)     Procedure Component Value - Date/Time    AFB Culture - Wash, Bronchus [[id number]] Collected: 04/24/23 1004    Lab Status: Preliminary result Specimen: Wash from Bronchus Updated: 04/25/23 1219     AFB Stain No acid fast bacilli seen on concentrated smear    Fungus Smear - Wash, Bronchus [[id number]] Collected: 04/24/23 1004    Lab Status: Final result Specimen: Wash from Bronchus Updated: 04/25/23 1346     Fungal Stain No fungal elements seen    Respiratory Culture - Wash, Bronchus [[id number]] Collected: 04/24/23 1004    Lab Status: Final result Specimen: Wash from Bronchus Updated: 04/26/23 1025     Respiratory Culture No growth     Gram Stain Many (4+) Red blood cells      Few (2+) WBCs seen      No organisms seen          CT Chest With & Without Contrast Diagnostic    Result Date: 4/21/2023  CT CHEST W WO CONTRAST DIAGNOSTIC Date of Exam: 4/21/2023 12:57 PM EDT Indication: Lung nodule, > 8mm lung mass, pleural effusion. Comparison: 4/20/2023 chest radiograph. Technique: Axial CT images were obtained of the chest before and after the uneventful intravenous administration of 85 mL Isovue-300.  Reconstructed coronal and sagittal images were also obtained. Automated exposure control and iterative construction methods were used. Findings: History indicates black lung with increasing dyspnea, some tachycardia. Today's study shows extensive irregular perihilar masses involving not only the upper lobes but also the right and left lower lobes, with adjacent irregular nodular disease and reticular interstitial disease,  typical of PMF. Most of the areas of involvement would be indistinguishable from pulmonary neoplasm.  There is an unusually discrete and rounded area of the left-sided mass, in the superior left lower lobe, best appreciated on image 46 series 2, which measures approximately 4.5 cm in diameter. This is most likely to represent masslike PMF as well, although superimposed neoplasm would appear similar.  There are shotty mediastinal lymph nodes, not unusual for inhalational disease, and no clearly pathologic mediastinal adenopathy. No hilar adenopathy is seen distinct from the patient's presumed PMF. There is no pericardial effusion or right pleural effusion but there is a large apparently free-flowing left pleural effusion. No debris or hematocrit layer is seen. The airways appear to be normally patent except for mild narrowing where they extend through the areas of presumed PMF, and, notably, where they appear truncated as they extend into the left lower lobe mass. Please refer to axial image 48 series 2 and axial image 52 series 2. Included images of the upper abdomen show normal liver morphology and mild fatty liver change. No significant inabilities are seen in the included portions of the spleen gallbladder, adrenal glands or pleural poles. There is mild fatty liver change as is  often seen in older patients. No ascites is identified. Bony structures appear to be intact.     Impression: 1. Extensive masslike perihilar disease, consistent with history of longstanding inhalational disease and progressive massive fibrosis. 2. Asymmetrically rounded and masslike area associated with left perihilar PMF in the left lower lobe, 4.5 cm in diameter, which may also reflect advanced PMF, but at least potentially could represent superimposed neoplasm, as discussed above. 3. Large, free-flowing left pleural effusion of uncertain significance. Electronically Signed: Mitch Sears  4/21/2023 3:48 PM EDT  Workstation ID:  [id number]    XR Chest 1 View    Result Date: 4/27/2023  XR CHEST 1 VW Date of Exam: 4/27/2023 5:18 AM EDT Indication: follow up pleural effusion status post left CT-guided chest tube placement follow up Comparison: April 26, 2023 Findings: The heart is enlarged. There is no evidence of a left-sided chest tube having previously been removed. There is no pneumothorax. There is mild pulmonary vascular congestion. There are confluent opacities in the right upper lobe and bilateral perihilar regions consistent with multifocal pneumonia.     Impression: Stable chest. No pneumothorax. Multifocal infiltrates. Electronically Signed: Bud Britton  4/27/2023 8:21 AM EDT  Workstation ID: [id number]    XR Chest 1 View    Result Date: 4/26/2023  XR CHEST 1 VW Date of Exam: 4/26/2023 2:24 PM EDT Indication: Post chest tube removal Comparison: 4/26/2023, 4/25/2023 Findings: There is been removal of the left-sided chest tube. Perihilar airspace disease present bilaterally, right greater than left. There is indistinctness of the pulmonary vasculature. Probable small left-sided pleural effusion. The heart appears mildly enlarged. No pneumothorax. No acute osseous abnormality identified.     Impression: Removal of left-sided chest tube. No pneumothorax. Otherwise, stable exam. Electronically Signed: Shantelle Grimm  4/26/2023 3:04 PM EDT  Workstation ID: [id number]    XR Chest 1 View    Result Date: 4/26/2023  XR CHEST 1 VW Date of Exam: 4/26/2023 4:25 AM EDT Indication: follow up pleural effusion status post left CT-guided chest tube placement follow up Comparison: 4/25/2023, 4/24/2023 Findings: The heart appears enlarged. Perihilar airspace disease is present bilaterally. No significant effusion identified. Pulmonary vasculature appears unremarkable. No pneumothorax. No acute osseous abnormality identified. Left basilar pleural drain present.     Impression: Left basilar pleural drain. No significant effusion or evidence of pneumothorax.  Perihilar airspace masses present bilaterally, similar as compared to the previous study Electronically Signed: Shantelle Grimm  4/26/2023 7:09 AM EDT  Workstation ID: [id number]    XR Chest 1 View    Result Date: 4/25/2023  XR CHEST 1 VW Date of Exam: 4/25/2023 5:30 AM EDT Indication: follow up pleural effusion status post left CT-guided chest tube placement follow up Comparison: 4/24/2023 Findings: Masslike opacities in both perihilar regions are again noted, stable from the prior exam. Patchy bibasilar disease, however, is improving. No pneumothorax, effusion or other new pulm parenchymal disease is seen.     Impression: 1. Stable right and left perihilar masses as previously described. 2. Improving bibasilar pulmonary interstitial disease, now mild in extent. Electronically Signed: Mitch Sears  4/25/2023 8:57 AM EDT  Workstation ID: [id number]    XR Chest 1 View    Result Date: 4/24/2023  XR CHEST 1 VW Date of Exam: 4/24/2023 4:33 AM EDT Indication: follow up pleural effusion status post left CT-guided chest tube placement follow up Comparison: 4/23/2023 Findings: Patient's left-sided pigtail catheter is more faintly visible on today's exam, likely due to different film technique, but is stable in position. No pneumothorax is seen. Perihilar masslike changes typical of PMF are again noted. Pulmonary parenchymal disease pattern is stable. There is no evidence of significant effusion or other new chest disease.     Impression: Left pleural drain remains in place. Stable changes of pulmonary fibrosis. No new chest disease is seen. Electronically Signed: Mitch Sears  4/24/2023 8:36 AM EDT  Workstation ID: [id number]    XR Chest 1 View    Result Date: 4/23/2023  XR CHEST 1 VW Date of Exam: 4/23/2023 7:09 AM EDT Indication: follow up pleural effusion status post left CT-guided chest tube placement follow up Comparison: CT chest 4/22/2023, chest x-ray 4/20/2023 Findings: Multifocal masslike consolidations in the lungs largest in the  right upper lobe and left and right perihilar locations. Background of septal thickening. Cardiomegaly. Small caliber chest tube seen in the lower left thorax. Small left pleural effusion. No  pneumothorax.     Impression: Decreased size of pleural effusion on the left. It is now small. Small caliber chest tube is present. No pneumothorax. No change in masslike bilateral parenchymal airspace opacities. Electronically Signed: Patti Williamson  4/23/2023 8:30 AM EDT  Workstation ID: [id number]    XR Chest 1 View    Result Date: 4/20/2023  XR CHEST 1 VW Date of Exam: 4/20/2023 2:13 PM EDT Indication: SOA triage protocol. Comparison: Chest CT 2/9/2023 , chest radiograph 2/9/2023. Findings: Heart size is stable. The masslike perihilar infiltrates in the lungs appear unchanged on the right and slightly improved on the left. There is at least a moderate dependent left-sided pleural effusion with some compressive atelectasis in the left lung base.      Impression: 1. No change in the masslike nodular appearing infiltrate in the right perihilar region. 2. Improvement in the left perihilar infiltrate compared with the last study with improvement in aeration at the left base. 3. Moderate pleural effusion, unchanged. Electronically Signed: Miguelito Deras  4/20/2023 2:22 PM EDT  Workstation ID: [id number]    CT Guided Chest Tube    Result Date: 4/23/2023  Clinical indication: Pleural effusion : Dr. Bacilio June Procedure: CT-guided chest tube placement, left-sided Contrast usage: None Conscious sedation: None Complication: None apparent. Estimated blood loss: Negligible Procedure: Formal written consent for the procedure was obtained from the patient after explaining risks to include bleeding, infection, injury to lung/adjacent organs, pneumothorax, need for additional surgery/procedure.  The patient's left mid axillary region was prepped and draped in the usual, sterile fashion.  Local anesthesia with 1% lidocaine infiltration was  "achieved.  Utilizing CT guidance, a 10 [place] locking pigtail drainage catheter was placed into the moderate size left pleural fluid collection without difficulty.  A sample of the pleural fluid was sent for cytology, per the referring service.  Catheter position was confirmed with \"fluoroscopy CT\", and the catheter was positioned in the \"locked\" position, sutured into place, and a sterile bandage was applied.  The catheter was placed to drain to an [facility] Pleur-evac system.  There were no immediate complications. Impression: Successful CT-guided left-sided chest tube placement, with 10 [place] \"locking\" pigtail drainage catheter placed into the left-sided pleural effusion without difficulty.  Sample was sent for cytology, per the referring service.    X-ray chest PA and lateral    Result Date: 4/17/2023  TWO-VIEW CHEST. HISTORY: Left pleural effusion, shortness of air. COMPARISON: April 10, 2023. FINDINGS: The cardiac silhouette is normal in size. The mediastinum is unremarkable. There has been no change in the bilateral masslike nodular opacities or the interstitial opacities. There is a left pleural effusion that appears stable. There is no pneumothorax. There is no acute osseous abnormality.    No significant interval change. Images reviewed, interpreted, and dictated by Dr. Gauri Gtz. Transcribed by Tahmina Isaacs PA-C.                  Plan for Follow-up of Pending Labs/Results: PCP  Pending Labs     Order Current Status    Fungus Culture - Wash, Bronchus In process    AFB Culture - Wash, Bronchus Preliminary result        Discharge Details        Discharge Medications      New Medications      Instructions Start Date   hydrOXYzine 25 MG tablet  Commonly known as: ATARAX   25 mg, Oral, 3 Times Daily PRN         Continue These Medications      Instructions Start Date   albuterol sulfate  (90 Base) MCG/ACT inhaler  Commonly known as: PROVENTIL HFA;VENTOLIN HFA;PROAIR HFA   2 puffs, Inhalation, Every 4 " Hours PRN      aspirin 81 MG EC tablet   81 mg, Oral, Daily, OTC      Budeson-Glycopyrrol-Formoterol 160-9-4.8 MCG/ACT aerosol inhaler  Commonly known as: BREZTRI   2 puffs, Inhalation, 2 Times Daily      busPIRone 5 MG tablet  Commonly known as: BUSPAR   5 mg, Oral, Every Night at Bedtime      esomeprazole 20 MG capsule  Commonly known as: nexIUM   20 mg, Oral, Every Morning Before Breakfast      glipizide 5 MG ER tablet  Commonly known as: GLUCOTROL XL   5 mg, Oral, Every Morning      HYDROcodone-acetaminophen 7.5-325 MG per tablet  Commonly known as: NORCO   1 tablet, Oral, 3 Times Daily      ibuprofen 800 MG tablet  Commonly known as: ADVIL,MOTRIN   800 mg, Oral, 3 Times Daily      ipratropium-albuterol 0.5-2.5 mg/3 ml nebulizer  Commonly known as: DUO-NEB   3 mL, Nebulization, Every 4 Hours PRN      lisinopril 10 MG tablet  Commonly known as: PRINIVIL,ZESTRIL   10 mg, Oral, Nightly      metFORMIN 500 MG tablet  Commonly known as: GLUCOPHAGE   500 mg, Oral, 2 Times Daily With Meals      omeprazole 40 MG capsule  Commonly known as: priLOSEC   40 mg, Oral, Daily      roflumilast 250 MCG tablet tablet  Commonly known as: DALIRESP   250 mcg, Oral, Daily         Stop These Medications    nabumetone 750 MG tablet  Commonly known as: RELAFEN        ASK your doctor about these medications      Instructions Start Date   metoprolol succinate XL 25 MG 24 hr tablet  Commonly known as: TOPROL-XL   25 mg, Oral, Daily             No Known Allergies      Discharge Disposition:  Home or Self Care    Diet:  Hospital:  Diet Order   Procedures   • Diet: Regular/House Diet; Texture: Regular Texture (IDDSI 7); Fluid Consistency: Thin (IDDSI 0)       Activity:      Restrictions or Other Recommendations:  As tolerated       CODE STATUS:    Code Status and Medical Interventions:   Ordered at: 04/20/23 8283     Level Of Support Discussed With:    Patient     Code Status (Patient has no pulse and is not breathing):    CPR (Attempt to  Resuscitate)     Medical Interventions (Patient has pulse or is breathing):    Full Support       Future Appointments   Date Time Provider Department Center   5/2/2023  3:00 PM Miguelito Solis MD [facility]       Additional Instructions for the Follow-ups that You Need to Schedule     Call MD With Problems / Concerns   As directed      Please seek medical attention for any of the following: Difficulty breathing, chest pain, persistent fevers, coughing up blood, and/or any other concerning symptom    Order Comments: Please seek medical attention for any of the following: Difficulty breathing, chest pain, persistent fevers, coughing up blood, and/or any other concerning symptom          Discharge Follow-up with PCP   As directed       Currently Documented PCP:    Alejandro Monique MD    PCP Phone Number:    [phone]     Follow Up Details: Follow-up with primary care doctor in 5 to 7 days regarding this hospitalization and for repeat lab work (CBC, CMP)         Discharge Follow-up with Specialty: Please schedule [facility] lung transplant clinic follow-up prior to discharge; 1 Month   As directed      Specialty: Please schedule [facility] transplant clinic follow-up prior to discharge    Follow Up: 1 Month                     Belinda Mazariegos MD  04/27/23      Time Spent on Discharge:  I spent 50 minutes on this discharge activity which included: face-to-face encounter with the patient, reviewing the data in the system, coordination of the care with the nursing staff as well as consultants, documentation, and entering orders.

## 2023-04-27 ENCOUNTER — APPOINTMENT (OUTPATIENT)
Dept: GENERAL RADIOLOGY | Facility: HOSPITAL | Age: 63
DRG: 187 | End: 2023-04-27
Payer: OTHER MISCELLANEOUS

## 2023-04-27 ENCOUNTER — READMISSION MANAGEMENT (OUTPATIENT)
Dept: CALL CENTER | Facility: HOSPITAL | Age: 63
End: 2023-04-27
Payer: MEDICARE

## 2023-04-27 VITALS
SYSTOLIC BLOOD PRESSURE: 107 MMHG | TEMPERATURE: 97.8 F | HEART RATE: 78 BPM | HEIGHT: 77 IN | BODY MASS INDEX: 34 KG/M2 | DIASTOLIC BLOOD PRESSURE: 79 MMHG | RESPIRATION RATE: 17 BRPM | WEIGHT: 288 LBS | OXYGEN SATURATION: 94 %

## 2023-04-27 PROBLEM — J60 COAL WORKERS PNEUMOCONIOSIS: Status: ACTIVE | Noted: 2023-04-27

## 2023-04-27 PROBLEM — J84.10 PULMONARY FIBROSIS: Status: ACTIVE | Noted: 2023-04-27

## 2023-04-27 LAB
ANION GAP SERPL CALCULATED.3IONS-SCNC: 9 MMOL/L (ref 5–15)
BUN SERPL-MCNC: 17 MG/DL (ref 8–23)
BUN/CREAT SERPL: 21.5 (ref 7–25)
CALCIUM SPEC-SCNC: 9.6 MG/DL (ref 8.6–10.5)
CHLORIDE SERPL-SCNC: 103 MMOL/L (ref 98–107)
CO2 SERPL-SCNC: 26 MMOL/L (ref 22–29)
CREAT SERPL-MCNC: 0.79 MG/DL (ref 0.76–1.27)
DEPRECATED RDW RBC AUTO: 44.7 FL (ref 37–54)
EGFRCR SERPLBLD CKD-EPI 2021: 98.6 ML/MIN/1.73
ERYTHROCYTE [DISTWIDTH] IN BLOOD BY AUTOMATED COUNT: 14.7 % (ref 12.3–15.4)
GLUCOSE BLDC GLUCOMTR-MCNC: 105 MG/DL (ref 70–130)
GLUCOSE BLDC GLUCOMTR-MCNC: 117 MG/DL (ref 70–130)
GLUCOSE SERPL-MCNC: 126 MG/DL (ref 65–99)
HCT VFR BLD AUTO: 43.2 % (ref 37.5–51)
HGB BLD-MCNC: 13 G/DL (ref 13–17.7)
MCH RBC QN AUTO: 24.8 PG (ref 26.6–33)
MCHC RBC AUTO-ENTMCNC: 30.1 G/DL (ref 31.5–35.7)
MCV RBC AUTO: 82.4 FL (ref 79–97)
PLATELET # BLD AUTO: 245 10*3/MM3 (ref 140–450)
PMV BLD AUTO: 9.1 FL (ref 6–12)
POTASSIUM SERPL-SCNC: 4.2 MMOL/L (ref 3.5–5.2)
RBC # BLD AUTO: 5.24 10*6/MM3 (ref 4.14–5.8)
SODIUM SERPL-SCNC: 138 MMOL/L (ref 136–145)
WBC NRBC COR # BLD: 9.15 10*3/MM3 (ref 3.4–10.8)

## 2023-04-27 PROCEDURE — 71045 X-RAY EXAM CHEST 1 VIEW: CPT

## 2023-04-27 PROCEDURE — 82962 GLUCOSE BLOOD TEST: CPT

## 2023-04-27 PROCEDURE — 80048 BASIC METABOLIC PNL TOTAL CA: CPT | Performed by: STUDENT IN AN ORGANIZED HEALTH CARE EDUCATION/TRAINING PROGRAM

## 2023-04-27 PROCEDURE — 94799 UNLISTED PULMONARY SVC/PX: CPT

## 2023-04-27 PROCEDURE — 85027 COMPLETE CBC AUTOMATED: CPT | Performed by: STUDENT IN AN ORGANIZED HEALTH CARE EDUCATION/TRAINING PROGRAM

## 2023-04-27 PROCEDURE — 99239 HOSP IP/OBS DSCHRG MGMT >30: CPT | Performed by: STUDENT IN AN ORGANIZED HEALTH CARE EDUCATION/TRAINING PROGRAM

## 2023-04-27 PROCEDURE — 82948 REAGENT STRIP/BLOOD GLUCOSE: CPT

## 2023-04-27 RX ORDER — HYDROXYZINE HYDROCHLORIDE 25 MG/1
25 TABLET, FILM COATED ORAL 3 TIMES DAILY PRN
Qty: 21 TABLET | Refills: 0 | Status: SHIPPED | OUTPATIENT
Start: 2023-04-27

## 2023-04-27 RX ADMIN — PANTOPRAZOLE SODIUM 40 MG: 40 TABLET, DELAYED RELEASE ORAL at 08:29

## 2023-04-27 RX ADMIN — Medication 10 ML: at 08:30

## 2023-04-27 RX ADMIN — IPRATROPIUM BROMIDE AND ALBUTEROL SULFATE 3 ML: .5; 2.5 SOLUTION RESPIRATORY (INHALATION) at 11:56

## 2023-04-27 NOTE — OUTREACH NOTE
Prep Survey    Flowsheet Row Responses   [facility] facility patient discharged from? [facility]   Is LACE score < 7 ? No   Eligibility Readm Mgmt   Discharge diagnosis Recurrent left pleural effusion   Does the patient have one of the following disease processes/diagnoses(primary or secondary)? Other   Does the patient have Home health ordered? No   Is there a DME ordered? No   Prep survey completed? Yes          Maryjane GRANADOS - Registered Nurse

## 2023-04-27 NOTE — PLAN OF CARE
Problem: Adult Inpatient Plan of Care  Goal: Plan of Care Review  Outcome: Met  Goal: Patient-Specific Goal (Individualized)  Outcome: Met  Goal: Absence of Hospital-Acquired Illness or Injury  Outcome: Met  Intervention: Identify and Manage Fall Risk  Recent Flowsheet Documentation  Taken 4/27/2023 1000 by Gwendolyn Martinez RN  Safety Promotion/Fall Prevention:   activity supervised   assistive device/personal items within reach   clutter free environment maintained   fall prevention program maintained   nonskid shoes/slippers when out of bed   room organization consistent   safety round/check completed  Taken 4/27/2023 0800 by Gwendolyn Martinez RN  Safety Promotion/Fall Prevention:   activity supervised   assistive device/personal items within reach   clutter free environment maintained   fall prevention program maintained   nonskid shoes/slippers when out of bed   safety round/check completed   room organization consistent  Intervention: Prevent Skin Injury  Recent Flowsheet Documentation  Taken 4/27/2023 1000 by Gwendolyn Martinez RN  Body Position: position changed independently  Skin Protection:   adhesive use limited   incontinence pads utilized   tubing/devices free from skin contact  Taken 4/27/2023 0800 by Gwendolyn Martinez RN  Body Position: position changed independently  Skin Protection:   adhesive use limited   incontinence pads utilized   tubing/devices free from skin contact  Intervention: Prevent and Manage VTE (Venous Thromboembolism) Risk  Recent Flowsheet Documentation  Taken 4/27/2023 1000 by Gwendolyn Martinez RN  Activity Management: activity encouraged  Taken 4/27/2023 0800 by Gwendolyn Martinez RN  Activity Management: activity encouraged  Range of Motion: active ROM (range of motion) encouraged  Intervention: Prevent Infection  Recent Flowsheet Documentation  Taken 4/27/2023 1000 by Gwendolyn Martinez RN  Infection Prevention:   environmental surveillance performed   equipment surfaces disinfected   hand  hygiene promoted   rest/sleep promoted   single patient room provided  Taken 4/27/2023 0800 by Gwendolyn Martinez RN  Infection Prevention:   environmental surveillance performed   equipment surfaces disinfected   hand hygiene promoted   rest/sleep promoted   single patient room provided  Goal: Optimal Comfort and Wellbeing  Outcome: Met  Intervention: Provide Person-Centered Care  Recent Flowsheet Documentation  Taken 4/27/2023 0800 by Gwendolyn Martinez RN  Trust Relationship/Rapport:   care explained   choices provided   empathic listening provided   questions answered   thoughts/feelings acknowledged  Goal: Readiness for Transition of Care  Outcome: Met     Problem: COPD (Chronic Obstructive Pulmonary Disease) Comorbidity  Goal: Maintenance of COPD Symptom Control  Outcome: Met  Intervention: Maintain COPD-Symptom Control  Recent Flowsheet Documentation  Taken 4/27/2023 1000 by Gwendolyn Martinez RN  Medication Review/Management: medications reviewed  Taken 4/27/2023 0800 by Gwendolyn Martinez RN  Supportive Measures: active listening utilized  Medication Review/Management: medications reviewed     Problem: Diabetes Comorbidity  Goal: Blood Glucose Level Within Targeted Range  Outcome: Met  Intervention: Monitor and Manage Glycemia  Recent Flowsheet Documentation  Taken 4/27/2023 0800 by Gwendolyn Martinez RN  Glycemic Management: blood glucose monitored     Problem: Hypertension Comorbidity  Goal: Blood Pressure in Desired Range  Outcome: Met  Intervention: Maintain Blood Pressure Management  Recent Flowsheet Documentation  Taken 4/27/2023 1000 by Gwendolyn Martinez RN  Medication Review/Management: medications reviewed  Taken 4/27/2023 0800 by Gwendolyn Martinez RN  Medication Review/Management: medications reviewed     Problem: Obstructive Sleep Apnea Risk or Actual Comorbidity Management  Goal: Unobstructed Breathing During Sleep  Outcome: Met     Problem: Pain Chronic (Persistent) (Comorbidity Management)  Goal: Acceptable Pain  Control and Functional Ability  Outcome: Met  Intervention: Manage Persistent Pain  Recent Flowsheet Documentation  Taken 4/27/2023 1000 by Gwendolyn Martinez RN  Sleep/Rest Enhancement:   awakenings minimized   room darkened   relaxation techniques promoted   regular sleep/rest pattern promoted  Medication Review/Management: medications reviewed  Taken 4/27/2023 0800 by Gwendolyn Martinez RN  Bowel Elimination Promotion: adequate fluid intake promoted  Medication Review/Management: medications reviewed  Intervention: Optimize Psychosocial Wellbeing  Recent Flowsheet Documentation  Taken 4/27/2023 0800 by Gwendolyn Martinez RN  Supportive Measures: active listening utilized  Diversional Activities: television  Family/Support System Care: self-care encouraged   Goal Outcome Evaluation:

## 2023-04-27 NOTE — PLAN OF CARE
Problem: Adult Inpatient Plan of Care  Goal: Plan of Care Review  Outcome: Ongoing, Progressing  Flowsheets (Taken 4/27/2023 0602)  Progress: improving  Plan of Care Reviewed With: patient  Outcome Evaluation: Pt did well during the shift with vss. No drainage from chest tube removal site.  Goal: Patient-Specific Goal (Individualized)  Outcome: Ongoing, Progressing  Goal: Absence of Hospital-Acquired Illness or Injury  Outcome: Ongoing, Progressing  Intervention: Identify and Manage Fall Risk  Recent Flowsheet Documentation  Taken 4/27/2023 0400 by Cesilia Rey, RN  Safety Promotion/Fall Prevention:   toileting scheduled   safety round/check completed   room organization consistent   nonskid shoes/slippers when out of bed   lighting adjusted   fall prevention program maintained   clutter free environment maintained   activity supervised  Taken 4/27/2023 0200 by Cesilia Rey, RN  Safety Promotion/Fall Prevention:   toileting scheduled   safety round/check completed   room organization consistent   nonskid shoes/slippers when out of bed   lighting adjusted   fall prevention program maintained   clutter free environment maintained   activity supervised  Taken 4/27/2023 0000 by Cesilia Rey, RN  Safety Promotion/Fall Prevention:   toileting scheduled   safety round/check completed   room organization consistent   nonskid shoes/slippers when out of bed   lighting adjusted   fall prevention program maintained   clutter free environment maintained   activity supervised  Taken 4/26/2023 2200 by Cesilia Rey, RN  Safety Promotion/Fall Prevention:   toileting scheduled   safety round/check completed   room organization consistent   nonskid shoes/slippers when out of bed   lighting adjusted   fall prevention program maintained   clutter free environment maintained   activity supervised  Taken 4/26/2023 2000 by Cesilia Rey, RN  Safety Promotion/Fall Prevention:   toileting scheduled   safety  round/check completed   room organization consistent   nonskid shoes/slippers when out of bed   lighting adjusted   fall prevention program maintained   clutter free environment maintained   activity supervised  Intervention: Prevent Skin Injury  Recent Flowsheet Documentation  Taken 4/27/2023 0400 by Cesilia Rey RN  Body Position: position changed independently  Taken 4/27/2023 0200 by Cesilia Rey RN  Body Position: position changed independently  Taken 4/27/2023 0000 by Cesilia Rey RN  Body Position: position changed independently  Taken 4/26/2023 2200 by Cesilia Rey RN  Body Position: position changed independently  Taken 4/26/2023 2000 by Cesilia Rey RN  Body Position: position changed independently  Skin Protection:   adhesive use limited   transparent dressing maintained  Intervention: Prevent and Manage VTE (Venous Thromboembolism) Risk  Recent Flowsheet Documentation  Taken 4/27/2023 0400 by Cesilia Rey RN  Activity Management: activity encouraged  Taken 4/27/2023 0200 by Cesilia Rey RN  Activity Management: activity encouraged  Taken 4/27/2023 0000 by Cesilia Rey RN  Activity Management: activity encouraged  Taken 4/26/2023 2200 by Cesilia Rey RN  Activity Management: activity encouraged  Taken 4/26/2023 2000 by Cesilia Rye RN  Activity Management: activity encouraged  VTE Prevention/Management: sequential compression devices off  Range of Motion: active ROM (range of motion) encouraged  Intervention: Prevent Infection  Recent Flowsheet Documentation  Taken 4/27/2023 0400 by Cesilia Rey RN  Infection Prevention:   single patient room provided   rest/sleep promoted   environmental surveillance performed  Taken 4/27/2023 0200 by Cesilia Rey RN  Infection Prevention:   single patient room provided   rest/sleep promoted   environmental surveillance performed  Taken 4/27/2023 0000 by Cesilia Rey RN  Infection Prevention:    single patient room provided   rest/sleep promoted   environmental surveillance performed  Taken 4/26/2023 2200 by Cesilia Rey RN  Infection Prevention:   single patient room provided   rest/sleep promoted  Taken 4/26/2023 2000 by Cesilia Rey RN  Infection Prevention:   single patient room provided   rest/sleep promoted   environmental surveillance performed  Goal: Optimal Comfort and Wellbeing  Outcome: Ongoing, Progressing  Intervention: Provide Person-Centered Care  Recent Flowsheet Documentation  Taken 4/26/2023 2000 by Cesilia Rey RN  Trust Relationship/Rapport:   care explained   reassurance provided   questions encouraged  Goal: Readiness for Transition of Care  Outcome: Ongoing, Progressing     Problem: COPD (Chronic Obstructive Pulmonary Disease) Comorbidity  Goal: Maintenance of COPD Symptom Control  Outcome: Ongoing, Progressing  Intervention: Maintain COPD-Symptom Control  Recent Flowsheet Documentation  Taken 4/27/2023 0400 by Cesilia Rey RN  Medication Review/Management: medications reviewed  Taken 4/27/2023 0200 by Cesilia Rey RN  Medication Review/Management: medications reviewed  Taken 4/27/2023 0000 by Cesilia Rey RN  Medication Review/Management: medications reviewed  Taken 4/26/2023 2200 by Cesilia Rey RN  Medication Review/Management: medications reviewed  Taken 4/26/2023 2000 by Cesilia Rey RN  Supportive Measures: active listening utilized  Medication Review/Management: medications reviewed     Problem: Diabetes Comorbidity  Goal: Blood Glucose Level Within Targeted Range  Outcome: Ongoing, Progressing     Problem: Hypertension Comorbidity  Goal: Blood Pressure in Desired Range  Outcome: Ongoing, Progressing  Intervention: Maintain Blood Pressure Management  Recent Flowsheet Documentation  Taken 4/27/2023 0400 by Cesilia Rey RN  Medication Review/Management: medications reviewed  Taken 4/27/2023 0200 by Cesilia Rey  RN  Medication Review/Management: medications reviewed  Taken 4/27/2023 0000 by Cesilia Rey RN  Medication Review/Management: medications reviewed  Taken 4/26/2023 2200 by Cesilia Rey RN  Medication Review/Management: medications reviewed  Taken 4/26/2023 2000 by Cesilia Rey, RN  Medication Review/Management: medications reviewed     Problem: Obstructive Sleep Apnea Risk or Actual Comorbidity Management  Goal: Unobstructed Breathing During Sleep  Outcome: Ongoing, Progressing     Problem: Pain Chronic (Persistent) (Comorbidity Management)  Goal: Acceptable Pain Control and Functional Ability  Outcome: Ongoing, Progressing  Intervention: Manage Persistent Pain  Recent Flowsheet Documentation  Taken 4/27/2023 0400 by Cesilia Rey, RN  Sleep/Rest Enhancement:   awakenings minimized   room darkened   relaxation techniques promoted   regular sleep/rest pattern promoted  Medication Review/Management: medications reviewed  Taken 4/27/2023 0200 by Cesilia Rey RN  Sleep/Rest Enhancement:   awakenings minimized   room darkened   relaxation techniques promoted   regular sleep/rest pattern promoted  Medication Review/Management: medications reviewed  Taken 4/27/2023 0000 by Cesilia Rey RN  Sleep/Rest Enhancement:   awakenings minimized   room darkened   relaxation techniques promoted   regular sleep/rest pattern promoted  Medication Review/Management: medications reviewed  Taken 4/26/2023 2200 by Cesilia Rey RN  Sleep/Rest Enhancement:   awakenings minimized   room darkened   relaxation techniques promoted   regular sleep/rest pattern promoted  Medication Review/Management: medications reviewed  Taken 4/26/2023 2000 by Cesilia Rey RN  Bowel Elimination Promotion: adequate fluid intake promoted  Sleep/Rest Enhancement:   awakenings minimized   room darkened   relaxation techniques promoted   regular sleep/rest pattern promoted  Medication Review/Management: medications  reviewed  Intervention: Optimize Psychosocial Wellbeing  Recent Flowsheet Documentation  Taken 4/26/2023 2000 by Cesilia Rey, RN  Supportive Measures: active listening utilized  Diversional Activities: television  Family/Support System Care: support provided   Goal Outcome Evaluation:  Plan of Care Reviewed With: patient        Progress: improving  Outcome Evaluation: Pt did well during the shift with vss. No drainage from chest tube removal site.

## 2023-05-01 ENCOUNTER — READMISSION MANAGEMENT (OUTPATIENT)
Dept: CALL CENTER | Facility: HOSPITAL | Age: 63
End: 2023-05-01
Payer: MEDICARE

## 2023-05-01 NOTE — OUTREACH NOTE
Medical Week 1 Survey    Flowsheet Row Responses   [facility], [place] by [facility] patient discharged from? [facility]   Does the patient have one of the following disease processes/diagnoses(primary or secondary)? Other   Week 1 attempt successful? Yes   Call start time 1523   Call end time 1525   Discharge diagnosis Recurrent left pleural effusion   Meds reviewed with patient/caregiver? Yes   Is the patient having any side effects they believe may be caused by any medication additions or changes? No   Does the patient have all medications ordered at discharge? Yes   Is the patient taking all medications as directed (includes completed medication regime)? Yes   Comments regarding appointments Pulm appt on 5/16/23   Does the patient have a primary care provider?  Yes   Does the patient have an appointment with their PCP within 7 days of discharge? Yes   Comments regarding PCP 5/4/23   Has the patient kept scheduled appointments due by today? N/A   Has home health visited the patient within 72 hours of discharge? N/A   DME comments wears 2LO2 at night with CPAP   Psychosocial issues? No   Did the patient receive a copy of their discharge instructions? Yes   Nursing interventions Reviewed instructions with patient   What is the patient's perception of their health status since discharge? Improving   Is the patient/caregiver able to teach back the hierarchy of who to call/visit for symptoms/problems? PCP, Specialist, Home health nurse, Urgent Care, ED, 911 Yes   Week 1 call completed? Yes   Graduated Yes   Is the patient interested in additional calls from an ambulatory ?  NOTE:  applies to high risk patients requiring additional follow-up. No   Graduated/Revoked comments Pt reports doing well, no concerns at this time          Caren FREEMAN - Registered Nurse

## 2023-05-08 LAB
QT INTERVAL: 306 MS
QTC INTERVAL: 475 MS

## 2023-05-10 ENCOUNTER — HOSPITAL ENCOUNTER (INPATIENT)
Facility: HOSPITAL | Age: 63
LOS: 2 days | Discharge: HOME | DRG: 197 | End: 2023-05-12
Attending: EMERGENCY MEDICINE | Admitting: STUDENT IN AN ORGANIZED HEALTH CARE EDUCATION/TRAINING PROGRAM
Payer: OTHER MISCELLANEOUS

## 2023-05-10 ENCOUNTER — APPOINTMENT (OUTPATIENT)
Dept: GENERAL RADIOLOGY | Facility: HOSPITAL | Age: 63
DRG: 197 | End: 2023-05-10
Payer: OTHER MISCELLANEOUS

## 2023-05-10 ENCOUNTER — APPOINTMENT (OUTPATIENT)
Dept: CT IMAGING | Facility: HOSPITAL | Age: 63
DRG: 197 | End: 2023-05-10
Payer: OTHER MISCELLANEOUS

## 2023-05-10 DIAGNOSIS — J90 PLEURAL EFFUSION: Primary | ICD-10-CM

## 2023-05-10 LAB
ALBUMIN SERPL-MCNC: 3.7 G/DL (ref 3.5–5.2)
ALBUMIN/GLOB SERPL: 1.4 G/DL
ALP SERPL-CCNC: 90 U/L (ref 39–117)
ALT SERPL W P-5'-P-CCNC: 14 U/L (ref 1–41)
AMPHET+METHAMPHET UR QL: NEGATIVE
AMPHETAMINES UR QL: NEGATIVE
ANION GAP SERPL CALCULATED.3IONS-SCNC: 10.8 MMOL/L (ref 5–15)
ANION GAP SERPL CALCULATED.3IONS-SCNC: 9.3 MMOL/L (ref 5–15)
APAP SERPL-MCNC: <5 MCG/ML (ref 0–30)
APTT PPP: 30.2 SECONDS (ref 26.5–34.5)
AST SERPL-CCNC: 16 U/L (ref 1–40)
BACTERIA UR QL AUTO: ABNORMAL /HPF
BARBITURATES UR QL SCN: NEGATIVE
BASOPHILS # BLD AUTO: 0.05 10*3/MM3 (ref 0–0.2)
BASOPHILS NFR BLD AUTO: 0.7 % (ref 0–1.5)
BENZODIAZ UR QL SCN: NEGATIVE
BILIRUB SERPL-MCNC: 0.4 MG/DL (ref 0–1.2)
BILIRUB UR QL STRIP: NEGATIVE
BUN SERPL-MCNC: 7 MG/DL (ref 8–23)
BUN SERPL-MCNC: 8 MG/DL (ref 8–23)
BUN/CREAT SERPL: 8.8 (ref 7–25)
BUN/CREAT SERPL: 9.3 (ref 7–25)
BUPRENORPHINE SERPL-MCNC: NEGATIVE NG/ML
CALCIUM SPEC-SCNC: 9.1 MG/DL (ref 8.6–10.5)
CALCIUM SPEC-SCNC: 9.5 MG/DL (ref 8.6–10.5)
CANNABINOIDS SERPL QL: NEGATIVE
CHLORIDE SERPL-SCNC: 103 MMOL/L (ref 98–107)
CHLORIDE SERPL-SCNC: 106 MMOL/L (ref 98–107)
CLARITY UR: CLEAR
CO2 SERPL-SCNC: 26.2 MMOL/L (ref 22–29)
CO2 SERPL-SCNC: 26.7 MMOL/L (ref 22–29)
COCAINE UR QL: NEGATIVE
COLOR UR: YELLOW
CREAT SERPL-MCNC: 0.8 MG/DL (ref 0.76–1.27)
CREAT SERPL-MCNC: 0.86 MG/DL (ref 0.76–1.27)
CRP SERPL-MCNC: 1.54 MG/DL (ref 0–0.5)
D DIMER PPP FEU-MCNC: 1.85 MCGFEU/ML (ref 0–0.65)
DEPRECATED RDW RBC AUTO: 44.7 FL (ref 37–54)
DEPRECATED RDW RBC AUTO: 45.1 FL (ref 37–54)
EGFRCR SERPLBLD CKD-EPI 2021: 96.1 ML/MIN/1.73
EGFRCR SERPLBLD CKD-EPI 2021: 98.2 ML/MIN/1.73
EOSINOPHIL # BLD AUTO: 0.17 10*3/MM3 (ref 0–0.4)
EOSINOPHIL NFR BLD AUTO: 2.3 % (ref 0.3–6.2)
ERYTHROCYTE [DISTWIDTH] IN BLOOD BY AUTOMATED COUNT: 14.8 % (ref 12.3–15.4)
ERYTHROCYTE [DISTWIDTH] IN BLOOD BY AUTOMATED COUNT: 14.9 % (ref 12.3–15.4)
ERYTHROCYTE [SEDIMENTATION RATE] IN BLOOD: 39 MM/HR (ref 0–20)
ETHANOL BLD-MCNC: <10 MG/DL (ref 0–10)
ETHANOL UR QL: <0.01 %
FLUAV RNA RESP QL NAA+PROBE: NOT DETECTED
FLUBV RNA ISLT QL NAA+PROBE: NOT DETECTED
GEN 5 2HR TROPONIN T REFLEX: 16 NG/L
GLOBULIN UR ELPH-MCNC: 2.7 GM/DL
GLUCOSE BLDC GLUCOMTR-MCNC: 123 MG/DL (ref 70–130)
GLUCOSE SERPL-MCNC: 106 MG/DL (ref 65–99)
GLUCOSE SERPL-MCNC: 131 MG/DL (ref 65–99)
GLUCOSE UR STRIP-MCNC: NEGATIVE MG/DL
HCT VFR BLD AUTO: 45.2 % (ref 37.5–51)
HCT VFR BLD AUTO: 45.2 % (ref 37.5–51)
HGB BLD-MCNC: 13.8 G/DL (ref 13–17.7)
HGB BLD-MCNC: 13.8 G/DL (ref 13–17.7)
HGB UR QL STRIP.AUTO: ABNORMAL
HYALINE CASTS UR QL AUTO: ABNORMAL /LPF
IMM GRANULOCYTES # BLD AUTO: 0.02 10*3/MM3 (ref 0–0.05)
IMM GRANULOCYTES NFR BLD AUTO: 0.3 % (ref 0–0.5)
INR PPP: 1 (ref 0.9–1.1)
KETONES UR QL STRIP: NEGATIVE
LEUKOCYTE ESTERASE UR QL STRIP.AUTO: NEGATIVE
LYMPHOCYTES # BLD AUTO: 1.32 10*3/MM3 (ref 0.7–3.1)
LYMPHOCYTES NFR BLD AUTO: 18 % (ref 19.6–45.3)
MAGNESIUM SERPL-MCNC: 1.7 MG/DL (ref 1.6–2.4)
MAGNESIUM SERPL-MCNC: 1.9 MG/DL (ref 1.6–2.4)
MCH RBC QN AUTO: 25.2 PG (ref 26.6–33)
MCH RBC QN AUTO: 25.3 PG (ref 26.6–33)
MCHC RBC AUTO-ENTMCNC: 30.5 G/DL (ref 31.5–35.7)
MCHC RBC AUTO-ENTMCNC: 30.5 G/DL (ref 31.5–35.7)
MCV RBC AUTO: 82.6 FL (ref 79–97)
MCV RBC AUTO: 82.9 FL (ref 79–97)
METHADONE UR QL SCN: NEGATIVE
MONOCYTES # BLD AUTO: 0.59 10*3/MM3 (ref 0.1–0.9)
MONOCYTES NFR BLD AUTO: 8.1 % (ref 5–12)
NEUTROPHILS NFR BLD AUTO: 5.17 10*3/MM3 (ref 1.7–7)
NEUTROPHILS NFR BLD AUTO: 70.6 % (ref 42.7–76)
NITRITE UR QL STRIP: NEGATIVE
NRBC BLD AUTO-RTO: 0 /100 WBC (ref 0–0.2)
NT-PROBNP SERPL-MCNC: 189.9 PG/ML (ref 0–900)
OPIATES UR QL: NEGATIVE
OXYCODONE UR QL SCN: NEGATIVE
PCP UR QL SCN: NEGATIVE
PH UR STRIP.AUTO: 7.5 [PH] (ref 5–8)
PLATELET # BLD AUTO: 199 10*3/MM3 (ref 140–450)
PLATELET # BLD AUTO: 223 10*3/MM3 (ref 140–450)
PMV BLD AUTO: 8.8 FL (ref 6–12)
PMV BLD AUTO: 9 FL (ref 6–12)
POTASSIUM SERPL-SCNC: 3.8 MMOL/L (ref 3.5–5.2)
POTASSIUM SERPL-SCNC: 3.9 MMOL/L (ref 3.5–5.2)
PROPOXYPH UR QL: NEGATIVE
PROT SERPL-MCNC: 6.4 G/DL (ref 6–8.5)
PROT UR QL STRIP: NEGATIVE
PROTHROMBIN TIME: 13.7 SECONDS (ref 12.1–14.7)
QT INTERVAL: 366 MS
QTC INTERVAL: 422 MS
RBC # BLD AUTO: 5.45 10*6/MM3 (ref 4.14–5.8)
RBC # BLD AUTO: 5.47 10*6/MM3 (ref 4.14–5.8)
RBC # UR STRIP: ABNORMAL /HPF
REF LAB TEST METHOD: ABNORMAL
SALICYLATES SERPL-MCNC: <0.3 MG/DL
SARS-COV-2 RNA RESP QL NAA+PROBE: NOT DETECTED
SODIUM SERPL-SCNC: 140 MMOL/L (ref 136–145)
SODIUM SERPL-SCNC: 142 MMOL/L (ref 136–145)
SP GR UR STRIP: <=1.005 (ref 1–1.03)
SQUAMOUS #/AREA URNS HPF: ABNORMAL /HPF
T4 FREE SERPL-MCNC: 1.33 NG/DL (ref 0.93–1.7)
TRICYCLICS UR QL SCN: NEGATIVE
TROPONIN T DELTA: 1 NG/L
TROPONIN T SERPL HS-MCNC: 15 NG/L
TSH SERPL DL<=0.05 MIU/L-ACNC: 2.21 UIU/ML (ref 0.27–4.2)
UROBILINOGEN UR QL STRIP: ABNORMAL
WBC # UR STRIP: ABNORMAL /HPF
WBC NRBC COR # BLD: 12.44 10*3/MM3 (ref 3.4–10.8)
WBC NRBC COR # BLD: 7.32 10*3/MM3 (ref 3.4–10.8)

## 2023-05-10 PROCEDURE — 83735 ASSAY OF MAGNESIUM: CPT

## 2023-05-10 PROCEDURE — 99285 EMERGENCY DEPT VISIT HI MDM: CPT

## 2023-05-10 PROCEDURE — 80053 COMPREHEN METABOLIC PANEL: CPT | Performed by: EMERGENCY MEDICINE

## 2023-05-10 PROCEDURE — 94799 UNLISTED PULMONARY SVC/PX: CPT

## 2023-05-10 PROCEDURE — 93005 ELECTROCARDIOGRAM TRACING: CPT | Performed by: EMERGENCY MEDICINE

## 2023-05-10 PROCEDURE — 85652 RBC SED RATE AUTOMATED: CPT | Performed by: EMERGENCY MEDICINE

## 2023-05-10 PROCEDURE — 94640 AIRWAY INHALATION TREATMENT: CPT

## 2023-05-10 PROCEDURE — 83735 ASSAY OF MAGNESIUM: CPT | Performed by: EMERGENCY MEDICINE

## 2023-05-10 PROCEDURE — 85025 COMPLETE CBC W/AUTO DIFF WBC: CPT | Performed by: EMERGENCY MEDICINE

## 2023-05-10 PROCEDURE — 71275 CT ANGIOGRAPHY CHEST: CPT | Performed by: RADIOLOGY

## 2023-05-10 PROCEDURE — 36415 COLL VENOUS BLD VENIPUNCTURE: CPT

## 2023-05-10 PROCEDURE — 85379 FIBRIN DEGRADATION QUANT: CPT | Performed by: EMERGENCY MEDICINE

## 2023-05-10 PROCEDURE — 87636 SARSCOV2 & INF A&B AMP PRB: CPT | Performed by: EMERGENCY MEDICINE

## 2023-05-10 PROCEDURE — 25010000002 ENOXAPARIN PER 10 MG: Performed by: STUDENT IN AN ORGANIZED HEALTH CARE EDUCATION/TRAINING PROGRAM

## 2023-05-10 PROCEDURE — 85610 PROTHROMBIN TIME: CPT | Performed by: EMERGENCY MEDICINE

## 2023-05-10 PROCEDURE — 94761 N-INVAS EAR/PLS OXIMETRY MLT: CPT

## 2023-05-10 PROCEDURE — 80179 DRUG ASSAY SALICYLATE: CPT | Performed by: EMERGENCY MEDICINE

## 2023-05-10 PROCEDURE — 83880 ASSAY OF NATRIURETIC PEPTIDE: CPT | Performed by: EMERGENCY MEDICINE

## 2023-05-10 PROCEDURE — 80143 DRUG ASSAY ACETAMINOPHEN: CPT | Performed by: EMERGENCY MEDICINE

## 2023-05-10 PROCEDURE — 99223 1ST HOSP IP/OBS HIGH 75: CPT | Performed by: STUDENT IN AN ORGANIZED HEALTH CARE EDUCATION/TRAINING PROGRAM

## 2023-05-10 PROCEDURE — 80306 DRUG TEST PRSMV INSTRMNT: CPT | Performed by: EMERGENCY MEDICINE

## 2023-05-10 PROCEDURE — 81001 URINALYSIS AUTO W/SCOPE: CPT | Performed by: EMERGENCY MEDICINE

## 2023-05-10 PROCEDURE — 71045 X-RAY EXAM CHEST 1 VIEW: CPT

## 2023-05-10 PROCEDURE — 25510000001 IOPAMIDOL PER 1 ML: Performed by: EMERGENCY MEDICINE

## 2023-05-10 PROCEDURE — 84484 ASSAY OF TROPONIN QUANT: CPT | Performed by: EMERGENCY MEDICINE

## 2023-05-10 PROCEDURE — 71275 CT ANGIOGRAPHY CHEST: CPT

## 2023-05-10 PROCEDURE — 84443 ASSAY THYROID STIM HORMONE: CPT | Performed by: EMERGENCY MEDICINE

## 2023-05-10 PROCEDURE — 82077 ASSAY SPEC XCP UR&BREATH IA: CPT | Performed by: EMERGENCY MEDICINE

## 2023-05-10 PROCEDURE — 86140 C-REACTIVE PROTEIN: CPT | Performed by: EMERGENCY MEDICINE

## 2023-05-10 PROCEDURE — 85730 THROMBOPLASTIN TIME PARTIAL: CPT | Performed by: EMERGENCY MEDICINE

## 2023-05-10 PROCEDURE — 71045 X-RAY EXAM CHEST 1 VIEW: CPT | Performed by: RADIOLOGY

## 2023-05-10 PROCEDURE — 25010000002 FUROSEMIDE PER 20 MG: Performed by: EMERGENCY MEDICINE

## 2023-05-10 PROCEDURE — 85027 COMPLETE CBC AUTOMATED: CPT | Performed by: STUDENT IN AN ORGANIZED HEALTH CARE EDUCATION/TRAINING PROGRAM

## 2023-05-10 PROCEDURE — 84439 ASSAY OF FREE THYROXINE: CPT | Performed by: EMERGENCY MEDICINE

## 2023-05-10 PROCEDURE — 82948 REAGENT STRIP/BLOOD GLUCOSE: CPT

## 2023-05-10 RX ORDER — ONDANSETRON 4 MG/1
4 TABLET, ORALLY DISINTEGRATING ORAL EVERY 12 HOURS PRN
Status: DISCONTINUED | OUTPATIENT
Start: 2023-05-10 | End: 2023-05-12 | Stop reason: HOSPADM

## 2023-05-10 RX ORDER — CHOLECALCIFEROL (VITAMIN D3) 125 MCG
5 CAPSULE ORAL NIGHTLY PRN
Status: DISCONTINUED | OUTPATIENT
Start: 2023-05-10 | End: 2023-05-12 | Stop reason: HOSPADM

## 2023-05-10 RX ORDER — SODIUM CHLORIDE 9 MG/ML
40 INJECTION, SOLUTION INTRAVENOUS AS NEEDED
Status: DISCONTINUED | OUTPATIENT
Start: 2023-05-10 | End: 2023-05-12 | Stop reason: HOSPADM

## 2023-05-10 RX ORDER — HYDROCODONE BITARTRATE AND ACETAMINOPHEN 7.5; 325 MG/1; MG/1
1 TABLET ORAL 3 TIMES DAILY
Status: DISCONTINUED | OUTPATIENT
Start: 2023-05-10 | End: 2023-05-12 | Stop reason: HOSPADM

## 2023-05-10 RX ORDER — METOPROLOL SUCCINATE 50 MG/1
50 TABLET, EXTENDED RELEASE ORAL DAILY
Status: DISCONTINUED | OUTPATIENT
Start: 2023-05-11 | End: 2023-05-12 | Stop reason: HOSPADM

## 2023-05-10 RX ORDER — INSULIN LISPRO 100 [IU]/ML
2-9 INJECTION, SOLUTION INTRAVENOUS; SUBCUTANEOUS
Status: DISCONTINUED | OUTPATIENT
Start: 2023-05-10 | End: 2023-05-12 | Stop reason: HOSPADM

## 2023-05-10 RX ORDER — NITROGLYCERIN 0.4 MG/1
0.4 TABLET SUBLINGUAL
Status: DISCONTINUED | OUTPATIENT
Start: 2023-05-10 | End: 2023-05-12 | Stop reason: HOSPADM

## 2023-05-10 RX ORDER — METOPROLOL SUCCINATE 50 MG/1
50 TABLET, EXTENDED RELEASE ORAL DAILY
COMMUNITY

## 2023-05-10 RX ORDER — NICOTINE POLACRILEX 4 MG
15 LOZENGE BUCCAL
Status: DISCONTINUED | OUTPATIENT
Start: 2023-05-10 | End: 2023-05-12 | Stop reason: HOSPADM

## 2023-05-10 RX ORDER — IPRATROPIUM BROMIDE AND ALBUTEROL SULFATE 2.5; .5 MG/3ML; MG/3ML
3 SOLUTION RESPIRATORY (INHALATION) EVERY 6 HOURS PRN
Status: DISCONTINUED | OUTPATIENT
Start: 2023-05-10 | End: 2023-05-12 | Stop reason: HOSPADM

## 2023-05-10 RX ORDER — DEXTROSE MONOHYDRATE 25 G/50ML
25 INJECTION, SOLUTION INTRAVENOUS
Status: DISCONTINUED | OUTPATIENT
Start: 2023-05-10 | End: 2023-05-12 | Stop reason: HOSPADM

## 2023-05-10 RX ORDER — IBUPROFEN 800 MG/1
800 TABLET ORAL 3 TIMES DAILY
Status: CANCELLED | OUTPATIENT
Start: 2023-05-10

## 2023-05-10 RX ORDER — PANTOPRAZOLE SODIUM 40 MG/1
40 TABLET, DELAYED RELEASE ORAL
Status: CANCELLED | OUTPATIENT
Start: 2023-05-10

## 2023-05-10 RX ORDER — AMOXICILLIN 250 MG
2 CAPSULE ORAL NIGHTLY
Status: DISCONTINUED | OUTPATIENT
Start: 2023-05-10 | End: 2023-05-12 | Stop reason: HOSPADM

## 2023-05-10 RX ORDER — ROFLUMILAST 500 UG/1
250 TABLET ORAL DAILY
Status: DISCONTINUED | OUTPATIENT
Start: 2023-05-10 | End: 2023-05-12 | Stop reason: HOSPADM

## 2023-05-10 RX ORDER — ENOXAPARIN SODIUM 100 MG/ML
40 INJECTION SUBCUTANEOUS ONCE
Status: COMPLETED | OUTPATIENT
Start: 2023-05-10 | End: 2023-05-10

## 2023-05-10 RX ORDER — PANTOPRAZOLE SODIUM 40 MG/1
40 TABLET, DELAYED RELEASE ORAL
Status: DISCONTINUED | OUTPATIENT
Start: 2023-05-10 | End: 2023-05-12 | Stop reason: HOSPADM

## 2023-05-10 RX ORDER — FUROSEMIDE 10 MG/ML
80 INJECTION INTRAMUSCULAR; INTRAVENOUS ONCE
Status: COMPLETED | OUTPATIENT
Start: 2023-05-10 | End: 2023-05-10

## 2023-05-10 RX ORDER — HYDROXYZINE HYDROCHLORIDE 25 MG/1
25 TABLET, FILM COATED ORAL 3 TIMES DAILY PRN
Status: CANCELLED | OUTPATIENT
Start: 2023-05-10

## 2023-05-10 RX ORDER — SODIUM CHLORIDE 0.9 % (FLUSH) 0.9 %
10 SYRINGE (ML) INJECTION EVERY 12 HOURS SCHEDULED
Status: DISCONTINUED | OUTPATIENT
Start: 2023-05-10 | End: 2023-05-12 | Stop reason: HOSPADM

## 2023-05-10 RX ORDER — IPRATROPIUM BROMIDE AND ALBUTEROL SULFATE 2.5; .5 MG/3ML; MG/3ML
3 SOLUTION RESPIRATORY (INHALATION) EVERY 4 HOURS PRN
Status: CANCELLED | OUTPATIENT
Start: 2023-05-10

## 2023-05-10 RX ORDER — ALBUTEROL SULFATE 2.5 MG/3ML
2.5 SOLUTION RESPIRATORY (INHALATION) EVERY 4 HOURS PRN
Status: CANCELLED | OUTPATIENT
Start: 2023-05-10

## 2023-05-10 RX ORDER — SODIUM CHLORIDE 0.9 % (FLUSH) 0.9 %
10 SYRINGE (ML) INJECTION AS NEEDED
Status: DISCONTINUED | OUTPATIENT
Start: 2023-05-10 | End: 2023-05-12 | Stop reason: HOSPADM

## 2023-05-10 RX ORDER — POLYETHYLENE GLYCOL 3350 17 G/17G
17 POWDER, FOR SOLUTION ORAL DAILY PRN
Status: DISCONTINUED | OUTPATIENT
Start: 2023-05-10 | End: 2023-05-12 | Stop reason: HOSPADM

## 2023-05-10 RX ORDER — ESOMEPRAZOLE MAGNESIUM 40 MG/1
40 CAPSULE, DELAYED RELEASE ORAL
COMMUNITY
End: 2023-05-12 | Stop reason: HOSPADM

## 2023-05-10 RX ORDER — BUDESONIDE AND FORMOTEROL FUMARATE DIHYDRATE 160; 4.5 UG/1; UG/1
2 AEROSOL RESPIRATORY (INHALATION)
Status: DISCONTINUED | OUTPATIENT
Start: 2023-05-10 | End: 2023-05-12 | Stop reason: HOSPADM

## 2023-05-10 RX ORDER — HYDROXYZINE HYDROCHLORIDE 25 MG/1
25 TABLET, FILM COATED ORAL 3 TIMES DAILY PRN
Status: DISCONTINUED | OUTPATIENT
Start: 2023-05-10 | End: 2023-05-12 | Stop reason: HOSPADM

## 2023-05-10 RX ORDER — METOPROLOL SUCCINATE 50 MG/1
50 TABLET, EXTENDED RELEASE ORAL DAILY
Status: CANCELLED | OUTPATIENT
Start: 2023-05-11

## 2023-05-10 RX ORDER — GLUCAGON 1 MG/ML
1 KIT INJECTION
Status: DISCONTINUED | OUTPATIENT
Start: 2023-05-10 | End: 2023-05-12 | Stop reason: HOSPADM

## 2023-05-10 RX ORDER — LISINOPRIL 10 MG/1
10 TABLET ORAL NIGHTLY
Status: CANCELLED | OUTPATIENT
Start: 2023-05-10

## 2023-05-10 RX ADMIN — IOPAMIDOL 80 ML: 755 INJECTION, SOLUTION INTRAVENOUS at 13:03

## 2023-05-10 RX ADMIN — FUROSEMIDE 80 MG: 10 INJECTION, SOLUTION INTRAMUSCULAR; INTRAVENOUS at 12:02

## 2023-05-10 RX ADMIN — IPRATROPIUM BROMIDE AND ALBUTEROL SULFATE 3 ML: .5; 2.5 SOLUTION RESPIRATORY (INHALATION) at 16:40

## 2023-05-10 RX ADMIN — BUDESONIDE AND FORMOTEROL FUMARATE DIHYDRATE 2 PUFF: 160; 4.5 AEROSOL RESPIRATORY (INHALATION) at 19:20

## 2023-05-10 RX ADMIN — HYDROCODONE BITARTRATE AND ACETAMINOPHEN 1 TABLET: 7.5; 325 TABLET ORAL at 20:04

## 2023-05-10 RX ADMIN — ROFLUMILAST 250 MCG: 500 TABLET ORAL at 19:59

## 2023-05-10 RX ADMIN — Medication 10 ML: at 20:00

## 2023-05-10 RX ADMIN — DOCUSATE SODIUM 50 MG AND SENNOSIDES 8.6 MG 2 TABLET: 8.6; 5 TABLET, FILM COATED ORAL at 20:00

## 2023-05-10 RX ADMIN — PANTOPRAZOLE SODIUM 40 MG: 40 TABLET, DELAYED RELEASE ORAL at 19:59

## 2023-05-10 RX ADMIN — HYDROXYZINE HYDROCHLORIDE 25 MG: 25 TABLET, FILM COATED ORAL at 17:22

## 2023-05-10 RX ADMIN — IPRATROPIUM BROMIDE 0.5 MG: 0.5 SOLUTION RESPIRATORY (INHALATION) at 19:20

## 2023-05-10 RX ADMIN — ENOXAPARIN SODIUM 40 MG: 40 INJECTION SUBCUTANEOUS at 17:20

## 2023-05-10 RX ADMIN — HYDROCODONE BITARTRATE AND ACETAMINOPHEN 1 TABLET: 7.5; 325 TABLET ORAL at 17:20

## 2023-05-10 NOTE — LETTER
[facility] [place] [facility] [place]  [address]  [place] [address]  [phone]  [phone]        May 12, 2023      Patient: Rory Boothmariah Viveros.  YOB: 1960  Date of Visit: 5/10/2023    Black Lung  Claim # [id number]  Deedee Mccollum () (p) [phone]      Amanda De Luna

## 2023-05-10 NOTE — H&P
[facility] HOSPITALIST HISTORY AND PHYSICAL    Patient Identification:  Name:  Rory Rios Jr.  Age:  63 y.o.  Sex:  male  :  1960  MRN:  [id number]   Visit Number:  [id number]  Admit Date: 5/10/2023   Room number:  3312/1P  Primary Care Physician:  Alejandro Monique MD    Date of Admission: 5/10/2023     Subjective     Chief complaint:    Chief Complaint   Patient presents with   • Shortness of Breath     History of presenting illness:  63 y.o. male who was admitted on 5/10/2023 for progressive dyspnea with known recurrent left sided pleural effusion.    Rory Rios Jr. 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 In summary, during last hospitalization he was seen by pulmonologist and CT surgery w/thoracentesis on  showing exudative fluid w/negative cytology and cx. In setting of significant perihilar fibrosis/masses noted, underwent bronchoscopy with EBUS  w/biopsies negative for malignancy and cx NGTD. After evaluation it was determined that the recurrent effusion was secondary to his coal workers pneumoconiosis with progressive massive fibrosis and was recommended to f/u with  outpatient lung trx clinic which he has not been able to do yet. Tentative plan was for him to see CT surgery in outpatient clinic  with repeat CXR at that time and possible direct admit for pleuryx cath placement if reaccumulating.    Unfortunately patient began developing recurrent progressive dyspnea without cough, CP, fever, or chills over last 3-4 days that slowly worsened without any alleviating factors. Also notes dyspnea not significantly worsened lying flat. Does improve some with  nebulizers and nightly cpap but otherwise no alleviating factors. Patient has home O2 prn but had not been using it more than usual in last week.    On arrival to ED, he underwent CXR similar to prior and in setting of dyspnea and elevated d-dimer, had CTPE that showed recurrence of moderate sized left sided effusion. Otherwise not significantly changed dense consolidations similar to prior imaging. No tachycardia or significant hypoxia noted. Discussed admission for pleuryx here vs discussion with [facility] and in setting of current stability but with progressive sx and recurrent effusion, decision made to admit to floor service with general surgery consult for possible pleuryx catheter placement.     Prior to admission I discussed patient's presentation and management with attending ER physician and verbal handoff received.  ---------------------------------------------------------------------------------------------------------------------   A thorough systems based relevant ROS was asked and was negative except as noted above.  ---------------------------------------------------------------------------------------------------------------------   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   • Diabetes mellitus    • GERD (gastroesophageal reflux disease)    • Hypertension    • Pneumonia    • Pulmonary fibrosis 4/27/2023     Past Surgical History:   Procedure Laterality Date   • BRONCHOSCOPY N/A 4/24/2023    Procedure: BRONCHOSCOPY WITH ENDOBRONCHIAL ULTRASOUND;  Surgeon: Kota Willard MD;  Location: [facility];  Service: Pulmonary;  Laterality: N/A;   • FOOT SURGERY Left      Family History   Problem Relation Age of Onset   • Diabetes Mother    • Heart failure Father    • Diabetes Sister    • Heart disease Brother    • Diabetes Brother      Social History     Socioeconomic History   • Marital status:     Tobacco Use   • Smoking status: Never     Passive exposure: Never   • Smokeless tobacco: Current     Types: Chew   Vaping Use   • Vaping Use: Never used   Substance and Sexual Activity   • Alcohol use: No   • Drug use: No   • Sexual activity: Defer     ---------------------------------------------------------------------------------------------------------------------   Allergies:  Patient has no known allergies.  ---------------------------------------------------------------------------------------------------------------------   Medications below are reported home medications pulling from within the system; at this time, these medications have not been reconciled unless otherwise specified and are in the verification process for further verifcation as current home medications.      Prior to Admission Medications     Prescriptions Last Dose Informant Patient Reported? Taking?    albuterol (PROVENTIL HFA;VENTOLIN HFA) 108 (90 BASE) MCG/ACT inhaler   No No    Inhale 2 puffs Every 4 (Four) Hours As Needed for Wheezing or Shortness of Air.    aspirin 81 MG EC tablet   Yes No    Take 1 tablet by mouth Daily. OTC    Budeson-Glycopyrrol-Formoterol (BREZTRI) 160-9-4.8 MCG/ACT aerosol inhaler   Yes No    Inhale 2 puffs 2 (Two) Times a Day.    busPIRone (BUSPAR) 5 MG tablet   Yes No    Take 1 tablet by mouth every night at bedtime.    esomeprazole (nexIUM) 20 MG capsule   No No    Take 1 capsule by mouth Every Morning Before Breakfast.    glipizide (GLUCOTROL XL) 5 MG ER tablet   Yes No    Take 1 tablet by mouth Every Morning.    HYDROcodone-acetaminophen (NORCO) 7.5-325 MG per tablet   Yes No    Take 1 tablet by mouth 3 (Three) Times a Day.    hydrOXYzine (ATARAX) 25 MG tablet   No No    Take 1 tablet by mouth 3 (Three) Times a Day As Needed for Anxiety.    ibuprofen (ADVIL,MOTRIN) 800 MG tablet   Yes No    Take 1 tablet by mouth 3 (Three) Times a Day.    ipratropium-albuterol (DUO-NEB) 0.5-2.5 mg/3 ml nebulizer   Yes No     Take 3 mL by nebulization Every 4 (Four) Hours As Needed for Wheezing or Shortness of Air.    lisinopril (PRINIVIL,ZESTRIL) 10 MG tablet   Yes No    Take 1 tablet by mouth Every Night.    metFORMIN (GLUCOPHAGE) 500 MG tablet   Yes No    Take 1 tablet by mouth 2 (Two) Times a Day With Meals.    metoprolol succinate XL (TOPROL-XL) 25 MG 24 hr tablet   No No    Take 1 tablet by mouth Daily for 30 days.    Patient taking differently:  Take 2 tablets by mouth Every Morning.    omeprazole (priLOSEC) 40 MG capsule   Yes No    Take 1 capsule by mouth Daily.    roflumilast (DALIRESP) 250 MCG tablet tablet   Yes No    Take 1 tablet by mouth Daily.        Objective     Vital Signs:  Temp:  [97.1 °F (36.2 °C)-98 °F (36.7 °C)] 98 °F (36.7 °C)  Heart Rate:  [71-89] 79  Resp:  [18-24] 18  BP: (117-147)/(70-95) 147/80    Mean Arterial Pressure (Non-Invasive) for the past 24 hrs (Last 3 readings):   Noninvasive MAP (mmHg)   05/10/23 1611 99   05/10/23 1450 96   05/10/23 1435 98     SpO2:  [94 %-98 %] 97 %  on  Flow (L/min):  [3] 3;   Device (Oxygen Therapy): nasal cannula  Body mass index is 33.57 kg/m².    Wt Readings from Last 3 Encounters:   05/10/23 128 kg (283 lb 1.6 oz)   04/20/23 131 kg (288 lb)   02/09/23 134 kg (295 lb)      ----------------------------------------------------------------------------------------------------------------------  Physical Exam  Vitals and nursing note reviewed.   Constitutional:       General: He is not in acute distress.  HENT:      Mouth/Throat:      Mouth: Mucous membranes are moist.      Pharynx: Oropharynx is clear.   Eyes:      General: No scleral icterus.     Extraocular Movements: Extraocular movements intact.   Cardiovascular:      Rate and Rhythm: Normal rate and regular rhythm.      Pulses: Normal pulses.   Pulmonary:      Effort: Pulmonary effort is normal. No respiratory distress.      Breath sounds: No wheezing.      Comments: Diminished breath sounds at left lung base but  breath sounds audible deep into middle anterior and posterior lung fields  Abdominal:      Palpations: Abdomen is soft.      Tenderness: There is no abdominal tenderness.   Musculoskeletal:      Right lower leg: No edema.      Left lower leg: No edema.   Skin:     General: Skin is warm and dry.      Capillary Refill: Capillary refill takes less than 2 seconds.   Neurological:      General: No focal deficit present.      Mental Status: He is alert. Mental status is at baseline.   Psychiatric:         Mood and Affect: Mood normal.         Behavior: Behavior normal.       --------------------------------------------------------------------------------------------------------------------  LABS:    CBC and coagulation:  Results from last 7 days   Lab Units 05/10/23  1043   SED RATE mm/hr 39*   CRP mg/dL 1.54*   WBC 10*3/mm3 7.32   HEMOGLOBIN g/dL 13.8   HEMATOCRIT % 45.2   MCV fL 82.9   MCHC g/dL 30.5*   PLATELETS 10*3/mm3 223   INR  1.00   D DIMER QUANT MCGFEU/mL 1.85*     Acid/base balance:      Renal and electrolytes:  Results from last 7 days   Lab Units 05/10/23  1043   SODIUM mmol/L 142   POTASSIUM mmol/L 3.9   MAGNESIUM mg/dL 1.9   CHLORIDE mmol/L 106   CO2 mmol/L 26.7   BUN mg/dL 7*   CREATININE mg/dL 0.80   CALCIUM mg/dL 9.5   GLUCOSE mg/dL 106*     Estimated Creatinine Clearance: 136.7 mL/min (by C-G formula based on SCr of 0.8 mg/dL).    Liver and pancreatic function:  Results from last 7 days   Lab Units 05/10/23  1043   ALBUMIN g/dL 3.7   BILIRUBIN mg/dL 0.4   ALK PHOS U/L 90   AST (SGOT) U/L 16   ALT (SGPT) U/L 14     Endocrine function:  Lab Results   Component Value Date    HGBA1C 6.30 (H) 04/21/2023     Point of care bedside glucose levels:      Lab Results   Component Value Date    TSH 2.210 05/10/2023    FREET4 1.33 05/10/2023     Cardiac:  Results from last 7 days   Lab Units 05/10/23  1347 05/10/23  1043   HSTROP T ng/L 16* 15*   PROBNP pg/mL  --  189.9       Cultures:  Lab Results   Component Value  Date    COLORU Yellow 05/10/2023    CLARITYU Clear 05/10/2023    PHUR 7.5 05/10/2023    GLUCOSEU Negative 05/10/2023    KETONESU Negative 05/10/2023    BLOODU Moderate (2+) (A) 05/10/2023    NITRITEU Negative 05/10/2023    LEUKOCYTESUR Negative 05/10/2023    BILIRUBINUR Negative 05/10/2023    UROBILINOGEN 0.2 E.U./dL 05/10/2023    RBCUA 13-20 (A) 05/10/2023    WBCUA 0-2 05/10/2023    BACTERIA Trace (A) 05/10/2023     Microbiology Results (last 10 days)     Procedure Component Value - Date/Time    COVID-19 and FLU A/B PCR - Swab, Nasopharynx [[id number]]  (Normal) Collected: 05/10/23 1044    Lab Status: Final result Specimen: Swab from Nasopharynx Updated: 05/10/23 1116     COVID19 Not Detected     Influenza A PCR Not Detected     Influenza B PCR Not Detected    Narrative:      Fact sheet for providers: https://www.fda.gov/media/433724/download    Fact sheet for patients: https://www.fda.gov/media/443679/download    Test performed by PCR.          No results found for: PREGTESTUR, PREGSERUM, HCG, HCGQUANT  Pain Management Panel         Latest Ref Rng & Units 5/10/2023   Pain Management Panel   Amphetamine, Urine Qual Negative Negative     Barbiturates Screen, Urine Negative Negative     Benzodiazepine Screen, Urine Negative Negative     Buprenorphine, Screen, Urine Negative Negative     Cocaine Screen, Urine Negative Negative     Methadone Screen , Urine Negative Negative     Methamphetamine, Ur Negative Negative                  I have personally looked at the labs and they are summarized above.  ----------------------------------------------------------------------------------------------------------------------  Detailed radiology reports for the last 24 hours:    Imaging Results (Last 24 Hours)     Procedure Component Value Units Date/Time    CT Angiogram Chest Pulmonary Embolism [[id number]] Collected: 05/10/23 1305     Updated: 05/10/23 1308    Narrative:      CT ANGIOGRAM CHEST PULMONARY EMBOLISM-     CLINICAL  INDICATION: Pulmonary embolism (PE) suspected, high prob        COMPARISON: 04/22/2023      PROCEDURE: Thin cut axial images were acquired through the pulmonary  vessels during the rapid infusion of IV contrast.     Additional 3-D reformatted images obtained via post-processing for  improved diagnostic accuracy and procedural planning.     Radiation dose reduction techniques were utilized per ALARA protocol.  Automated exposure control was initiated through either or ZIMPERIUM or  DoseRight software packages by  protocol.           FINDINGS: Today's study demonstrates opacification of the central  pulmonary vessels.   There are no filling defects.   There is no truncation.     No evidence of a pulmonary embolus.     Dense areas of consolidation are present bilaterally and are very  similar to the prior study..     Consolidation in the perihilar regions bilaterally     Moderate left and small right pleural effusion       Impression:      1. No evidence of a pulmonary embolus  2. Moderate left and small right pleural effusion  3. Dense areas of consolidation again noted bilaterally but similar to  the prior study.     This report was finalized on 5/10/2023 1:06 PM by Dr. Jamie Smith MD.       XR Chest 1 View [[id number]] Collected: 05/10/23 1100     Updated: 05/10/23 1103    Narrative:      XR CHEST 1 VW-     CLINICAL INDICATION: sob        COMPARISON: 04/27/2023      TECHNIQUE: Single frontal view of the chest.     FINDINGS:      LUNGS: Dense consolidation and nodularity bilaterally. Worse than on the  previous exam      HEART AND MEDIASTINUM: Heart and mediastinal contours are unremarkable        SKELETON: Bony and soft tissue structures are unremarkable.             Impression:      Overall appearance worse than on the prior study     This report was finalized on 5/10/2023 11:01 AM by Dr. Jamie Smith MD.           Final impressions for the last 30 days of radiology reports:    CT Chest With & Without  Contrast Diagnostic    Result Date: 4/21/2023  Impression: 1. Extensive masslike perihilar disease, consistent with history of longstanding inhalational disease and progressive massive fibrosis. 2. Asymmetrically rounded and masslike area associated with left perihilar PMF in the left lower lobe, 4.5 cm in diameter, which may also reflect advanced PMF, but at least potentially could represent superimposed neoplasm, as discussed above. 3. Large, free-flowing left pleural effusion of uncertain significance. Electronically Signed: Mitch Sears  4/21/2023 3:48 PM EDT  Workstation ID: [id number]    XR Chest 1 View    Result Date: 5/10/2023  Overall appearance worse than on the prior study  This report was finalized on 5/10/2023 11:01 AM by Dr. Jamie Smith MD.      XR Chest 1 View    Result Date: 4/27/2023  Impression: Stable chest. No pneumothorax. Multifocal infiltrates. Electronically Signed: Bud Britton  4/27/2023 8:21 AM EDT  Workstation ID: [id number]    XR Chest 1 View    Result Date: 4/26/2023  Impression: Removal of left-sided chest tube. No pneumothorax. Otherwise, stable exam. Electronically Signed: Shantelle Grimm  4/26/2023 3:04 PM EDT  Workstation ID: [id number]    XR Chest 1 View    Result Date: 4/26/2023  Impression: Left basilar pleural drain. No significant effusion or evidence of pneumothorax. Perihilar airspace masses present bilaterally, similar as compared to the previous study Electronically Signed: Shantelle Grimm  4/26/2023 7:09 AM EDT  Workstation ID: [id number]    XR Chest 1 View    Result Date: 4/25/2023  Impression: 1. Stable right and left perihilar masses as previously described. 2. Improving bibasilar pulmonary interstitial disease, now mild in extent. Electronically Signed: Mitch Sears  4/25/2023 8:57 AM EDT  Workstation ID: [id number]    XR Chest 1 View    Result Date: 4/24/2023  Impression: Left pleural drain remains in place. Stable changes of pulmonary fibrosis. No new chest disease is seen. Electronically  Signed: Mitch Sears  4/24/2023 8:36 AM EDT  Workstation ID: [id number]    XR Chest 1 View    Result Date: 4/23/2023  Impression: Decreased size of pleural effusion on the left. It is now small. Small caliber chest tube is present. No pneumothorax. No change in masslike bilateral parenchymal airspace opacities. Electronically Signed: Patti Williamson  4/23/2023 8:30 AM EDT  Workstation ID: [id number]    XR Chest 1 View    Result Date: 4/20/2023  Impression: 1. No change in the masslike nodular appearing infiltrate in the right perihilar region. 2. Improvement in the left perihilar infiltrate compared with the last study with improvement in aeration at the left base. 3. Moderate pleural effusion, unchanged. Electronically Signed: Miguelito GONG Braeden  4/20/2023 2:22 PM EDT  Workstation ID: [id number]    CT Angiogram Chest Pulmonary Embolism    Result Date: 5/10/2023  1. No evidence of a pulmonary embolus 2. Moderate left and small right pleural effusion 3. Dense areas of consolidation again noted bilaterally but similar to the prior study.  This report was finalized on 5/10/2023 1:06 PM by Dr. Jamie Smith MD.      X-ray chest PA and lateral    Result Date: 4/17/2023  No significant interval change. Images reviewed, interpreted, and dictated by Dr. Gauri Gtz. Transcribed by Tahmina Isaacs PA-C.    X-ray chest PA and lateral    Result Date: 4/10/2023  There has been no significant interval change in  the opacities, likely related to pneumoconiosis. Small left pleural effusion. Images reviewed, interpreted, and dictated by Dr. Gauri Gtz. Transcribed by JOHNATHON Huerta (R).      I have personally looked at the radiology images, my personal interpretation is as follows:    CXR with left sided effusion and multifocal dense consolidations    CT with moderate sized left effusion and noted b/l perihilar dense infiltrates/masses    Assessment & Plan       #hx of coal workers pneumoconiosis with dense b/l perihilar infiltrates/pulmonary  fibrosis  #Recurrent left sided pleural effusion, previously exudative thought secondary to above  #Chronic hypoxic respiratory failure (2L)  -Extensive w/u including EBUS of perihilar mass performed at [facility] during last hospitalization with effusion found to be exudative in nature with negative cytology x2 samples and negative fungal and bacterial w/u. Currently on baseline O2 with no fever, inf clinical sx, or leukocytosis. Crp mildly elevated not dissimilar from prior. BNP wnl and CTPE neg for PE. Defer abx coverage  -Patient would be well served by pleurodesis or VATS but evaluated by CT surgery at [facility] and felt overall pulmonary condition not amenable to either intervention, treatment ultimately narrowed to lung trx eval with pleuryx for palliation of sx in interim  -General surgery consulted for possible pleuryx catheter placement  -TTE added to complete cardiac w/u, no prior echo on chart but note effusion was exudative in nature, not likely cardiac origin and no other clinical evidence of HF  -NPO at midnight until evaluated by surgical consultant  -s/p IV lasix in ED, monitor for sx improvement but unlikely to significantly improve given exudative noncardiac nature    #COOPER  -Resume home cpap    #Hx of atrial tachycardia  -Resume home metoprolol pend med rec    #?COPD hx  -Reported on last hospitalization but no smoking hx and no PFTs available for review but likely secondary to environmental contaminant  -Resume home breztri formulary equivalent with prn duonebs    #HTN  -Resume antihypertensives post op pend med rec    #NIDDM  -Hold metformin and glipizide  -FSBG qac/qhs w/SSI        VTE Prophylaxis:   Mechanical Order History:     None      Pharmalogical Order History:      Ordered     Dose Route Frequency Stop    05/10/23 5173  Enoxaparin Sodium (LOVENOX) syringe 40 mg         40 mg SC Once --              The patient is high risk due to the following diagnoses/reasons:  Recurrent effusion requiring  surgical drainage with chronic O2 usage    Admission Status:  I certify that this patient requires inpatient hospitalization for greater than 2 midnights in INPATIENT status.  I anticipate there to be the need for care which can only be reasonably provided in a hospital setting such as possible need for aggressive/expedited ancillary services and/or consultation services, IV medications, close physician monitoring, and/or procedures. In such, I feel patient’s risk for adverse outcomes and need for care warrant INPATIENT evaluation and predict the patient’s care encounter to likely last beyond 2 midnights.    Code Status and Medical Interventions:   Ordered at: 05/10/23 1555     Code Status (Patient has no pulse and is not breathing):    CPR (Attempt to Resuscitate)     Medical Interventions (Patient has pulse or is breathing):    Full Support        Disposition: Admit to tele pend surgical eval    Filemon Campbell MD  [facility] Hospitalist  05/10/23  16:56 EDT

## 2023-05-10 NOTE — ED PROVIDER NOTES
Subjective     History provided by:  Patient   used: No    Shortness of Breath  Severity:  Severe  Onset quality:  Gradual  Timing:  Constant  Progression:  Worsening  Chronicity:  Chronic  Context: activity    Context: not animal exposure, not emotional upset, not fumes, not known allergens, not occupational exposure, not pollens, not smoke exposure, not strong odors, not URI and not weather changes    Relieved by:  Nothing  Worsened by:  Activity, coughing, deep breathing, exertion and movement  Ineffective treatments:  Oxygen  Associated symptoms: diaphoresis    Associated symptoms: no abdominal pain, no chest pain, no claudication, no cough, no ear pain, no fever, no headaches, no hemoptysis, no neck pain, no PND, no rash, no sore throat, no sputum production, no syncope, no swollen glands, no vomiting and no wheezing    Risk factors: no recent alcohol use, no family hx of DVT, no hx of cancer, no hx of PE/DVT, no obesity, no prolonged immobilization, no recent surgery and no tobacco use        Review of Systems   Constitutional: Positive for diaphoresis. Negative for activity change, appetite change, chills, fatigue and fever.   HENT: Negative for congestion, ear pain and sore throat.    Eyes: Negative for redness.   Respiratory: Positive for shortness of breath. Negative for cough, hemoptysis, sputum production, chest tightness and wheezing.    Cardiovascular: Negative for chest pain, palpitations, claudication, leg swelling, syncope and PND.   Gastrointestinal: Negative for abdominal pain, diarrhea, nausea and vomiting.   Genitourinary: Negative for dysuria and urgency.   Musculoskeletal: Negative for arthralgias, back pain, myalgias and neck pain.   Skin: Negative for pallor, rash and wound.   Neurological: Negative for dizziness, speech difficulty, weakness and headaches.   Psychiatric/Behavioral: Negative for agitation, behavioral problems, confusion and decreased concentration.   All  other systems reviewed and are negative.      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   • Diabetes mellitus    • GERD (gastroesophageal reflux disease)    • Hypertension    • Pneumonia    • Pulmonary fibrosis 4/27/2023       No Known Allergies    Past Surgical History:   Procedure Laterality Date   • BRONCHOSCOPY N/A 4/24/2023    Procedure: BRONCHOSCOPY WITH ENDOBRONCHIAL ULTRASOUND;  Surgeon: Kota Willard MD;  Location: [facility] ENDOSCOPY;  Service: Pulmonary;  Laterality: N/A;   • FOOT SURGERY Left        Family History   Problem Relation Age of Onset   • Diabetes Mother    • Heart failure Father    • Diabetes Sister    • Heart disease Brother    • Diabetes Brother        Social History     Socioeconomic History   • Marital status:    Tobacco Use   • Smoking status: Never     Passive exposure: Never   • Smokeless tobacco: Current     Types: Chew   Vaping Use   • Vaping Use: Never used   Substance and Sexual Activity   • Alcohol use: No   • Drug use: No   • Sexual activity: Defer           Objective   Physical Exam  Vitals and nursing note reviewed.   Constitutional:       General: He is not in acute distress.     Appearance: Normal appearance. He is well-developed. He is not toxic-appearing or diaphoretic.   HENT:      Head: Normocephalic and atraumatic.      Right Ear: External ear normal.      Left Ear: External ear normal.      Nose: Nose normal.      Mouth/Throat:      Pharynx: No oropharyngeal exudate.      Tonsils: No tonsillar exudate.   Eyes:      General: Lids are normal.      Conjunctiva/sclera: Conjunctivae normal.      Pupils: Pupils are equal, round, and reactive to light.   Neck:      Thyroid: No thyromegaly.   Cardiovascular:      Rate and Rhythm: Normal rate and regular rhythm.      Pulses: Normal pulses.      Heart sounds: Normal heart sounds, S1 normal and S2 normal.   Pulmonary:       Effort: Pulmonary effort is normal. Tachypnea present. No respiratory distress.      Breath sounds: Examination of the right-upper field reveals decreased breath sounds. Examination of the left-upper field reveals decreased breath sounds and rales. Examination of the right-middle field reveals decreased breath sounds. Examination of the left-middle field reveals decreased breath sounds and rales. Examination of the right-lower field reveals decreased breath sounds. Examination of the left-lower field reveals decreased breath sounds and rales. Decreased breath sounds and rales present. No wheezing.   Chest:      Chest wall: No tenderness.   Abdominal:      General: Bowel sounds are normal. There is no distension.      Palpations: Abdomen is soft.      Tenderness: There is no abdominal tenderness. There is no guarding or rebound.   Musculoskeletal:         General: No tenderness or deformity. Normal range of motion.      Cervical back: Full passive range of motion without pain, normal range of motion and neck supple.   Lymphadenopathy:      Cervical: No cervical adenopathy.   Skin:     General: Skin is warm and dry.      Coloration: Skin is not pale.      Findings: No erythema or rash.   Neurological:      Mental Status: He is alert and oriented to person, place, and time.      GCS: GCS eye subscore is 4. GCS verbal subscore is 5. GCS motor subscore is 6.      Cranial Nerves: No cranial nerve deficit.      Sensory: No sensory deficit.   Psychiatric:         Speech: Speech normal.         Behavior: Behavior normal.         Thought Content: Thought content normal.         Judgment: Judgment normal.         Procedures           ED Course  ED Course as of 05/10/23 1708   Wed May 10, 2023   1034 ECG 12 Lead Chest Pain  Vent. Rate :  80 BPM     Atrial Rate :  80 BPM     P-R Int : 172 ms          QRS Dur :  78 ms      QT Int : 366 ms       P-R-T Axes :  44  80  62 degrees     QTc Int : 422 ms     Sinus rhythm with premature  atrial complexes  Low voltage QRS  Septal infarct (cited on or before 09-FEB-2023)  Abnormal ECG  When compared with ECG of 22-APR-2023 06:17,  premature atrial complexes are now present  Questionable change in QRS duration  Criteria for Inferior infarct are no longer present  Questionable change in initial forces of Septal leads [ES]   1115 XR Chest 1 View  IMPRESSION:  Overall appearance worse than on the prior study    [ES]      ED Course User Index  [ES] Ap Reyes MD                                           Medical Decision Making    History provided by:  Patient   used: No    Shortness of Breath  Severity:  Severe  Onset quality:  Gradual  Timing:  Constant  Progression:  Worsening  Chronicity:  Chronic  Context: activity    Context: not animal exposure, not emotional upset, not fumes, not known allergens, not occupational exposure, not pollens, not smoke exposure, not strong odors, not URI and not weather changes    Relieved by:  Nothing  Worsened by:  Activity, coughing, deep breathing, exertion and movement  Ineffective treatments:  Oxygen  Associated symptoms: diaphoresis    Associated symptoms: no abdominal pain, no chest pain, no claudication, no cough, no ear pain, no fever, no headaches, no hemoptysis, no neck pain, no PND, no rash, no sore throat, no sputum production, no syncope, no swollen glands, no vomiting and no wheezing    Risk factors: no recent alcohol use, no family hx of DVT, no hx of cancer, no hx of PE/DVT, no obesity, no prolonged immobilization, no recent surgery and no tobacco use        Amount and/or Complexity of Data Reviewed  External Data Reviewed: labs, radiology, ECG and notes.  Labs: ordered. Decision-making details documented in ED Course.  Radiology: ordered and independent interpretation performed. Decision-making details documented in ED Course.  ECG/medicine tests: ordered and independent interpretation performed. Decision-making  details documented in ED Course.      Risk  Prescription drug management.  Decision regarding hospitalization.          Final diagnoses:   Pleural effusion       ED Disposition  ED Disposition     ED Disposition   Decision to Admit    Condition   --    Comment   Level of Care: Telemetry [5]   Diagnosis: Pleural effusion exudative [[id number]]   Admitting Physician: MARIO CHAVEZ [[id number]]   Attending Physician: MARIO CHAVEZ [[id number]]   Certification: I Certify That Inpatient Hospital Services Are Medically Necessary For Greater Than 2 Midnights               No follow-up provider specified.       Medication List      ASK your doctor about these medications    esomeprazole 40 MG capsule  Commonly known as: nexIUM  Ask about: Which instructions should I use?     metoprolol succinate XL 50 MG 24 hr tablet  Commonly known as: TOPROL-XL  Ask about: Which instructions should I use?             Ap Reyes MD  05/10/23 3235

## 2023-05-10 NOTE — PLAN OF CARE
Goal Outcome Evaluation:   Pt resting in bed with no complaints. Daughter at bedside. Pt on 3L NC. Will continue with plan of care.

## 2023-05-11 ENCOUNTER — ANESTHESIA (OUTPATIENT)
Dept: PERIOP | Facility: HOSPITAL | Age: 63
DRG: 197 | End: 2023-05-11
Payer: OTHER MISCELLANEOUS

## 2023-05-11 ENCOUNTER — APPOINTMENT (OUTPATIENT)
Dept: GENERAL RADIOLOGY | Facility: HOSPITAL | Age: 63
DRG: 197 | End: 2023-05-11
Payer: OTHER MISCELLANEOUS

## 2023-05-11 ENCOUNTER — APPOINTMENT (OUTPATIENT)
Dept: CARDIOLOGY | Facility: HOSPITAL | Age: 63
DRG: 197 | End: 2023-05-11
Payer: OTHER MISCELLANEOUS

## 2023-05-11 ENCOUNTER — ANESTHESIA EVENT (OUTPATIENT)
Dept: PERIOP | Facility: HOSPITAL | Age: 63
DRG: 197 | End: 2023-05-11
Payer: OTHER MISCELLANEOUS

## 2023-05-11 LAB
BH CV ECHO MEAS - AO MAX PG: 10.1 MMHG
BH CV ECHO MEAS - AO MEAN PG: 6 MMHG
BH CV ECHO MEAS - AO V2 MAX: 159 CM/SEC
BH CV ECHO MEAS - AO V2 VTI: 22.6 CM
BH CV ECHO MEAS - EDV(CUBED): 65 ML
BH CV ECHO MEAS - EDV(MOD-SP4): 123 ML
BH CV ECHO MEAS - EF(MOD-SP4): 72.3 %
BH CV ECHO MEAS - ESV(CUBED): 20.8 ML
BH CV ECHO MEAS - ESV(MOD-SP4): 34.1 ML
BH CV ECHO MEAS - FS: 31.6 %
BH CV ECHO MEAS - IVS/LVPW: 0.9 CM
BH CV ECHO MEAS - IVSD: 0.99 CM
BH CV ECHO MEAS - LAT PEAK E' VEL: 6.5 CM/SEC
BH CV ECHO MEAS - LV DIASTOLIC VOL/BSA (35-75): 47.7 CM2
BH CV ECHO MEAS - LV MASS(C)D: 135 GRAMS
BH CV ECHO MEAS - LV SYSTOLIC VOL/BSA (12-30): 13.2 CM2
BH CV ECHO MEAS - LVIDD: 4 CM
BH CV ECHO MEAS - LVIDS: 2.8 CM
BH CV ECHO MEAS - LVPWD: 1.09 CM
BH CV ECHO MEAS - MED PEAK E' VEL: 7.4 CM/SEC
BH CV ECHO MEAS - MV A MAX VEL: 78.8 CM/SEC
BH CV ECHO MEAS - MV E MAX VEL: 52.3 CM/SEC
BH CV ECHO MEAS - MV E/A: 0.66
BH CV ECHO MEAS - SI(MOD-SP4): 34.5 ML/M2
BH CV ECHO MEAS - SV(MOD-SP4): 88.9 ML
BH CV ECHO MEASUREMENTS AVERAGE E/E' RATIO: 7.53
CRP SERPL-MCNC: 4.78 MG/DL (ref 0–0.5)
GLUCOSE BLDC GLUCOMTR-MCNC: 105 MG/DL (ref 70–130)
GLUCOSE BLDC GLUCOMTR-MCNC: 121 MG/DL (ref 70–130)
GLUCOSE BLDC GLUCOMTR-MCNC: 98 MG/DL (ref 70–130)
MAXIMAL PREDICTED HEART RATE: 155 BPM
PROCALCITONIN SERPL-MCNC: 0.09 NG/ML (ref 0–0.25)
STRESS TARGET HR: 132 BPM

## 2023-05-11 PROCEDURE — 82948 REAGENT STRIP/BLOOD GLUCOSE: CPT

## 2023-05-11 PROCEDURE — 94660 CPAP INITIATION&MGMT: CPT

## 2023-05-11 PROCEDURE — 25010000002 FENTANYL CITRATE (PF) 50 MCG/ML SOLUTION: Performed by: NURSE ANESTHETIST, CERTIFIED REGISTERED

## 2023-05-11 PROCEDURE — 76000 FLUOROSCOPY <1 HR PHYS/QHP: CPT

## 2023-05-11 PROCEDURE — 94799 UNLISTED PULMONARY SVC/PX: CPT

## 2023-05-11 PROCEDURE — 25010000002 PROPOFOL 200 MG/20ML EMULSION: Performed by: NURSE ANESTHETIST, CERTIFIED REGISTERED

## 2023-05-11 PROCEDURE — C1729 CATH, DRAINAGE: HCPCS | Performed by: SURGERY

## 2023-05-11 PROCEDURE — 71045 X-RAY EXAM CHEST 1 VIEW: CPT

## 2023-05-11 PROCEDURE — 25010000002 CEFAZOLIN PER 500 MG: Performed by: NURSE ANESTHETIST, CERTIFIED REGISTERED

## 2023-05-11 PROCEDURE — 99232 SBSQ HOSP IP/OBS MODERATE 35: CPT | Performed by: STUDENT IN AN ORGANIZED HEALTH CARE EDUCATION/TRAINING PROGRAM

## 2023-05-11 PROCEDURE — 25010000002 ONDANSETRON PER 1 MG: Performed by: NURSE ANESTHETIST, CERTIFIED REGISTERED

## 2023-05-11 PROCEDURE — 25010000002 SULFUR HEXAFLUORIDE MICROSPH 60.7-25 MG RECONSTITUTED SUSPENSION: Performed by: STUDENT IN AN ORGANIZED HEALTH CARE EDUCATION/TRAINING PROGRAM

## 2023-05-11 PROCEDURE — 84145 PROCALCITONIN (PCT): CPT | Performed by: STUDENT IN AN ORGANIZED HEALTH CARE EDUCATION/TRAINING PROGRAM

## 2023-05-11 PROCEDURE — 0W9B30Z DRAINAGE OF LEFT PLEURAL CAVITY WITH DRAINAGE DEVICE, PERCUTANEOUS APPROACH: ICD-10-PCS | Performed by: SURGERY

## 2023-05-11 PROCEDURE — 93306 TTE W/DOPPLER COMPLETE: CPT

## 2023-05-11 PROCEDURE — 94761 N-INVAS EAR/PLS OXIMETRY MLT: CPT

## 2023-05-11 PROCEDURE — 86140 C-REACTIVE PROTEIN: CPT | Performed by: STUDENT IN AN ORGANIZED HEALTH CARE EDUCATION/TRAINING PROGRAM

## 2023-05-11 RX ORDER — PROPOFOL 10 MG/ML
INJECTION, EMULSION INTRAVENOUS CONTINUOUS PRN
Status: DISCONTINUED | OUTPATIENT
Start: 2023-05-11 | End: 2023-05-11 | Stop reason: SURG

## 2023-05-11 RX ORDER — SODIUM CHLORIDE 0.9 % (FLUSH) 0.9 %
10 SYRINGE (ML) INJECTION AS NEEDED
Status: DISCONTINUED | OUTPATIENT
Start: 2023-05-11 | End: 2023-05-11 | Stop reason: HOSPADM

## 2023-05-11 RX ORDER — SODIUM CHLORIDE, SODIUM LACTATE, POTASSIUM CHLORIDE, CALCIUM CHLORIDE 600; 310; 30; 20 MG/100ML; MG/100ML; MG/100ML; MG/100ML
100 INJECTION, SOLUTION INTRAVENOUS ONCE AS NEEDED
Status: DISCONTINUED | OUTPATIENT
Start: 2023-05-11 | End: 2023-05-11 | Stop reason: HOSPADM

## 2023-05-11 RX ORDER — FAMOTIDINE 10 MG/ML
INJECTION, SOLUTION INTRAVENOUS AS NEEDED
Status: DISCONTINUED | OUTPATIENT
Start: 2023-05-11 | End: 2023-05-11 | Stop reason: SURG

## 2023-05-11 RX ORDER — ONDANSETRON 2 MG/ML
4 INJECTION INTRAMUSCULAR; INTRAVENOUS AS NEEDED
Status: DISCONTINUED | OUTPATIENT
Start: 2023-05-11 | End: 2023-05-11 | Stop reason: HOSPADM

## 2023-05-11 RX ORDER — LIDOCAINE HYDROCHLORIDE 20 MG/ML
INJECTION, SOLUTION EPIDURAL; INFILTRATION; INTRACAUDAL; PERINEURAL AS NEEDED
Status: DISCONTINUED | OUTPATIENT
Start: 2023-05-11 | End: 2023-05-11 | Stop reason: SURG

## 2023-05-11 RX ORDER — KETOROLAC TROMETHAMINE 30 MG/ML
30 INJECTION, SOLUTION INTRAMUSCULAR; INTRAVENOUS EVERY 6 HOURS PRN
Status: DISCONTINUED | OUTPATIENT
Start: 2023-05-11 | End: 2023-05-11 | Stop reason: HOSPADM

## 2023-05-11 RX ORDER — MIDAZOLAM HYDROCHLORIDE 1 MG/ML
1 INJECTION INTRAMUSCULAR; INTRAVENOUS
Status: DISCONTINUED | OUTPATIENT
Start: 2023-05-11 | End: 2023-05-11 | Stop reason: HOSPADM

## 2023-05-11 RX ORDER — MAGNESIUM HYDROXIDE 1200 MG/15ML
LIQUID ORAL AS NEEDED
Status: DISCONTINUED | OUTPATIENT
Start: 2023-05-11 | End: 2023-05-11 | Stop reason: HOSPADM

## 2023-05-11 RX ORDER — ONDANSETRON 2 MG/ML
INJECTION INTRAMUSCULAR; INTRAVENOUS AS NEEDED
Status: DISCONTINUED | OUTPATIENT
Start: 2023-05-11 | End: 2023-05-11 | Stop reason: SURG

## 2023-05-11 RX ORDER — FENTANYL CITRATE 50 UG/ML
50 INJECTION, SOLUTION INTRAMUSCULAR; INTRAVENOUS
Status: DISCONTINUED | OUTPATIENT
Start: 2023-05-11 | End: 2023-05-11 | Stop reason: HOSPADM

## 2023-05-11 RX ORDER — SODIUM CHLORIDE 9 MG/ML
40 INJECTION, SOLUTION INTRAVENOUS AS NEEDED
Status: DISCONTINUED | OUTPATIENT
Start: 2023-05-11 | End: 2023-05-11 | Stop reason: HOSPADM

## 2023-05-11 RX ORDER — OXYCODONE HYDROCHLORIDE AND ACETAMINOPHEN 5; 325 MG/1; MG/1
1 TABLET ORAL ONCE AS NEEDED
Status: DISCONTINUED | OUTPATIENT
Start: 2023-05-11 | End: 2023-05-11 | Stop reason: HOSPADM

## 2023-05-11 RX ORDER — FENTANYL CITRATE 50 UG/ML
INJECTION, SOLUTION INTRAMUSCULAR; INTRAVENOUS AS NEEDED
Status: DISCONTINUED | OUTPATIENT
Start: 2023-05-11 | End: 2023-05-11 | Stop reason: SURG

## 2023-05-11 RX ORDER — IPRATROPIUM BROMIDE AND ALBUTEROL SULFATE 2.5; .5 MG/3ML; MG/3ML
3 SOLUTION RESPIRATORY (INHALATION) ONCE AS NEEDED
Status: DISCONTINUED | OUTPATIENT
Start: 2023-05-11 | End: 2023-05-11 | Stop reason: HOSPADM

## 2023-05-11 RX ORDER — SODIUM CHLORIDE 0.9 % (FLUSH) 0.9 %
10 SYRINGE (ML) INJECTION EVERY 12 HOURS SCHEDULED
Status: DISCONTINUED | OUTPATIENT
Start: 2023-05-11 | End: 2023-05-11 | Stop reason: HOSPADM

## 2023-05-11 RX ORDER — MIDAZOLAM HYDROCHLORIDE 1 MG/ML
0.5 INJECTION INTRAMUSCULAR; INTRAVENOUS
Status: DISCONTINUED | OUTPATIENT
Start: 2023-05-11 | End: 2023-05-11 | Stop reason: HOSPADM

## 2023-05-11 RX ORDER — SODIUM CHLORIDE, SODIUM LACTATE, POTASSIUM CHLORIDE, CALCIUM CHLORIDE 600; 310; 30; 20 MG/100ML; MG/100ML; MG/100ML; MG/100ML
125 INJECTION, SOLUTION INTRAVENOUS ONCE
Status: COMPLETED | OUTPATIENT
Start: 2023-05-11 | End: 2023-05-11

## 2023-05-11 RX ADMIN — IPRATROPIUM BROMIDE 0.5 MG: 0.5 SOLUTION RESPIRATORY (INHALATION) at 06:58

## 2023-05-11 RX ADMIN — Medication 10 ML: at 21:18

## 2023-05-11 RX ADMIN — FENTANYL CITRATE 25 MCG: 50 INJECTION INTRAMUSCULAR; INTRAVENOUS at 14:54

## 2023-05-11 RX ADMIN — LIDOCAINE HYDROCHLORIDE 60 MG: 20 INJECTION, SOLUTION EPIDURAL; INFILTRATION; INTRACAUDAL; PERINEURAL at 14:22

## 2023-05-11 RX ADMIN — DOCUSATE SODIUM 50 MG AND SENNOSIDES 8.6 MG 2 TABLET: 8.6; 5 TABLET, FILM COATED ORAL at 21:17

## 2023-05-11 RX ADMIN — FENTANYL CITRATE 25 MCG: 50 INJECTION INTRAMUSCULAR; INTRAVENOUS at 14:27

## 2023-05-11 RX ADMIN — Medication 10 ML: at 08:58

## 2023-05-11 RX ADMIN — BUDESONIDE AND FORMOTEROL FUMARATE DIHYDRATE 2 PUFF: 160; 4.5 AEROSOL RESPIRATORY (INHALATION) at 06:58

## 2023-05-11 RX ADMIN — Medication 5 MG: at 21:18

## 2023-05-11 RX ADMIN — FENTANYL CITRATE 25 MCG: 50 INJECTION INTRAMUSCULAR; INTRAVENOUS at 14:33

## 2023-05-11 RX ADMIN — SULFUR HEXAFLUORIDE 3 ML: KIT at 10:02

## 2023-05-11 RX ADMIN — HYDROXYZINE HYDROCHLORIDE 25 MG: 25 TABLET, FILM COATED ORAL at 21:18

## 2023-05-11 RX ADMIN — ONDANSETRON 4 MG: 2 INJECTION INTRAMUSCULAR; INTRAVENOUS at 14:22

## 2023-05-11 RX ADMIN — HYDROXYZINE HYDROCHLORIDE 25 MG: 25 TABLET, FILM COATED ORAL at 09:11

## 2023-05-11 RX ADMIN — HYDROCODONE BITARTRATE AND ACETAMINOPHEN 0.5 TABLET: 7.5; 325 TABLET ORAL at 16:49

## 2023-05-11 RX ADMIN — FAMOTIDINE 20 MG: 10 INJECTION, SOLUTION INTRAVENOUS at 14:20

## 2023-05-11 RX ADMIN — HYDROCODONE BITARTRATE AND ACETAMINOPHEN 1 TABLET: 7.5; 325 TABLET ORAL at 21:18

## 2023-05-11 RX ADMIN — BUDESONIDE AND FORMOTEROL FUMARATE DIHYDRATE 2 PUFF: 160; 4.5 AEROSOL RESPIRATORY (INHALATION) at 18:53

## 2023-05-11 RX ADMIN — SODIUM CHLORIDE, POTASSIUM CHLORIDE, SODIUM LACTATE AND CALCIUM CHLORIDE: 600; 310; 30; 20 INJECTION, SOLUTION INTRAVENOUS at 14:22

## 2023-05-11 RX ADMIN — PROPOFOL 50 MCG/KG/MIN: 10 INJECTION, EMULSION INTRAVENOUS at 14:22

## 2023-05-11 RX ADMIN — FENTANYL CITRATE 25 MCG: 50 INJECTION INTRAMUSCULAR; INTRAVENOUS at 14:30

## 2023-05-11 NOTE — PLAN OF CARE
Goal Outcome Evaluation:      Patient had surgery this shift with a left sided plurex cath placed. Patient is resting in bed. No s/s of acute distress noted at this time. Will continue to follow plan of care.

## 2023-05-11 NOTE — OP NOTE
Insertion Pleurx catheter left chest with fluoroscopic guidance    Pre-op: Recurrent left pleural effusion    Post-op:  Same    Surgeon:  Brigitte Kolb M.D., F.A.C.S.    Assistant:  None    Anesthesia:  general      Indications: Recurrent left pleural effusion       Procedure Details   After obtaining informed consent and receiving preoperative antibiotics and with venous compression boots in place, the patient was taken to the operating room and placed under anesthesia.  The left lateral chest was prepped and draped in a sterile fashion.  Using the chest tube incision as the landmark an 8 mm incision was made in the skin.  The needle was inserted into the thoracic cavity and fluid was aspirated.  Guidewire was passed without resistance.  Following this a counterincision inferior to the skin entry incision was made and the catheter was brought through the tunnel.  Under fluoroscopic guidance the dilator sheath was passed over the guidewire, directing the catheter inferior and posterior.  Catheter was passed over the peel-away sheath which was then removed.  Catheter was sutured in with 0 silk and the skin incision site was closed with 3-0 Vicryl subdermal suture and 4-0 Monocryl.  550 cc were drained at the time of the procedure.  Dressings were placed patient was taken to the recovery room where chest x-ray demonstrated the catheter to be in good position    Findings:         Estimated Blood Loss:  Minimal    Blood Administered: none           Drains: none           Specimens: None     Grafts and Implants: No       Complications:  none           Disposition: PACU - hemodynamically stable.           Condition: stable

## 2023-05-11 NOTE — PLAN OF CARE
Goal Outcome Evaluation:      Pt has been resting this shift. No signs and symptoms of acute distress noted. No complaints of chest pain or SOB reported.

## 2023-05-11 NOTE — CASE MANAGEMENT/SOCIAL WORK
Discharge Planning Assessment   [facility]     Patient Name: Rory Rios Jr.  MRN: [id number]  Today's Date: 5/11/2023    Admit Date: 5/10/2023    Plan: CM spoke with daughter at bedside, pt off unit in surgery. Pt daughter is POA no living will. Pt lives alone. Request [facility] HH at discharge. DME oxygen from [facility] cpap from supplier in [facility]. for Black Lung, request wheelchair at discharge. Will return home at discharge daughter will transport home.  PCP Alejandro Monique uses [facility] in [facility]. Primary Workers Compensation also Medicare A&B. Discharge planned for tomorrow per family. CM to follow.   Discharge Needs Assessment     Row Name 05/11/23 0536       Living Environment    People in Home alone    Potentially Unsafe Housing Conditions none    Primary Care Provided by self;child(andre)    Provides Primary Care For no one    Family Caregiver if Needed child(andre), adult    Family Caregiver Names Emilia Sterling    Able to Return to Prior Arrangements yes       Resource/Environmental Concerns    Resource/Environmental Concerns none       Transition Planning    Patient/Family Anticipates Transition to home    Patient/Family Anticipated Services at Transition durable medical equipment       Discharge Needs Assessment    Current Outpatient/Agency/Support Group homecare agency    Equipment Currently Used at Home cpap;oxygen    Concerns to be Addressed discharge planning    Equipment Needed After Discharge wheelchair, manual    Discharge Facility/Level of Care Needs other (see comments)  family request HH to follow at discharge               Discharge Plan     Row Name 05/11/23 7252       Plan    Plan CM spoke with daughter at bedside, pt off unit in surgery. Pt daughter is POA no living will. Pt lives alone. Request [facility] HH at discharge. DME oxygen from [facility] cpap from supplier in [facility]. for Black Lung, request wheelchair at discharge. Will return  home at discharge daughter will transport home.  PCP Alejandro Monique uses [employer] in [facility]. Primary Workers Compensation also Medicare A&B. Discharge planned for tomorrow per family.    Patient/Family in Agreement with Plan yes              Continued Care and Services - Admitted Since 5/10/2023    Coordination has not been started for this encounter.       Expected Discharge Date and Time     Expected Discharge Date Expected Discharge Time    May 12, 2023                      Edwige Lr

## 2023-05-11 NOTE — ANESTHESIA PREPROCEDURE EVALUATION
Anesthesia Evaluation     Patient summary reviewed and Nursing notes reviewed   no history of anesthetic complications:  NPO Solid Status: > 8 hours  NPO Liquid Status: > 8 hours           Airway   Mallampati: II  TM distance: >3 FB  Neck ROM: full  No difficulty expected  Dental    (+) edentulous    Pulmonary - normal exam    breath sounds clear to auscultation  (+) pneumonia resolved , pleural effusion, COPD (Black lung) severe, home oxygen (2 liters at night, PRN daily), sleep apnea,     ROS comment: Lung mass  Cardiovascular - normal exam    ECG reviewed  Rhythm: regular  Rate: normal    (+) hypertension,       Neuro/Psych- negative ROS  GI/Hepatic/Renal/Endo    (+)  GERD,  diabetes mellitus,     Musculoskeletal     Abdominal    Substance History - negative use     OB/GYN negative ob/gyn ROS         Other   arthritis,                      Anesthesia Plan    ASA 4     MAC     intravenous induction     Anesthetic plan, risks, benefits, and alternatives have been provided, discussed and informed consent has been obtained with: patient.        CODE STATUS:

## 2023-05-11 NOTE — CONSULTS
Consulting physician:  Dr. Kolb    Referring physician: Hospitalist    Date of consultation: 05/11/23     Chief complaint symptomatic pleural effusion    Subjective     Patient is a 63 y.o. male who was admitted on 5/10/2023 with a symptomatic left pleural effusion.  The patient has been followed by pulmonary in [place] for some time.  Recently he was admitted to [facility] and was evaluated by Dr. Solis.  He had a chest tube placed at that time.  Apparently he was to have a Pleurx catheter placed but the chest tube was removed and then there was not enough fluid to allow for tube placement again.  Over the course of the next week or so the fluid reaccumulated and the patient became symptomatic.  He presented to the emergency room and was admitted for consideration for Pleurx catheter insertion.  Hypertension      Review of Systems  Review of Systems - General ROS: negative for - weight loss  Psychological ROS: negative for - behavioral disorder  Ophthalmic ROS: negative for - dry eyes  ENT ROS: negative for - vertigo or vocal changes  Hematological and Lymphatic ROS: negative for - swollen lymph nodes, DVT, PE.   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   • Diabetes mellitus    • GERD (gastroesophageal reflux disease)    • Hypertension    • Pneumonia    • Pulmonary fibrosis 4/27/2023     Past Surgical History:   Procedure Laterality Date   • BRONCHOSCOPY N/A 4/24/2023    Procedure: BRONCHOSCOPY WITH ENDOBRONCHIAL ULTRASOUND;  Surgeon: Kota Willard MD;  Location: [facility] ENDOSCOPY;  Service:  Pulmonary;  Laterality: N/A;   • FOOT SURGERY Left      Family History   Problem Relation Age of Onset   • Diabetes Mother    • Heart failure Father    • Diabetes Sister    • Heart disease Brother    • Diabetes Brother      Social History     Tobacco Use   • Smoking status: Never     Passive exposure: Never   • Smokeless tobacco: Current     Types: Chew   Vaping Use   • Vaping Use: Never used   Substance Use Topics   • Alcohol use: No   • Drug use: No     Medications Prior to Admission   Medication Sig Dispense Refill Last Dose   • Budeson-Glycopyrrol-Formoterol (BREZTRI) 160-9-4.8 MCG/ACT aerosol inhaler Inhale 2 puffs 2 (Two) Times a Day.   5/9/2023   • esomeprazole (nexIUM) 40 MG capsule Take 1 capsule by mouth Every Morning Before Breakfast.   5/9/2023   • hydrOXYzine (ATARAX) 25 MG tablet Take 1 tablet by mouth 3 (Three) Times a Day As Needed for Anxiety. 21 tablet 0 5/10/2023   • ibuprofen (ADVIL,MOTRIN) 800 MG tablet Take 1 tablet by mouth 3 (Three) Times a Day.   5/9/2023   • lisinopril (PRINIVIL,ZESTRIL) 10 MG tablet Take 1 tablet by mouth Every Night.   5/9/2023   • metoprolol succinate XL (TOPROL-XL) 50 MG 24 hr tablet Take 1 tablet by mouth Daily.   5/10/2023   • omeprazole (priLOSEC) 40 MG capsule Take 1 capsule by mouth Daily.   5/9/2023   • roflumilast (DALIRESP) 250 MCG tablet tablet Take 1 tablet by mouth Daily.   5/9/2023   • albuterol (PROVENTIL HFA;VENTOLIN HFA) 108 (90 BASE) MCG/ACT inhaler Inhale 2 puffs Every 4 (Four) Hours As Needed for Wheezing or Shortness of Air. 1 inhaler 0 Unknown   • HYDROcodone-acetaminophen (NORCO) 7.5-325 MG per tablet Take 1 tablet by mouth Every 8 (Eight) Hours As Needed for Moderate Pain.   Unknown   • ipratropium-albuterol (DUO-NEB) 0.5-2.5 mg/3 ml nebulizer Take 3 mL by nebulization Every 4 (Four) Hours As Needed for Wheezing or Shortness of Air.   Unknown     Allergies:  Patient has no known allergies.    Objective     Vital Signs  Temp:  [97.1 °F (36.2  °C)-99.1 °F (37.3 °C)] 98.9 °F (37.2 °C)  Heart Rate:  [71-96] 91  Resp:  [18-24] 18  BP: ()/(54-95) 93/55    Physical Exam:  General:  This is a WD WN male in no acute distress  Vital signs: Stable, afebrile  HEENT exam:  WNL. Sclerae are anicteric.  EOMI  Neck:  Supple, FROM.  No JVD.  Trachea midline  Lungs:  Respiratory effort normal. Auscultation: Clear, without wheezes, rhonchi, rales  Heart:  Regular rate and rhythm, without murmur, gallop, rub.  No pedal edema  Abdomen: non tender  Musculoskeletal:  Muscle strength/tone is normal.    Psych:  Alert, oriented x 3.  Mood and affect are appropriate  Skin:  Warm with good turgor.  Without rash or lesion  Extremities:  Examination of the extremities revealed no cyanosis, clubbing or edema.    Results Review:   Results from last 7 days   Lab Units 05/10/23  1347 05/10/23  1043   HSTROP T ng/L 16* 15*     Results from last 7 days   Lab Units 05/11/23  0755 05/10/23  2212 05/10/23  1043   CRP mg/dL 4.78*  --  1.54*   WBC 10*3/mm3  --  12.44* 7.32   HEMOGLOBIN g/dL  --  13.8 13.8   HEMATOCRIT %  --  45.2 45.2   PLATELETS 10*3/mm3  --  199 223   INR   --   --  1.00         Results from last 7 days   Lab Units 05/10/23  2212 05/10/23  1043   SODIUM mmol/L 140 142   POTASSIUM mmol/L 3.8 3.9   MAGNESIUM mg/dL 1.7 1.9   CHLORIDE mmol/L 103 106   CO2 mmol/L 26.2 26.7   BUN mg/dL 8 7*   CREATININE mg/dL 0.86 0.80   CALCIUM mg/dL 9.1 9.5   GLUCOSE mg/dL 131* 106*   ALBUMIN g/dL  --  3.7   BILIRUBIN mg/dL  --  0.4   ALK PHOS U/L  --  90   AST (SGOT) U/L  --  16   ALT (SGPT) U/L  --  14   Estimated Creatinine Clearance: 126 mL/min (by C-G formula based on SCr of 0.86 mg/dL).  No results found for: AMMONIA      No results found for: BLOODCX  No results found for: URINECX  No results found for: WOUNDCX  No results found for: STOOLCX    Imaging:  Imaging Results (Last 24 Hours)     Procedure Component Value Units Date/Time    CT Angiogram Chest Pulmonary Embolism [[id number]]  Collected: 05/10/23 1305     Updated: 05/10/23 1308    Narrative:      CT ANGIOGRAM CHEST PULMONARY EMBOLISM-     CLINICAL INDICATION: Pulmonary embolism (PE) suspected, high prob        COMPARISON: 04/22/2023      PROCEDURE: Thin cut axial images were acquired through the pulmonary  vessels during the rapid infusion of IV contrast.     Additional 3-D reformatted images obtained via post-processing for  improved diagnostic accuracy and procedural planning.     Radiation dose reduction techniques were utilized per ALARA protocol.  Automated exposure control was initiated through either or [employer] or  [employer] software packages by  protocol.           FINDINGS: Today's study demonstrates opacification of the central  pulmonary vessels.   There are no filling defects.   There is no truncation.     No evidence of a pulmonary embolus.     Dense areas of consolidation are present bilaterally and are very  similar to the prior study..     Consolidation in the perihilar regions bilaterally     Moderate left and small right pleural effusion       Impression:      1. No evidence of a pulmonary embolus  2. Moderate left and small right pleural effusion  3. Dense areas of consolidation again noted bilaterally but similar to  the prior study.     This report was finalized on 5/10/2023 1:06 PM by Dr. Jamie Smith MD.       XR Chest 1 View [[id number]] Collected: 05/10/23 1100     Updated: 05/10/23 1103    Narrative:      XR CHEST 1 VW-     CLINICAL INDICATION: sob        COMPARISON: 04/27/2023      TECHNIQUE: Single frontal view of the chest.     FINDINGS:      LUNGS: Dense consolidation and nodularity bilaterally. Worse than on the  previous exam      HEART AND MEDIASTINUM: Heart and mediastinal contours are unremarkable        SKELETON: Bony and soft tissue structures are unremarkable.             Impression:      Overall appearance worse than on the prior study     This report was finalized on 5/10/2023 11:01 AM  by Dr. Jamie Smith MD.             CT chest reports and images were reviewed.  I agree with the assessment      Impression:  Patient Active Problem List   Diagnosis Code   • Recurrent left pleural effusion J90   • PAT (paroxysmal atrial tachycardia) I47.1   • Black lung J60   • Chronic obstructive pulmonary disease J44.9   • Primary hypertension I10   • Lung mass R91.8   • Coal workers pneumoconiosis J60   • Pulmonary fibrosis J84.10   • Pleural effusion exudative J90       Plan:  Pleurx catheter insertion left chest      Discussion:  The risks of the surgical procedure were discussed.  Options of alternative treatments including no treatment (if applicable) were discussed.  Patient voiced understanding of the above issues and wishes to proceed    Brigitte Kolb MD  05/11/23  10:14 EDT    Time: Time spent:    Please note that portions of this note were completed with a voice recognition program.

## 2023-05-11 NOTE — CASE MANAGEMENT/SOCIAL WORK
This CM spoke with Christa in surgery and verified paperwork has been completed at [phone]; this CM notified bedside RN Stephanie need for pt to have kit to go home with through material mgt at [phone] with anticipated discharge date of 5/12/23. 5/11@8116: This CM messaged attending via secure chat with request for wheelchair at discharge.

## 2023-05-11 NOTE — PROGRESS NOTES
[facility] HOSPITALIST PROGRESS NOTE     Patient Identification:  Name:  Rory Rios Jr.  Age:  63 y.o.  Sex:  male  :  1960  MRN:  [id number]  Visit Number:  [id number]  ROOM: [address] [place] [address]     Primary Care Provider:  Alejandro Monique MD     Date of Admission: 5/10/2023    Length of stay in inpatient status:  1    Subjective     Chief Compliant:    Chief Complaint   Patient presents with   • Shortness of Breath       Patient seen following return from OR this afternoon. Dyspnea resolved after removal of 550cc pleural fluid with placement of left sided pleuryx catheter. No CP and minimal soreness and plauritic pain reported at pleuryx site.         Objective     Current Hospital Meds:  budesonide-formoterol, 2 puff, Inhalation, BID - RT  HYDROcodone-acetaminophen, 1 tablet, Oral, TID  insulin lispro, 2-9 Units, Subcutaneous, TID With Meals  ipratropium, 0.5 mg, Nebulization, 4x Daily - RT  metoprolol succinate XL, 50 mg, Oral, Daily  pantoprazole, 40 mg, Oral, QAM AC  roflumilast, 250 mcg, Oral, Daily  senna-docusate sodium, 2 tablet, Oral, Nightly  sodium chloride, 10 mL, Intravenous, Q12H       Current Antimicrobial Therapy:  Anti-Infectives (From admission, onward)    None        Current Diuretic Therapy:  Diuretics (From admission, onward)    Ordered     Dose/Rate Route Frequency Start Stop    05/10/23 1116  furosemide (LASIX) injection 80 mg        Ordering Provider: Ap Reyes MD    80 mg Intravenous Once 05/10/23 1118 05/10/23 1202        ----------------------------------------------------------------------------------------------------------------------  Vital Signs:  Temp:  [97.3 °F (36.3 °C)-99.1 °F (37.3 °C)] 98 °F (36.7 °C)  Heart Rate:  [] 101  Resp:  [16-18] 18  BP: ()/(54-89) 99/62  SpO2:  [93 %-97 %] 96 %  on  Flow (L/min):  [2-4] 2;   Device (Oxygen Therapy): nasal cannula  Body mass index is 34.87 kg/m².    Wt Readings from Last 3 Encounters:    05/11/23 127 kg (279 lb)   04/20/23 131 kg (288 lb)   02/09/23 134 kg (295 lb)     Intake & Output (last 3 days)       05/08 0701  05/09 0700 05/09 0701  05/10 0700 05/10 0701  05/11 0700 05/11 0701 05/12 0700    P.O.   240     I.V. (mL/kg)    700 (5.5)    Total Intake(mL/kg)   240 (1.9) 700 (5.5)    Net   +240 +700            Urine Unmeasured Occurrence   6 x         Diet: Regular/House Diet; Texture: Regular Texture (IDDSI 7); Fluid Consistency: Thin (IDDSI 0)  ----------------------------------------------------------------------------------------------------------------------  Physical Exam  Vitals and nursing note reviewed.   Constitutional:       General: He is not in acute distress.  HENT:      Mouth/Throat:      Mouth: Mucous membranes are moist.      Pharynx: Oropharynx is clear.   Eyes:      General: No scleral icterus.     Extraocular Movements: Extraocular movements intact.   Cardiovascular:      Rate and Rhythm: Normal rate and regular rhythm.      Pulses: Normal pulses.   Pulmonary:      Effort: Pulmonary effort is normal. No respiratory distress.      Breath sounds: No wheezing.      Comments: Diminished breath sounds at left lung base but breath sounds audible deep into middle anterior and posterior lung fields. Pleuryx catheter in place with clear dlean dressing overlying insertion site  Abdominal:      Palpations: Abdomen is soft.      Tenderness: There is no abdominal tenderness.   Musculoskeletal:      Right lower leg: No edema.      Left lower leg: No edema.   Skin:     General: Skin is warm and dry.      Capillary Refill: Capillary refill takes less than 2 seconds.   Neurological:      General: No focal deficit present.      Mental Status: He is alert. Mental status is at baseline.   Psychiatric:         Mood and Affect: Mood normal.         Behavior: Behavior normal.    ----------------------------------------------------------------------------------------------------------------------    ----------------------------------------------------------------------------------------------------------------------  LABS:    CBC and coagulation:  Results from last 7 days   Lab Units 05/11/23  0755 05/10/23  2212 05/10/23  1043   PROCALCITONIN ng/mL 0.09  --   --    SED RATE mm/hr  --   --  39*   CRP mg/dL 4.78*  --  1.54*   WBC 10*3/mm3  --  12.44* 7.32   HEMOGLOBIN g/dL  --  13.8 13.8   HEMATOCRIT %  --  45.2 45.2   MCV fL  --  82.6 82.9   MCHC g/dL  --  30.5* 30.5*   PLATELETS 10*3/mm3  --  199 223   INR   --   --  1.00   D DIMER QUANT MCGFEU/mL  --   --  1.85*     Acid/base balance:      Renal and electrolytes:  Results from last 7 days   Lab Units 05/10/23  2212 05/10/23  1043   SODIUM mmol/L 140 142   POTASSIUM mmol/L 3.8 3.9   MAGNESIUM mg/dL 1.7 1.9   CHLORIDE mmol/L 103 106   CO2 mmol/L 26.2 26.7   BUN mg/dL 8 7*   CREATININE mg/dL 0.86 0.80   CALCIUM mg/dL 9.1 9.5   GLUCOSE mg/dL 131* 106*     Estimated Creatinine Clearance: 123.5 mL/min (by C-G formula based on SCr of 0.86 mg/dL).    Liver and pancreatic function:  Results from last 7 days   Lab Units 05/10/23  1043   ALBUMIN g/dL 3.7   BILIRUBIN mg/dL 0.4   ALK PHOS U/L 90   AST (SGOT) U/L 16   ALT (SGPT) U/L 14     Endocrine function:  Lab Results   Component Value Date    HGBA1C 6.30 (H) 04/21/2023     Point of care bedside glucose levels:  Results from last 7 days   Lab Units 05/11/23  1642 05/11/23  1108 05/11/23  0644 05/10/23  1709   GLUCOSE mg/dL 121 98 105 123     Glucose levels from the [facility]:  Results from last 7 days   Lab Units 05/10/23  2212 05/10/23  1043   GLUCOSE mg/dL 131* 106*     Lab Results   Component Value Date    TSH 2.210 05/10/2023    FREET4 1.33 05/10/2023     Cardiac:  Results from last 7 days   Lab Units 05/10/23  1347 05/10/23  1043   HSTROP T ng/L 16* 15*   PROBNP pg/mL  --  189.9        Cultures:  Lab Results   Component Value Date    COLORU Yellow 05/10/2023    CLARITYU Clear 05/10/2023    PHUR 7.5 05/10/2023    GLUCOSEU Negative 05/10/2023    KETONESU Negative 05/10/2023    BLOODU Moderate (2+) (A) 05/10/2023    NITRITEU Negative 05/10/2023    LEUKOCYTESUR Negative 05/10/2023    BILIRUBINUR Negative 05/10/2023    UROBILINOGEN 0.2 E.U./dL 05/10/2023    RBCUA 13-20 (A) 05/10/2023    WBCUA 0-2 05/10/2023    BACTERIA Trace (A) 05/10/2023     Microbiology Results (last 10 days)     Procedure Component Value - Date/Time    COVID-19 and FLU A/B PCR - Swab, Nasopharynx [[id number]]  (Normal) Collected: 05/10/23 1044    Lab Status: Final result Specimen: Swab from Nasopharynx Updated: 05/10/23 1116     COVID19 Not Detected     Influenza A PCR Not Detected     Influenza B PCR Not Detected    Narrative:      Fact sheet for providers: https://www.fda.gov/media/341094/download    Fact sheet for patients: https://www.fda.gov/media/290803/download    Test performed by PCR.          No results found for: PREGTESTUR, PREGSERUM, HCG, HCGQUANT  Pain Management Panel         Latest Ref Rng & Units 5/10/2023   Pain Management Panel   Amphetamine, Urine Qual Negative Negative     Barbiturates Screen, Urine Negative Negative     Benzodiazepine Screen, Urine Negative Negative     Buprenorphine, Screen, Urine Negative Negative     Cocaine Screen, Urine Negative Negative     Methadone Screen , Urine Negative Negative     Methamphetamine, Ur Negative Negative                  I have personally looked at the labs and they are summarized above.  ----------------------------------------------------------------------------------------------------------------------  Detailed radiology reports for the last 24 hours:    Imaging Results (Last 24 Hours)     Procedure Component Value Units Date/Time    FL Surgery Fluoro [[id number]] Collected: 05/11/23 1603     Updated: 05/11/23 1606    Narrative:      EXAMINATION: FL SURGERY  FLUORO-      CLINICAL INDICATION: pleurx catheter; V02-Mxaejye effusion, not  elsewhere classified        COMPARISON: None available     Total fluoroscopy time 25.5 seconds     Fluoroscopic or was placed under fluoroscopy and 1 image was obtained     For a full procedural report, please see the report provided by the  performing physician     This report was finalized on 5/11/2023 4:04 PM by Dr. Jamie Smith MD.       XR Chest AP [[id number]] Collected: 05/11/23 1602     Updated: 05/11/23 1605    Narrative:      XR CHEST AP-     CLINICAL INDICATION: Pluerx cath ; U20-Ypwpwig effusion, not elsewhere  classified        COMPARISON: 05/10/2023      TECHNIQUE: Single frontal view of the chest.     FINDINGS:      LUNGS: Patchy areas of consolidation bilaterally.  Left-sided catheter is present. No measurable pleural fluid is seen      HEART AND MEDIASTINUM: Heart and mediastinal contours are unremarkable        SKELETON: Bony and soft tissue structures are unremarkable.             Impression:      No residual effusion identified.     This report was finalized on 5/11/2023 4:03 PM by Dr. Jamie Smith MD.             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 Currently on baseline O2 with no fever, inf clinical sx, or leukocytosis. Crp mildly elevated not dissimilar from prior. BNP wnl and CTPE neg for PE. Defer abx coverage  -Patient would be well served by pleurodesis or VATS but evaluated by CT surgery at [facility] and felt overall pulmonary condition not amenable to either intervention, treatment ultimately narrowed  to lung trx eval with pleuryx for palliation of sx in interim  -TTE added to complete cardiac w/u, no prior echo on chart but note effusion was exudative in nature, not likely cardiac origin and no other clinical evidence of HF. Performed with read pending. No physical exam evidence of HF  -s/p left pleuryz cath placement 5/11, 550cc pleural fluid removed with sx improvement  -repeat cxr in am, if stable can likely d/c home with outpatient home health for pleuryx care and drainage periodically  -f/u Dr. Solis 5/16 at [facility] as previously scheduled      #COOPER  -Cont home cpap     #Hx of atrial tachycardia  -Resumed home metoprolol     #?COPD hx  -Reported on last hospitalization but no smoking hx and no PFTs available for review but likely secondary to environmental contaminant  -Resume home breztri formulary equivalent with prn duonebs     #HTN  -Holding antihypertensives post-op, within normal range currently    #NIDDM  -Hold metformin and glipizide  -FSBG qac/qhs w/SSI    VTE Prophylaxis:   Mechanical Order History:     None      Pharmalogical Order History:      Ordered     Dose Route Frequency Stop    05/10/23 1556  Enoxaparin Sodium (LOVENOX) syringe 40 mg         40 mg SC Once 05/10/23 1720                Code Status and Medical Interventions:   Ordered at: 05/10/23 1555     Code Status (Patient has no pulse and is not breathing):    CPR (Attempt to Resuscitate)     Medical Interventions (Patient has pulse or is breathing):    Full Support         Disposition: Pend cxr in am, discharge <48hrs    I have reviewed any copied/forwarded text or data, verified its accuracy, and updated as necessary above.    Filemon Campbell MD  [facility] Hospitalist  05/11/23  17:45 EDT

## 2023-05-11 NOTE — ANESTHESIA POSTPROCEDURE EVALUATION
Patient: Rory Rios Jr.    Procedure Summary     Date: 05/11/23 Room / Location:  [facility] OR  /  [facility] OR    Anesthesia Start: 1420 Anesthesia Stop: 1517    Procedure: PLEURX CATHETER INSERTION (Left) Diagnosis:       Pleural effusion      (Pleural effusion [J90])    Surgeons: Brigitte Kolb MD Provider: Magan Singh MD    Anesthesia Type: MAC ASA Status: 4          Anesthesia Type: MAC    Vitals  Vitals Value Taken Time   /80 05/11/23 1532   Temp 97.3 °F (36.3 °C) 05/11/23 1518   Pulse 75 05/11/23 1532   Resp 16 05/11/23 1532   SpO2 95 % 05/11/23 1532           Post Anesthesia Care and Evaluation    Patient location during evaluation: PACU  Patient participation: complete - patient participated  Level of consciousness: awake  Pain score: 2  Pain management: adequate    Airway patency: patent  Anesthetic complications: No anesthetic complications  PONV Status: controlled  Cardiovascular status: acceptable and blood pressure returned to baseline  Respiratory status: acceptable and room air  Hydration status: acceptable

## 2023-05-11 NOTE — PAYOR COMM NOTE
"CONTACT:  DORIS TOBIAS MSN, APRN  UTILIZATION MANAGEMENT DEPT.  [facility]  [address] [facility], [address]  PHONE:  [phone]  FAX: [phone]    CLINICAL DOCUMENTATION FOR INPATIENT ADMISSION.    BLACK LUNG CLAIM # [id number]    [facility] NPI: [id number]  DR. FILEMON CHAVEZ NPI: [id number]    Rory Vera Jr. (63 y.o. Male)       Date of Birth   1960    Social Security Number       Address   [address]  [facility] [address]    Home Phone   [phone]    MRN   [id number]       Hinduism   None    Marital Status                               Admission Date   5/10/23    Admission Type   Emergency    Admitting Provider   Filemon Chavez MD    Attending Provider   Filemon Chavez MD    Department, Room/Bed   [address], [address]12/       Discharge Date       Discharge Disposition       Discharge Destination                                 Attending Provider: Filemon Chavez MD    Allergies: No Known Allergies    Isolation: None   Infection: None   Code Status: CPR    Ht: 195.6 cm (77\")   Wt: 127 kg (279 lb 6.4 oz)    Admission Cmt: None   Principal Problem: Pleural effusion exudative [J90]                   Active Insurance as of 5/10/2023       Primary Coverage       Payor Plan Insurance Group Employer/Plan Group    WORKERS COMPENSATION NAS[employer]IMENTOJEAN PAUL TAPIA ROSALVA       Payor Plan Address Payor Plan Phone Number Payor Plan Fax Number Effective Dates    [address] [phone]  6/25/2009 - None Entered    [facility] [address]         Subscriber Name Subscriber Birth Date Member ID       RORY VERA JR. 1960 [id number]               Secondary Coverage       Payor Plan Insurance Group Employer/Plan Group    MEDICARE MEDICARE A & B        Payor Plan Address Payor Plan Phone Number Payor Plan Fax Number Effective Dates    [address] [phone]  4/1/2013 - None Entered    [facility] [address]         Subscriber Name Subscriber Birth Date " Member ID       RORY VERA JR. 1960 [id number]                     Emergency Contacts        (Rel.) Home Phone Work Phone Mobile Phone    Whitney Vera (Spouse) [phone] -- [phone]    KATIE KENDRICK (Daughter) [phone] -- --              Problem List             Codes Noted - Resolved       Hospital    * (Principal) Pleural effusion exudative ICD-10-CM: J90  ICD-9-CM: 511.9 5/10/2023 - Present    Recurrent left pleural effusion ICD-10-CM: J90  ICD-9-CM: 511.9 2023 - Present       Non-Hospital    Coal workers pneumoconiosis ICD-10-CM: J60  ICD-9-CM: 500 2023 - Present    Pulmonary fibrosis ICD-10-CM: J84.10  ICD-9-CM: 515 2023 - Present    Black lung ICD-10-CM: J60  ICD-9-CM: 500 2023 - Present    Chronic obstructive pulmonary disease ICD-10-CM: J44.9  ICD-9-CM: 496 2023 - Present    Lung mass ICD-10-CM: R91.8  ICD-9-CM: 786.6 2023 - Present    PAT (paroxysmal atrial tachycardia) ICD-10-CM: I47.1  ICD-9-CM: 427.0 2023 - Present    Primary hypertension ICD-10-CM: I10  ICD-9-CM: 401.9 2023 - Present        History & Physical        Filmeon Campbell MD at 05/10/23 [address] [place] [address] HISTORY AND PHYSICAL    Patient Identification:  Name:  Rory Vera Jr.  Age:  63 y.o.  Sex:  male  :  1960  MRN:  [id number]   Visit Number:  [id number]  Admit Date: 5/10/2023   Room number:  3312/1P  Primary Care Physician:  Alejandro Monique MD    Date of Admission: 5/10/2023     Subjective     Chief complaint:    Chief Complaint   Patient presents with    Shortness of Breath     History of presenting illness:  63 y.o. male who was admitted on 5/10/2023 for progressive dyspnea with known recurrent left sided pleural effusion.    Rory Vera Jr. 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 In summary, during last hospitalization he was seen by pulmonologist and CT surgery w/thoracentesis on 4/5 showing exudative fluid w/negative cytology and cx. In setting of significant perihilar fibrosis/masses noted, underwent bronchoscopy with EBUS 4/24 w/biopsies negative for malignancy and cx NGTD. After evaluation it was determined that the recurrent effusion was secondary to his coal workers pneumoconiosis with progressive massive fibrosis and was recommended to f/u with  outpatient lung trx clinic which he has not been able to do yet. Tentative plan was for him to see CT surgery in outpatient clinic 5/16 with repeat CXR at that time and possible direct admit for pleuryx cath placement if reaccumulating.    Unfortunately patient began developing recurrent progressive dyspnea without cough, CP, fever, or chills over last 3-4 days that slowly worsened without any alleviating factors. Also notes dyspnea not significantly worsened lying flat. Does improve some with nebulizers and nightly cpap but otherwise no alleviating factors. Patient has home O2 prn but had not been using it more than usual in last week.    On arrival to ED, he underwent CXR similar to prior and in setting of dyspnea and elevated d-dimer, had CTPE that showed recurrence of moderate sized left sided effusion. Otherwise not significantly changed dense consolidations similar to prior imaging. No tachycardia or significant hypoxia noted. Discussed admission for pleuryx here vs discussion with [facility] and in setting of current stability but with progressive sx and recurrent effusion, decision made to admit to floor service with general surgery consult for possible pleuryx catheter placement.     Prior to admission I discussed patient's presentation and management with  attending ER physician and verbal handoff received.  ---------------------------------------------------------------------------------------------------------------------   A thorough systems based relevant ROS was asked and was negative except as noted above.  ---------------------------------------------------------------------------------------------------------------------   Past Medical History:   Diagnosis Date    Arthritis     Black lung     Black lung disease     Coal workers pneumoconiosis 4/27/2023    Diabetes mellitus     GERD (gastroesophageal reflux disease)     Hypertension     Pneumonia     Pulmonary fibrosis 4/27/2023     Past Surgical History:   Procedure Laterality Date    BRONCHOSCOPY N/A 4/24/2023    Procedure: BRONCHOSCOPY WITH ENDOBRONCHIAL ULTRASOUND;  Surgeon: Kota Willard MD;  Location: [facility] ENDOSCOPY;  Service: Pulmonary;  Laterality: N/A;    FOOT SURGERY Left      Family History   Problem Relation Age of Onset    Diabetes Mother     Heart failure Father     Diabetes Sister     Heart disease Brother     Diabetes Brother      Social History     Socioeconomic History    Marital status:    Tobacco Use    Smoking status: Never     Passive exposure: Never    Smokeless tobacco: Current     Types: Chew   Vaping Use    Vaping Use: Never used   Substance and Sexual Activity    Alcohol use: No    Drug use: No    Sexual activity: Defer     ---------------------------------------------------------------------------------------------------------------------   Allergies:  Patient has no known allergies.  ---------------------------------------------------------------------------------------------------------------------   Medications below are reported home medications pulling from within the system; at this time, these medications have not been reconciled unless otherwise specified and are in the verification process for further verifcation as current home medications.      Prior to  Admission Medications       Prescriptions Last Dose Informant Patient Reported? Taking?    albuterol (PROVENTIL HFA;VENTOLIN HFA) 108 (90 BASE) MCG/ACT inhaler   No No    Inhale 2 puffs Every 4 (Four) Hours As Needed for Wheezing or Shortness of Air.    aspirin 81 MG EC tablet   Yes No    Take 1 tablet by mouth Daily. OTC    Budeson-Glycopyrrol-Formoterol (BREZTRI) 160-9-4.8 MCG/ACT aerosol inhaler   Yes No    Inhale 2 puffs 2 (Two) Times a Day.    busPIRone (BUSPAR) 5 MG tablet   Yes No    Take 1 tablet by mouth every night at bedtime.    esomeprazole (nexIUM) 20 MG capsule   No No    Take 1 capsule by mouth Every Morning Before Breakfast.    glipizide (GLUCOTROL XL) 5 MG ER tablet   Yes No    Take 1 tablet by mouth Every Morning.    HYDROcodone-acetaminophen (NORCO) 7.5-325 MG per tablet   Yes No    Take 1 tablet by mouth 3 (Three) Times a Day.    hydrOXYzine (ATARAX) 25 MG tablet   No No    Take 1 tablet by mouth 3 (Three) Times a Day As Needed for Anxiety.    ibuprofen (ADVIL,MOTRIN) 800 MG tablet   Yes No    Take 1 tablet by mouth 3 (Three) Times a Day.    ipratropium-albuterol (DUO-NEB) 0.5-2.5 mg/3 ml nebulizer   Yes No    Take 3 mL by nebulization Every 4 (Four) Hours As Needed for Wheezing or Shortness of Air.    lisinopril (PRINIVIL,ZESTRIL) 10 MG tablet   Yes No    Take 1 tablet by mouth Every Night.    metFORMIN (GLUCOPHAGE) 500 MG tablet   Yes No    Take 1 tablet by mouth 2 (Two) Times a Day With Meals.    metoprolol succinate XL (TOPROL-XL) 25 MG 24 hr tablet   No No    Take 1 tablet by mouth Daily for 30 days.    Patient taking differently:  Take 2 tablets by mouth Every Morning.    omeprazole (priLOSEC) 40 MG capsule   Yes No    Take 1 capsule by mouth Daily.    roflumilast (DALIRESP) 250 MCG tablet tablet   Yes No    Take 1 tablet by mouth Daily.          Objective     Vital Signs:  Temp:  [97.1 °F (36.2 °C)-98 °F (36.7 °C)] 98 °F (36.7 °C)  Heart Rate:  [71-89] 79  Resp:  [18-24] 18  BP:  (117-147)/(70-95) 147/80    Mean Arterial Pressure (Non-Invasive) for the past 24 hrs (Last 3 readings):   Noninvasive MAP (mmHg)   05/10/23 1611 99   05/10/23 1450 96   05/10/23 1435 98     SpO2:  [94 %-98 %] 97 %  on  Flow (L/min):  [3] 3;   Device (Oxygen Therapy): nasal cannula  Body mass index is 33.57 kg/m².    Wt Readings from Last 3 Encounters:   05/10/23 128 kg (283 lb 1.6 oz)   04/20/23 131 kg (288 lb)   02/09/23 134 kg (295 lb)      ----------------------------------------------------------------------------------------------------------------------  Physical Exam  Vitals and nursing note reviewed.   Constitutional:       General: He is not in acute distress.  HENT:      Mouth/Throat:      Mouth: Mucous membranes are moist.      Pharynx: Oropharynx is clear.   Eyes:      General: No scleral icterus.     Extraocular Movements: Extraocular movements intact.   Cardiovascular:      Rate and Rhythm: Normal rate and regular rhythm.      Pulses: Normal pulses.   Pulmonary:      Effort: Pulmonary effort is normal. No respiratory distress.      Breath sounds: No wheezing.      Comments: Diminished breath sounds at left lung base but breath sounds audible deep into middle anterior and posterior lung fields  Abdominal:      Palpations: Abdomen is soft.      Tenderness: There is no abdominal tenderness.   Musculoskeletal:      Right lower leg: No edema.      Left lower leg: No edema.   Skin:     General: Skin is warm and dry.      Capillary Refill: Capillary refill takes less than 2 seconds.   Neurological:      General: No focal deficit present.      Mental Status: He is alert. Mental status is at baseline.   Psychiatric:         Mood and Affect: Mood normal.         Behavior: Behavior normal.       --------------------------------------------------------------------------------------------------------------------  LABS:    CBC and coagulation:  Results from last 7 days   Lab Units 05/10/23  3168   SED RATE mm/hr  39*   CRP mg/dL 1.54*   WBC 10*3/mm3 7.32   HEMOGLOBIN g/dL 13.8   HEMATOCRIT % 45.2   MCV fL 82.9   MCHC g/dL 30.5*   PLATELETS 10*3/mm3 223   INR  1.00   D DIMER QUANT MCGFEU/mL 1.85*     Acid/base balance:      Renal and electrolytes:  Results from last 7 days   Lab Units 05/10/23  1043   SODIUM mmol/L 142   POTASSIUM mmol/L 3.9   MAGNESIUM mg/dL 1.9   CHLORIDE mmol/L 106   CO2 mmol/L 26.7   BUN mg/dL 7*   CREATININE mg/dL 0.80   CALCIUM mg/dL 9.5   GLUCOSE mg/dL 106*     Estimated Creatinine Clearance: 136.7 mL/min (by C-G formula based on SCr of 0.8 mg/dL).    Liver and pancreatic function:  Results from last 7 days   Lab Units 05/10/23  1043   ALBUMIN g/dL 3.7   BILIRUBIN mg/dL 0.4   ALK PHOS U/L 90   AST (SGOT) U/L 16   ALT (SGPT) U/L 14     Endocrine function:  Lab Results   Component Value Date    HGBA1C 6.30 (H) 04/21/2023     Point of care bedside glucose levels:      Lab Results   Component Value Date    TSH 2.210 05/10/2023    FREET4 1.33 05/10/2023     Cardiac:  Results from last 7 days   Lab Units 05/10/23  1347 05/10/23  1043   HSTROP T ng/L 16* 15*   PROBNP pg/mL  --  189.9       Cultures:  Lab Results   Component Value Date    COLORU Yellow 05/10/2023    CLARITYU Clear 05/10/2023    PHUR 7.5 05/10/2023    GLUCOSEU Negative 05/10/2023    KETONESU Negative 05/10/2023    BLOODU Moderate (2+) (A) 05/10/2023    NITRITEU Negative 05/10/2023    LEUKOCYTESUR Negative 05/10/2023    BILIRUBINUR Negative 05/10/2023    UROBILINOGEN 0.2 E.U./dL 05/10/2023    RBCUA 13-20 (A) 05/10/2023    WBCUA 0-2 05/10/2023    BACTERIA Trace (A) 05/10/2023     Microbiology Results (last 10 days)       Procedure Component Value - Date/Time    COVID-19 and FLU A/B PCR - Swab, Nasopharynx [[id number]]  (Normal) Collected: 05/10/23 1044    Lab Status: Final result Specimen: Swab from Nasopharynx Updated: 05/10/23 1116     COVID19 Not Detected     Influenza A PCR Not Detected     Influenza B PCR Not Detected    Narrative:      Fact  sheet for providers: https://www.fda.gov/media/407006/download    Fact sheet for patients: https://www.fda.gov/media/326893/download    Test performed by PCR.            No results found for: PREGTESTUR, PREGSERUM, HCG, HCGQUANT  Pain Management Panel           Latest Ref Rng & Units 5/10/2023   Pain Management Panel   Amphetamine, Urine Qual Negative Negative     Barbiturates Screen, Urine Negative Negative     Benzodiazepine Screen, Urine Negative Negative     Buprenorphine, Screen, Urine Negative Negative     Cocaine Screen, Urine Negative Negative     Methadone Screen , Urine Negative Negative     Methamphetamine, Ur Negative Negative                  I have personally looked at the labs and they are summarized above.  ----------------------------------------------------------------------------------------------------------------------  Detailed radiology reports for the last 24 hours:    Imaging Results (Last 24 Hours)       Procedure Component Value Units Date/Time    CT Angiogram Chest Pulmonary Embolism [[id number]] Collected: 05/10/23 1305     Updated: 05/10/23 1308    Narrative:      CT ANGIOGRAM CHEST PULMONARY EMBOLISM-     CLINICAL INDICATION: Pulmonary embolism (PE) suspected, high prob        COMPARISON: 04/22/2023      PROCEDURE: Thin cut axial images were acquired through the pulmonary  vessels during the rapid infusion of IV contrast.     Additional 3-D reformatted images obtained via post-processing for  improved diagnostic accuracy and procedural planning.     Radiation dose reduction techniques were utilized per ALARA protocol.  Automated exposure control was initiated through either or [employer] or  D[employer] software packages by  protocol.           FINDINGS: Today's study demonstrates opacification of the central  pulmonary vessels.   There are no filling defects.   There is no truncation.     No evidence of a pulmonary embolus.     Dense areas of consolidation are present  bilaterally and are very  similar to the prior study..     Consolidation in the perihilar regions bilaterally     Moderate left and small right pleural effusion       Impression:      1. No evidence of a pulmonary embolus  2. Moderate left and small right pleural effusion  3. Dense areas of consolidation again noted bilaterally but similar to  the prior study.     This report was finalized on 5/10/2023 1:06 PM by Dr. Jamie Smith MD.       XR Chest 1 View [[id number]] Collected: 05/10/23 1100     Updated: 05/10/23 1103    Narrative:      XR CHEST 1 VW-     CLINICAL INDICATION: sob        COMPARISON: 04/27/2023      TECHNIQUE: Single frontal view of the chest.     FINDINGS:      LUNGS: Dense consolidation and nodularity bilaterally. Worse than on the  previous exam      HEART AND MEDIASTINUM: Heart and mediastinal contours are unremarkable        SKELETON: Bony and soft tissue structures are unremarkable.             Impression:      Overall appearance worse than on the prior study     This report was finalized on 5/10/2023 11:01 AM by Dr. Jamie Smith MD.             Final impressions for the last 30 days of radiology reports:    CT Chest With & Without Contrast Diagnostic    Result Date: 4/21/2023  Impression: 1. Extensive masslike perihilar disease, consistent with history of longstanding inhalational disease and progressive massive fibrosis. 2. Asymmetrically rounded and masslike area associated with left perihilar PMF in the left lower lobe, 4.5 cm in diameter, which may also reflect advanced PMF, but at least potentially could represent superimposed neoplasm, as discussed above. 3. Large, free-flowing left pleural effusion of uncertain significance. Electronically Signed: Mitch Sears  4/21/2023 3:48 PM EDT  Workstation ID: [id number]    XR Chest 1 View    Result Date: 5/10/2023  Overall appearance worse than on the prior study  This report was finalized on 5/10/2023 11:01 AM by Dr. Jamie Smith MD.      XR Chest  1 View    Result Date: 4/27/2023  Impression: Stable chest. No pneumothorax. Multifocal infiltrates. Electronically Signed: Bud Britton  4/27/2023 8:21 AM EDT  Workstation ID: [id number]    XR Chest 1 View    Result Date: 4/26/2023  Impression: Removal of left-sided chest tube. No pneumothorax. Otherwise, stable exam. Electronically Signed: Shantelle Grimm  4/26/2023 3:04 PM EDT  Workstation ID: [id number]    XR Chest 1 View    Result Date: 4/26/2023  Impression: Left basilar pleural drain. No significant effusion or evidence of pneumothorax. Perihilar airspace masses present bilaterally, similar as compared to the previous study Electronically Signed: Shantelle Grimm  4/26/2023 7:09 AM EDT  Workstation ID: [id number]    XR Chest 1 View    Result Date: 4/25/2023  Impression: 1. Stable right and left perihilar masses as previously described. 2. Improving bibasilar pulmonary interstitial disease, now mild in extent. Electronically Signed: Mitch Sears  4/25/2023 8:57 AM EDT  Workstation ID: [id number]    XR Chest 1 View    Result Date: 4/24/2023  Impression: Left pleural drain remains in place. Stable changes of pulmonary fibrosis. No new chest disease is seen. Electronically Signed: Mitch Sears  4/24/2023 8:36 AM EDT  Workstation ID: [id number]    XR Chest 1 View    Result Date: 4/23/2023  Impression: Decreased size of pleural effusion on the left. It is now small. Small caliber chest tube is present. No pneumothorax. No change in masslike bilateral parenchymal airspace opacities. Electronically Signed: Patti Williamson  4/23/2023 8:30 AM EDT  Workstation ID: [id number]    XR Chest 1 View    Result Date: 4/20/2023  Impression: 1. No change in the masslike nodular appearing infiltrate in the right perihilar region. 2. Improvement in the left perihilar infiltrate compared with the last study with improvement in aeration at the left base. 3. Moderate pleural effusion, unchanged. Electronically Signed: Miguelito Deras  4/20/2023 2:22 PM EDT  Workstation  ID: [id number]    CT Angiogram Chest Pulmonary Embolism    Result Date: 5/10/2023  1. No evidence of a pulmonary embolus 2. Moderate left and small right pleural effusion 3. Dense areas of consolidation again noted bilaterally but similar to the prior study.  This report was finalized on 5/10/2023 1:06 PM by Dr. Jamie Smith MD.      X-ray chest PA and lateral    Result Date: 4/17/2023  No significant interval change. Images reviewed, interpreted, and dictated by Dr. Gauri Gtz. Transcribed by Tahmina Isaacs PA-C.    X-ray chest PA and lateral    Result Date: 4/10/2023  There has been no significant interval change in  the opacities, likely related to pneumoconiosis. Small left pleural effusion. Images reviewed, interpreted, and dictated by Dr. Gauri Gtz. Transcribed by JOHNATHON Huerta (R).      I have personally looked at the radiology images, my personal interpretation is as follows:    CXR with left sided effusion and multifocal dense consolidations    CT with moderate sized left effusion and noted b/l perihilar dense infiltrates/masses    Assessment & Plan       #hx of coal workers pneumoconiosis with dense b/l perihilar infiltrates/pulmonary fibrosis  #Recurrent left sided pleural effusion, previously exudative thought secondary to above  #Chronic hypoxic respiratory failure (2L)  -Extensive w/u including EBUS of perihilar mass performed at [facility] during last hospitalization with effusion found to be exudative in nature with negative cytology x2 samples and negative fungal and bacterial w/u. Currently on baseline O2 with no fever, inf clinical sx, or leukocytosis. Crp mildly elevated not dissimilar from prior. BNP wnl and CTPE neg for PE. Defer abx coverage  -Patient would be well served by pleurodesis or VATS but evaluated by CT surgery at [facility] and felt overall pulmonary condition not amenable to either intervention, treatment ultimately narrowed to lung trx eval with pleuryx for palliation of sx in  interim  -General surgery consulted for possible pleuryx catheter placement  -TTE added to complete cardiac w/u, no prior echo on chart but note effusion was exudative in nature, not likely cardiac origin and no other clinical evidence of HF  -NPO at midnight until evaluated by surgical consultant  -s/p IV lasix in ED, monitor for sx improvement but unlikely to significantly improve given exudative noncardiac nature    #COOPER  -Resume home cpap    #Hx of atrial tachycardia  -Resume home metoprolol pend med rec    #?COPD hx  -Reported on last hospitalization but no smoking hx and no PFTs available for review but likely secondary to environmental contaminant  -Resume home breztri formulary equivalent with prn duonebs    #HTN  -Resume antihypertensives post op pend med rec    #NIDDM  -Hold metformin and glipizide  -FSBG qac/qhs w/SSI        VTE Prophylaxis:   Mechanical Order History:       None          Pharmalogical Order History:        Ordered     Dose Route Frequency Stop    05/10/23 2262  Enoxaparin Sodium (LOVENOX) syringe 40 mg         40 mg SC Once --                  The patient is high risk due to the following diagnoses/reasons:  Recurrent effusion requiring surgical drainage with chronic O2 usage    Admission Status:  I certify that this patient requires inpatient hospitalization for greater than 2 midnights in INPATIENT status.  I anticipate there to be the need for care which can only be reasonably provided in a hospital setting such as possible need for aggressive/expedited ancillary services and/or consultation services, IV medications, close physician monitoring, and/or procedures. In such, I feel patient’s risk for adverse outcomes and need for care warrant INPATIENT evaluation and predict the patient’s care encounter to likely last beyond 2 midnights.    Code Status and Medical Interventions:   Ordered at: 05/10/23 3213     Code Status (Patient has no pulse and is not breathing):    CPR (Attempt to  Resuscitate)     Medical Interventions (Patient has pulse or is breathing):    Full Support        Disposition: Admit to tele pend surgical eval    Filemon Campbell MD  [facility]  05/10/23  16:56 EDT        Electronically signed by Filemon Campbell MD at 05/10/23 1737          Emergency Department Notes        Ap Reyes MD at 05/10/23 1704          Subjective     History provided by:  Patient   used: No    Shortness of Breath  Severity:  Severe  Onset quality:  Gradual  Timing:  Constant  Progression:  Worsening  Chronicity:  Chronic  Context: activity    Context: not animal exposure, not emotional upset, not fumes, not known allergens, not occupational exposure, not pollens, not smoke exposure, not strong odors, not URI and not weather changes    Relieved by:  Nothing  Worsened by:  Activity, coughing, deep breathing, exertion and movement  Ineffective treatments:  Oxygen  Associated symptoms: diaphoresis    Associated symptoms: no abdominal pain, no chest pain, no claudication, no cough, no ear pain, no fever, no headaches, no hemoptysis, no neck pain, no PND, no rash, no sore throat, no sputum production, no syncope, no swollen glands, no vomiting and no wheezing    Risk factors: no recent alcohol use, no family hx of DVT, no hx of cancer, no hx of PE/DVT, no obesity, no prolonged immobilization, no recent surgery and no tobacco use        Review of Systems   Constitutional: Positive for diaphoresis. Negative for activity change, appetite change, chills, fatigue and fever.   HENT: Negative for congestion, ear pain and sore throat.    Eyes: Negative for redness.   Respiratory: Positive for shortness of breath. Negative for cough, hemoptysis, sputum production, chest tightness and wheezing.    Cardiovascular: Negative for chest pain, palpitations, claudication, leg swelling, syncope and PND.   Gastrointestinal: Negative for abdominal pain, diarrhea,  nausea and vomiting.   Genitourinary: Negative for dysuria and urgency.   Musculoskeletal: Negative for arthralgias, back pain, myalgias and neck pain.   Skin: Negative for pallor, rash and wound.   Neurological: Negative for dizziness, speech difficulty, weakness and headaches.   Psychiatric/Behavioral: Negative for agitation, behavioral problems, confusion and decreased concentration.   All other systems reviewed and are negative.      Past Medical History:   Diagnosis Date    Arthritis     Black lung     Black lung disease     Coal workers pneumoconiosis 4/27/2023    Diabetes mellitus     GERD (gastroesophageal reflux disease)     Hypertension     Pneumonia     Pulmonary fibrosis 4/27/2023       No Known Allergies    Past Surgical History:   Procedure Laterality Date    BRONCHOSCOPY N/A 4/24/2023    Procedure: BRONCHOSCOPY WITH ENDOBRONCHIAL ULTRASOUND;  Surgeon: Kota Willard MD;  Location: [facility] ENDOSCOPY;  Service: Pulmonary;  Laterality: N/A;    FOOT SURGERY Left        Family History   Problem Relation Age of Onset    Diabetes Mother     Heart failure Father     Diabetes Sister     Heart disease Brother     Diabetes Brother        Social History     Socioeconomic History    Marital status:    Tobacco Use    Smoking status: Never     Passive exposure: Never    Smokeless tobacco: Current     Types: Chew   Vaping Use    Vaping Use: Never used   Substance and Sexual Activity    Alcohol use: No    Drug use: No    Sexual activity: Defer           Objective   Physical Exam  Vitals and nursing note reviewed.   Constitutional:       General: He is not in acute distress.     Appearance: Normal appearance. He is well-developed. He is not toxic-appearing or diaphoretic.   HENT:      Head: Normocephalic and atraumatic.      Right Ear: External ear normal.      Left Ear: External ear normal.      Nose: Nose normal.      Mouth/Throat:      Pharynx: No oropharyngeal exudate.      Tonsils: No tonsillar exudate.    Eyes:      General: Lids are normal.      Conjunctiva/sclera: Conjunctivae normal.      Pupils: Pupils are equal, round, and reactive to light.   Neck:      Thyroid: No thyromegaly.   Cardiovascular:      Rate and Rhythm: Normal rate and regular rhythm.      Pulses: Normal pulses.      Heart sounds: Normal heart sounds, S1 normal and S2 normal.   Pulmonary:      Effort: Pulmonary effort is normal. Tachypnea present. No respiratory distress.      Breath sounds: Examination of the right-upper field reveals decreased breath sounds. Examination of the left-upper field reveals decreased breath sounds and rales. Examination of the right-middle field reveals decreased breath sounds. Examination of the left-middle field reveals decreased breath sounds and rales. Examination of the right-lower field reveals decreased breath sounds. Examination of the left-lower field reveals decreased breath sounds and rales. Decreased breath sounds and rales present. No wheezing.   Chest:      Chest wall: No tenderness.   Abdominal:      General: Bowel sounds are normal. There is no distension.      Palpations: Abdomen is soft.      Tenderness: There is no abdominal tenderness. There is no guarding or rebound.   Musculoskeletal:         General: No tenderness or deformity. Normal range of motion.      Cervical back: Full passive range of motion without pain, normal range of motion and neck supple.   Lymphadenopathy:      Cervical: No cervical adenopathy.   Skin:     General: Skin is warm and dry.      Coloration: Skin is not pale.      Findings: No erythema or rash.   Neurological:      Mental Status: He is alert and oriented to person, place, and time.      GCS: GCS eye subscore is 4. GCS verbal subscore is 5. GCS motor subscore is 6.      Cranial Nerves: No cranial nerve deficit.      Sensory: No sensory deficit.   Psychiatric:         Speech: Speech normal.         Behavior: Behavior normal.         Thought Content: Thought content  normal.         Judgment: Judgment normal.         Procedures          ED Course  ED Course as of 05/10/23 1708   Wed May 10, 2023   1034 ECG 12 Lead Chest Pain  Vent. Rate :  80 BPM     Atrial Rate :  80 BPM     P-R Int : 172 ms          QRS Dur :  78 ms      QT Int : 366 ms       P-R-T Axes :  44  80  62 degrees     QTc Int : 422 ms     Sinus rhythm with premature atrial complexes  Low voltage QRS  Septal infarct (cited on or before 09-FEB-2023)  Abnormal ECG  When compared with ECG of 22-APR-2023 06:17,  premature atrial complexes are now present  Questionable change in QRS duration  Criteria for Inferior infarct are no longer present  Questionable change in initial forces of Septal leads [ES]   1115 XR Chest 1 View  IMPRESSION:  Overall appearance worse than on the prior study    [ES]      ED Course User Index  [ES] Ap Reyes MD                                           Medical Decision Making    History provided by:  Patient   used: No    Shortness of Breath  Severity:  Severe  Onset quality:  Gradual  Timing:  Constant  Progression:  Worsening  Chronicity:  Chronic  Context: activity    Context: not animal exposure, not emotional upset, not fumes, not known allergens, not occupational exposure, not pollens, not smoke exposure, not strong odors, not URI and not weather changes    Relieved by:  Nothing  Worsened by:  Activity, coughing, deep breathing, exertion and movement  Ineffective treatments:  Oxygen  Associated symptoms: diaphoresis    Associated symptoms: no abdominal pain, no chest pain, no claudication, no cough, no ear pain, no fever, no headaches, no hemoptysis, no neck pain, no PND, no rash, no sore throat, no sputum production, no syncope, no swollen glands, no vomiting and no wheezing    Risk factors: no recent alcohol use, no family hx of DVT, no hx of cancer, no hx of PE/DVT, no obesity, no prolonged immobilization, no recent surgery and no tobacco use         Amount and/or Complexity of Data Reviewed  External Data Reviewed: labs, radiology, ECG and notes.  Labs: ordered. Decision-making details documented in ED Course.  Radiology: ordered and independent interpretation performed. Decision-making details documented in ED Course.  ECG/medicine tests: ordered and independent interpretation performed. Decision-making details documented in ED Course.      Risk  Prescription drug management.  Decision regarding hospitalization.          Final diagnoses:   Pleural effusion       ED Disposition  ED Disposition       ED Disposition   Decision to Admit    Condition   --    Comment   Level of Care: Telemetry [5]   Diagnosis: Pleural effusion exudative [168466]   Admitting Physician: MARIO CHAVEZ [[id number]]   Attending Physician: MARIO CHAVEZ [[id number]]   Certification: I Certify That Inpatient Hospital Services Are Medically Necessary For Greater Than 2 Midnights                 No follow-up provider specified.       Medication List        ASK your doctor about these medications      esomeprazole 40 MG capsule  Commonly known as: nexIUM  Ask about: Which instructions should I use?     metoprolol succinate XL 50 MG 24 hr tablet  Commonly known as: TOPROL-XL  Ask about: Which instructions should I use?               Ap Reyes MD  05/10/23 1708      Electronically signed by Ap Reyes MD at 05/10/23 1708       Vital Signs (last day)       Date/Time Temp Temp src Pulse Resp BP Patient Position SpO2    05/11/23 0708 -- -- 91 18 -- -- 94    05/11/23 0658 -- -- 96 18 -- -- 96    05/11/23 0600 98.9 (37.2) Oral 85 18 93/55 Lying 95    05/11/23 0207 98.3 (36.8) Oral 77 18 90/54 Lying 93    05/10/23 2220 99 (37.2) Axillary 78 18 115/72 Lying 97    05/10/23 1930 -- -- 81 18 -- -- 97    05/10/23 1920 -- -- 80 18 -- -- 96    05/10/23 1900 99.1 (37.3) Axillary 76 18 118/74 Lying 97    05/10/23 1647 -- -- 79 18 -- -- 97    05/10/23 1640 -- -- 81 18 -- --  95    05/10/23 1611 98 (36.7) Oral 79 20 147/80 Lying 94    05/10/23 1450 -- -- 74 -- 117/82 -- 97    05/10/23 1439 -- -- -- -- -- -- 97    05/10/23 1435 -- -- 73 -- 125/85 -- --    05/10/23 1405 -- -- 73 -- 118/84 -- --    05/10/23 1336 -- -- -- -- -- -- 96    05/10/23 1335 -- -- 74 -- 125/70 -- --    05/10/23 1325 -- -- -- -- -- -- 96    05/10/23 1320 -- -- 76 -- 119/79 -- --    05/10/23 1223 -- -- -- -- -- -- 98    05/10/23 1220 -- -- 71 -- 146/95 -- --    05/10/23 1202 -- -- 75 -- 138/92 -- --    05/10/23 1140 -- -- -- -- -- -- 97    05/10/23 1139 -- -- -- -- -- -- 97    05/10/23 1137 -- -- 75 -- 145/89 -- --    05/10/23 1122 -- -- -- -- -- -- 98    05/10/23 1120 -- -- 78 -- 143/88 -- --    05/10/23 1024 97.1 (36.2) Infrared 89 24 138/79 Sitting 95          Intake & Output (last day)         05/10 0701 05/11 0700 05/11 0701  05/12 0700    P.O. 240     Total Intake(mL/kg) 240 (1.9)     Net +240           Urine Unmeasured Occurrence 6 x           Lines, Drains & Airways       Active LDAs       Name Placement date Placement time Site Days    Peripheral IV 05/10/23 1248 Anterior;Proximal;Right;Upper Arm 05/10/23  1248  Arm  less than 1              Inactive LDAs       Name Placement date Placement time Removal date Removal time Site Days    [REMOVED] Peripheral IV 05/10/23 1158 Left Antecubital 05/10/23  1158  05/10/23  1534  Antecubital  less than 1    [REMOVED] Chest Tube 1 Left Midaxillary 04/22/23  1339  05/10/23  1534  Midaxillary  18                  Medication Administration Report for Rory Rios Jr. as of 05/11/23 0915         Medications 05/10/23 05/11/23      budesonide-formoterol (SYMBICORT) 160-4.5 MCG/ACT inhaler 2 puff  Dose: 2 puff  Freq: 2 Times Daily - RT Route: IN  Start: 05/10/23 1900   Admin Instructions:   Use with atrovent nebs q6h  Include Respiratory Treatment Education, therapeutic interchange for breztri   Shake well.  Rinse mouth after use, do not swallow water.  Send aerosols to  pharmacy in ziplock bag for proper disposal.    1920-Given            0658-Given     1900              HYDROcodone-acetaminophen (NORCO) 7.5-325 MG per tablet 1 tablet  Dose: 1 tablet  Freq: 3 Times Daily Route: PO  Start: 05/10/23 1600   Admin Instructions:   Based on patient request - if ordered for moderate or severe pain, provider allows for administration of a medication prescribed for a lower pain scale.  [VINCENZO]    Do not exceed 4 grams of acetaminophen in a 24 hr period. Max dose of 2gm for AST/ALT greater than 120 units/L        If given for pain, use the following pain scale:   Mild Pain = Pain Score of 1-3, CPOT 1-2  Moderate Pain = Pain Score of 4-6, CPOT 3-4  Severe Pain = Pain Score of 7-10, CPOT 5-8    1720-Given     2004-Given           0900     1600     2100             Insulin Lispro (humaLOG) injection 2-9 Units  Dose: 2-9 Units  Freq: 3 Times Daily With Meals Route: SC  Start: 05/10/23 1800   Admin Instructions:   Correction - Moderate Dose. 40-60 units/day total insulin dose or average weight patient, patient taking oral hypoglycemic    Blood Glucose 150-199 mg/dL - 2 units  Blood Glucose 200-249 mg/dL - 4 units  Blood Glucose 250-299 mg/dL - 6 units  Blood Glucose 300-349 mg/dL - 7 units  Blood Glucose 350-400 mg/dL - 8 units  Blood Glucose greater than 400 mg/dL - 9 units & Call Provider   Caution: Look alike/sound alike drug alert    (1707)-Not Given            (0900)-Not Given     1200     1800             ipratropium (ATROVENT) nebulizer solution 0.5 mg  Dose: 0.5 mg  Freq: 4 Times Daily - RT Route: NEBULIZATION  Start: 05/10/23 1900   Admin Instructions:   Use with symbicort  Include Respiratory Treatment Education, therapeutic interchange for Breztri      1920-Given            (0013)-Not Given     0658-Given     1300     1900            metoprolol succinate XL (TOPROL-XL) 24 hr tablet 50 mg  Dose: 50 mg  Freq: Daily Route: PO  Start: 05/11/23 0900   Admin Instructions:   Do not crush or  chew.     (0858)-Not Given               pantoprazole (PROTONIX) EC tablet 40 mg  Dose: 40 mg  Freq: Every Morning Before Breakfast Route: PO  Start: 05/10/23 1845   Admin Instructions:   Swallow whole; do not crush, split, or chew.    1959-Given            (0858)-Not Given               roflumilast (DALIRESP) tablet 250 mcg  Dose: 250 mcg  Freq: Daily Route: PO  Start: 05/10/23 1900 1959-Given            (0900)-Not Given               sennosides-docusate (PERICOLACE) 8.6-50 MG per tablet 2 tablet  Dose: 2 tablet  Freq: Nightly Route: PO  Start: 05/10/23 2100    2000-Given            2100               sodium chloride 0.9 % flush 10 mL  Dose: 10 mL  Freq: Every 12 Hours Scheduled Route: IV  Start: 05/10/23 2100    2000-Given            0858-Given     2100             Completed Medications  Medications 05/10/23 05/11/23       Enoxaparin Sodium (LOVENOX) syringe 40 mg  Dose: 40 mg  Freq: Once Route: SC  Indications of Use: PROPHYLAXIS OF VENOUS THROMBOEMBOLISM  Start: 05/10/23 1558   End: 05/10/23 1720   Admin Instructions:   Give subcutaneous in abdomen only. Do not massage site after injection.    1720-Given                furosemide (LASIX) injection 80 mg  Dose: 80 mg  Freq: Once Route: IV  Start: 05/10/23 1118   End: 05/10/23 1202    1202-Given                iopamidol (ISOVUE-370) 76 % injection 100 mL  Dose: 100 mL  Freq: Once in Imaging Route: IV  Start: 05/10/23 1305   End: 05/10/23 1303    1303-Given                            Lab Results (all)       Procedure Component Value Units Date/Time    Procalcitonin [[id number]]  (Normal) Collected: 05/11/23 0755    Specimen: Blood Updated: 05/11/23 0837     Procalcitonin 0.09 ng/mL     Narrative:      As a Marker for Sepsis (Non-Neonates):    1. <0.5 ng/mL represents a low risk of severe sepsis and/or septic shock.  2. >2 ng/mL represents a high risk of severe sepsis and/or septic shock.    As a Marker for Lower Respiratory Tract Infections that require  "antibiotic therapy:    PCT on Admission    Antibiotic Therapy       6-12 Hrs later    >0.5                Strongly Recommended  >0.25 - <0.5        Recommended   0.1 - 0.25          Discouraged              Remeasure/reassess PCT  <0.1                Strongly Discouraged     Remeasure/reassess PCT    As 28 day mortality risk marker: \"Change in Procalcitonin Result\" (>80% or <=80%) if Day 0 (or Day 1) and Day 4 values are available. Refer to [email or web address]://[email or web address]-pct-[email or web address]    Change in PCT <=80%  A decrease of PCT levels below or equal to 80% defines a positive change in PCT test result representing a higher risk for 28-day all-cause mortality of patients diagnosed with severe sepsis for septic shock.    Change in PCT >80%  A decrease of PCT levels of more than 80% defines a negative change in PCT result representing a lower risk for 28-day all-cause mortality of patients diagnosed with severe sepsis or septic shock.       C-reactive Protein [[id number]]  (Abnormal) Collected: 05/11/23 0755    Specimen: Blood Updated: 05/11/23 0827     C-Reactive Protein 4.78 mg/dL     POC Glucose Once [[id number]]  (Normal) Collected: 05/11/23 0644    Specimen: Blood Updated: 05/11/23 0650     Glucose 105 mg/dL      Comment: Meter: [id number] : [id number] CADE SANCHEZ       CBC (No Diff) [[id number]]  (Abnormal) Collected: 05/10/23 2212    Specimen: Blood Updated: 05/10/23 2312     WBC 12.44 10*3/mm3      RBC 5.47 10*6/mm3      Hemoglobin 13.8 g/dL      Hematocrit 45.2 %      MCV 82.6 fL      MCH 25.2 pg      MCHC 30.5 g/dL      RDW 14.9 %      RDW-SD 45.1 fl      MPV 8.8 fL      Platelets 199 10*3/mm3     Magnesium [[id number]]  (Normal) Collected: 05/10/23 2212    Specimen: Blood Updated: 05/10/23 2242     Magnesium 1.7 mg/dL     Basic Metabolic Panel [[id number]]  (Abnormal) Collected: 05/10/23 2212    Specimen: Blood Updated: 05/10/23 2242     Glucose 131 mg/dL      BUN 8 mg/dL      Creatinine 0.86 mg/dL      Sodium 140 " mmol/L      Potassium 3.8 mmol/L      Chloride 103 mmol/L      CO2 26.2 mmol/L      Calcium 9.1 mg/dL      BUN/Creatinine Ratio 9.3     Anion Gap 10.8 mmol/L      eGFR 96.1 mL/min/1.73     Narrative:      GFR Normal >60  Chronic Kidney Disease <60  Kidney Failure <15      POC Glucose Once [[id number]]  (Normal) Collected: 05/10/23 1709    Specimen: Blood Updated: 05/10/23 1718     Glucose 123 mg/dL      Comment: Meter: [id number] : [id number] GLYNN MAYO       High Sensitivity Troponin T 2Hr [[id number]]  (Abnormal) Collected: 05/10/23 1347    Specimen: Blood from Arm, Right Updated: 05/10/23 1425     HS Troponin T 16 ng/L      Troponin T Delta 1 ng/L     Narrative:      High Sensitive Troponin T Reference Range:  <10.0 ng/L- Negative Female for AMI  <15.0 ng/L- Negative Male for AMI  >=10 - Abnormal Female indicating possible myocardial injury.  >=15 - Abnormal Male indicating possible myocardial injury.   Clinicians would have to utilize clinical acumen, EKG, Troponin, and serial changes to determine if it is an Acute Myocardial Infarction or myocardial injury due to an underlying chronic condition.         Urinalysis, Microscopic Only - Urine, Clean Catch [[id number]]  (Abnormal) Collected: 05/10/23 1239    Specimen: Urine, Clean Catch Updated: 05/10/23 1311     RBC, UA 13-20 /HPF      WBC, UA 0-2 /HPF      Bacteria, UA Trace /HPF      Squamous Epithelial Cells, UA 0-2 /HPF      Hyaline Casts, UA None Seen /LPF      Methodology Automated Microscopy    Urine Drug Screen - Urine, Clean Catch [[id number]]  (Normal) Collected: 05/10/23 1239    Specimen: Urine, Clean Catch Updated: 05/10/23 1309     THC, Screen, Urine Negative     Phencyclidine (PCP), Urine Negative     Cocaine Screen, Urine Negative     Methamphetamine, Ur Negative     Opiate Screen Negative     Amphetamine Screen, Urine Negative     Benzodiazepine Screen, Urine Negative     Tricyclic Antidepressants Screen Negative     Methadone Screen, Urine  Negative     Barbiturates Screen, Urine Negative     Oxycodone Screen, Urine Negative     Propoxyphene Screen Negative     Buprenorphine, Screen, Urine Negative    Narrative:      Cutoff For Drugs Screened:    Amphetamines               500 ng/ml  Barbiturates               200 ng/ml  Benzodiazepines            150 ng/ml  Cocaine                    150 ng/ml  Methadone                  200 ng/ml  Opiates                    100 ng/ml  Phencyclidine               25 ng/ml  THC                            50 ng/ml  Methamphetamine            500 ng/ml  Tricyclic Antidepressants  300 ng/ml  Oxycodone                  100 ng/ml  Propoxyphene               300 ng/ml  Buprenorphine               10 ng/ml    The normal value for all drugs tested is negative. This report includes unconfirmed screening results, with the cutoff values listed, to be used for medical treatment purposes only.  Unconfirmed results must not be used for non-medical purposes such as employment or legal testing.  Clinical consideration should be applied to any drug of abuse test, particularly when unconfirmed results are used.      Urinalysis With Microscopic If Indicated (No Culture) - Urine, Clean Catch [[id number]]  (Abnormal) Collected: 05/10/23 1239    Specimen: Urine, Clean Catch Updated: 05/10/23 1308     Color, UA Yellow     Appearance, UA Clear     pH, UA 7.5     Specific Gravity, UA <=1.005     Glucose, UA Negative     Ketones, UA Negative     Bilirubin, UA Negative     Blood, UA Moderate (2+)     Protein, UA Negative     Leuk Esterase, UA Negative     Nitrite, UA Negative     Urobilinogen, UA 0.2 E.U./dL    High Sensitivity Troponin T [[id number]]  (Abnormal) Collected: 05/10/23 1043    Specimen: Blood from Arm, Left Updated: 05/10/23 1129     HS Troponin T 15 ng/L     Narrative:      High Sensitive Troponin T Reference Range:  <10.0 ng/L- Negative Female for AMI  <15.0 ng/L- Negative Male for AMI  >=10 - Abnormal Female indicating possible  myocardial injury.  >=15 - Abnormal Male indicating possible myocardial injury.   Clinicians would have to utilize clinical acumen, EKG, Troponin, and serial changes to determine if it is an Acute Myocardial Infarction or myocardial injury due to an underlying chronic condition.         BNP [[id number]]  (Normal) Collected: 05/10/23 1043    Specimen: Blood from Arm, Left Updated: 05/10/23 1129     proBNP 189.9 pg/mL     Narrative:      Among patients with dyspnea, NT-proBNP is highly sensitive for the detection of acute congestive heart failure. In addition NT-proBNP of <300 pg/ml effectively rules out acute congestive heart failure with 99% negative predictive value.    Results may be falsely decreased if patient taking Biotin.      TSH [[id number]]  (Normal) Collected: 05/10/23 1043    Specimen: Blood from Arm, Left Updated: 05/10/23 1129     TSH 2.210 uIU/mL     T4, Free [[id number]]  (Normal) Collected: 05/10/23 1043    Specimen: Blood from Arm, Left Updated: 05/10/23 1129     Free T4 1.33 ng/dL     Narrative:      Results may be falsely increased if patient taking Biotin.      Magnesium [[id number]]  (Normal) Collected: 05/10/23 1043    Specimen: Blood from Arm, Left Updated: 05/10/23 1126     Magnesium 1.9 mg/dL     Comprehensive Metabolic Panel [[id number]]  (Abnormal) Collected: 05/10/23 1043    Specimen: Blood from Arm, Left Updated: 05/10/23 1126     Glucose 106 mg/dL      BUN 7 mg/dL      Creatinine 0.80 mg/dL      Sodium 142 mmol/L      Potassium 3.9 mmol/L      Chloride 106 mmol/L      CO2 26.7 mmol/L      Calcium 9.5 mg/dL      Total Protein 6.4 g/dL      Albumin 3.7 g/dL      ALT (SGPT) 14 U/L      AST (SGOT) 16 U/L      Alkaline Phosphatase 90 U/L      Total Bilirubin 0.4 mg/dL      Globulin 2.7 gm/dL      A/G Ratio 1.4 g/dL      BUN/Creatinine Ratio 8.8     Anion Gap 9.3 mmol/L      eGFR 98.2 mL/min/1.73     Narrative:      GFR Normal >60  Chronic Kidney Disease <60  Kidney Failure <15      Ethanol  [[id number]] Collected: 05/10/23 1043    Specimen: Blood from Arm, Left Updated: 05/10/23 1126     Ethanol <10 mg/dL      Ethanol % <0.010 %     Narrative:      >/= 80.0 legally intoxicated    Acetaminophen Level [[id number]]  (Normal) Collected: 05/10/23 1043    Specimen: Blood from Arm, Left Updated: 05/10/23 1126     Acetaminophen <5.0 mcg/mL     Salicylate Level [[id number]]  (Normal) Collected: 05/10/23 1043    Specimen: Blood from Arm, Left Updated: 05/10/23 1126     Salicylate <0.3 mg/dL     C-reactive Protein [[id number]]  (Abnormal) Collected: 05/10/23 1043    Specimen: Blood from Arm, Left Updated: 05/10/23 1126     C-Reactive Protein 1.54 mg/dL     COVID-19 and FLU A/B PCR - Swab, Nasopharynx [[id number]]  (Normal) Collected: 05/10/23 1044    Specimen: Swab from Nasopharynx Updated: 05/10/23 1116     COVID19 Not Detected     Influenza A PCR Not Detected     Influenza B PCR Not Detected    Narrative:      Fact sheet for providers: https://www.fda.gov/media/043728/download    Fact sheet for patients: https://www.fda.gov/media/000345/download    Test performed by PCR.    Protime-INR [[id number]]  (Normal) Collected: 05/10/23 1043    Specimen: Blood from Arm, Left Updated: 05/10/23 1109     Protime 13.7 Seconds      INR 1.00    Narrative:      Suggested INR therapeutic range for stable oral anticoagulant therapy:    Low Intensity therapy:   1.5-2.0  Moderate Intensity therapy:   2.0-3.0  High Intensity therapy:   2.5-4.0    aPTT [[id number]]  (Normal) Collected: 05/10/23 1043    Specimen: Blood from Arm, Left Updated: 05/10/23 1109     PTT 30.2 seconds     Narrative:      PTT Heparin Therapeutic Range:  59 - 95 seconds      D-dimer, Quantitative [[id number]]  (Abnormal) Collected: 05/10/23 1043    Specimen: Blood from Arm, Left Updated: 05/10/23 1109     D-Dimer, Quantitative 1.85 MCGFEU/mL     Sedimentation Rate [[id number]]  (Abnormal) Collected: 05/10/23 1043    Specimen: Blood from Arm, Left Updated: 05/10/23 1101      Sed Rate 39 mm/hr     CBC Auto Differential [[id number]]  (Abnormal) Collected: 05/10/23 1043    Specimen: Blood from Arm, Left Updated: 05/10/23 1055     WBC 7.32 10*3/mm3      RBC 5.45 10*6/mm3      Hemoglobin 13.8 g/dL      Hematocrit 45.2 %      MCV 82.9 fL      MCH 25.3 pg      MCHC 30.5 g/dL      RDW 14.8 %      RDW-SD 44.7 fl      MPV 9.0 fL      Platelets 223 10*3/mm3      Neutrophil % 70.6 %      Lymphocyte % 18.0 %      Monocyte % 8.1 %      Eosinophil % 2.3 %      Basophil % 0.7 %      Immature Grans % 0.3 %      Neutrophils, Absolute 5.17 10*3/mm3      Lymphocytes, Absolute 1.32 10*3/mm3      Monocytes, Absolute 0.59 10*3/mm3      Eosinophils, Absolute 0.17 10*3/mm3      Basophils, Absolute 0.05 10*3/mm3      Immature Grans, Absolute 0.02 10*3/mm3      nRBC 0.0 /100 WBC           Imaging Results (All)       Procedure Component Value Units Date/Time    CT Angiogram Chest Pulmonary Embolism [[id number]] Collected: 05/10/23 1305     Updated: 05/10/23 1308    Narrative:      CT ANGIOGRAM CHEST PULMONARY EMBOLISM-     CLINICAL INDICATION: Pulmonary embolism (PE) suspected, high prob        COMPARISON: 04/22/2023      PROCEDURE: Thin cut axial images were acquired through the pulmonary  vessels during the rapid infusion of IV contrast.     Additional 3-D reformatted images obtained via post-processing for  improved diagnostic accuracy and procedural planning.     Radiation dose reduction techniques were utilized per ALARA protocol.  Automated exposure control was initiated through either or [email or web address] or  [employer] software packages by  protocol.           FINDINGS: Today's study demonstrates opacification of the central  pulmonary vessels.   There are no filling defects.   There is no truncation.     No evidence of a pulmonary embolus.     Dense areas of consolidation are present bilaterally and are very  similar to the prior study..     Consolidation in the perihilar regions bilaterally      Moderate left and small right pleural effusion       Impression:      1. No evidence of a pulmonary embolus  2. Moderate left and small right pleural effusion  3. Dense areas of consolidation again noted bilaterally but similar to  the prior study.     This report was finalized on 5/10/2023 1:06 PM by Dr. Jamie Smith MD.       XR Chest 1 View [[id number]] Collected: 05/10/23 1100     Updated: 05/10/23 1103    Narrative:      XR CHEST 1 VW-     CLINICAL INDICATION: sob        COMPARISON: 04/27/2023      TECHNIQUE: Single frontal view of the chest.     FINDINGS:      LUNGS: Dense consolidation and nodularity bilaterally. Worse than on the  previous exam      HEART AND MEDIASTINUM: Heart and mediastinal contours are unremarkable        SKELETON: Bony and soft tissue structures are unremarkable.             Impression:      Overall appearance worse than on the prior study     This report was finalized on 5/10/2023 11:01 AM by Dr. Jamie Smith MD.             ECG/EMG Results (all)       Procedure Component Value Units Date/Time    ECG 12 Lead Chest Pain [[id number]] Collected: 05/10/23 1031     Updated: 05/10/23 1134     QT Interval 366 ms      QTC Interval 422 ms     Narrative:      Test Reason : Chest Pain  Blood Pressure :   */*   mmHG  Vent. Rate :  80 BPM     Atrial Rate :  80 BPM     P-R Int : 172 ms          QRS Dur :  78 ms      QT Int : 366 ms       P-R-T Axes :  44  80  62 degrees     QTc Int : 422 ms    Sinus rhythm with premature atrial complexes  Low voltage QRS  Septal infarct (cited on or before 09-FEB-2023)  Abnormal ECG  When compared with ECG of 22-APR-2023 06:17,  premature atrial complexes are now present  Questionable change in QRS duration  Criteria for Inferior infarct are no longer present  Questionable change in initial forces of Septal leads  Confirmed by Shelby Garcia ([id number]) on 5/10/2023 11:29:59 AM    Referred By: KATYA           Confirmed By: Shelby Garcia    SCANNED - TELEMETRY    [[id number]] Resulted: 05/10/23     Updated: 05/11/23 0635    Adult Transthoracic Echo Complete W/ Cont if Necessary Per Protocol [[id number]] Resulted: 05/11/23 0903     Updated: 05/11/23 0903     Target HR (85%) 132 bpm      Max. Pred. HR (100%) 155 bpm           Orders (all)        Start     Ordered    05/11/23 0828  Case Request  Once         05/11/23 0828    05/11/23 0001  NPO Diet NPO Type: Strict NPO, Sips with Meds  Diet Effective Midnight         05/10/23 1556    05/10/23 1800  Oral Care  2 Times Daily       05/10/23 1556    05/10/23 1711  NIPPV (CPAP or BIPAP)  At Bedtime As Needed-RT         05/10/23 1710    05/10/23 1557  Inpatient General Surgery Consult  Once        Specialty:  General Surgery  Provider:  Brigitte Kolb MD    05/10/23 1556    05/10/23 1557  Intake & Output  Every Shift       05/10/23 1556    05/10/23 1557  Weigh Patient  Once         05/10/23 1556    05/10/23 1557  Oxygen Therapy- Nasal Cannula; Titrate for SPO2: 90% - 95%  Continuous         05/10/23 1556    05/10/23 1557  Continuous Cardiac Monitoring  Continuous        Comments: Follow Standing Orders As Outlined in Process Instructions (Open Order Report to View Full Instructions)    05/10/23 1556    05/10/23 1557  May Be Off Telemetry for Tests  Continuous         05/10/23 1556    05/10/23 1557  Diet: Regular/House Diet; Texture: Regular Texture (IDDSI 7); Fluid Consistency: Thin (IDDSI 0)  Diet Effective Now,   Status:  Canceled         05/10/23 1556    05/10/23 1550  Code Status and Medical Interventions:  Continuous         05/10/23 1555    05/10/23 1502  Cardiac Monitoring  Continuous,   Status:  Canceled        Comments: Follow Standing Orders As Outlined in Process Instructions (Open Order Report to View Full Instructions)    05/10/23 1501    05/10/23 1500  Inpatient Admission  Once         05/10/23 1501                  Ventilator/Non-Invasive Ventilation Settings (From admission, onward)       Start     Ordered     05/10/23 1711  NIPPV (CPAP or BIPAP)  At Bedtime As Needed-RT        Question Answer Comment   Indication: Sleep Apnea    Type: CPAP        05/10/23 1710

## 2023-05-11 NOTE — CASE MANAGEMENT/SOCIAL WORK
Discharge Planning Assessment  [facility]     Patient Name: Rory Rios Jr.  MRN: [id number]  Today's Date: 5/11/2023    Admit Date: 5/10/2023    Plan: SS spoke with pt and daughter at bedside and obtained information regarding pt's worker's comp claim.  SS spoke with BERRY Lainez at [phone] who stated that SS would need to contact  Deedee Mccollum at [phone] for approval for home health at discharge.  SS left voicemail for Deedee Mccollum to return call.  SS will follow up in am.    LAURENT Hooker

## 2023-05-12 ENCOUNTER — APPOINTMENT (OUTPATIENT)
Dept: GENERAL RADIOLOGY | Facility: HOSPITAL | Age: 63
DRG: 197 | End: 2023-05-12
Payer: OTHER MISCELLANEOUS

## 2023-05-12 ENCOUNTER — READMISSION MANAGEMENT (OUTPATIENT)
Dept: CALL CENTER | Facility: HOSPITAL | Age: 63
End: 2023-05-12
Payer: MEDICARE

## 2023-05-12 VITALS
SYSTOLIC BLOOD PRESSURE: 147 MMHG | BODY MASS INDEX: 34.49 KG/M2 | HEIGHT: 75 IN | DIASTOLIC BLOOD PRESSURE: 70 MMHG | TEMPERATURE: 98.1 F | RESPIRATION RATE: 16 BRPM | HEART RATE: 98 BPM | OXYGEN SATURATION: 93 % | WEIGHT: 277.4 LBS

## 2023-05-12 LAB
DEPRECATED RDW RBC AUTO: 45 FL (ref 37–54)
ERYTHROCYTE [DISTWIDTH] IN BLOOD BY AUTOMATED COUNT: 14.9 % (ref 12.3–15.4)
GLUCOSE BLDC GLUCOMTR-MCNC: 185 MG/DL (ref 70–130)
GLUCOSE BLDC GLUCOMTR-MCNC: 97 MG/DL (ref 70–130)
HCT VFR BLD AUTO: 44.6 % (ref 37.5–51)
HGB BLD-MCNC: 13.4 G/DL (ref 13–17.7)
MCH RBC QN AUTO: 24.9 PG (ref 26.6–33)
MCHC RBC AUTO-ENTMCNC: 30 G/DL (ref 31.5–35.7)
MCV RBC AUTO: 82.9 FL (ref 79–97)
PLATELET # BLD AUTO: 214 10*3/MM3 (ref 140–450)
PMV BLD AUTO: 9.3 FL (ref 6–12)
RBC # BLD AUTO: 5.38 10*6/MM3 (ref 4.14–5.8)
WBC NRBC COR # BLD: 7.6 10*3/MM3 (ref 3.4–10.8)

## 2023-05-12 PROCEDURE — 94799 UNLISTED PULMONARY SVC/PX: CPT

## 2023-05-12 PROCEDURE — 85027 COMPLETE CBC AUTOMATED: CPT | Performed by: STUDENT IN AN ORGANIZED HEALTH CARE EDUCATION/TRAINING PROGRAM

## 2023-05-12 PROCEDURE — 82948 REAGENT STRIP/BLOOD GLUCOSE: CPT

## 2023-05-12 PROCEDURE — 99239 HOSP IP/OBS DSCHRG MGMT >30: CPT | Performed by: STUDENT IN AN ORGANIZED HEALTH CARE EDUCATION/TRAINING PROGRAM

## 2023-05-12 PROCEDURE — 71045 X-RAY EXAM CHEST 1 VIEW: CPT

## 2023-05-12 PROCEDURE — 97162 PT EVAL MOD COMPLEX 30 MIN: CPT

## 2023-05-12 RX ADMIN — BUDESONIDE AND FORMOTEROL FUMARATE DIHYDRATE 2 PUFF: 160; 4.5 AEROSOL RESPIRATORY (INHALATION) at 06:51

## 2023-05-12 RX ADMIN — IPRATROPIUM BROMIDE 0.5 MG: 0.5 SOLUTION RESPIRATORY (INHALATION) at 06:51

## 2023-05-12 RX ADMIN — Medication 10 ML: at 09:27

## 2023-05-12 RX ADMIN — METOPROLOL SUCCINATE 50 MG: 50 TABLET, EXTENDED RELEASE ORAL at 09:26

## 2023-05-12 RX ADMIN — HYDROCODONE BITARTRATE AND ACETAMINOPHEN 1 TABLET: 7.5; 325 TABLET ORAL at 09:26

## 2023-05-12 RX ADMIN — PANTOPRAZOLE SODIUM 40 MG: 40 TABLET, DELAYED RELEASE ORAL at 09:26

## 2023-05-12 NOTE — CASE MANAGEMENT/SOCIAL WORK
Discharge Planning Assessment  [facility]     Patient Name: Rory Rios Jr.  MRN: [id number]  Today's Date: 5/12/2023    Admit Date: 5/10/2023    Plan: [facility] per Tania does not have staff to accept pt at this time.  Per Tania if pt's PCP completes new home health referral at follow up then referral can be reassessed.  Pt's daughter Emilia aware and agreeable.     Discharge Plan     Row Name 05/12/23 1448       Plan    Plan [facility] per Tania does not have staff to accept pt at this time.  Per Tania if pt's PCP completes new home health referral at follow up then referral can be reassessed.  Pt's daughter Emilia aware and agreeable.    Final Discharge Disposition Code 01 - home or self-care    Final Note Pt discharged home on this date.    Row Name 05/12/23 1210       Plan    Plan SS verified that [facility] per Tania received consult.  SS will follow.              Continued Care and Services - Discharged on 5/12/2023 Admission date: 5/10/2023 - Discharge disposition: Home or Self Care    Home Medical Care     Service Provider Request Status Selected Services Address Phone Fax Patient Preferred    [facility] - [place] Pending - No Request Sent N/A [address] [address] [phone] --    [facility] [place] Declined  Clinical Denial N/A [address] [address] [phone] --                Destinee Hoffman, BSW

## 2023-05-12 NOTE — DISCHARGE PLACEMENT REQUEST
"Rory Rios Jr. (63 y.o. Male)     Date of Birth   1960    Social Security Number       Address   [address]  [place] [address]    Home Phone   [phone]    MRN   [id number]       Holiness   None    Marital Status                               Admission Date   5/10/23    Admission Type   Emergency    Admitting Provider   Filemon Campbell MD    Attending Provider   Filemon Campbell MD    Department, Room/Bed   [address], [address]12/       Discharge Date       Discharge Disposition       Discharge Destination                               Attending Provider: Filemon Campbell MD    Allergies: No Known Allergies    Isolation: None   Infection: None   Code Status: CPR    Ht: 190.5 cm (75\")   Wt: 126 kg (277 lb 6.4 oz)    Admission Cmt: None   Principal Problem: Pleural effusion exudative [J90]                 Active Insurance as of 5/10/2023     Primary Coverage     Payor Plan Insurance Group Employer/Plan Group    WORKERS COMPENSATION [employer]     Payor Plan Address Payor Plan Phone Number Payor Plan Fax Number Effective Dates    [address] [phone]  6/25/2009 - None Entered    [facility] [address]       Subscriber Name Subscriber Birth Date Member ID       RORY RIOS JR. 1960 [id number]           Secondary Coverage     Payor Plan Insurance Group Employer/Plan Group    MEDICARE MEDICARE A & B      Payor Plan Address Payor Plan Phone Number Payor Plan Fax Number Effective Dates    [address] [phone]  4/1/2013 - None Entered    [facility] [id number]       Subscriber Name Subscriber Birth Date Member ID       RORY RIOS JR. 1960 [id number]                 Emergency Contacts      (Rel.) Home Phone Work Phone Mobile Phone    KATIE KENDRICK (Daughter) [phone] -- --            Emergency Contact Information     Name Relation Home Work Mobile    KATIE KENDRICK Daughter [phone]            Insurance Information  "               WORKERS COMPENSATION/AZAM [employer] Phone: [phone]    Subscriber: Rory Rios JrNile Subscriber#: [id number]    Group#: [id number] Precert#: [id number]        MEDICARE/MEDICARE A & B Phone: [phone]    Subscriber: Rory Rios  Subscriber#: [id number]    Group#: -- Precert#: --          Problem List           Codes Noted - Resolved       Hospital    * (Principal) Pleural effusion exudative ICD-10-CM: J90  ICD-9-CM: 511.9 5/10/2023 - Present    Recurrent left pleural effusion ICD-10-CM: J90  ICD-9-CM: 511.9 2023 - Present       Non-Hospital    Coal workers pneumoconiosis ICD-10-CM: J60  ICD-9-CM: 500 2023 - Present    Pulmonary fibrosis ICD-10-CM: J84.10  ICD-9-CM: 515 2023 - Present    Black lung ICD-10-CM: J60  ICD-9-CM: 500 2023 - Present    Chronic obstructive pulmonary disease ICD-10-CM: J44.9  ICD-9-CM: 496 2023 - Present    Lung mass ICD-10-CM: R91.8  ICD-9-CM: 786.6 2023 - Present    PAT (paroxysmal atrial tachycardia) ICD-10-CM: I47.1  ICD-9-CM: 427.0 2023 - Present    Primary hypertension ICD-10-CM: I10  ICD-9-CM: 401.9 2023 - Present        History & Physical      Filemon Campbell MD at 05/10/23 [address] [place] [address] HISTORY AND PHYSICAL    Patient Identification:  Name:  Rory Rios Jr.  Age:  63 y.o.  Sex:  male  :  1960  MRN:  [id number]   Visit Number:  [id number]  Admit Date: 5/10/2023   Room number:  3312/1P  Primary Care Physician:  Alejandro Monique MD    Date of Admission: 5/10/2023     Subjective     Chief complaint:    Chief Complaint   Patient presents with   • Shortness of Breath     History of presenting illness:  63 y.o. male who was admitted on 5/10/2023 for progressive dyspnea with known recurrent left sided pleural effusion.    Rory Rios Jr. 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 In summary, during last hospitalization he was seen by pulmonologist and CT surgery w/thoracentesis on 4/5 showing exudative fluid w/negative cytology and cx. In setting of significant perihilar fibrosis/masses noted, underwent bronchoscopy with EBUS 4/24 w/biopsies negative for malignancy and cx NGTD. After evaluation it was determined that the recurrent effusion was secondary to his coal workers pneumoconiosis with progressive massive fibrosis and was recommended to f/u with  outpatient lung trx clinic which he has not been able to do yet. Tentative plan was for him to see CT surgery in outpatient clinic 5/16 with repeat CXR at that time and possible direct admit for pleuryx cath placement if reaccumulating.    Unfortunately patient began developing recurrent progressive dyspnea without cough, CP, fever, or chills over last 3-4 days that slowly worsened without any alleviating factors. Also notes dyspnea not significantly worsened lying flat. Does improve some with nebulizers and nightly cpap but otherwise no alleviating factors. Patient has home O2 prn but had not been using it more than usual in last week.    On arrival to ED, he underwent CXR similar to prior and in setting of dyspnea and elevated d-dimer, had CTPE that showed recurrence of moderate sized left sided effusion. Otherwise not significantly changed dense consolidations similar to prior imaging. No tachycardia or significant hypoxia noted. Discussed admission for pleuryx here vs discussion with [facility] and in setting of current stability but with progressive sx and recurrent effusion, decision made to admit to floor service with general surgery consult for possible pleuryx catheter placement.     Prior to admission I  discussed patient's presentation and management with attending ER physician and verbal handoff received.  ---------------------------------------------------------------------------------------------------------------------   A thorough systems based relevant ROS was asked and was negative except as noted above.  ---------------------------------------------------------------------------------------------------------------------   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   • Diabetes mellitus    • GERD (gastroesophageal reflux disease)    • Hypertension    • Pneumonia    • Pulmonary fibrosis 4/27/2023     Past Surgical History:   Procedure Laterality Date   • BRONCHOSCOPY N/A 4/24/2023    Procedure: BRONCHOSCOPY WITH ENDOBRONCHIAL ULTRASOUND;  Surgeon: Kota Willard MD;  [facility];  Service: Pulmonary;  Laterality: N/A;   • FOOT SURGERY Left      Family History   Problem Relation Age of Onset   • Diabetes Mother    • Heart failure Father    • Diabetes Sister    • Heart disease Brother    • Diabetes Brother      Social History     Socioeconomic History   • Marital status:    Tobacco Use   • Smoking status: Never     Passive exposure: Never   • Smokeless tobacco: Current     Types: Chew   Vaping Use   • Vaping Use: Never used   Substance and Sexual Activity   • Alcohol use: No   • Drug use: No   • Sexual activity: Defer     ---------------------------------------------------------------------------------------------------------------------   Allergies:  Patient has no known allergies.  ---------------------------------------------------------------------------------------------------------------------   Medications below are reported home medications pulling from within the system; at this time, these medications have not been reconciled unless otherwise specified and are in the verification  process for further verifcation as current home medications.      Prior to Admission Medications     Prescriptions Last Dose Informant Patient Reported? Taking?    albuterol (PROVENTIL HFA;VENTOLIN HFA) 108 (90 BASE) MCG/ACT inhaler   No No    Inhale 2 puffs Every 4 (Four) Hours As Needed for Wheezing or Shortness of Air.    aspirin 81 MG EC tablet   Yes No    Take 1 tablet by mouth Daily. OTC    Budeson-Glycopyrrol-Formoterol (BREZTRI) 160-9-4.8 MCG/ACT aerosol inhaler   Yes No    Inhale 2 puffs 2 (Two) Times a Day.    busPIRone (BUSPAR) 5 MG tablet   Yes No    Take 1 tablet by mouth every night at bedtime.    esomeprazole (nexIUM) 20 MG capsule   No No    Take 1 capsule by mouth Every Morning Before Breakfast.    glipizide (GLUCOTROL XL) 5 MG ER tablet   Yes No    Take 1 tablet by mouth Every Morning.    HYDROcodone-acetaminophen (NORCO) 7.5-325 MG per tablet   Yes No    Take 1 tablet by mouth 3 (Three) Times a Day.    hydrOXYzine (ATARAX) 25 MG tablet   No No    Take 1 tablet by mouth 3 (Three) Times a Day As Needed for Anxiety.    ibuprofen (ADVIL,MOTRIN) 800 MG tablet   Yes No    Take 1 tablet by mouth 3 (Three) Times a Day.    ipratropium-albuterol (DUO-NEB) 0.5-2.5 mg/3 ml nebulizer   Yes No    Take 3 mL by nebulization Every 4 (Four) Hours As Needed for Wheezing or Shortness of Air.    lisinopril (PRINIVIL,ZESTRIL) 10 MG tablet   Yes No    Take 1 tablet by mouth Every Night.    metFORMIN (GLUCOPHAGE) 500 MG tablet   Yes No    Take 1 tablet by mouth 2 (Two) Times a Day With Meals.    metoprolol succinate XL (TOPROL-XL) 25 MG 24 hr tablet   No No    Take 1 tablet by mouth Daily for 30 days.    Patient taking differently:  Take 2 tablets by mouth Every Morning.    omeprazole (priLOSEC) 40 MG capsule   Yes No    Take 1 capsule by mouth Daily.    roflumilast (DALIRESP) 250 MCG tablet tablet   Yes No    Take 1 tablet by mouth Daily.        Objective     Vital Signs:  Temp:  [97.1 °F (36.2 °C)-98 °F (36.7 °C)] 98  °F (36.7 °C)  Heart Rate:  [71-89] 79  Resp:  [18-24] 18  BP: (117-147)/(70-95) 147/80    Mean Arterial Pressure (Non-Invasive) for the past 24 hrs (Last 3 readings):   Noninvasive MAP (mmHg)   05/10/23 1611 99   05/10/23 1450 96   05/10/23 1435 98     SpO2:  [94 %-98 %] 97 %  on  Flow (L/min):  [3] 3;   Device (Oxygen Therapy): nasal cannula  Body mass index is 33.57 kg/m².    Wt Readings from Last 3 Encounters:   05/10/23 128 kg (283 lb 1.6 oz)   04/20/23 131 kg (288 lb)   02/09/23 134 kg (295 lb)      ----------------------------------------------------------------------------------------------------------------------  Physical Exam  Vitals and nursing note reviewed.   Constitutional:       General: He is not in acute distress.  HENT:      Mouth/Throat:      Mouth: Mucous membranes are moist.      Pharynx: Oropharynx is clear.   Eyes:      General: No scleral icterus.     Extraocular Movements: Extraocular movements intact.   Cardiovascular:      Rate and Rhythm: Normal rate and regular rhythm.      Pulses: Normal pulses.   Pulmonary:      Effort: Pulmonary effort is normal. No respiratory distress.      Breath sounds: No wheezing.      Comments: Diminished breath sounds at left lung base but breath sounds audible deep into middle anterior and posterior lung fields  Abdominal:      Palpations: Abdomen is soft.      Tenderness: There is no abdominal tenderness.   Musculoskeletal:      Right lower leg: No edema.      Left lower leg: No edema.   Skin:     General: Skin is warm and dry.      Capillary Refill: Capillary refill takes less than 2 seconds.   Neurological:      General: No focal deficit present.      Mental Status: He is alert. Mental status is at baseline.   Psychiatric:         Mood and Affect: Mood normal.         Behavior: Behavior normal.       --------------------------------------------------------------------------------------------------------------------  LABS:    CBC and coagulation:  Results  from last 7 days   Lab Units 05/10/23  1043   SED RATE mm/hr 39*   CRP mg/dL 1.54*   WBC 10*3/mm3 7.32   HEMOGLOBIN g/dL 13.8   HEMATOCRIT % 45.2   MCV fL 82.9   MCHC g/dL 30.5*   PLATELETS 10*3/mm3 223   INR  1.00   D DIMER QUANT MCGFEU/mL 1.85*     Acid/base balance:      Renal and electrolytes:  Results from last 7 days   Lab Units 05/10/23  1043   SODIUM mmol/L 142   POTASSIUM mmol/L 3.9   MAGNESIUM mg/dL 1.9   CHLORIDE mmol/L 106   CO2 mmol/L 26.7   BUN mg/dL 7*   CREATININE mg/dL 0.80   CALCIUM mg/dL 9.5   GLUCOSE mg/dL 106*     Estimated Creatinine Clearance: 136.7 mL/min (by C-G formula based on SCr of 0.8 mg/dL).    Liver and pancreatic function:  Results from last 7 days   Lab Units 05/10/23  1043   ALBUMIN g/dL 3.7   BILIRUBIN mg/dL 0.4   ALK PHOS U/L 90   AST (SGOT) U/L 16   ALT (SGPT) U/L 14     Endocrine function:  Lab Results   Component Value Date    HGBA1C 6.30 (H) 04/21/2023     Point of care bedside glucose levels:      Lab Results   Component Value Date    TSH 2.210 05/10/2023    FREET4 1.33 05/10/2023     Cardiac:  Results from last 7 days   Lab Units 05/10/23  1347 05/10/23  1043   HSTROP T ng/L 16* 15*   PROBNP pg/mL  --  189.9       Cultures:  Lab Results   Component Value Date    COLORU Yellow 05/10/2023    CLARITYU Clear 05/10/2023    PHUR 7.5 05/10/2023    GLUCOSEU Negative 05/10/2023    KETONESU Negative 05/10/2023    BLOODU Moderate (2+) (A) 05/10/2023    NITRITEU Negative 05/10/2023    LEUKOCYTESUR Negative 05/10/2023    BILIRUBINUR Negative 05/10/2023    UROBILINOGEN 0.2 E.U./dL 05/10/2023    RBCUA 13-20 (A) 05/10/2023    WBCUA 0-2 05/10/2023    BACTERIA Trace (A) 05/10/2023     Microbiology Results (last 10 days)     Procedure Component Value - Date/Time    COVID-19 and FLU A/B PCR - Swab, Nasopharynx [[id number]]  (Normal) Collected: 05/10/23 1044    Lab Status: Final result Specimen: Swab from Nasopharynx Updated: 05/10/23 1116     COVID19 Not Detected     Influenza A PCR Not  Detected     Influenza B PCR Not Detected    Narrative:      Fact sheet for providers: https://www.fda.gov/media/563383/download    Fact sheet for patients: https://www.fda.gov/media/751079/download    Test performed by PCR.          No results found for: PREGTESTUR, PREGSERUM, HCG, HCGQUANT  Pain Management Panel         Latest Ref Rng & Units 5/10/2023   Pain Management Panel   Amphetamine, Urine Qual Negative Negative     Barbiturates Screen, Urine Negative Negative     Benzodiazepine Screen, Urine Negative Negative     Buprenorphine, Screen, Urine Negative Negative     Cocaine Screen, Urine Negative Negative     Methadone Screen , Urine Negative Negative     Methamphetamine, Ur Negative Negative                  I have personally looked at the labs and they are summarized above.  ----------------------------------------------------------------------------------------------------------------------  Detailed radiology reports for the last 24 hours:    Imaging Results (Last 24 Hours)     Procedure Component Value Units Date/Time    CT Angiogram Chest Pulmonary Embolism [[id number]] Collected: 05/10/23 1305     Updated: 05/10/23 1308    Narrative:      CT ANGIOGRAM CHEST PULMONARY EMBOLISM-     CLINICAL INDICATION: Pulmonary embolism (PE) suspected, high prob        COMPARISON: 04/22/2023      PROCEDURE: Thin cut axial images were acquired through the pulmonary  vessels during the rapid infusion of IV contrast.     Additional 3-D reformatted images obtained via post-processing for  improved diagnostic accuracy and procedural planning.     Radiation dose reduction techniques were utilized per ALARA protocol.  Automated exposure control was initiated through either or SpeechCycle or  i-nexus software packages by  protocol.           FINDINGS: Today's study demonstrates opacification of the central  pulmonary vessels.   There are no filling defects.   There is no truncation.     No evidence of a pulmonary  embolus.     Dense areas of consolidation are present bilaterally and are very  similar to the prior study..     Consolidation in the perihilar regions bilaterally     Moderate left and small right pleural effusion       Impression:      1. No evidence of a pulmonary embolus  2. Moderate left and small right pleural effusion  3. Dense areas of consolidation again noted bilaterally but similar to  the prior study.     This report was finalized on 5/10/2023 1:06 PM by Dr. Jamie Smith MD.       XR Chest 1 View [[id number]] Collected: 05/10/23 1100     Updated: 05/10/23 1103    Narrative:      XR CHEST 1 VW-     CLINICAL INDICATION: sob        COMPARISON: 04/27/2023      TECHNIQUE: Single frontal view of the chest.     FINDINGS:      LUNGS: Dense consolidation and nodularity bilaterally. Worse than on the  previous exam      HEART AND MEDIASTINUM: Heart and mediastinal contours are unremarkable        SKELETON: Bony and soft tissue structures are unremarkable.             Impression:      Overall appearance worse than on the prior study     This report was finalized on 5/10/2023 11:01 AM by Dr. Jamie Smith MD.           Final impressions for the last 30 days of radiology reports:    CT Chest With & Without Contrast Diagnostic    Result Date: 4/21/2023  Impression: 1. Extensive masslike perihilar disease, consistent with history of longstanding inhalational disease and progressive massive fibrosis. 2. Asymmetrically rounded and masslike area associated with left perihilar PMF in the left lower lobe, 4.5 cm in diameter, which may also reflect advanced PMF, but at least potentially could represent superimposed neoplasm, as discussed above. 3. Large, free-flowing left pleural effusion of uncertain significance. Electronically Signed: Mitch Seras  4/21/2023 3:48 PM EDT  Workstation ID: [id number]    XR Chest 1 View    Result Date: 5/10/2023  Overall appearance worse than on the prior study  This report was finalized on  5/10/2023 11:01 AM by Dr. Jamie Smith MD.      XR Chest 1 View    Result Date: 4/27/2023  Impression: Stable chest. No pneumothorax. Multifocal infiltrates. Electronically Signed: Bud Britton  4/27/2023 8:21 AM EDT  Workstation ID: [id number]    XR Chest 1 View    Result Date: 4/26/2023  Impression: Removal of left-sided chest tube. No pneumothorax. Otherwise, stable exam. Electronically Signed: Shantelle Grimm  4/26/2023 3:04 PM EDT  Workstation ID: [id number]    XR Chest 1 View    Result Date: 4/26/2023  Impression: Left basilar pleural drain. No significant effusion or evidence of pneumothorax. Perihilar airspace masses present bilaterally, similar as compared to the previous study Electronically Signed: Shantelle Grimm  4/26/2023 7:09 AM EDT  Workstation ID: [id number]    XR Chest 1 View    Result Date: 4/25/2023  Impression: 1. Stable right and left perihilar masses as previously described. 2. Improving bibasilar pulmonary interstitial disease, now mild in extent. Electronically Signed: Mitch Sears  4/25/2023 8:57 AM EDT  Workstation ID: [id number]    XR Chest 1 View    Result Date: 4/24/2023  Impression: Left pleural drain remains in place. Stable changes of pulmonary fibrosis. No new chest disease is seen. Electronically Signed: Mitch Sears  4/24/2023 8:36 AM EDT  Workstation ID: [id number]    XR Chest 1 View    Result Date: 4/23/2023  Impression: Decreased size of pleural effusion on the left. It is now small. Small caliber chest tube is present. No pneumothorax. No change in masslike bilateral parenchymal airspace opacities. Electronically Signed: Patti Williamson  4/23/2023 8:30 AM EDT  Workstation ID: [id number]    XR Chest 1 View    Result Date: 4/20/2023  Impression: 1. No change in the masslike nodular appearing infiltrate in the right perihilar region. 2. Improvement in the left perihilar infiltrate compared with the last study with improvement in aeration at the left base. 3. Moderate pleural effusion, unchanged. Electronically  Signed: Miguelito Deras  4/20/2023 2:22 PM EDT  Workstation ID: [id number]    CT Angiogram Chest Pulmonary Embolism    Result Date: 5/10/2023  1. No evidence of a pulmonary embolus 2. Moderate left and small right pleural effusion 3. Dense areas of consolidation again noted bilaterally but similar to the prior study.  This report was finalized on 5/10/2023 1:06 PM by Dr. Jamie Smith MD.      X-ray chest PA and lateral    Result Date: 4/17/2023  No significant interval change. Images reviewed, interpreted, and dictated by Dr. Gauri Gtz. Transcribed by Tahmina Isaacs PA-C.    X-ray chest PA and lateral    Result Date: 4/10/2023  There has been no significant interval change in  the opacities, likely related to pneumoconiosis. Small left pleural effusion. Images reviewed, interpreted, and dictated by Dr. Gauri Gtz. Transcribed by JOHNATHON Huerta (R).      I have personally looked at the radiology images, my personal interpretation is as follows:    CXR with left sided effusion and multifocal dense consolidations    CT with moderate sized left effusion and noted b/l perihilar dense infiltrates/masses    Assessment & Plan       #hx of coal workers pneumoconiosis with dense b/l perihilar infiltrates/pulmonary fibrosis  #Recurrent left sided pleural effusion, previously exudative thought secondary to above  #Chronic hypoxic respiratory failure (2L)  -Extensive w/u including EBUS of perihilar mass performed at [facility] during last hospitalization with effusion found to be exudative in nature with negative cytology x2 samples and negative fungal and bacterial w/u. Currently on baseline O2 with no fever, inf clinical sx, or leukocytosis. Crp mildly elevated not dissimilar from prior. BNP wnl and CTPE neg for PE. Defer abx coverage  -Patient would be well served by pleurodesis or VATS but evaluated by CT surgery at [facility] and felt overall pulmonary condition not amenable to either intervention, treatment ultimately narrowed to  lung trx eval with pleuryx for palliation of sx in interim  -General surgery consulted for possible pleuryx catheter placement  -TTE added to complete cardiac w/u, no prior echo on chart but note effusion was exudative in nature, not likely cardiac origin and no other clinical evidence of HF  -NPO at midnight until evaluated by surgical consultant  -s/p IV lasix in ED, monitor for sx improvement but unlikely to significantly improve given exudative noncardiac nature    #COOPER  -Resume home cpap    #Hx of atrial tachycardia  -Resume home metoprolol pend med rec    #?COPD hx  -Reported on last hospitalization but no smoking hx and no PFTs available for review but likely secondary to environmental contaminant  -Resume home breztri formulary equivalent with prn duonebs    #HTN  -Resume antihypertensives post op pend med rec    #NIDDM  -Hold metformin and glipizide  -FSBG qac/qhs w/SSI        VTE Prophylaxis:   Mechanical Order History:     None      Pharmalogical Order History:      Ordered     Dose Route Frequency Stop    05/10/23 7200  Enoxaparin Sodium (LOVENOX) syringe 40 mg         40 mg SC Once --              The patient is high risk due to the following diagnoses/reasons:  Recurrent effusion requiring surgical drainage with chronic O2 usage    Admission Status:  I certify that this patient requires inpatient hospitalization for greater than 2 midnights in INPATIENT status.  I anticipate there to be the need for care which can only be reasonably provided in a hospital setting such as possible need for aggressive/expedited ancillary services and/or consultation services, IV medications, close physician monitoring, and/or procedures. In such, I feel patient’s risk for adverse outcomes and need for care warrant INPATIENT evaluation and predict the patient’s care encounter to likely last beyond 2 midnights.    Code Status and Medical Interventions:   Ordered at: 05/10/23 3934     Code Status (Patient has no pulse and  is not breathing):    CPR (Attempt to Resuscitate)     Medical Interventions (Patient has pulse or is breathing):    Full Support        Disposition: Admit to tele pend surgical eval    Filemon Campbell MD  [facility]  05/10/23  16:56 EDT        Electronically signed by Filemon Campbell MD at 05/10/23 6356       Vital Signs (last day)     Date/Time Temp Temp src Pulse Resp BP Patient Position SpO2    05/12/23 0651 -- -- 71 16 -- -- 94    05/12/23 0600 98.4 (36.9) Oral 80 18 114/73 Lying 92    05/12/23 0200 97.8 (36.6) Oral 88 18 113/63 Lying 95    05/11/23 2230 98.1 (36.7) Oral 83 18 101/54 Lying 96    05/11/23 2130 -- -- 81 18 109/60 Lying 96    05/11/23 2030 -- -- 86 18 113/56 Lying 96    05/11/23 1930 -- -- 95 18 119/61 Lying 97    05/11/23 1853 -- -- 100 18 -- -- 96    05/11/23 1830 -- -- 91 18 111/64 Lying 96    05/11/23 1800 97.8 (36.6) Oral 97 18 110/58 Lying 95    05/11/23 1700 -- -- 101 18 99/62 -- 96    05/11/23 1630 98 (36.7) -- 93 18 130/80 -- 95    05/11/23 1615 98.1 (36.7) -- 81 18 123/89 -- 96    05/11/23 1600 97.6 (36.4) -- 94 18 137/89 -- 96    05/11/23 1545 97.4 (36.3) -- 91 18 121/82 -- 95    05/11/23 1532 -- -- 75 16 124/80 Lying 95    05/11/23 1528 -- -- 89 16 120/78 Lying 95    05/11/23 1523 -- -- 94 16 121/80 Lying 95    05/11/23 1518 97.3 (36.3) Temporal 76 16 113/81 Lying 95    05/11/23 1257 -- Temporal 94 18 127/77 Lying 95    05/11/23 0708 -- -- 91 18 -- -- 94    05/11/23 0658 -- -- 96 18 -- -- 96    05/11/23 0600 98.9 (37.2) Oral 85 18 93/55 Lying 95    05/11/23 0207 98.3 (36.8) Oral 77 18 90/54 Lying 93          Lines, Drains & Airways     Active LDAs     Name Placement date Placement time Site Days    Peripheral IV 05/10/23 1248 Anterior;Proximal;Right;Upper Arm 05/10/23  1248  Arm  1    Chest Tube 1 Left Pleural 05/11/23  1502  Pleural  less than 1                  Current Facility-Administered Medications   Medication Dose Route Frequency Provider Last Rate  Last Admin   • budesonide-formoterol (SYMBICORT) 160-4.5 MCG/ACT inhaler 2 puff  2 puff Inhalation BID - RT Brigitte Kolb MD   2 puff at 05/12/23 0651   • dextrose (D50W) (25 g/50 mL) IV injection 25 g  25 g Intravenous Q15 Min PRN Brigitte Kolb MD       • dextrose (GLUTOSE) oral gel 15 g  15 g Oral Q15 Min PRN Brigitte Kolb MD       • glucagon HCl (Diagnostic) injection 1 mg  1 mg Intramuscular Q15 Min PRN Brigitte Kolb MD       • HYDROcodone-acetaminophen (NORCO) 7.5-325 MG per tablet 1 tablet  1 tablet Oral TID Brigitte Kolb MD   1 tablet at 05/12/23 0926   • hydrOXYzine (ATARAX) tablet 25 mg  25 mg Oral TID PRN Brigitte Kolb MD   25 mg at 05/11/23 2118   • Insulin Lispro (humaLOG) injection 2-9 Units  2-9 Units Subcutaneous TID With Meals Brigitte Kolb MD       • ipratropium (ATROVENT) nebulizer solution 0.5 mg  0.5 mg Nebulization 4x Daily - RT Brigitte Kolb MD   0.5 mg at 05/12/23 0651   • ipratropium-albuterol (DUO-NEB) nebulizer solution 3 mL  3 mL Nebulization Q6H PRN Brigitte Kolb MD   3 mL at 05/10/23 1640   • melatonin tablet 5 mg  5 mg Oral Nightly PRN Brigitte Kolb MD   5 mg at 05/11/23 2118   • metoprolol succinate XL (TOPROL-XL) 24 hr tablet 50 mg  50 mg Oral Daily Brigitte Kolb MD   50 mg at 05/12/23 0926   • nitroglycerin (NITROSTAT) SL tablet 0.4 mg  0.4 mg Sublingual Q5 Min PRN Brigitte Kolb MD       • ondansetron ODT (ZOFRAN-ODT) disintegrating tablet 4 mg  4 mg Oral Q12H PRN Brigitte Kolb MD       • pantoprazole (PROTONIX) EC tablet 40 mg  40 mg Oral QAM AC Brigitte Kolb MD   40 mg at 05/12/23 0926   • polyethylene glycol (MIRALAX) packet 17 g  17 g Oral Daily PRN Brigitte Kolb MD       • roflumilast (DALIRESP) tablet 250 mcg  250 mcg Oral Daily Brigitte Kolb MD   250 mcg at 05/10/23 1959   • sennosides-docusate (PERICOLACE) 8.6-50 MG per tablet 2 tablet  2 tablet Oral Nightly Brigitte Kolb MD   2 tablet at  05/11/23 2117   • sodium chloride 0.9 % flush 10 mL  10 mL Intravenous PRN Brigitte Kolb MD       • sodium chloride 0.9 % flush 10 mL  10 mL Intravenous Q12H Brigitte Kolb MD   10 mL at 05/12/23 0927   • sodium chloride 0.9 % flush 10 mL  10 mL Intravenous PRN Brigitte Kolb MD       • sodium chloride 0.9 % infusion 40 mL  40 mL Intravenous PRN Brigitte Kolb MD           Lab Results (last 24 hours)     Procedure Component Value Units Date/Time    CBC (No Diff) [[id number]]  (Abnormal) Collected: 05/12/23 0700    Specimen: Blood Updated: 05/12/23 0823     WBC 7.60 10*3/mm3      RBC 5.38 10*6/mm3      Hemoglobin 13.4 g/dL      Hematocrit 44.6 %      MCV 82.9 fL      MCH 24.9 pg      MCHC 30.0 g/dL      RDW 14.9 %      RDW-SD 45.0 fl      MPV 9.3 fL      Platelets 214 10*3/mm3     POC Glucose Once [[id number]]  (Normal) Collected: 05/12/23 0635    Specimen: Blood Updated: 05/12/23 0646     Glucose 97 mg/dL      Comment: Meter: [id number] : [id number] JEFF RIVERA       POC Glucose Once [[id number]]  (Normal) Collected: 05/11/23 1642    Specimen: Blood Updated: 05/11/23 1648     Glucose 121 mg/dL      Comment: Meter: [id number] : [id number] CADE SANCHEZ       POC Glucose Once [[id number]]  (Normal) Collected: 05/11/23 1108    Specimen: Blood Updated: 05/11/23 1115     Glucose 98 mg/dL      Comment: Meter: [id number] : [id number] CADE SANCHEZ           Orders (last 24 hrs)      Start     Ordered    05/12/23 0930  Discontinue IV  Once         05/12/23 0934    05/12/23 0647  POC Glucose Once  PROCEDURE ONCE         05/12/23 0635    05/12/23 0600  ceFAZolin 2 gm IVPB in 100 mL NS (VTB)  On Call to O.R.,   Status:  Discontinued         05/11/23 1302    05/12/23 0600  CBC (No Diff)  Morning Draw         05/11/23 1857    05/12/23 0500  XR Chest 1 View  1 Time Imaging         05/11/23 1753    05/12/23 0000  Discharge Follow-up with Specified Provider: Dr. Solis as previously scheduled 5/16/23          05/12/23 0934    05/12/23 0000  Ambulatory Referral to Home Health         05/12/23 0934    05/11/23 1649  POC Glucose Once  PROCEDURE ONCE         05/11/23 1642    05/11/23 1551  Diet: Regular/House Diet; Texture: Regular Texture (IDDSI 7); Fluid Consistency: Thin (IDDSI 0)  Diet Effective Now         05/11/23 1550    05/11/23 1537  Oxygen Therapy- Nasal Cannula; Titrate for SPO2: equal to or greater than, 96%, per policy  Continuous,   Status:  Canceled         05/11/23 1536    05/11/23 1537  Pulse Oximetry, Continuous  Continuous,   Status:  Canceled         05/11/23 1536 05/11/23 1537  POC Glucose STAT  STAT,   Status:  Canceled        Comments: Notify Anesthesia if blood sugar is less than 80 mg/dL or greater than 180mg/dL      05/11/23 1536    05/11/23 1537  Vital signs every 5 minutes for 15 minutes, every 15 minutes thereafter.  Once,   Status:  Canceled         05/11/23 1536    05/11/23 1537  Call Anesthesiologist for additional IV Fluid bolus for Hypotension/Tachycardia  Until Discontinued,   Status:  Canceled         05/11/23 1536    05/11/23 1537  Notify Anesthesia of Any Acute Changes in Patient Condition  Until Discontinued,   Status:  Canceled         05/11/23 1536    05/11/23 1537  Notify Anesthesia for Unrelieved Pain  Until Discontinued,   Status:  Canceled         05/11/23 1536    05/11/23 1537  Once DC criteria to floor met, follow surgeon's orders.  Until Discontinued,   Status:  Canceled         05/11/23 1536    05/11/23 1537  Discharge patient from PACU when discharge criteria is met.  Until Discontinued,   Status:  Canceled         05/11/23 1536    05/11/23 1536  lactated ringers infusion  Once As Needed,   Status:  Discontinued         05/11/23 1536 05/11/23 1536  oxyCODONE-acetaminophen (PERCOCET) 5-325 MG per tablet 1 tablet  Once As Needed,   Status:  Discontinued         05/11/23 1536    05/11/23 1536  ketorolac (TORADOL) injection 30 mg  Every 6 Hours PRN,   Status:   Discontinued         05/11/23 1536    05/11/23 1536  fentaNYL citrate (PF) (SUBLIMAZE) injection 50 mcg  Every 5 Minutes PRN,   Status:  Discontinued         05/11/23 1536 05/11/23 1536  ondansetron (ZOFRAN) injection 4 mg  As Needed,   Status:  Discontinued         05/11/23 1536 05/11/23 1536  ipratropium-albuterol (DUO-NEB) nebulizer solution 3 mL  Once As Needed,   Status:  Discontinued         05/11/23 1536 05/11/23 1517  XR Chest AP  1 Time Imaging         05/11/23 1517    05/11/23 1459  sodium chloride (NS) irrigation solution  As Needed,   Status:  Discontinued         05/11/23 1459 05/11/23 1457  PT Consult: Eval & Treat Functional Mobility Below Baseline  Once         05/11/23 1456    05/11/23 1413  sodium chloride 0.9 % flush 10 mL  Every 12 Hours Scheduled,   Status:  Discontinued         05/11/23 1411 05/11/23 1413  lactated ringers infusion  Once         05/11/23 1411 05/11/23 1412  POC Glucose Once  Once,   Status:  Canceled        Comments: For all diabetic patients and all patients who are to be admitted. Notify Anesthesiologist for blood sugar > 180.      05/11/23 1411    05/11/23 1412  Insert Peripheral IV  Once,   Status:  Canceled         05/11/23 1411    05/11/23 1412  Saline Lock & Maintain IV Access  Continuous,   Status:  Canceled         05/11/23 1411 05/11/23 1411  Vital Signs - Per Anesthesia Protocol  As Needed,   Status:  Canceled       05/11/23 1411    05/11/23 1411  sodium chloride 0.9 % flush 10 mL  As Needed,   Status:  Discontinued         05/11/23 1411    05/11/23 1411  sodium chloride 0.9 % infusion 40 mL  As Needed,   Status:  Discontinued         05/11/23 1411 05/11/23 1411  midazolam (VERSED) injection 1 mg  Every 10 Minutes PRN,   Status:  Discontinued         05/11/23 1411 05/11/23 1411  midazolam (VERSED) injection 0.5 mg  Every 10 Minutes PRN,   Status:  Discontinued         05/11/23 1411 05/11/23 1359  Case Management  Consult   Once        Provider:  (Not yet assigned)    05/11/23 1358    05/11/23 1338  FL Surgery Fluoro  1 Time Imaging         05/11/23 1337    05/11/23 1116  POC Glucose Once  PROCEDURE ONCE         05/11/23 1108    05/11/23 1000  Sulfur Hexafluoride Microsph (LUMASON) 60.7-25 MG IV reconstituted suspension reconstituted suspension 3 mL  Once in Imaging         05/11/23 1002    05/11/23 0900  metoprolol succinate XL (TOPROL-XL) 24 hr tablet 50 mg  Daily         05/10/23 1745    05/11/23 0000  Wheelchair         05/11/23 1455    05/10/23 2100  sodium chloride 0.9 % flush 10 mL  Every 12 Hours Scheduled         05/10/23 1556    05/10/23 2100  sennosides-docusate (PERICOLACE) 8.6-50 MG per tablet 2 tablet  Nightly         05/10/23 1556    05/10/23 1900  roflumilast (DALIRESP) tablet 250 mcg  Daily         05/10/23 1745    05/10/23 1900  budesonide-formoterol (SYMBICORT) 160-4.5 MCG/ACT inhaler 2 puff  2 Times Daily - RT         05/10/23 1745    05/10/23 1900  ipratropium (ATROVENT) nebulizer solution 0.5 mg  4 Times Daily - RT         05/10/23 1745    05/10/23 1845  pantoprazole (PROTONIX) EC tablet 40 mg  Every Morning Before Breakfast         05/10/23 1745    05/10/23 1800  Oral Care  2 Times Daily       05/10/23 1556    05/10/23 1800  Insulin Lispro (humaLOG) injection 2-9 Units  3 Times Daily With Meals         05/10/23 1556    05/10/23 1720  hydrOXYzine (ATARAX) tablet 25 mg  3 Times Daily PRN         05/10/23 1720    05/10/23 1700  POC Glucose 4x Daily AC & at Bedtime  4 Times Daily Before Meals & at Bedtime       05/10/23 1556    05/10/23 1600  Vital Signs  Every 4 Hours       05/10/23 1556    05/10/23 1600  HYDROcodone-acetaminophen (NORCO) 7.5-325 MG per tablet 1 tablet  3 Times Daily         05/10/23 1556    05/10/23 1557  Intake & Output  Every Shift       05/10/23 1556    05/10/23 1556  polyethylene glycol (MIRALAX) packet 17 g  Daily PRN         05/10/23 1556    05/10/23 1556  ipratropium-albuterol (DUO-NEB)  nebulizer solution 3 mL  Every 6 Hours PRN         05/10/23 1556    05/10/23 1556  ondansetron ODT (ZOFRAN-ODT) disintegrating tablet 4 mg  Every 12 Hours PRN         05/10/23 1556    05/10/23 1556  melatonin tablet 5 mg  Nightly PRN         05/10/23 1556    05/10/23 1556  dextrose (GLUTOSE) oral gel 15 g  Every 15 Minutes PRN         05/10/23 1556    05/10/23 1556  dextrose (D50W) (25 g/50 mL) IV injection 25 g  Every 15 Minutes PRN         05/10/23 1556    05/10/23 1556  glucagon HCl (Diagnostic) injection 1 mg  Every 15 Minutes PRN         05/10/23 1556    05/10/23 1556  sodium chloride 0.9 % flush 10 mL  As Needed         05/10/23 1556    05/10/23 1556  sodium chloride 0.9 % infusion 40 mL  As Needed         05/10/23 1556    05/10/23 1556  Telemetry - Pulse Oximetry  Continuous PRN,   Status:  Canceled      Comments: If Patient Develops Unresponsiveness, Acute Dyspnea, Cyanosis or Suspected Hypoxemia Start Continuous Pulse Ox Monitoring, Apply Oxygen & Notify Provider    05/10/23 1556    05/10/23 1556  Oxygen Therapy- Nasal Cannula; Titrate for SPO2: 90% - 95%  Continuous PRN,   Status:  Canceled      Comments: If Patient Develops Unresponsiveness, Acute Dyspnea, Cyanosis or Suspected Hypoxemia Start Continuous Pulse Ox Monitoring, Apply Oxygen & Notify Provider    05/10/23 1556    05/10/23 1556  nitroglycerin (NITROSTAT) SL tablet 0.4 mg  Every 5 Minutes PRN         05/10/23 1556    05/10/23 1031  sodium chloride 0.9 % flush 10 mL  As Needed        See Hyperspace for full Linked Orders Report.    05/10/23 1031    Unscheduled  ECG 12 Lead Chest Pain  As Needed      Comments: Nurse to Release if Patient Expericences Acute Chest Pain or Dysrhythmias    05/10/23 1556    Unscheduled  Potassium  As Needed      Comments: For Ventricular Arrhythmias      05/10/23 1556    Unscheduled  Magnesium  As Needed      Comments: For Ventricular Arrhythmias      05/10/23 1556    Unscheduled  High Sensitivity Troponin T  As Needed       Comments: For Chest Pain      05/10/23 1556    Unscheduled  Blood Gas, Arterial -With Co-Ox Panel: Yes  As Needed      Comments: Draw for Acute Dyspnea, Cyanosis, Suspected Hypoxemia or UnresponsivenessNotify Provider of Results      05/10/23 1556    Unscheduled  Follow Hypoglycemia Standing Orders For Blood Glucose <70 & Notify Provider of Treatment  As Needed      Comments: Follow Hypoglycemia Orders As Outlined in Process Instructions (Open Order Report to View Full Instructions)  Notify Provider Any Time Hypoglycemia Treatment is Administered    05/10/23 1556    --  esomeprazole (nexIUM) 40 MG capsule  Every Morning Before Breakfast         05/10/23 1659    --  metoprolol succinate XL (TOPROL-XL) 50 MG 24 hr tablet  Daily         05/10/23 1701    --  SCANNED - TELEMETRY           05/10/23 0000                   Operative/Procedure Notes (last 24 hours)      Brigitte Kolb MD at 05/11/23 1459        Insertion Pleurx catheter left chest with fluoroscopic guidance    Pre-op: Recurrent left pleural effusion    Post-op:  Same    Surgeon:  Brigitte Kolb M.D., F.A.C.S.    Assistant:  None    Anesthesia:  general      Indications: Recurrent left pleural effusion       Procedure Details   After obtaining informed consent and receiving preoperative antibiotics and with venous compression boots in place, the patient was taken to the operating room and placed under anesthesia.  The left lateral chest was prepped and draped in a sterile fashion.  Using the chest tube incision as the landmark an 8 mm incision was made in the skin.  The needle was inserted into the thoracic cavity and fluid was aspirated.  Guidewire was passed without resistance.  Following this a counterincision inferior to the skin entry incision was made and the catheter was brought through the tunnel.  Under fluoroscopic guidance the dilator sheath was passed over the guidewire, directing the catheter inferior and posterior.  Catheter was passed  over the peel-away sheath which was then removed.  Catheter was sutured in with 0 silk and the skin incision site was closed with 3-0 Vicryl subdermal suture and 4-0 Monocryl.  550 cc were drained at the time of the procedure.  Dressings were placed patient was taken to the recovery room where chest x-ray demonstrated the catheter to be in good position    Findings:         Estimated Blood Loss:  Minimal    Blood Administered: none           Drains: none           Specimens: None     Grafts and Implants: No       Complications:  none           Disposition: PACU - hemodynamically stable.           Condition: stable        Electronically signed by Brigitte Kolb MD at 23 1658          Physician Progress Notes (last 24 hours)      Filemon Campbell MD at 23 1742              [facility]     Patient Identification:  Name:  Rory Rios Jr.  Age:  63 y.o.  Sex:  male  :  1960  MRN:  [id number]  Visit Number:  [id number]  ROOM: [address] [place] [address]     Primary Care Provider:  Alejandro Monique MD     Date of Admission: 5/10/2023    Length of stay in inpatient status:  1    Subjective     Chief Compliant:    Chief Complaint   Patient presents with   • Shortness of Breath       Patient seen following return from OR this afternoon. Dyspnea resolved after removal of 550cc pleural fluid with placement of left sided pleuryx catheter. No CP and minimal soreness and plauritic pain reported at pleuryx site.         Objective     Current Hospital Meds:  budesonide-formoterol, 2 puff, Inhalation, BID - RT  HYDROcodone-acetaminophen, 1 tablet, Oral, TID  insulin lispro, 2-9 Units, Subcutaneous, TID With Meals  ipratropium, 0.5 mg, Nebulization, 4x Daily - RT  metoprolol succinate XL, 50 mg, Oral, Daily  pantoprazole, 40 mg, Oral, QAM AC  roflumilast, 250 mcg, Oral, Daily  senna-docusate sodium, 2 tablet, Oral, Nightly  sodium chloride, 10 mL, Intravenous, Q12H       Current  Antimicrobial Therapy:  Anti-Infectives (From admission, onward)    None        Current Diuretic Therapy:  Diuretics (From admission, onward)    Ordered     Dose/Rate Route Frequency Start Stop    05/10/23 1116  furosemide (LASIX) injection 80 mg        Ordering Provider: Ap Reyes MD    80 mg Intravenous Once 05/10/23 1118 05/10/23 1202        ----------------------------------------------------------------------------------------------------------------------  Vital Signs:  Temp:  [97.3 °F (36.3 °C)-99.1 °F (37.3 °C)] 98 °F (36.7 °C)  Heart Rate:  [] 101  Resp:  [16-18] 18  BP: ()/(54-89) 99/62  SpO2:  [93 %-97 %] 96 %  on  Flow (L/min):  [2-4] 2;   Device (Oxygen Therapy): nasal cannula  Body mass index is 34.87 kg/m².    Wt Readings from Last 3 Encounters:   05/11/23 127 kg (279 lb)   04/20/23 131 kg (288 lb)   02/09/23 134 kg (295 lb)     Intake & Output (last 3 days)       05/08 0701  05/09 0700 05/09 0701  05/10 0700 05/10 0701 05/11 0700 05/11 0701  05/12 0700    P.O.   240     I.V. (mL/kg)    700 (5.5)    Total Intake(mL/kg)   240 (1.9) 700 (5.5)    Net   +240 +700            Urine Unmeasured Occurrence   6 x         Diet: Regular/House Diet; Texture: Regular Texture (IDDSI 7); Fluid Consistency: Thin (IDDSI 0)  ----------------------------------------------------------------------------------------------------------------------  Physical Exam  Vitals and nursing note reviewed.   Constitutional:       General: He is not in acute distress.  HENT:      Mouth/Throat:      Mouth: Mucous membranes are moist.      Pharynx: Oropharynx is clear.   Eyes:      General: No scleral icterus.     Extraocular Movements: Extraocular movements intact.   Cardiovascular:      Rate and Rhythm: Normal rate and regular rhythm.      Pulses: Normal pulses.   Pulmonary:      Effort: Pulmonary effort is normal. No respiratory distress.      Breath sounds: No wheezing.      Comments: Diminished breath  sounds at left lung base but breath sounds audible deep into middle anterior and posterior lung fields. Pleuryx catheter in place with clear dlean dressing overlying insertion site  Abdominal:      Palpations: Abdomen is soft.      Tenderness: There is no abdominal tenderness.   Musculoskeletal:      Right lower leg: No edema.      Left lower leg: No edema.   Skin:     General: Skin is warm and dry.      Capillary Refill: Capillary refill takes less than 2 seconds.   Neurological:      General: No focal deficit present.      Mental Status: He is alert. Mental status is at baseline.   Psychiatric:         Mood and Affect: Mood normal.         Behavior: Behavior normal.   ----------------------------------------------------------------------------------------------------------------------    ----------------------------------------------------------------------------------------------------------------------  LABS:    CBC and coagulation:  Results from last 7 days   Lab Units 05/11/23  0755 05/10/23  2212 05/10/23  1043   PROCALCITONIN ng/mL 0.09  --   --    SED RATE mm/hr  --   --  39*   CRP mg/dL 4.78*  --  1.54*   WBC 10*3/mm3  --  12.44* 7.32   HEMOGLOBIN g/dL  --  13.8 13.8   HEMATOCRIT %  --  45.2 45.2   MCV fL  --  82.6 82.9   MCHC g/dL  --  30.5* 30.5*   PLATELETS 10*3/mm3  --  199 223   INR   --   --  1.00   D DIMER QUANT MCGFEU/mL  --   --  1.85*     Acid/base balance:      Renal and electrolytes:  Results from last 7 days   Lab Units 05/10/23  2212 05/10/23  1043   SODIUM mmol/L 140 142   POTASSIUM mmol/L 3.8 3.9   MAGNESIUM mg/dL 1.7 1.9   CHLORIDE mmol/L 103 106   CO2 mmol/L 26.2 26.7   BUN mg/dL 8 7*   CREATININE mg/dL 0.86 0.80   CALCIUM mg/dL 9.1 9.5   GLUCOSE mg/dL 131* 106*     Estimated Creatinine Clearance: 123.5 mL/min (by C-G formula based on SCr of 0.86 mg/dL).    Liver and pancreatic function:  Results from last 7 days   Lab Units 05/10/23  1043   ALBUMIN g/dL 3.7   BILIRUBIN mg/dL 0.4   ALK  PHOS U/L 90   AST (SGOT) U/L 16   ALT (SGPT) U/L 14     Endocrine function:  Lab Results   Component Value Date    HGBA1C 6.30 (H) 04/21/2023     Point of care bedside glucose levels:  Results from last 7 days   Lab Units 05/11/23  1642 05/11/23  1108 05/11/23  0644 05/10/23  1709   GLUCOSE mg/dL 121 98 105 123     Glucose levels from the CMP:  Results from last 7 days   Lab Units 05/10/23  2212 05/10/23  1043   GLUCOSE mg/dL 131* 106*     Lab Results   Component Value Date    TSH 2.210 05/10/2023    FREET4 1.33 05/10/2023     Cardiac:  Results from last 7 days   Lab Units 05/10/23  1347 05/10/23  1043   HSTROP T ng/L 16* 15*   PROBNP pg/mL  --  189.9       Cultures:  Lab Results   Component Value Date    COLORU Yellow 05/10/2023    CLARITYU Clear 05/10/2023    PHUR 7.5 05/10/2023    GLUCOSEU Negative 05/10/2023    KETONESU Negative 05/10/2023    BLOODU Moderate (2+) (A) 05/10/2023    NITRITEU Negative 05/10/2023    LEUKOCYTESUR Negative 05/10/2023    BILIRUBINUR Negative 05/10/2023    UROBILINOGEN 0.2 E.U./dL 05/10/2023    RBCUA 13-20 (A) 05/10/2023    WBCUA 0-2 05/10/2023    BACTERIA Trace (A) 05/10/2023     Microbiology Results (last 10 days)     Procedure Component Value - Date/Time    COVID-19 and FLU A/B PCR - Swab, Nasopharynx [[id number]]  (Normal) Collected: 05/10/23 1044    Lab Status: Final result Specimen: Swab from Nasopharynx Updated: 05/10/23 1116     COVID19 Not Detected     Influenza A PCR Not Detected     Influenza B PCR Not Detected    Narrative:      Fact sheet for providers: https://www.fda.gov/media/614297/download    Fact sheet for patients: https://www.fda.gov/media/642474/download    Test performed by PCR.          No results found for: PREGTESTUR, PREGSERUM, HCG, HCGQUANT  Pain Management Panel         Latest Ref Rng & Units 5/10/2023   Pain Management Panel   Amphetamine, Urine Qual Negative Negative     Barbiturates Screen, Urine Negative Negative     Benzodiazepine Screen, Urine Negative  Negative     Buprenorphine, Screen, Urine Negative Negative     Cocaine Screen, Urine Negative Negative     Methadone Screen , Urine Negative Negative     Methamphetamine, Ur Negative Negative                  I have personally looked at the labs and they are summarized above.  ----------------------------------------------------------------------------------------------------------------------  Detailed radiology reports for the last 24 hours:    Imaging Results (Last 24 Hours)     Procedure Component Value Units Date/Time    FL Surgery Fluoro [[id number]] Collected: 05/11/23 1603     Updated: 05/11/23 1606    Narrative:      EXAMINATION: FL SURGERY FLUORO-      CLINICAL INDICATION: pleurx catheter; A15-Skwvmrz effusion, not  elsewhere classified        COMPARISON: None available     Total fluoroscopy time 25.5 seconds     Fluoroscopic or was placed under fluoroscopy and 1 image was obtained     For a full procedural report, please see the report provided by the  performing physician     This report was finalized on 5/11/2023 4:04 PM by Dr. Jamie Smith MD.       XR Chest AP [[id number]] Collected: 05/11/23 1602     Updated: 05/11/23 1605    Narrative:      XR CHEST AP-     CLINICAL INDICATION: Pluerx cath ; O58-Lyvsvpr effusion, not elsewhere  classified        COMPARISON: 05/10/2023      TECHNIQUE: Single frontal view of the chest.     FINDINGS:      LUNGS: Patchy areas of consolidation bilaterally.  Left-sided catheter is present. No measurable pleural fluid is seen      HEART AND MEDIASTINUM: Heart and mediastinal contours are unremarkable        SKELETON: Bony and soft tissue structures are unremarkable.             Impression:      No residual effusion identified.     This report was finalized on 5/11/2023 4:03 PM by Dr. Jamie Smith MD.             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 Currently on baseline O2 with no fever, inf clinical sx, or leukocytosis. Crp mildly elevated not dissimilar from prior. BNP wnl and CTPE neg for PE. Defer abx coverage  -Patient would be well served by pleurodesis or VATS but evaluated by CT surgery at [facility] and felt overall pulmonary condition not amenable to either intervention, treatment ultimately narrowed to lung trx eval with pleuryx for palliation of sx in interim  -TTE added to complete cardiac w/u, no prior echo on chart but note effusion was exudative in nature, not likely cardiac origin and no other clinical evidence of HF. Performed with read pending. No physical exam evidence of HF  -s/p left pleuryz cath placement 5/11, 550cc pleural fluid removed with sx improvement  -repeat cxr in am, if stable can likely d/c home with outpatient home health for pleuryx care and drainage periodically  -f/u Dr. Solis 5/16 at [facility] as previously scheduled      #COOPER  -Cont home cpap     #Hx of atrial tachycardia  -Resumed home metoprolol     #?COPD hx  -Reported on last hospitalization but no smoking hx and no PFTs available for review but likely secondary to environmental contaminant  -Resume home breztri formulary equivalent with prn duonebs     #HTN  -Holding antihypertensives post-op, within normal range currently    #NIDDM  -Hold metformin and glipizide  -FSBG qac/qhs w/SSI    VTE Prophylaxis:   Mechanical Order History:     None      Pharmalogical Order History:      Ordered     Dose Route Frequency Stop    05/10/23 1556  Enoxaparin Sodium (LOVENOX) syringe 40 mg         40 mg SC Once 05/10/23 1720                Code Status and Medical Interventions:   Ordered at:  05/10/23 1555     Code Status (Patient has no pulse and is not breathing):    CPR (Attempt to Resuscitate)     Medical Interventions (Patient has pulse or is breathing):    Full Support         Disposition: Pend cxr in am, discharge <48hrs    I have reviewed any copied/forwarded text or data, verified its accuracy, and updated as necessary above.    Filemon Campbell MD  [facility] Hospitalist  05/11/23  17:45 EDT      Electronically signed by Filemon Campbell MD at 05/11/23 1752          Consult Notes (last 24 hours)      Brigitte Kolb MD at 05/11/23 1014      Consult Orders    1. Inpatient General Surgery Consult [[id number]] ordered by Brigitte Kolb MD at 05/10/23 1502               Consulting physician:  Dr. Kolb    Referring physician: Hospitalist    Date of consultation: 05/11/23     Chief complaint symptomatic pleural effusion    Subjective     Patient is a 63 y.o. male who was admitted on 5/10/2023 with a symptomatic left pleural effusion.  The patient has been followed by pulmonary in [place] for some time.  Recently he was admitted to [facility] and was evaluated by Dr. Solis.  He had a chest tube placed at that time.  Apparently he was to have a Pleurx catheter placed but the chest tube was removed and then there was not enough fluid to allow for tube placement again.  Over the course of the next week or so the fluid reaccumulated and the patient became symptomatic.  He presented to the emergency room and was admitted for consideration for Pleurx catheter insertion.  Hypertension      Review of Systems  Review of Systems - General ROS: negative for - weight loss  Psychological ROS: negative for - behavioral disorder  Ophthalmic ROS: negative for - dry eyes  ENT ROS: negative for - vertigo or vocal changes  Hematological and Lymphatic ROS: negative for - swollen lymph nodes, DVT, PE.   Respiratory ROS: positive for shortness of breath  Cardiovascular ROS: negative for - irregular  heartbeat or murmur  Gastrointestinal ROS: negative for - blood in stools or change in stools  Genitourinary ROS: negative for - hematuria or incontinence  Musculoskeletal ROS: negative for - gait disturbance      History  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   • Diabetes mellitus    • GERD (gastroesophageal reflux disease)    • Hypertension    • Pneumonia    • Pulmonary fibrosis 4/27/2023     Past Surgical History:   Procedure Laterality Date   • BRONCHOSCOPY N/A 4/24/2023    Procedure: BRONCHOSCOPY WITH ENDOBRONCHIAL ULTRASOUND;  Surgeon: Kota Willard MD;  [facility] ENDOSCOPY;  Service: Pulmonary;  Laterality: N/A;   • FOOT SURGERY Left      Family History   Problem Relation Age of Onset   • Diabetes Mother    • Heart failure Father    • Diabetes Sister    • Heart disease Brother    • Diabetes Brother      Social History     Tobacco Use   • Smoking status: Never     Passive exposure: Never   • Smokeless tobacco: Current     Types: Chew   Vaping Use   • Vaping Use: Never used   Substance Use Topics   • Alcohol use: No   • Drug use: No     Medications Prior to Admission   Medication Sig Dispense Refill Last Dose   • Budeson-Glycopyrrol-Formoterol (BREZTRI) 160-9-4.8 MCG/ACT aerosol inhaler Inhale 2 puffs 2 (Two) Times a Day.   5/9/2023   • esomeprazole (nexIUM) 40 MG capsule Take 1 capsule by mouth Every Morning Before Breakfast.   5/9/2023   • hydrOXYzine (ATARAX) 25 MG tablet Take 1 tablet by mouth 3 (Three) Times a Day As Needed for Anxiety. 21 tablet 0 5/10/2023   • ibuprofen (ADVIL,MOTRIN) 800 MG tablet Take 1 tablet by mouth 3 (Three) Times a Day.   5/9/2023   • lisinopril (PRINIVIL,ZESTRIL) 10 MG tablet Take 1 tablet by mouth Every Night.   5/9/2023   • metoprolol succinate XL (TOPROL-XL) 50 MG 24 hr tablet Take 1 tablet by mouth Daily.   5/10/2023   • omeprazole (priLOSEC) 40 MG capsule Take 1  capsule by mouth Daily.   5/9/2023   • roflumilast (DALIRESP) 250 MCG tablet tablet Take 1 tablet by mouth Daily.   5/9/2023   • albuterol (PROVENTIL HFA;VENTOLIN HFA) 108 (90 BASE) MCG/ACT inhaler Inhale 2 puffs Every 4 (Four) Hours As Needed for Wheezing or Shortness of Air. 1 inhaler 0 Unknown   • HYDROcodone-acetaminophen (NORCO) 7.5-325 MG per tablet Take 1 tablet by mouth Every 8 (Eight) Hours As Needed for Moderate Pain.   Unknown   • ipratropium-albuterol (DUO-NEB) 0.5-2.5 mg/3 ml nebulizer Take 3 mL by nebulization Every 4 (Four) Hours As Needed for Wheezing or Shortness of Air.   Unknown     Allergies:  Patient has no known allergies.    Objective     Vital Signs  Temp:  [97.1 °F (36.2 °C)-99.1 °F (37.3 °C)] 98.9 °F (37.2 °C)  Heart Rate:  [71-96] 91  Resp:  [18-24] 18  BP: ()/(54-95) 93/55    Physical Exam:  General:  This is a WD WN male in no acute distress  Vital signs: Stable, afebrile  HEENT exam:  WNL. Sclerae are anicteric.  EOMI  Neck:  Supple, FROM.  No JVD.  Trachea midline  Lungs:  Respiratory effort normal. Auscultation: Clear, without wheezes, rhonchi, rales  Heart:  Regular rate and rhythm, without murmur, gallop, rub.  No pedal edema  Abdomen: non tender  Musculoskeletal:  Muscle strength/tone is normal.    Psych:  Alert, oriented x 3.  Mood and affect are appropriate  Skin:  Warm with good turgor.  Without rash or lesion  Extremities:  Examination of the extremities revealed no cyanosis, clubbing or edema.    Results Review:   Results from last 7 days   Lab Units 05/10/23  1347 05/10/23  1043   HSTROP T ng/L 16* 15*     Results from last 7 days   Lab Units 05/11/23  0755 05/10/23  2212 05/10/23  1043   CRP mg/dL 4.78*  --  1.54*   WBC 10*3/mm3  --  12.44* 7.32   HEMOGLOBIN g/dL  --  13.8 13.8   HEMATOCRIT %  --  45.2 45.2   PLATELETS 10*3/mm3  --  199 223   INR   --   --  1.00         Results from last 7 days   Lab Units 05/10/23  2212 05/10/23  1043   SODIUM mmol/L 140 142    POTASSIUM mmol/L 3.8 3.9   MAGNESIUM mg/dL 1.7 1.9   CHLORIDE mmol/L 103 106   CO2 mmol/L 26.2 26.7   BUN mg/dL 8 7*   CREATININE mg/dL 0.86 0.80   CALCIUM mg/dL 9.1 9.5   GLUCOSE mg/dL 131* 106*   ALBUMIN g/dL  --  3.7   BILIRUBIN mg/dL  --  0.4   ALK PHOS U/L  --  90   AST (SGOT) U/L  --  16   ALT (SGPT) U/L  --  14   Estimated Creatinine Clearance: 126 mL/min (by C-G formula based on SCr of 0.86 mg/dL).  No results found for: AMMONIA      No results found for: BLOODCX  No results found for: URINECX  No results found for: WOUNDCX  No results found for: STOOLCX    Imaging:  Imaging Results (Last 24 Hours)     Procedure Component Value Units Date/Time    CT Angiogram Chest Pulmonary Embolism [[id number]] Collected: 05/10/23 1305     Updated: 05/10/23 1308    Narrative:      CT ANGIOGRAM CHEST PULMONARY EMBOLISM-     CLINICAL INDICATION: Pulmonary embolism (PE) suspected, high prob        COMPARISON: 04/22/2023      PROCEDURE: Thin cut axial images were acquired through the pulmonary  vessels during the rapid infusion of IV contrast.     Additional 3-D reformatted images obtained via post-processing for  improved diagnostic accuracy and procedural planning.     Radiation dose reduction techniques were utilized per ALARA protocol.  Automated exposure control was initiated through either or [employer] or  D[employer] software packages by  protocol.           FINDINGS: Today's study demonstrates opacification of the central  pulmonary vessels.   There are no filling defects.   There is no truncation.     No evidence of a pulmonary embolus.     Dense areas of consolidation are present bilaterally and are very  similar to the prior study..     Consolidation in the perihilar regions bilaterally     Moderate left and small right pleural effusion       Impression:      1. No evidence of a pulmonary embolus  2. Moderate left and small right pleural effusion  3. Dense areas of consolidation again noted bilaterally but  similar to  the prior study.     This report was finalized on 5/10/2023 1:06 PM by Dr. Jamie Smith MD.       XR Chest 1 View [[id number]] Collected: 05/10/23 1100     Updated: 05/10/23 1103    Narrative:      XR CHEST 1 VW-     CLINICAL INDICATION: sob        COMPARISON: 04/27/2023      TECHNIQUE: Single frontal view of the chest.     FINDINGS:      LUNGS: Dense consolidation and nodularity bilaterally. Worse than on the  previous exam      HEART AND MEDIASTINUM: Heart and mediastinal contours are unremarkable        SKELETON: Bony and soft tissue structures are unremarkable.             Impression:      Overall appearance worse than on the prior study     This report was finalized on 5/10/2023 11:01 AM by Dr. Jamie Smith MD.             CT chest reports and images were reviewed.  I agree with the assessment      Impression:  Patient Active Problem List   Diagnosis Code   • Recurrent left pleural effusion J90   • PAT (paroxysmal atrial tachycardia) I47.1   • Black lung J60   • Chronic obstructive pulmonary disease J44.9   • Primary hypertension I10   • Lung mass R91.8   • Coal workers pneumoconiosis J60   • Pulmonary fibrosis J84.10   • Pleural effusion exudative J90       Plan:  Pleurx catheter insertion left chest      Discussion:  The risks of the surgical procedure were discussed.  Options of alternative treatments including no treatment (if applicable) were discussed.  Patient voiced understanding of the above issues and wishes to proceed    Brigitte Kolb MD  05/11/23  10:14 EDT    Time: Time spent:    Please note that portions of this note were completed with a voice recognition program.    Electronically signed by Brigitte Kolb MD at 05/11/23 0038       Nutrition Notes (last 24 hours)   [address] [place] [id number]  Phone:  [phone]  Fax:  [phone] Date: May 12, 2023      Ambulatory Referral to Home Health     Patient:  Rory Rios  MRN:  [id number]    [address] [place] [address]  [address] :  1960  SSN:    Phone: [phone] Sex:  M      INSURANCE PAYOR PLAN GROUP # SUBSCRIBER ID   Primary:  Secondary:    WORKERS COMPENSATION  MEDICARE [id number]  [id number] [facility], [place]    [id number]  [id number]      Referring Provider Information:  FILEMON CAMPBELL Phone: [phone] Fax: [phone]       Referral Information:   # Visits:  999 Referral Type: Home Health [42]   Urgency:  Routine Referral Reason: Specialty Services Required   Start Date: May 12, 2023 End Date:  To be determined by Insurer   Diagnosis: Pleural effusion (J90 [ICD-10-CM] 511.9 [ICD-9-CM])      Refer to Dept:   Refer to Provider:   Refer to Provider Phone:   Refer to Facility:       Face to Face Visit Date: 2023  Follow-up provider for Plan of Care? I treated the patient in an acute care facility and will not continue treatment after discharge.  Follow-up provider: ANDRIA PHELPS [[id number]]  Reason/Clinical Findings: f/u pleuryx catheter  Describe mobility limitations that make leaving home difficult: dyspnea  Nursing/Therapeutic Services Requested: Skilled Nursing  Skilled nursing orders: Other (Pleuryz catheter care and drainage)  Frequency: 1 Week 1     This document serves as a request of services and does not constitute Insurance authorization or approval of services.  To determine eligibility, please contact the members Insurance carrier to verify and review coverage.     If you have medical questions regarding this request for services. Please contact [address] at [phone] during normal business hours.        Authorizing Provider:Filemon Campbell MD  Authorizing Provider's NPI: [id number]  Order Entered By: Filemon Campbell MD 2023  9:34 AM     Electronically signed by: Filemon Campbell MD 2023  9:34 AM      Notes from 23 through 23   No notes exist for this encounter.         Physical Therapy Notes (most recent note)    No notes exist for this  encounter.         Occupational Therapy Notes (most recent note)    No notes exist for this encounter.         ADL Documentation (last day)     Date/Time Transferring Toileting Bathing Dressing Eating Communication Swallowing Equipment Currently Used at Home    05/11/23 6095 0 - independent 0 - independent 0 - independent 0 - independent 0 - independent 0 - understands/communicates without difficulty 0 - swallows foods/liquids without difficulty --    05/11/23 1356 -- -- -- -- -- -- -- cpap;oxygen    05/11/23 0715 0 - independent 0 - independent 0 - independent 0 - independent 0 - independent 0 - understands/communicates without difficulty 0 - swallows foods/liquids without difficulty --

## 2023-05-12 NOTE — DISCHARGE PLACEMENT REQUEST
"Rory Rios Jr. (63 y.o. Male)     Date of Birth   1960    Social Security Number       Address   [address]  [place] [address]    Home Phone   [phone]    MRN   [id number]       Amish   None    Marital Status                               Admission Date   5/10/23    Admission Type   Emergency    Admitting Provider   Filemon Campbell MD    Attending Provider   Filemon Cambpell MD    Department, Room/Bed   [address], [facility]/       Discharge Date       Discharge Disposition   Home or Self Care    Discharge Destination                               Attending Provider: Filemon Campbell MD    Allergies: No Known Allergies    Isolation: None   Infection: None   Code Status: CPR    Ht: 190.5 cm (75\")   Wt: 126 kg (277 lb 6.4 oz)    Admission Cmt: None   Principal Problem: Pleural effusion exudative [J90]                 Active Insurance as of 5/10/2023     Primary Coverage     Payor Plan Insurance Group Employer/Plan Group    WORKERS COMPENSATION [employer]AM [employer]     Payor Plan Address Payor Plan Phone Number Payor Plan Fax Number Effective Dates    [address] [phone]  6/25/2009 - None Entered    [facility] [id number]       Subscriber Name Subscriber Birth Date Member ID       RORY RIOS JR. 1960 [id number]           Secondary Coverage     Payor Plan Insurance Group Employer/Plan Group    MEDICARE MEDICARE A & B      Payor Plan Address Payor Plan Phone Number Payor Plan Fax Number Effective Dates    [address] [phone]  4/1/2013 - None Entered    [facility] [id number]       Subscriber Name Subscriber Birth Date Member ID       RORY RIOS JR. 1960 [id number]                 Emergency Contacts      (Rel.) Home Phone Work Phone Mobile Phone    KATIE KENDRICK (Daughter) [phone] -- --            Emergency Contact Information     Name Relation Home Work Mobile    KATIE KENDRICK Daughter [phone]      "       Insurance Information                WORKERS COMPENSATION/AZAM [employer] Phone: [phone]    Subscriber: Rory Rios JrNile Subscriber#: [id number]    Group#: [id number] Precert#: [id number]        MEDICARE/MEDICARE A & B Phone: [phone]    Subscriber: Rory Rios  Subscriber#: [id number]    Group#: -- Precert#: --          Problem List           Codes Noted - Resolved       Hospital    * (Principal) Pleural effusion exudative ICD-10-CM: J90  ICD-9-CM: 511.9 5/10/2023 - Present    Recurrent left pleural effusion ICD-10-CM: J90  ICD-9-CM: 511.9 2023 - Present       Non-Hospital    Coal workers pneumoconiosis ICD-10-CM: J60  ICD-9-CM: 500 2023 - Present    Pulmonary fibrosis ICD-10-CM: J84.10  ICD-9-CM: 515 2023 - Present    Black lung ICD-10-CM: J60  ICD-9-CM: 500 2023 - Present    Chronic obstructive pulmonary disease ICD-10-CM: J44.9  ICD-9-CM: 496 2023 - Present    Lung mass ICD-10-CM: R91.8  ICD-9-CM: 786.6 2023 - Present    PAT (paroxysmal atrial tachycardia) ICD-10-CM: I47.1  ICD-9-CM: 427.0 2023 - Present    Primary hypertension ICD-10-CM: I10  ICD-9-CM: 401.9 2023 - Present        History & Physical      Filemon Campbell MD at 05/10/23 [address] [place], [place] [address]IST HISTORY AND PHYSICAL    Patient Identification:  Name:  Rory Rios Jr.  Age:  63 y.o.  Sex:  male  :  1960  MRN:  [id number]   Visit Number:  [id number]  Admit Date: 5/10/2023   Room number:  3312/1P  Primary Care Physician:  Alejandro Monique MD    Date of Admission: 5/10/2023     Subjective     Chief complaint:    Chief Complaint   Patient presents with   • Shortness of Breath     History of presenting illness:  63 y.o. male who was admitted on 5/10/2023 for progressive dyspnea with known recurrent left sided pleural effusion.    Rory Rios Jr. 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 In summary, during last hospitalization he was seen by pulmonologist and CT surgery w/thoracentesis on 4/5 showing exudative fluid w/negative cytology and cx. In setting of significant perihilar fibrosis/masses noted, underwent bronchoscopy with EBUS 4/24 w/biopsies negative for malignancy and cx NGTD. After evaluation it was determined that the recurrent effusion was secondary to his coal workers pneumoconiosis with progressive massive fibrosis and was recommended to f/u with  outpatient lung trx clinic which he has not been able to do yet. Tentative plan was for him to see CT surgery in outpatient clinic 5/16 with repeat CXR at that time and possible direct admit for pleuryx cath placement if reaccumulating.    Unfortunately patient began developing recurrent progressive dyspnea without cough, CP, fever, or chills over last 3-4 days that slowly worsened without any alleviating factors. Also notes dyspnea not significantly worsened lying flat. Does improve some with nebulizers and nightly cpap but otherwise no alleviating factors. Patient has home O2 prn but had not been using it more than usual in last week.    On arrival to ED, he underwent CXR similar to prior and in setting of dyspnea and elevated d-dimer, had CTPE that showed recurrence of moderate sized left sided effusion. Otherwise not significantly changed dense consolidations similar to prior imaging. No tachycardia or significant hypoxia noted. Discussed admission for pleuryx here vs discussion with [facility] and in setting of current stability but with progressive sx and recurrent effusion, decision made to admit to floor service with general surgery consult for possible pleuryx catheter placement.     Prior  to admission I discussed patient's presentation and management with attending ER physician and verbal handoff received.  ---------------------------------------------------------------------------------------------------------------------   A thorough systems based relevant ROS was asked and was negative except as noted above.  ---------------------------------------------------------------------------------------------------------------------   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   • Diabetes mellitus    • GERD (gastroesophageal reflux disease)    • Hypertension    • Pneumonia    • Pulmonary fibrosis 4/27/2023     Past Surgical History:   Procedure Laterality Date   • BRONCHOSCOPY N/A 4/24/2023    Procedure: BRONCHOSCOPY WITH ENDOBRONCHIAL ULTRASOUND;  Surgeon: Kota Willard MD;  Location: [facility];  Service: Pulmonary;  Laterality: N/A;   • FOOT SURGERY Left      Family History   Problem Relation Age of Onset   • Diabetes Mother    • Heart failure Father    • Diabetes Sister    • Heart disease Brother    • Diabetes Brother      Social History     Socioeconomic History   • Marital status:    Tobacco Use   • Smoking status: Never     Passive exposure: Never   • Smokeless tobacco: Current     Types: Chew   Vaping Use   • Vaping Use: Never used   Substance and Sexual Activity   • Alcohol use: No   • Drug use: No   • Sexual activity: Defer     ---------------------------------------------------------------------------------------------------------------------   Allergies:  Patient has no known allergies.  ---------------------------------------------------------------------------------------------------------------------   Medications below are reported home medications pulling from within the system; at this time, these medications have not been reconciled unless otherwise specified and are in the  verification process for further verifcation as current home medications.      Prior to Admission Medications     Prescriptions Last Dose Informant Patient Reported? Taking?    albuterol (PROVENTIL HFA;VENTOLIN HFA) 108 (90 BASE) MCG/ACT inhaler   No No    Inhale 2 puffs Every 4 (Four) Hours As Needed for Wheezing or Shortness of Air.    aspirin 81 MG EC tablet   Yes No    Take 1 tablet by mouth Daily. OTC    Budeson-Glycopyrrol-Formoterol (BREZTRI) 160-9-4.8 MCG/ACT aerosol inhaler   Yes No    Inhale 2 puffs 2 (Two) Times a Day.    busPIRone (BUSPAR) 5 MG tablet   Yes No    Take 1 tablet by mouth every night at bedtime.    esomeprazole (nexIUM) 20 MG capsule   No No    Take 1 capsule by mouth Every Morning Before Breakfast.    glipizide (GLUCOTROL XL) 5 MG ER tablet   Yes No    Take 1 tablet by mouth Every Morning.    HYDROcodone-acetaminophen (NORCO) 7.5-325 MG per tablet   Yes No    Take 1 tablet by mouth 3 (Three) Times a Day.    hydrOXYzine (ATARAX) 25 MG tablet   No No    Take 1 tablet by mouth 3 (Three) Times a Day As Needed for Anxiety.    ibuprofen (ADVIL,MOTRIN) 800 MG tablet   Yes No    Take 1 tablet by mouth 3 (Three) Times a Day.    ipratropium-albuterol (DUO-NEB) 0.5-2.5 mg/3 ml nebulizer   Yes No    Take 3 mL by nebulization Every 4 (Four) Hours As Needed for Wheezing or Shortness of Air.    lisinopril (PRINIVIL,ZESTRIL) 10 MG tablet   Yes No    Take 1 tablet by mouth Every Night.    metFORMIN (GLUCOPHAGE) 500 MG tablet   Yes No    Take 1 tablet by mouth 2 (Two) Times a Day With Meals.    metoprolol succinate XL (TOPROL-XL) 25 MG 24 hr tablet   No No    Take 1 tablet by mouth Daily for 30 days.    Patient taking differently:  Take 2 tablets by mouth Every Morning.    omeprazole (priLOSEC) 40 MG capsule   Yes No    Take 1 capsule by mouth Daily.    roflumilast (DALIRESP) 250 MCG tablet tablet   Yes No    Take 1 tablet by mouth Daily.        Objective     Vital Signs:  Temp:  [97.1 °F (36.2 °C)-98 °F  (36.7 °C)] 98 °F (36.7 °C)  Heart Rate:  [71-89] 79  Resp:  [18-24] 18  BP: (117-147)/(70-95) 147/80    Mean Arterial Pressure (Non-Invasive) for the past 24 hrs (Last 3 readings):   Noninvasive MAP (mmHg)   05/10/23 1611 99   05/10/23 1450 96   05/10/23 1435 98     SpO2:  [94 %-98 %] 97 %  on  Flow (L/min):  [3] 3;   Device (Oxygen Therapy): nasal cannula  Body mass index is 33.57 kg/m².    Wt Readings from Last 3 Encounters:   05/10/23 128 kg (283 lb 1.6 oz)   04/20/23 131 kg (288 lb)   02/09/23 134 kg (295 lb)      ----------------------------------------------------------------------------------------------------------------------  Physical Exam  Vitals and nursing note reviewed.   Constitutional:       General: He is not in acute distress.  HENT:      Mouth/Throat:      Mouth: Mucous membranes are moist.      Pharynx: Oropharynx is clear.   Eyes:      General: No scleral icterus.     Extraocular Movements: Extraocular movements intact.   Cardiovascular:      Rate and Rhythm: Normal rate and regular rhythm.      Pulses: Normal pulses.   Pulmonary:      Effort: Pulmonary effort is normal. No respiratory distress.      Breath sounds: No wheezing.      Comments: Diminished breath sounds at left lung base but breath sounds audible deep into middle anterior and posterior lung fields  Abdominal:      Palpations: Abdomen is soft.      Tenderness: There is no abdominal tenderness.   Musculoskeletal:      Right lower leg: No edema.      Left lower leg: No edema.   Skin:     General: Skin is warm and dry.      Capillary Refill: Capillary refill takes less than 2 seconds.   Neurological:      General: No focal deficit present.      Mental Status: He is alert. Mental status is at baseline.   Psychiatric:         Mood and Affect: Mood normal.         Behavior: Behavior normal.       --------------------------------------------------------------------------------------------------------------------  LABS:    CBC and  coagulation:  Results from last 7 days   Lab Units 05/10/23  1043   SED RATE mm/hr 39*   CRP mg/dL 1.54*   WBC 10*3/mm3 7.32   HEMOGLOBIN g/dL 13.8   HEMATOCRIT % 45.2   MCV fL 82.9   MCHC g/dL 30.5*   PLATELETS 10*3/mm3 223   INR  1.00   D DIMER QUANT MCGFEU/mL 1.85*     Acid/base balance:      Renal and electrolytes:  Results from last 7 days   Lab Units 05/10/23  1043   SODIUM mmol/L 142   POTASSIUM mmol/L 3.9   MAGNESIUM mg/dL 1.9   CHLORIDE mmol/L 106   CO2 mmol/L 26.7   BUN mg/dL 7*   CREATININE mg/dL 0.80   CALCIUM mg/dL 9.5   GLUCOSE mg/dL 106*     Estimated Creatinine Clearance: 136.7 mL/min (by C-G formula based on SCr of 0.8 mg/dL).    Liver and pancreatic function:  Results from last 7 days   Lab Units 05/10/23  1043   ALBUMIN g/dL 3.7   BILIRUBIN mg/dL 0.4   ALK PHOS U/L 90   AST (SGOT) U/L 16   ALT (SGPT) U/L 14     Endocrine function:  Lab Results   Component Value Date    HGBA1C 6.30 (H) 04/21/2023     Point of care bedside glucose levels:      Lab Results   Component Value Date    TSH 2.210 05/10/2023    FREET4 1.33 05/10/2023     Cardiac:  Results from last 7 days   Lab Units 05/10/23  1347 05/10/23  1043   HSTROP T ng/L 16* 15*   PROBNP pg/mL  --  189.9       Cultures:  Lab Results   Component Value Date    COLORU Yellow 05/10/2023    CLARITYU Clear 05/10/2023    PHUR 7.5 05/10/2023    GLUCOSEU Negative 05/10/2023    KETONESU Negative 05/10/2023    BLOODU Moderate (2+) (A) 05/10/2023    NITRITEU Negative 05/10/2023    LEUKOCYTESUR Negative 05/10/2023    BILIRUBINUR Negative 05/10/2023    UROBILINOGEN 0.2 E.U./dL 05/10/2023    RBCUA 13-20 (A) 05/10/2023    WBCUA 0-2 05/10/2023    BACTERIA Trace (A) 05/10/2023     Microbiology Results (last 10 days)     Procedure Component Value - Date/Time    COVID-19 and FLU A/B PCR - Swab, Nasopharynx [[id number]]  (Normal) Collected: 05/10/23 1044    Lab Status: Final result Specimen: Swab from Nasopharynx Updated: 05/10/23 1116     COVID19 Not Detected      Influenza A PCR Not Detected     Influenza B PCR Not Detected    Narrative:      Fact sheet for providers: https://www.fda.gov/media/960094/download    Fact sheet for patients: https://www.fda.gov/media/791172/download    Test performed by PCR.          No results found for: PREGTESTUR, PREGSERUM, HCG, HCGQUANT  Pain Management Panel         Latest Ref Rng & Units 5/10/2023   Pain Management Panel   Amphetamine, Urine Qual Negative Negative     Barbiturates Screen, Urine Negative Negative     Benzodiazepine Screen, Urine Negative Negative     Buprenorphine, Screen, Urine Negative Negative     Cocaine Screen, Urine Negative Negative     Methadone Screen , Urine Negative Negative     Methamphetamine, Ur Negative Negative                  I have personally looked at the labs and they are summarized above.  ----------------------------------------------------------------------------------------------------------------------  Detailed radiology reports for the last 24 hours:    Imaging Results (Last 24 Hours)     Procedure Component Value Units Date/Time    CT Angiogram Chest Pulmonary Embolism [[id number]] Collected: 05/10/23 1305     Updated: 05/10/23 1308    Narrative:      CT ANGIOGRAM CHEST PULMONARY EMBOLISM-     CLINICAL INDICATION: Pulmonary embolism (PE) suspected, high prob        COMPARISON: 04/22/2023      PROCEDURE: Thin cut axial images were acquired through the pulmonary  vessels during the rapid infusion of IV contrast.     Additional 3-D reformatted images obtained via post-processing for  improved diagnostic accuracy and procedural planning.     Radiation dose reduction techniques were utilized per ALARA protocol.  Automated exposure control was initiated through either or [employer] or  [employer] software packages by  protocol.           FINDINGS: Today's study demonstrates opacification of the central  pulmonary vessels.   There are no filling defects.   There is no truncation.     No  evidence of a pulmonary embolus.     Dense areas of consolidation are present bilaterally and are very  similar to the prior study..     Consolidation in the perihilar regions bilaterally     Moderate left and small right pleural effusion       Impression:      1. No evidence of a pulmonary embolus  2. Moderate left and small right pleural effusion  3. Dense areas of consolidation again noted bilaterally but similar to  the prior study.     This report was finalized on 5/10/2023 1:06 PM by Dr. Jamie Smith MD.       XR Chest 1 View [[id number]] Collected: 05/10/23 1100     Updated: 05/10/23 1103    Narrative:      XR CHEST 1 VW-     CLINICAL INDICATION: sob        COMPARISON: 04/27/2023      TECHNIQUE: Single frontal view of the chest.     FINDINGS:      LUNGS: Dense consolidation and nodularity bilaterally. Worse than on the  previous exam      HEART AND MEDIASTINUM: Heart and mediastinal contours are unremarkable        SKELETON: Bony and soft tissue structures are unremarkable.             Impression:      Overall appearance worse than on the prior study     This report was finalized on 5/10/2023 11:01 AM by Dr. Jamie Smith MD.           Final impressions for the last 30 days of radiology reports:    CT Chest With & Without Contrast Diagnostic    Result Date: 4/21/2023  Impression: 1. Extensive masslike perihilar disease, consistent with history of longstanding inhalational disease and progressive massive fibrosis. 2. Asymmetrically rounded and masslike area associated with left perihilar PMF in the left lower lobe, 4.5 cm in diameter, which may also reflect advanced PMF, but at least potentially could represent superimposed neoplasm, as discussed above. 3. Large, free-flowing left pleural effusion of uncertain significance. Electronically Signed: Mitch Sears  4/21/2023 3:48 PM EDT  Workstation ID: [id number]    XR Chest 1 View    Result Date: 5/10/2023  Overall appearance worse than on the prior study  This report  was finalized on 5/10/2023 11:01 AM by Dr. Jamie Smith MD.      XR Chest 1 View    Result Date: 4/27/2023  Impression: Stable chest. No pneumothorax. Multifocal infiltrates. Electronically Signed: Bud Britton  4/27/2023 8:21 AM EDT  Workstation ID: [id number]    XR Chest 1 View    Result Date: 4/26/2023  Impression: Removal of left-sided chest tube. No pneumothorax. Otherwise, stable exam. Electronically Signed: Shantelle Grimm  4/26/2023 3:04 PM EDT  Workstation ID: [id number]    XR Chest 1 View    Result Date: 4/26/2023  Impression: Left basilar pleural drain. No significant effusion or evidence of pneumothorax. Perihilar airspace masses present bilaterally, similar as compared to the previous study Electronically Signed: Shantelle Grimm  4/26/2023 7:09 AM EDT  Workstation ID: [id number]    XR Chest 1 View    Result Date: 4/25/2023  Impression: 1. Stable right and left perihilar masses as previously described. 2. Improving bibasilar pulmonary interstitial disease, now mild in extent. Electronically Signed: Mitch Sears  4/25/2023 8:57 AM EDT  Workstation ID: [id number]    XR Chest 1 View    Result Date: 4/24/2023  Impression: Left pleural drain remains in place. Stable changes of pulmonary fibrosis. No new chest disease is seen. Electronically Signed: Mitch Sears  4/24/2023 8:36 AM EDT  Workstation ID: [id number]    XR Chest 1 View    Result Date: 4/23/2023  Impression: Decreased size of pleural effusion on the left. It is now small. Small caliber chest tube is present. No pneumothorax. No change in masslike bilateral parenchymal airspace opacities. Electronically Signed: Patti Williamson  4/23/2023 8:30 AM EDT  Workstation ID: [id number]    XR Chest 1 View    Result Date: 4/20/2023  Impression: 1. No change in the masslike nodular appearing infiltrate in the right perihilar region. 2. Improvement in the left perihilar infiltrate compared with the last study with improvement in aeration at the left base. 3. Moderate pleural effusion,  unchanged. Electronically Signed: Miguelito Deras  4/20/2023 2:22 PM EDT  Workstation ID: [id number]    CT Angiogram Chest Pulmonary Embolism    Result Date: 5/10/2023  1. No evidence of a pulmonary embolus 2. Moderate left and small right pleural effusion 3. Dense areas of consolidation again noted bilaterally but similar to the prior study.  This report was finalized on 5/10/2023 1:06 PM by Dr. Jamie Smith MD.      X-ray chest PA and lateral    Result Date: 4/17/2023  No significant interval change. Images reviewed, interpreted, and dictated by Dr. Gauri Gtz. Transcribed by Tahmina Isaacs PA-C.    X-ray chest PA and lateral    Result Date: 4/10/2023  There has been no significant interval change in  the opacities, likely related to pneumoconiosis. Small left pleural effusion. Images reviewed, interpreted, and dictated by Dr. Gauri Gtz. Transcribed by JOHNATHON Huerta (R).      I have personally looked at the radiology images, my personal interpretation is as follows:    CXR with left sided effusion and multifocal dense consolidations    CT with moderate sized left effusion and noted b/l perihilar dense infiltrates/masses    Assessment & Plan       #hx of coal workers pneumoconiosis with dense b/l perihilar infiltrates/pulmonary fibrosis  #Recurrent left sided pleural effusion, previously exudative thought secondary to above  #Chronic hypoxic respiratory failure (2L)  -Extensive w/u including EBUS of perihilar mass performed at [facility] during last hospitalization with effusion found to be exudative in nature with negative cytology x2 samples and negative fungal and bacterial w/u. Currently on baseline O2 with no fever, inf clinical sx, or leukocytosis. Crp mildly elevated not dissimilar from prior. BNP wnl and CTPE neg for PE. Defer abx coverage  -Patient would be well served by pleurodesis or VATS but evaluated by CT surgery at [facility] and felt overall pulmonary condition not amenable to either intervention,  treatment ultimately narrowed to lung trx eval with pleuryx for palliation of sx in interim  -General surgery consulted for possible pleuryx catheter placement  -TTE added to complete cardiac w/u, no prior echo on chart but note effusion was exudative in nature, not likely cardiac origin and no other clinical evidence of HF  -NPO at midnight until evaluated by surgical consultant  -s/p IV lasix in ED, monitor for sx improvement but unlikely to significantly improve given exudative noncardiac nature    #COOPER  -Resume home cpap    #Hx of atrial tachycardia  -Resume home metoprolol pend med rec    #?COPD hx  -Reported on last hospitalization but no smoking hx and no PFTs available for review but likely secondary to environmental contaminant  -Resume home breztri formulary equivalent with prn duonebs    #HTN  -Resume antihypertensives post op pend med rec    #NIDDM  -Hold metformin and glipizide  -FSBG qac/qhs w/SSI        VTE Prophylaxis:   Mechanical Order History:     None      Pharmalogical Order History:      Ordered     Dose Route Frequency Stop    05/10/23 3286  Enoxaparin Sodium (LOVENOX) syringe 40 mg         40 mg SC Once --              The patient is high risk due to the following diagnoses/reasons:  Recurrent effusion requiring surgical drainage with chronic O2 usage    Admission Status:  I certify that this patient requires inpatient hospitalization for greater than 2 midnights in INPATIENT status.  I anticipate there to be the need for care which can only be reasonably provided in a hospital setting such as possible need for aggressive/expedited ancillary services and/or consultation services, IV medications, close physician monitoring, and/or procedures. In such, I feel patient’s risk for adverse outcomes and need for care warrant INPATIENT evaluation and predict the patient’s care encounter to likely last beyond 2 midnights.    Code Status and Medical Interventions:   Ordered at: 05/10/23 0790     Code  Status (Patient has no pulse and is not breathing):    CPR (Attempt to Resuscitate)     Medical Interventions (Patient has pulse or is breathing):    Full Support        Disposition: Admit to tele pend surgical eval    Filemon Campbell MD  [facility]  05/10/23  16:56 EDT        Electronically signed by Filemon Campbell MD at 05/10/23 0636       Vital Signs (last day)     Date/Time Temp Temp src Pulse Resp BP Patient Position SpO2    05/12/23 1000 98.1 (36.7) Oral 98 16 147/70 Lying 93    05/12/23 0651 -- -- 71 16 -- -- 94    05/12/23 0600 98.4 (36.9) Oral 80 18 114/73 Lying 92    05/12/23 0200 97.8 (36.6) Oral 88 18 113/63 Lying 95    05/11/23 2230 98.1 (36.7) Oral 83 18 101/54 Lying 96    05/11/23 2130 -- -- 81 18 109/60 Lying 96    05/11/23 2030 -- -- 86 18 113/56 Lying 96    05/11/23 1930 -- -- 95 18 119/61 Lying 97    05/11/23 1853 -- -- 100 18 -- -- 96    05/11/23 1830 -- -- 91 18 111/64 Lying 96    05/11/23 1800 97.8 (36.6) Oral 97 18 110/58 Lying 95    05/11/23 1700 -- -- 101 18 99/62 -- 96    05/11/23 1630 98 (36.7) -- 93 18 130/80 -- 95    05/11/23 1615 98.1 (36.7) -- 81 18 123/89 -- 96    05/11/23 1600 97.6 (36.4) -- 94 18 137/89 -- 96    05/11/23 1545 97.4 (36.3) -- 91 18 121/82 -- 95    05/11/23 1532 -- -- 75 16 124/80 Lying 95    05/11/23 1528 -- -- 89 16 120/78 Lying 95    05/11/23 1523 -- -- 94 16 121/80 Lying 95    05/11/23 1518 97.3 (36.3) Temporal 76 16 113/81 Lying 95    05/11/23 1257 -- Temporal 94 18 127/77 Lying 95    05/11/23 0708 -- -- 91 18 -- -- 94    05/11/23 0658 -- -- 96 18 -- -- 96    05/11/23 0600 98.9 (37.2) Oral 85 18 93/55 Lying 95    05/11/23 0207 98.3 (36.8) Oral 77 18 90/54 Lying 93          Lines, Drains & Airways     Active LDAs     Name Placement date Placement time Site Days    Peripheral IV 05/10/23 1248 Anterior;Proximal;Right;Upper Arm 05/10/23  1248  Arm  1    Chest Tube 1 Left Pleural 05/11/23  1502  Pleural  less than 1                  Current  Facility-Administered Medications   Medication Dose Route Frequency Provider Last Rate Last Admin   • budesonide-formoterol (SYMBICORT) 160-4.5 MCG/ACT inhaler 2 puff  2 puff Inhalation BID - RT Brigitte Kolb MD   2 puff at 05/12/23 0651   • dextrose (D50W) (25 g/50 mL) IV injection 25 g  25 g Intravenous Q15 Min PRN Brigitte Kolb MD       • dextrose (GLUTOSE) oral gel 15 g  15 g Oral Q15 Min PRN Brigitte Kolb MD       • glucagon HCl (Diagnostic) injection 1 mg  1 mg Intramuscular Q15 Min PRN Brigitte Kolb MD       • HYDROcodone-acetaminophen (NORCO) 7.5-325 MG per tablet 1 tablet  1 tablet Oral TID Brigitte Kolb MD   1 tablet at 05/12/23 0926   • hydrOXYzine (ATARAX) tablet 25 mg  25 mg Oral TID PRN Brigitte Kolb MD   25 mg at 05/11/23 2118   • Insulin Lispro (humaLOG) injection 2-9 Units  2-9 Units Subcutaneous TID With Meals Brigitte Kolb MD       • ipratropium (ATROVENT) nebulizer solution 0.5 mg  0.5 mg Nebulization 4x Daily - RT Brigitte Kolb MD   0.5 mg at 05/12/23 0651   • ipratropium-albuterol (DUO-NEB) nebulizer solution 3 mL  3 mL Nebulization Q6H PRN Brigitte Kolb MD   3 mL at 05/10/23 1640   • melatonin tablet 5 mg  5 mg Oral Nightly PRN Brigitte Kolb MD   5 mg at 05/11/23 2118   • metoprolol succinate XL (TOPROL-XL) 24 hr tablet 50 mg  50 mg Oral Daily Brigitte Kolb MD   50 mg at 05/12/23 0926   • nitroglycerin (NITROSTAT) SL tablet 0.4 mg  0.4 mg Sublingual Q5 Min PRN Brigitte Kolb MD       • ondansetron ODT (ZOFRAN-ODT) disintegrating tablet 4 mg  4 mg Oral Q12H PRN Brigitte Kolb MD       • pantoprazole (PROTONIX) EC tablet 40 mg  40 mg Oral QAM AC Brigitte Kolb MD   40 mg at 05/12/23 0926   • polyethylene glycol (MIRALAX) packet 17 g  17 g Oral Daily PRN Brigitte Kolb MD       • roflumilast (DALIRESP) tablet 250 mcg  250 mcg Oral Daily Brigitte Kolb MD   250 mcg at 05/10/23 1959   • sennosides-docusate (PERICOLACE)  8.6-50 MG per tablet 2 tablet  2 tablet Oral Nightly Brigitte Kolb MD   2 tablet at 05/11/23 2117   • sodium chloride 0.9 % flush 10 mL  10 mL Intravenous PRN Brigitte Kolb MD       • sodium chloride 0.9 % flush 10 mL  10 mL Intravenous Q12H Brigitte Kolb MD   10 mL at 05/12/23 0927   • sodium chloride 0.9 % flush 10 mL  10 mL Intravenous PRN Brigitte Kolb MD       • sodium chloride 0.9 % infusion 40 mL  40 mL Intravenous PRN Brigitte Kolb MD           Lab Results (last 24 hours)     Procedure Component Value Units Date/Time    POC Glucose Once [[id number]]  (Abnormal) Collected: 05/12/23 1000    Specimen: Blood Updated: 05/12/23 1009     Glucose 185 mg/dL      Comment: Meter: [id number] : [id number] JEFF RIVERA       CBC (No Diff) [[id number]]  (Abnormal) Collected: 05/12/23 0700    Specimen: Blood Updated: 05/12/23 0823     WBC 7.60 10*3/mm3      RBC 5.38 10*6/mm3      Hemoglobin 13.4 g/dL      Hematocrit 44.6 %      MCV 82.9 fL      MCH 24.9 pg      MCHC 30.0 g/dL      RDW 14.9 %      RDW-SD 45.0 fl      MPV 9.3 fL      Platelets 214 10*3/mm3     POC Glucose Once [[id number]]  (Normal) Collected: 05/12/23 0635    Specimen: Blood Updated: 05/12/23 0646     Glucose 97 mg/dL      Comment: Meter: [id number] : [id number] JEFF RIVERA       POC Glucose Once [[id number]]  (Normal) Collected: 05/11/23 1642    Specimen: Blood Updated: 05/11/23 1648     Glucose 121 mg/dL      Comment: Meter: [id number] : [id number] CADE SANCHEZ       POC Glucose Once [[id number]]  (Normal) Collected: 05/11/23 1108    Specimen: Blood Updated: 05/11/23 1115     Glucose 98 mg/dL      Comment: Meter: [id number] : [id number] CADE SANCHEZ           Orders (last 24 hrs)      Start     Ordered    05/12/23 1010  POC Glucose Once  PROCEDURE ONCE         05/12/23 1000    05/12/23 0936  Discharge patient  Once         05/12/23 0940    05/12/23 0930  Discontinue IV  Once         05/12/23 0934    05/12/23 0647   POC Glucose Once  PROCEDURE ONCE         05/12/23 0635    05/12/23 0600  ceFAZolin 2 gm IVPB in 100 mL NS (VTB)  On Call to O.R.,   Status:  Discontinued         05/11/23 1302    05/12/23 0600  CBC (No Diff)  Morning Draw         05/11/23 1857    05/12/23 0500  XR Chest 1 View  1 Time Imaging         05/11/23 1753    05/12/23 0000  Discharge Follow-up with Specified Provider: Dr. Solis as previously scheduled 5/16/23 05/12/23 0934    05/12/23 0000  Ambulatory Referral to Home Health         05/12/23 0934    05/11/23 1649  POC Glucose Once  PROCEDURE ONCE         05/11/23 1642    05/11/23 1551  Diet: Regular/House Diet; Texture: Regular Texture (IDDSI 7); Fluid Consistency: Thin (IDDSI 0)  Diet Effective Now         05/11/23 1550    05/11/23 1537  Oxygen Therapy- Nasal Cannula; Titrate for SPO2: equal to or greater than, 96%, per policy  Continuous,   Status:  Canceled         05/11/23 1536    05/11/23 1537  Pulse Oximetry, Continuous  Continuous,   Status:  Canceled         05/11/23 1536    05/11/23 1537  POC Glucose STAT  STAT,   Status:  Canceled        Comments: Notify Anesthesia if blood sugar is less than 80 mg/dL or greater than 180mg/dL      05/11/23 1536    05/11/23 1537  Vital signs every 5 minutes for 15 minutes, every 15 minutes thereafter.  Once,   Status:  Canceled         05/11/23 1536    05/11/23 1537  Call Anesthesiologist for additional IV Fluid bolus for Hypotension/Tachycardia  Until Discontinued,   Status:  Canceled         05/11/23 1536    05/11/23 1537  Notify Anesthesia of Any Acute Changes in Patient Condition  Until Discontinued,   Status:  Canceled         05/11/23 1536    05/11/23 1537  Notify Anesthesia for Unrelieved Pain  Until Discontinued,   Status:  Canceled         05/11/23 1536    05/11/23 1537  Once DC criteria to floor met, follow surgeon's orders.  Until Discontinued,   Status:  Canceled         05/11/23 1536    05/11/23 1537  Discharge patient from PACU when discharge  criteria is met.  Until Discontinued,   Status:  Canceled         05/11/23 1536 05/11/23 1536  lactated ringers infusion  Once As Needed,   Status:  Discontinued         05/11/23 1536    05/11/23 1536  oxyCODONE-acetaminophen (PERCOCET) 5-325 MG per tablet 1 tablet  Once As Needed,   Status:  Discontinued         05/11/23 1536    05/11/23 1536  ketorolac (TORADOL) injection 30 mg  Every 6 Hours PRN,   Status:  Discontinued         05/11/23 1536 05/11/23 1536  fentaNYL citrate (PF) (SUBLIMAZE) injection 50 mcg  Every 5 Minutes PRN,   Status:  Discontinued         05/11/23 1536 05/11/23 1536  ondansetron (ZOFRAN) injection 4 mg  As Needed,   Status:  Discontinued         05/11/23 1536 05/11/23 1536  ipratropium-albuterol (DUO-NEB) nebulizer solution 3 mL  Once As Needed,   Status:  Discontinued         05/11/23 1536 05/11/23 1517  XR Chest AP  1 Time Imaging         05/11/23 1517    05/11/23 1459  sodium chloride (NS) irrigation solution  As Needed,   Status:  Discontinued         05/11/23 1459    05/11/23 1457  PT Consult: Eval & Treat Functional Mobility Below Baseline  Once         05/11/23 1456    05/11/23 1413  sodium chloride 0.9 % flush 10 mL  Every 12 Hours Scheduled,   Status:  Discontinued         05/11/23 1411    05/11/23 1413  lactated ringers infusion  Once         05/11/23 1411    05/11/23 1412  POC Glucose Once  Once,   Status:  Canceled        Comments: For all diabetic patients and all patients who are to be admitted. Notify Anesthesiologist for blood sugar > 180.      05/11/23 1411    05/11/23 1412  Insert Peripheral IV  Once,   Status:  Canceled         05/11/23 1411    05/11/23 1412  Saline Lock & Maintain IV Access  Continuous,   Status:  Canceled         05/11/23 1411    05/11/23 1411  Vital Signs - Per Anesthesia Protocol  As Needed,   Status:  Canceled       05/11/23 1411    05/11/23 1411  sodium chloride 0.9 % flush 10 mL  As Needed,   Status:  Discontinued         05/11/23 1411     05/11/23 1411  sodium chloride 0.9 % infusion 40 mL  As Needed,   Status:  Discontinued         05/11/23 1411    05/11/23 1411  midazolam (VERSED) injection 1 mg  Every 10 Minutes PRN,   Status:  Discontinued         05/11/23 1411    05/11/23 1411  midazolam (VERSED) injection 0.5 mg  Every 10 Minutes PRN,   Status:  Discontinued         05/11/23 1411    05/11/23 1359  Case Management  Consult  Once        Provider:  (Not yet assigned)    05/11/23 1358    05/11/23 1338  FL Surgery Fluoro  1 Time Imaging         05/11/23 1337    05/11/23 1116  POC Glucose Once  PROCEDURE ONCE         05/11/23 1108    05/11/23 0900  metoprolol succinate XL (TOPROL-XL) 24 hr tablet 50 mg  Daily         05/10/23 1745    05/11/23 0000  Wheelchair         05/11/23 1455    05/10/23 2100  sodium chloride 0.9 % flush 10 mL  Every 12 Hours Scheduled         05/10/23 1556    05/10/23 2100  sennosides-docusate (PERICOLACE) 8.6-50 MG per tablet 2 tablet  Nightly         05/10/23 1556    05/10/23 1900  roflumilast (DALIRESP) tablet 250 mcg  Daily         05/10/23 1745    05/10/23 1900  budesonide-formoterol (SYMBICORT) 160-4.5 MCG/ACT inhaler 2 puff  2 Times Daily - RT         05/10/23 1745    05/10/23 1900  ipratropium (ATROVENT) nebulizer solution 0.5 mg  4 Times Daily - RT         05/10/23 1745    05/10/23 1845  pantoprazole (PROTONIX) EC tablet 40 mg  Every Morning Before Breakfast         05/10/23 1745    05/10/23 1800  Oral Care  2 Times Daily       05/10/23 1556    05/10/23 1800  Insulin Lispro (humaLOG) injection 2-9 Units  3 Times Daily With Meals         05/10/23 1556    05/10/23 1720  hydrOXYzine (ATARAX) tablet 25 mg  3 Times Daily PRN         05/10/23 1720    05/10/23 1700  POC Glucose 4x Daily AC & at Bedtime  4 Times Daily Before Meals & at Bedtime       05/10/23 1556    05/10/23 1600  Vital Signs  Every 4 Hours       05/10/23 1556    05/10/23 1600  HYDROcodone-acetaminophen (NORCO) 7.5-325 MG per tablet 1 tablet   3 Times Daily         05/10/23 1556    05/10/23 1557  Intake & Output  Every Shift       05/10/23 1556    05/10/23 1556  polyethylene glycol (MIRALAX) packet 17 g  Daily PRN         05/10/23 1556    05/10/23 1556  ipratropium-albuterol (DUO-NEB) nebulizer solution 3 mL  Every 6 Hours PRN         05/10/23 1556    05/10/23 1556  ondansetron ODT (ZOFRAN-ODT) disintegrating tablet 4 mg  Every 12 Hours PRN         05/10/23 1556    05/10/23 1556  melatonin tablet 5 mg  Nightly PRN         05/10/23 1556    05/10/23 1556  dextrose (GLUTOSE) oral gel 15 g  Every 15 Minutes PRN         05/10/23 1556    05/10/23 1556  dextrose (D50W) (25 g/50 mL) IV injection 25 g  Every 15 Minutes PRN         05/10/23 1556    05/10/23 1556  glucagon HCl (Diagnostic) injection 1 mg  Every 15 Minutes PRN         05/10/23 1556    05/10/23 1556  sodium chloride 0.9 % flush 10 mL  As Needed         05/10/23 1556    05/10/23 1556  sodium chloride 0.9 % infusion 40 mL  As Needed         05/10/23 1556    05/10/23 1556  Telemetry - Pulse Oximetry  Continuous PRN,   Status:  Canceled      Comments: If Patient Develops Unresponsiveness, Acute Dyspnea, Cyanosis or Suspected Hypoxemia Start Continuous Pulse Ox Monitoring, Apply Oxygen & Notify Provider    05/10/23 1556    05/10/23 1556  Oxygen Therapy- Nasal Cannula; Titrate for SPO2: 90% - 95%  Continuous PRN,   Status:  Canceled      Comments: If Patient Develops Unresponsiveness, Acute Dyspnea, Cyanosis or Suspected Hypoxemia Start Continuous Pulse Ox Monitoring, Apply Oxygen & Notify Provider    05/10/23 1556    05/10/23 1556  nitroglycerin (NITROSTAT) SL tablet 0.4 mg  Every 5 Minutes PRN         05/10/23 1556    05/10/23 1031  sodium chloride 0.9 % flush 10 mL  As Needed        See Hyperspace for full Linked Orders Report.    05/10/23 1031    Unscheduled  ECG 12 Lead Chest Pain  As Needed      Comments: Nurse to Release if Patient Expericences Acute Chest Pain or Dysrhythmias    05/10/23 2121     Unscheduled  Potassium  As Needed      Comments: For Ventricular Arrhythmias      05/10/23 1556    Unscheduled  Magnesium  As Needed      Comments: For Ventricular Arrhythmias      05/10/23 1556    Unscheduled  High Sensitivity Troponin T  As Needed      Comments: For Chest Pain      05/10/23 155    Unscheduled  Blood Gas, Arterial -With Co-Ox Panel: Yes  As Needed      Comments: Draw for Acute Dyspnea, Cyanosis, Suspected Hypoxemia or UnresponsivenessNotify Provider of Results      05/10/23 155    Unscheduled  Follow Hypoglycemia Standing Orders For Blood Glucose <70 & Notify Provider of Treatment  As Needed      Comments: Follow Hypoglycemia Orders As Outlined in Process Instructions (Open Order Report to View Full Instructions)  Notify Provider Any Time Hypoglycemia Treatment is Administered    05/10/23 1556    --  esomeprazole (nexIUM) 40 MG capsule  Every Morning Before Breakfast         05/10/23 1659    --  metoprolol succinate XL (TOPROL-XL) 50 MG 24 hr tablet  Daily         05/10/23 1701    --  SCANNED - TELEMETRY           05/10/23 0000                   Physician Progress Notes (last 24 hours)      Filemon Campbell MD at 23 1745              [facility] HOSPITALIST PROGRESS NOTE     Patient Identification:  Name:  Rory Rios Jr.  Age:  63 y.o.  Sex:  male  :  1960  MRN:  [id number]  Visit Number:  [id number]  ROOM: [address] [place] [address]     Primary Care Provider:  Alejandro Monique MD     Date of Admission: 5/10/2023    Length of stay in inpatient status:  1    Subjective     Chief Compliant:    Chief Complaint   Patient presents with   • Shortness of Breath       Patient seen following return from OR this afternoon. Dyspnea resolved after removal of 550cc pleural fluid with placement of left sided pleuryx catheter. No CP and minimal soreness and plauritic pain reported at pleuryx site.         Objective     Current Hospital Meds:  budesonide-formoterol, 2 puff, Inhalation, BID -  RT  HYDROcodone-acetaminophen, 1 tablet, Oral, TID  insulin lispro, 2-9 Units, Subcutaneous, TID With Meals  ipratropium, 0.5 mg, Nebulization, 4x Daily - RT  metoprolol succinate XL, 50 mg, Oral, Daily  pantoprazole, 40 mg, Oral, QAM AC  roflumilast, 250 mcg, Oral, Daily  senna-docusate sodium, 2 tablet, Oral, Nightly  sodium chloride, 10 mL, Intravenous, Q12H       Current Antimicrobial Therapy:  Anti-Infectives (From admission, onward)    None        Current Diuretic Therapy:  Diuretics (From admission, onward)    Ordered     Dose/Rate Route Frequency Start Stop    05/10/23 1116  furosemide (LASIX) injection 80 mg        Ordering Provider: Ap Reyes MD    80 mg Intravenous Once 05/10/23 1118 05/10/23 1202        ----------------------------------------------------------------------------------------------------------------------  Vital Signs:  Temp:  [97.3 °F (36.3 °C)-99.1 °F (37.3 °C)] 98 °F (36.7 °C)  Heart Rate:  [] 101  Resp:  [16-18] 18  BP: ()/(54-89) 99/62  SpO2:  [93 %-97 %] 96 %  on  Flow (L/min):  [2-4] 2;   Device (Oxygen Therapy): nasal cannula  Body mass index is 34.87 kg/m².    Wt Readings from Last 3 Encounters:   05/11/23 127 kg (279 lb)   04/20/23 131 kg (288 lb)   02/09/23 134 kg (295 lb)     Intake & Output (last 3 days)       05/08 0701 05/09 0700 05/09 0701  05/10 0700 05/10 0701  05/11 0700 05/11 0701  05/12 0700    P.O.   240     I.V. (mL/kg)    700 (5.5)    Total Intake(mL/kg)   240 (1.9) 700 (5.5)    Net   +240 +700            Urine Unmeasured Occurrence   6 x         Diet: Regular/House Diet; Texture: Regular Texture (IDDSI 7); Fluid Consistency: Thin (IDDSI 0)  ----------------------------------------------------------------------------------------------------------------------  Physical Exam  Vitals and nursing note reviewed.   Constitutional:       General: He is not in acute distress.  HENT:      Mouth/Throat:      Mouth: Mucous membranes are moist.       Pharynx: Oropharynx is clear.   Eyes:      General: No scleral icterus.     Extraocular Movements: Extraocular movements intact.   Cardiovascular:      Rate and Rhythm: Normal rate and regular rhythm.      Pulses: Normal pulses.   Pulmonary:      Effort: Pulmonary effort is normal. No respiratory distress.      Breath sounds: No wheezing.      Comments: Diminished breath sounds at left lung base but breath sounds audible deep into middle anterior and posterior lung fields. Pleuryx catheter in place with clear dlean dressing overlying insertion site  Abdominal:      Palpations: Abdomen is soft.      Tenderness: There is no abdominal tenderness.   Musculoskeletal:      Right lower leg: No edema.      Left lower leg: No edema.   Skin:     General: Skin is warm and dry.      Capillary Refill: Capillary refill takes less than 2 seconds.   Neurological:      General: No focal deficit present.      Mental Status: He is alert. Mental status is at baseline.   Psychiatric:         Mood and Affect: Mood normal.         Behavior: Behavior normal.   ----------------------------------------------------------------------------------------------------------------------    ----------------------------------------------------------------------------------------------------------------------  LABS:    CBC and coagulation:  Results from last 7 days   Lab Units 05/11/23  0755 05/10/23  2212 05/10/23  1043   PROCALCITONIN ng/mL 0.09  --   --    SED RATE mm/hr  --   --  39*   CRP mg/dL 4.78*  --  1.54*   WBC 10*3/mm3  --  12.44* 7.32   HEMOGLOBIN g/dL  --  13.8 13.8   HEMATOCRIT %  --  45.2 45.2   MCV fL  --  82.6 82.9   MCHC g/dL  --  30.5* 30.5*   PLATELETS 10*3/mm3  --  199 223   INR   --   --  1.00   D DIMER QUANT MCGFEU/mL  --   --  1.85*     Acid/base balance:      Renal and electrolytes:  Results from last 7 days   Lab Units 05/10/23  2212 05/10/23  1043   SODIUM mmol/L 140 142   POTASSIUM mmol/L 3.8 3.9   MAGNESIUM mg/dL 1.7 1.9    CHLORIDE mmol/L 103 106   CO2 mmol/L 26.2 26.7   BUN mg/dL 8 7*   CREATININE mg/dL 0.86 0.80   CALCIUM mg/dL 9.1 9.5   GLUCOSE mg/dL 131* 106*     Estimated Creatinine Clearance: 123.5 mL/min (by C-G formula based on SCr of 0.86 mg/dL).    Liver and pancreatic function:  Results from last 7 days   Lab Units 05/10/23  1043   ALBUMIN g/dL 3.7   BILIRUBIN mg/dL 0.4   ALK PHOS U/L 90   AST (SGOT) U/L 16   ALT (SGPT) U/L 14     Endocrine function:  Lab Results   Component Value Date    HGBA1C 6.30 (H) 04/21/2023     Point of care bedside glucose levels:  Results from last 7 days   Lab Units 05/11/23  1642 05/11/23  1108 05/11/23  0644 05/10/23  1709   GLUCOSE mg/dL 121 98 105 123     Glucose levels from the [facility]:  Results from last 7 days   Lab Units 05/10/23  2212 05/10/23  1043   GLUCOSE mg/dL 131* 106*     Lab Results   Component Value Date    TSH 2.210 05/10/2023    FREET4 1.33 05/10/2023     Cardiac:  Results from last 7 days   Lab Units 05/10/23  1347 05/10/23  1043   HSTROP T ng/L 16* 15*   PROBNP pg/mL  --  189.9       Cultures:  Lab Results   Component Value Date    COLORU Yellow 05/10/2023    CLARITYU Clear 05/10/2023    PHUR 7.5 05/10/2023    GLUCOSEU Negative 05/10/2023    KETONESU Negative 05/10/2023    BLOODU Moderate (2+) (A) 05/10/2023    NITRITEU Negative 05/10/2023    LEUKOCYTESUR Negative 05/10/2023    BILIRUBINUR Negative 05/10/2023    UROBILINOGEN 0.2 E.U./dL 05/10/2023    RBCUA 13-20 (A) 05/10/2023    WBCUA 0-2 05/10/2023    BACTERIA Trace (A) 05/10/2023     Microbiology Results (last 10 days)     Procedure Component Value - Date/Time    COVID-19 and FLU A/B PCR - Swab, Nasopharynx [[id number]]  (Normal) Collected: 05/10/23 1044    Lab Status: Final result Specimen: Swab from Nasopharynx Updated: 05/10/23 1116     COVID19 Not Detected     Influenza A PCR Not Detected     Influenza B PCR Not Detected    Narrative:      Fact sheet for providers: https://www.fda.gov/media/845613/download    Fact sheet  for patients: https://www.[employer].gov/media/937737/download    Test performed by PCR.          No results found for: PREGTESTUR, PREGSERUM, HCG, HCGQUANT  Pain Management Panel         Latest Ref Rng & Units 5/10/2023   Pain Management Panel   Amphetamine, Urine Qual Negative Negative     Barbiturates Screen, Urine Negative Negative     Benzodiazepine Screen, Urine Negative Negative     Buprenorphine, Screen, Urine Negative Negative     Cocaine Screen, Urine Negative Negative     Methadone Screen , Urine Negative Negative     Methamphetamine, Ur Negative Negative                  I have personally looked at the labs and they are summarized above.  ----------------------------------------------------------------------------------------------------------------------  Detailed radiology reports for the last 24 hours:    Imaging Results (Last 24 Hours)     Procedure Component Value Units Date/Time    FL Surgery Fluoro [[id number]] Collected: 05/11/23 1603     Updated: 05/11/23 1606    Narrative:      EXAMINATION: FL SURGERY FLUORO-      CLINICAL INDICATION: pleurx catheter; S98-Qxianqh effusion, not  elsewhere classified        COMPARISON: None available     Total fluoroscopy time 25.5 seconds     Fluoroscopic or was placed under fluoroscopy and 1 image was obtained     For a full procedural report, please see the report provided by the  performing physician     This report was finalized on 5/11/2023 4:04 PM by Dr. Jamie Smith MD.       XR Chest AP [[id number]] Collected: 05/11/23 1602     Updated: 05/11/23 1605    Narrative:      XR CHEST AP-     CLINICAL INDICATION: Pluerx cath ; W88-Uedylut effusion, not elsewhere  classified        COMPARISON: 05/10/2023      TECHNIQUE: Single frontal view of the chest.     FINDINGS:      LUNGS: Patchy areas of consolidation bilaterally.  Left-sided catheter is present. No measurable pleural fluid is seen      HEART AND MEDIASTINUM: Heart and mediastinal contours are unremarkable         SKELETON: Bony and soft tissue structures are unremarkable.             Impression:      No residual effusion identified.     This report was finalized on 5/11/2023 4:03 PM by Dr. Jamie Smith MD.             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 Currently on baseline O2 with no fever, inf clinical sx, or leukocytosis. Crp mildly elevated not dissimilar from prior. BNP wnl and CTPE neg for PE. Defer abx coverage  -Patient would be well served by pleurodesis or VATS but evaluated by CT surgery at [facility] and felt overall pulmonary condition not amenable to either intervention, treatment ultimately narrowed to lung trx eval with pleuryx for palliation of sx in interim  -TTE added to complete cardiac w/u, no prior echo on chart but note effusion was exudative in nature, not likely cardiac origin and no other clinical evidence of HF. Performed with read pending. No physical exam evidence of HF  -s/p left pleuryz cath placement 5/11, 550cc pleural fluid removed with sx improvement  -repeat cxr in am, if stable can likely d/c home with outpatient home health for pleuryx care and drainage periodically  -f/u Dr. Solis 5/16 at [facility] as previously scheduled      #COOPER  -Cont home cpap     #Hx of atrial tachycardia  -Resumed home metoprolol     #?COPD hx  -Reported on last hospitalization but no smoking hx and no PFTs available for review but likely secondary to environmental contaminant  -Resume home breztri formulary equivalent with prn duonebs     #HTN  -Holding antihypertensives post-op, within normal range  currently    #NIDDM  -Hold metformin and glipizide  -FSBG qac/qhs w/SSI    VTE Prophylaxis:   Mechanical Order History:     None      Pharmalogical Order History:      Ordered     Dose Route Frequency Stop    05/10/23 1556  Enoxaparin Sodium (LOVENOX) syringe 40 mg         40 mg SC Once 05/10/23 1720                Code Status and Medical Interventions:   Ordered at: 05/10/23 1555     Code Status (Patient has no pulse and is not breathing):    CPR (Attempt to Resuscitate)     Medical Interventions (Patient has pulse or is breathing):    Full Support         Disposition: Pend cxr in am, discharge <48hrs    I have reviewed any copied/forwarded text or data, verified its accuracy, and updated as necessary above.    Filemon Campbell MD  [facility] Hospitalist  05/11/23  17:45 EDT      Electronically signed by Filemon Campbell MD at 05/11/23 1753          Consult Notes (most recent note)      Brigitte Kolb MD at 05/11/23 1014      Consult Orders    1. Inpatient General Surgery Consult [[id number]] ordered by Brigitte Kolb MD at 05/10/23 1502               Consulting physician:  Dr. Kolb    Referring physician: Hospitalist    Date of consultation: 05/11/23     Chief complaint symptomatic pleural effusion    Subjective     Patient is a 63 y.o. male who was admitted on 5/10/2023 with a symptomatic left pleural effusion.  The patient has been followed by pulmonary in [place] for some time.  Recently he was admitted to [facility] and was evaluated by Dr. Solis.  He had a chest tube placed at that time.  Apparently he was to have a Pleurx catheter placed but the chest tube was removed and then there was not enough fluid to allow for tube placement again.  Over the course of the next week or so the fluid reaccumulated and the patient became symptomatic.  He presented to the emergency room and was admitted for consideration for Pleurx catheter insertion.  Hypertension      Review of Systems  Review of  Systems - General ROS: negative for - weight loss  Psychological ROS: negative for - behavioral disorder  Ophthalmic ROS: negative for - dry eyes  ENT ROS: negative for - vertigo or vocal changes  Hematological and Lymphatic ROS: negative for - swollen lymph nodes, DVT, PE.   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   • Diabetes mellitus    • GERD (gastroesophageal reflux disease)    • Hypertension    • Pneumonia    • Pulmonary fibrosis 4/27/2023     Past Surgical History:   Procedure Laterality Date   • BRONCHOSCOPY N/A 4/24/2023    Procedure: BRONCHOSCOPY WITH ENDOBRONCHIAL ULTRASOUND;  Surgeon: Kota Willard MD;  Location: [facility] ENDOSCOPY;  Service: Pulmonary;  Laterality: N/A;   • FOOT SURGERY Left      Family History   Problem Relation Age of Onset   • Diabetes Mother    • Heart failure Father    • Diabetes Sister    • Heart disease Brother    • Diabetes Brother      Social History     Tobacco Use   • Smoking status: Never     Passive exposure: Never   • Smokeless tobacco: Current     Types: Chew   Vaping Use   • Vaping Use: Never used   Substance Use Topics   • Alcohol use: No   • Drug use: No     Medications Prior to Admission   Medication Sig Dispense Refill Last Dose   • Budeson-Glycopyrrol-Formoterol (BREZTRI) 160-9-4.8 MCG/ACT aerosol inhaler Inhale 2 puffs 2 (Two) Times a Day.   5/9/2023   • esomeprazole (nexIUM) 40 MG capsule Take 1 capsule by mouth Every Morning Before Breakfast.   5/9/2023   • hydrOXYzine (ATARAX) 25 MG tablet Take 1 tablet by mouth 3 (Three) Times a Day As Needed for  Anxiety. 21 tablet 0 5/10/2023   • ibuprofen (ADVIL,MOTRIN) 800 MG tablet Take 1 tablet by mouth 3 (Three) Times a Day.   5/9/2023   • lisinopril (PRINIVIL,ZESTRIL) 10 MG tablet Take 1 tablet by mouth Every Night.   5/9/2023   • metoprolol succinate XL (TOPROL-XL) 50 MG 24 hr tablet Take 1 tablet by mouth Daily.   5/10/2023   • omeprazole (priLOSEC) 40 MG capsule Take 1 capsule by mouth Daily.   5/9/2023   • roflumilast (DALIRESP) 250 MCG tablet tablet Take 1 tablet by mouth Daily.   5/9/2023   • albuterol (PROVENTIL HFA;VENTOLIN HFA) 108 (90 BASE) MCG/ACT inhaler Inhale 2 puffs Every 4 (Four) Hours As Needed for Wheezing or Shortness of Air. 1 inhaler 0 Unknown   • HYDROcodone-acetaminophen (NORCO) 7.5-325 MG per tablet Take 1 tablet by mouth Every 8 (Eight) Hours As Needed for Moderate Pain.   Unknown   • ipratropium-albuterol (DUO-NEB) 0.5-2.5 mg/3 ml nebulizer Take 3 mL by nebulization Every 4 (Four) Hours As Needed for Wheezing or Shortness of Air.   Unknown     Allergies:  Patient has no known allergies.    Objective     Vital Signs  Temp:  [97.1 °F (36.2 °C)-99.1 °F (37.3 °C)] 98.9 °F (37.2 °C)  Heart Rate:  [71-96] 91  Resp:  [18-24] 18  BP: ()/(54-95) 93/55    Physical Exam:  General:  This is a WD WN male in no acute distress  Vital signs: Stable, afebrile  HEENT exam:  WNL. Sclerae are anicteric.  EOMI  Neck:  Supple, FROM.  No JVD.  Trachea midline  Lungs:  Respiratory effort normal. Auscultation: Clear, without wheezes, rhonchi, rales  Heart:  Regular rate and rhythm, without murmur, gallop, rub.  No pedal edema  Abdomen: non tender  Musculoskeletal:  Muscle strength/tone is normal.    Psych:  Alert, oriented x 3.  Mood and affect are appropriate  Skin:  Warm with good turgor.  Without rash or lesion  Extremities:  Examination of the extremities revealed no cyanosis, clubbing or edema.    Results Review:   Results from last 7 days   Lab Units 05/10/23  1347 05/10/23  1043   HSTROP T ng/L 16* 15*      Results from last 7 days   Lab Units 05/11/23  0755 05/10/23  2212 05/10/23  1043   CRP mg/dL 4.78*  --  1.54*   WBC 10*3/mm3  --  12.44* 7.32   HEMOGLOBIN g/dL  --  13.8 13.8   HEMATOCRIT %  --  45.2 45.2   PLATELETS 10*3/mm3  --  199 223   INR   --   --  1.00         Results from last 7 days   Lab Units 05/10/23  2212 05/10/23  1043   SODIUM mmol/L 140 142   POTASSIUM mmol/L 3.8 3.9   MAGNESIUM mg/dL 1.7 1.9   CHLORIDE mmol/L 103 106   CO2 mmol/L 26.2 26.7   BUN mg/dL 8 7*   CREATININE mg/dL 0.86 0.80   CALCIUM mg/dL 9.1 9.5   GLUCOSE mg/dL 131* 106*   ALBUMIN g/dL  --  3.7   BILIRUBIN mg/dL  --  0.4   ALK PHOS U/L  --  90   AST (SGOT) U/L  --  16   ALT (SGPT) U/L  --  14   Estimated Creatinine Clearance: 126 mL/min (by C-G formula based on SCr of 0.86 mg/dL).  No results found for: AMMONIA      No results found for: BLOODCX  No results found for: URINECX  No results found for: WOUNDCX  No results found for: STOOLCX    Imaging:  Imaging Results (Last 24 Hours)     Procedure Component Value Units Date/Time    CT Angiogram Chest Pulmonary Embolism [[id number]] Collected: 05/10/23 1305     Updated: 05/10/23 1308    Narrative:      CT ANGIOGRAM CHEST PULMONARY EMBOLISM-     CLINICAL INDICATION: Pulmonary embolism (PE) suspected, high prob        COMPARISON: 04/22/2023      PROCEDURE: Thin cut axial images were acquired through the pulmonary  vessels during the rapid infusion of IV contrast.     Additional 3-D reformatted images obtained via post-processing for  improved diagnostic accuracy and procedural planning.     Radiation dose reduction techniques were utilized per ALARA protocol.  Automated exposure control was initiated through either or Varolii or  DoseRight software packages by  protocol.           FINDINGS: Today's study demonstrates opacification of the central  pulmonary vessels.   There are no filling defects.   There is no truncation.     No evidence of a pulmonary embolus.     Dense  areas of consolidation are present bilaterally and are very  similar to the prior study..     Consolidation in the perihilar regions bilaterally     Moderate left and small right pleural effusion       Impression:      1. No evidence of a pulmonary embolus  2. Moderate left and small right pleural effusion  3. Dense areas of consolidation again noted bilaterally but similar to  the prior study.     This report was finalized on 5/10/2023 1:06 PM by Dr. Jamie Smith MD.       XR Chest 1 View [[id number]] Collected: 05/10/23 1100     Updated: 05/10/23 1103    Narrative:      XR CHEST 1 VW-     CLINICAL INDICATION: sob        COMPARISON: 04/27/2023      TECHNIQUE: Single frontal view of the chest.     FINDINGS:      LUNGS: Dense consolidation and nodularity bilaterally. Worse than on the  previous exam      HEART AND MEDIASTINUM: Heart and mediastinal contours are unremarkable        SKELETON: Bony and soft tissue structures are unremarkable.             Impression:      Overall appearance worse than on the prior study     This report was finalized on 5/10/2023 11:01 AM by Dr. Jamie Smith MD.             CT chest reports and images were reviewed.  I agree with the assessment      Impression:  Patient Active Problem List   Diagnosis Code   • Recurrent left pleural effusion J90   • PAT (paroxysmal atrial tachycardia) I47.1   • Black lung J60   • Chronic obstructive pulmonary disease J44.9   • Primary hypertension I10   • Lung mass R91.8   • Coal workers pneumoconiosis J60   • Pulmonary fibrosis J84.10   • Pleural effusion exudative J90       Plan:  Pleurx catheter insertion left chest      Discussion:  The risks of the surgical procedure were discussed.  Options of alternative treatments including no treatment (if applicable) were discussed.  Patient voiced understanding of the above issues and wishes to proceed    Brigitte Kolb MD  05/11/23  10:14 EDT    Time: Time spent:    Please note that portions of this note  were completed with a voice recognition program.    Electronically signed by Brigitte Kolb MD at 05/11/23 1655       Physical Therapy Notes (most recent note)    No notes exist for this encounter.         Occupational Therapy Notes (most recent note)    No notes exist for this encounter.         Speech Language Pathology Notes (most recent note)    No notes exist for this encounter.         ADL Documentation (last day)     Date/Time Transferring Toileting Bathing Dressing Eating Communication Swallowing Equipment Currently Used at Home    05/12/23 0720 0 - independent 0 - independent 0 - independent 0 - independent 0 - independent 0 - understands/communicates without difficulty 0 - swallows foods/liquids without difficulty --    05/11/23 2142 0 - independent 0 - independent 0 - independent 0 - independent 0 - independent 0 - understands/communicates without difficulty 0 - swallows foods/liquids without difficulty --    05/11/23 1356 -- -- -- -- -- -- -- cpap;oxygen    05/11/23 0715 0 - independent 0 - independent 0 - independent 0 - independent 0 - independent 0 - understands/communicates without difficulty 0 - swallows foods/liquids without difficulty --

## 2023-05-12 NOTE — CASE MANAGEMENT/SOCIAL WORK
Discharge Planning Assessment  [facility]     Patient Name: Rory Rios Jr.  MRN: [id number]  Today's Date: 5/12/2023    Admit Date: 5/10/2023    Plan: SS received return call back from La at [facility] who stated that [facility] is going to deny pt admit due to agency does not accept any worker's comp pt's and in her review of pt's record it appears as though pt's case will be covered as a workers comp.  SS called [facility] at [phone] and sent referral to [phone] for review.  [facility] will determine if they are able to admit pt as per Tania they have an RN in the hospital and may not have staff.  SS will follow.     Discharge Plan     Row Name 05/12/23 7767       Plan    Plan SS received return call back from La at [facility] who stated that [facility] is going to deny pt admit due to agency does not accept any worker's comp pt's and in her review of pt's record it appears as though pt's case will be covered as a workers comp.  SS called [facility] at [phone] and sent referral to [phone] for review.  [facility] will determine if they are able to admit pt as per Tania they have an RN in the hospital and may not have staff.  SS will follow.    Row Name 05/12/23 4524       Plan    Plan Pt stated no preference for home health agency.  SS faxed pt information to [facility] at [phone] and spoke with Radha at [phone].  [facility] to review pt's information.  SS will follow.    Row Name 05/12/23 3967       Plan    Plan SS spoke with Workers Comp michael Mccollum at [phone] who requested SS fax home health order and pt clinical information to [phone] for review for approval of home health order.  SS faxed pt information and will follow.              Continued Care and Services - Admitted Since 5/10/2023     Home Medical Care     Service Provider Request Status Selected Services Address Phone Fax Patient Preferred     [facility] Declined  Clinical Denial N/A [address] [address] [phone] --                      MARY HookerW

## 2023-05-12 NOTE — CASE MANAGEMENT/SOCIAL WORK
Discharge Planning Assessment  [facility]     Patient Name: Rory Rios Jr.  MRN: [id number]  Today's Date: 5/12/2023    Admit Date: 5/10/2023    Plan: SS verified that [facility] per Tania received consult.  SS will follow.     Discharge Plan     Row Name 05/12/23 1210       Plan    Plan SS verified that [facility] per [facility] received consult.  SS will follow.    Row Name 05/12/23 1116       Plan    Plan SS received return call back from La at [facility] who stated that [facility] is going to deny pt admit due to agency does not accept any worker's comp pt's and in her review of pt's record it appears as though pt's case will be covered as a workers comp.  SS called [facility] at [phone] and sent referral to [phone] for review.  [facility] will determine if they are able to admit pt as per Tania they have an RN in the hospital and may not have staff.  SS will follow.    Row Name 05/12/23 3877       Plan    Plan Pt stated no preference for home health agency.  SS faxed pt information to [facility] at [phone] and spoke with Radha at [phone].  [facility] to review pt's information.  SS will follow.    Row Name 05/12/23 5398       Plan    Plan SS spoke with Workers Comp  Deedee Mccollum at [phone] who requested SS fax home health order and pt clinical information to [phone] for review for approval of home health order.  SS faxed pt information and will follow.              Continued Care and Services - Admitted Since 5/10/2023     Home Medical Care     Service Provider Request Status Selected Services Address Phone Fax Patient Preferred    [facility] Pending - No Request Sent N/A [address] [facility] [address] [phone] --    [facility] [place] Declined  Clinical Denial N/A [address] [address] [phone] --              Destinee DIANA  Yasmin, BSW

## 2023-05-12 NOTE — CASE MANAGEMENT/SOCIAL WORK
Discharge Planning Assessment   [place]     Patient Name: Rory Rios Jr.  MRN: [id number]  Today's Date: 5/12/2023    Admit Date: 5/10/2023    Plan: Pt stated no preference for home health agency.  SS faxed pt information to [facility] at [phone] and spoke with Radha at [phone].  [facility] to review pt's information.  SS will follow.     Discharge Plan     Row Name 05/12/23 1059       Plan    Plan Pt stated no preference for home health agency.  SS faxed pt information to [facility] at [phone] and spoke with Radha at [phone].  [facility] to review pt's information.  SS will follow.    Row Name 05/12/23 0528       Plan    Plan SS spoke with Workers Comp  Deedee Mccollum at [phone] who requested SS fax home health order and pt clinical information to [phone] for review for approval of home health order.  SS faxed pt information and will follow.                MARY HookerW

## 2023-05-12 NOTE — CASE MANAGEMENT/SOCIAL WORK
Continued Stay Note  [facility]     Patient Name: Rory Rios Jr.  MRN: [id number]  Today's Date: 5/12/2023    Admit Date: 5/10/2023    Addendum 5/12@1034: Per Stephanie with [facility], they are not in network with pt's work comp co.  This CM also spoke with Deedee whom relayed that pt can use his Medicare to obtain DME and they will work on his claim on the back end; this CM again spoke to Stephanie at [facility] and explained the aforementioned however, Stephanie stated they can not accept d/t Black Lung claim as they have not been reimbursed for the oxygen; this CM called [facility] and spoke with [facility] regarding need for DME wheelchair under secondary insurance Medicare; per [facility] they can accommodate. This CM spoke with pt via room phone and relayed the aforementioned, pt requesting WC be delivered to his home address [address] [place] [address]; all required dx faxed to [facility] [phone], and [facility] updated via phone [phone].   Discharge Plan     Row Name 05/12/23 0556       Plan    Plan Comments This CM left detailed VM for pt's  Deedee GUO via phone [phone] regarding approval for wheelchair at discharge; this CM faxed order and all required dx to [facility] via fax [phone].              Expected Discharge Date and Time     Expected Discharge Date Expected Discharge Time    May 12, 2023         Amanda De Luna

## 2023-05-12 NOTE — PLAN OF CARE
Goal Outcome Evaluation:   Pt resting with family at bedside. No ss of distress noted. No requests or complaints at this time. VSS, will cont to monitor

## 2023-05-12 NOTE — DISCHARGE PLACEMENT REQUEST
"Rory Rios Jr. (63 y.o. Male)     Date of Birth   1960    Social Security Number       Address   [address]  [place] [address]    Home Phone   [phone]    MRN   [id number]       [employer]   None    Marital Status                               Admission Date   5/10/23    Admission Type   Emergency    Admitting Provider   Filemon aCmpbell MD    Attending Provider   Filemon Campbell MD    Department, Room/Bed   [address], [facility]/       Discharge Date       Discharge Disposition   Home or Self Care    Discharge Destination                               Attending Provider: Filemon Campbell MD    Allergies: No Known Allergies    Isolation: None   Infection: None   Code Status: CPR    Ht: 190.5 cm (75\")   Wt: 126 kg (277 lb 6.4 oz)    Admission Cmt: None   Principal Problem: Pleural effusion exudative [J90]                 Active Insurance as of 5/10/2023     Primary Coverage     Payor Plan Insurance Group Employer/Plan Group    WORKERS COMPENSATION [employer]AM [employer]     Payor Plan Address Payor Plan Phone Number Payor Plan Fax Number Effective Dates    [address] [phone]  6/25/2009 - None Entered    [facility] [address]       Subscriber Name Subscriber Birth Date Member ID       RORY RIOS JR. 1960 [id number]           Secondary Coverage     Payor Plan Insurance Group Employer/Plan Group    MEDICARE MEDICARE A & B      Payor Plan Address Payor Plan Phone Number Payor Plan Fax Number Effective Dates    [address] [phone]  4/1/2013 - None Entered    [facility] [address]       Subscriber Name Subscriber Birth Date Member ID       RORY RIOS JR. 1960 [id number]                 Emergency Contacts      (Rel.) Home Phone Work Phone Mobile Phone    KATIE KENDRICK (Daughter) [phone] -- --            Emergency Contact Information     Name Relation Home Work Mobile    KATIE KENDRICK Daughter [phone]      "       Insurance Information                WORKERS COMPENSATION/AZAM [employer] Phone: [phone]    Subscriber: Rory Rios JrNile Subscriber#: [id number]    Group#: [id number] Precert#: [id number]        MEDICARE/MEDICARE A & B Phone: [phone]    Subscriber: Rory Rios  Subscriber#: [id number]    Group#: -- Precert#: --          Problem List           Codes Noted - Resolved       Hospital    * (Principal) Pleural effusion exudative ICD-10-CM: J90  ICD-9-CM: 511.9 5/10/2023 - Present    Recurrent left pleural effusion ICD-10-CM: J90  ICD-9-CM: 511.9 2023 - Present       Non-Hospital    Coal workers pneumoconiosis ICD-10-CM: J60  ICD-9-CM: 500 2023 - Present    Pulmonary fibrosis ICD-10-CM: J84.10  ICD-9-CM: 515 2023 - Present    Black lung ICD-10-CM: J60  ICD-9-CM: 500 2023 - Present    Chronic obstructive pulmonary disease ICD-10-CM: J44.9  ICD-9-CM: 496 2023 - Present    Lung mass ICD-10-CM: R91.8  ICD-9-CM: 786.6 2023 - Present    PAT (paroxysmal atrial tachycardia) ICD-10-CM: I47.1  ICD-9-CM: 427.0 2023 - Present    Primary hypertension ICD-10-CM: I10  ICD-9-CM: 401.9 2023 - Present        History & Physical      Filemon Campbell MD at 05/10/23 [address] [place], [place] [address]IST HISTORY AND PHYSICAL    Patient Identification:  Name:  Rory Rios Jr.  Age:  63 y.o.  Sex:  male  :  1960  MRN:  [id number]   Visit Number:  [id number]  Admit Date: 5/10/2023   Room number:  3312/1P  Primary Care Physician:  Alejandro Monique MD    Date of Admission: 5/10/2023     Subjective     Chief complaint:    Chief Complaint   Patient presents with   • Shortness of Breath     History of presenting illness:  63 y.o. male who was admitted on 5/10/2023 for progressive dyspnea with known recurrent left sided pleural effusion.    Rory Rios Jr. 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 In summary, during last hospitalization he was seen by pulmonologist and CT surgery w/thoracentesis on 4/5 showing exudative fluid w/negative cytology and cx. In setting of significant perihilar fibrosis/masses noted, underwent bronchoscopy with EBUS 4/24 w/biopsies negative for malignancy and cx NGTD. After evaluation it was determined that the recurrent effusion was secondary to his coal workers pneumoconiosis with progressive massive fibrosis and was recommended to f/u with  outpatient lung trx clinic which he has not been able to do yet. Tentative plan was for him to see CT surgery in outpatient clinic 5/16 with repeat CXR at that time and possible direct admit for pleuryx cath placement if reaccumulating.    Unfortunately patient began developing recurrent progressive dyspnea without cough, CP, fever, or chills over last 3-4 days that slowly worsened without any alleviating factors. Also notes dyspnea not significantly worsened lying flat. Does improve some with nebulizers and nightly cpap but otherwise no alleviating factors. Patient has home O2 prn but had not been using it more than usual in last week.    On arrival to ED, he underwent CXR similar to prior and in setting of dyspnea and elevated d-dimer, had CTPE that showed recurrence of moderate sized left sided effusion. Otherwise not significantly changed dense consolidations similar to prior imaging. No tachycardia or significant hypoxia noted. Discussed admission for pleuryx here vs discussion with [facility] and in setting of current stability but with progressive sx and recurrent effusion, decision made to admit to floor service with general surgery consult for possible pleuryx catheter placement.     Prior  to admission I discussed patient's presentation and management with attending ER physician and verbal handoff received.  ---------------------------------------------------------------------------------------------------------------------   A thorough systems based relevant ROS was asked and was negative except as noted above.  ---------------------------------------------------------------------------------------------------------------------   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   • Diabetes mellitus    • GERD (gastroesophageal reflux disease)    • Hypertension    • Pneumonia    • Pulmonary fibrosis 4/27/2023     Past Surgical History:   Procedure Laterality Date   • BRONCHOSCOPY N/A 4/24/2023    Procedure: BRONCHOSCOPY WITH ENDOBRONCHIAL ULTRASOUND;  Surgeon: Kota Willard MD;  Location: [facility];  Service: Pulmonary;  Laterality: N/A;   • FOOT SURGERY Left      Family History   Problem Relation Age of Onset   • Diabetes Mother    • Heart failure Father    • Diabetes Sister    • Heart disease Brother    • Diabetes Brother      Social History     Socioeconomic History   • Marital status:    Tobacco Use   • Smoking status: Never     Passive exposure: Never   • Smokeless tobacco: Current     Types: Chew   Vaping Use   • Vaping Use: Never used   Substance and Sexual Activity   • Alcohol use: No   • Drug use: No   • Sexual activity: Defer     ---------------------------------------------------------------------------------------------------------------------   Allergies:  Patient has no known allergies.  ---------------------------------------------------------------------------------------------------------------------   Medications below are reported home medications pulling from within the system; at this time, these medications have not been reconciled unless otherwise specified and are in the  verification process for further verifcation as current home medications.      Prior to Admission Medications     Prescriptions Last Dose Informant Patient Reported? Taking?    albuterol (PROVENTIL HFA;VENTOLIN HFA) 108 (90 BASE) MCG/ACT inhaler   No No    Inhale 2 puffs Every 4 (Four) Hours As Needed for Wheezing or Shortness of Air.    aspirin 81 MG EC tablet   Yes No    Take 1 tablet by mouth Daily. OTC    Budeson-Glycopyrrol-Formoterol (BREZTRI) 160-9-4.8 MCG/ACT aerosol inhaler   Yes No    Inhale 2 puffs 2 (Two) Times a Day.    busPIRone (BUSPAR) 5 MG tablet   Yes No    Take 1 tablet by mouth every night at bedtime.    esomeprazole (nexIUM) 20 MG capsule   No No    Take 1 capsule by mouth Every Morning Before Breakfast.    glipizide (GLUCOTROL XL) 5 MG ER tablet   Yes No    Take 1 tablet by mouth Every Morning.    HYDROcodone-acetaminophen (NORCO) 7.5-325 MG per tablet   Yes No    Take 1 tablet by mouth 3 (Three) Times a Day.    hydrOXYzine (ATARAX) 25 MG tablet   No No    Take 1 tablet by mouth 3 (Three) Times a Day As Needed for Anxiety.    ibuprofen (ADVIL,MOTRIN) 800 MG tablet   Yes No    Take 1 tablet by mouth 3 (Three) Times a Day.    ipratropium-albuterol (DUO-NEB) 0.5-2.5 mg/3 ml nebulizer   Yes No    Take 3 mL by nebulization Every 4 (Four) Hours As Needed for Wheezing or Shortness of Air.    lisinopril (PRINIVIL,ZESTRIL) 10 MG tablet   Yes No    Take 1 tablet by mouth Every Night.    metFORMIN (GLUCOPHAGE) 500 MG tablet   Yes No    Take 1 tablet by mouth 2 (Two) Times a Day With Meals.    metoprolol succinate XL (TOPROL-XL) 25 MG 24 hr tablet   No No    Take 1 tablet by mouth Daily for 30 days.    Patient taking differently:  Take 2 tablets by mouth Every Morning.    omeprazole (priLOSEC) 40 MG capsule   Yes No    Take 1 capsule by mouth Daily.    roflumilast (DALIRESP) 250 MCG tablet tablet   Yes No    Take 1 tablet by mouth Daily.        Objective     Vital Signs:  Temp:  [97.1 °F (36.2 °C)-98 °F  (36.7 °C)] 98 °F (36.7 °C)  Heart Rate:  [71-89] 79  Resp:  [18-24] 18  BP: (117-147)/(70-95) 147/80    Mean Arterial Pressure (Non-Invasive) for the past 24 hrs (Last 3 readings):   Noninvasive MAP (mmHg)   05/10/23 1611 99   05/10/23 1450 96   05/10/23 1435 98     SpO2:  [94 %-98 %] 97 %  on  Flow (L/min):  [3] 3;   Device (Oxygen Therapy): nasal cannula  Body mass index is 33.57 kg/m².    Wt Readings from Last 3 Encounters:   05/10/23 128 kg (283 lb 1.6 oz)   04/20/23 131 kg (288 lb)   02/09/23 134 kg (295 lb)      ----------------------------------------------------------------------------------------------------------------------  Physical Exam  Vitals and nursing note reviewed.   Constitutional:       General: He is not in acute distress.  HENT:      Mouth/Throat:      Mouth: Mucous membranes are moist.      Pharynx: Oropharynx is clear.   Eyes:      General: No scleral icterus.     Extraocular Movements: Extraocular movements intact.   Cardiovascular:      Rate and Rhythm: Normal rate and regular rhythm.      Pulses: Normal pulses.   Pulmonary:      Effort: Pulmonary effort is normal. No respiratory distress.      Breath sounds: No wheezing.      Comments: Diminished breath sounds at left lung base but breath sounds audible deep into middle anterior and posterior lung fields  Abdominal:      Palpations: Abdomen is soft.      Tenderness: There is no abdominal tenderness.   Musculoskeletal:      Right lower leg: No edema.      Left lower leg: No edema.   Skin:     General: Skin is warm and dry.      Capillary Refill: Capillary refill takes less than 2 seconds.   Neurological:      General: No focal deficit present.      Mental Status: He is alert. Mental status is at baseline.   Psychiatric:         Mood and Affect: Mood normal.         Behavior: Behavior normal.       --------------------------------------------------------------------------------------------------------------------  LABS:    CBC and  coagulation:  Results from last 7 days   Lab Units 05/10/23  1043   SED RATE mm/hr 39*   CRP mg/dL 1.54*   WBC 10*3/mm3 7.32   HEMOGLOBIN g/dL 13.8   HEMATOCRIT % 45.2   MCV fL 82.9   MCHC g/dL 30.5*   PLATELETS 10*3/mm3 223   INR  1.00   D DIMER QUANT MCGFEU/mL 1.85*     Acid/base balance:      Renal and electrolytes:  Results from last 7 days   Lab Units 05/10/23  1043   SODIUM mmol/L 142   POTASSIUM mmol/L 3.9   MAGNESIUM mg/dL 1.9   CHLORIDE mmol/L 106   CO2 mmol/L 26.7   BUN mg/dL 7*   CREATININE mg/dL 0.80   CALCIUM mg/dL 9.5   GLUCOSE mg/dL 106*     Estimated Creatinine Clearance: 136.7 mL/min (by C-G formula based on SCr of 0.8 mg/dL).    Liver and pancreatic function:  Results from last 7 days   Lab Units 05/10/23  1043   ALBUMIN g/dL 3.7   BILIRUBIN mg/dL 0.4   ALK PHOS U/L 90   AST (SGOT) U/L 16   ALT (SGPT) U/L 14     Endocrine function:  Lab Results   Component Value Date    HGBA1C 6.30 (H) 04/21/2023     Point of care bedside glucose levels:      Lab Results   Component Value Date    TSH 2.210 05/10/2023    FREET4 1.33 05/10/2023     Cardiac:  Results from last 7 days   Lab Units 05/10/23  1347 05/10/23  1043   HSTROP T ng/L 16* 15*   PROBNP pg/mL  --  189.9       Cultures:  Lab Results   Component Value Date    COLORU Yellow 05/10/2023    CLARITYU Clear 05/10/2023    PHUR 7.5 05/10/2023    GLUCOSEU Negative 05/10/2023    KETONESU Negative 05/10/2023    BLOODU Moderate (2+) (A) 05/10/2023    NITRITEU Negative 05/10/2023    LEUKOCYTESUR Negative 05/10/2023    BILIRUBINUR Negative 05/10/2023    UROBILINOGEN 0.2 E.U./dL 05/10/2023    RBCUA 13-20 (A) 05/10/2023    WBCUA 0-2 05/10/2023    BACTERIA Trace (A) 05/10/2023     Microbiology Results (last 10 days)     Procedure Component Value - Date/Time    COVID-19 and FLU A/B PCR - Swab, Nasopharynx [[id number]]  (Normal) Collected: 05/10/23 1044    Lab Status: Final result Specimen: Swab from Nasopharynx Updated: 05/10/23 1116     COVID19 Not Detected      Influenza A PCR Not Detected     Influenza B PCR Not Detected    Narrative:      Fact sheet for providers: https://www.fda.gov/media/302199/download    Fact sheet for patients: https://www.fda.gov/media/916296/download    Test performed by PCR.          No results found for: PREGTESTUR, PREGSERUM, HCG, HCGQUANT  Pain Management Panel         Latest Ref Rng & Units 5/10/2023   Pain Management Panel   Amphetamine, Urine Qual Negative Negative     Barbiturates Screen, Urine Negative Negative     Benzodiazepine Screen, Urine Negative Negative     Buprenorphine, Screen, Urine Negative Negative     Cocaine Screen, Urine Negative Negative     Methadone Screen , Urine Negative Negative     Methamphetamine, Ur Negative Negative                  I have personally looked at the labs and they are summarized above.  ----------------------------------------------------------------------------------------------------------------------  Detailed radiology reports for the last 24 hours:    Imaging Results (Last 24 Hours)     Procedure Component Value Units Date/Time    CT Angiogram Chest Pulmonary Embolism [[id number]] Collected: 05/10/23 1305     Updated: 05/10/23 1308    Narrative:      CT ANGIOGRAM CHEST PULMONARY EMBOLISM-     CLINICAL INDICATION: Pulmonary embolism (PE) suspected, high prob        COMPARISON: 04/22/2023      PROCEDURE: Thin cut axial images were acquired through the pulmonary  vessels during the rapid infusion of IV contrast.     Additional 3-D reformatted images obtained via post-processing for  improved diagnostic accuracy and procedural planning.     Radiation dose reduction techniques were utilized per ALARA protocol.  Automated exposure control was initiated through either or [employer] or  [employer] software packages by  protocol.           FINDINGS: Today's study demonstrates opacification of the central  pulmonary vessels.   There are no filling defects.   There is no truncation.     No  evidence of a pulmonary embolus.     Dense areas of consolidation are present bilaterally and are very  similar to the prior study..     Consolidation in the perihilar regions bilaterally     Moderate left and small right pleural effusion       Impression:      1. No evidence of a pulmonary embolus  2. Moderate left and small right pleural effusion  3. Dense areas of consolidation again noted bilaterally but similar to  the prior study.     This report was finalized on 5/10/2023 1:06 PM by Dr. Jamie Smith MD.       XR Chest 1 View [[id number]] Collected: 05/10/23 1100     Updated: 05/10/23 1103    Narrative:      XR CHEST 1 VW-     CLINICAL INDICATION: sob        COMPARISON: 04/27/2023      TECHNIQUE: Single frontal view of the chest.     FINDINGS:      LUNGS: Dense consolidation and nodularity bilaterally. Worse than on the  previous exam      HEART AND MEDIASTINUM: Heart and mediastinal contours are unremarkable        SKELETON: Bony and soft tissue structures are unremarkable.             Impression:      Overall appearance worse than on the prior study     This report was finalized on 5/10/2023 11:01 AM by Dr. Jamie Smith MD.           Final impressions for the last 30 days of radiology reports:    CT Chest With & Without Contrast Diagnostic    Result Date: 4/21/2023  Impression: 1. Extensive masslike perihilar disease, consistent with history of longstanding inhalational disease and progressive massive fibrosis. 2. Asymmetrically rounded and masslike area associated with left perihilar PMF in the left lower lobe, 4.5 cm in diameter, which may also reflect advanced PMF, but at least potentially could represent superimposed neoplasm, as discussed above. 3. Large, free-flowing left pleural effusion of uncertain significance. Electronically Signed: Mitch Sears  4/21/2023 3:48 PM EDT  Workstation ID: [id number]    XR Chest 1 View    Result Date: 5/10/2023  Overall appearance worse than on the prior study  This report  was finalized on 5/10/2023 11:01 AM by Dr. Jamie Smith MD.      XR Chest 1 View    Result Date: 4/27/2023  Impression: Stable chest. No pneumothorax. Multifocal infiltrates. Electronically Signed: Bud Britton  4/27/2023 8:21 AM EDT  Workstation ID: [id number]    XR Chest 1 View    Result Date: 4/26/2023  Impression: Removal of left-sided chest tube. No pneumothorax. Otherwise, stable exam. Electronically Signed: Shantelle Grimm  4/26/2023 3:04 PM EDT  Workstation ID: [id number]    XR Chest 1 View    Result Date: 4/26/2023  Impression: Left basilar pleural drain. No significant effusion or evidence of pneumothorax. Perihilar airspace masses present bilaterally, similar as compared to the previous study Electronically Signed: Shantelle Grimm  4/26/2023 7:09 AM EDT  Workstation ID: [id number]    XR Chest 1 View    Result Date: 4/25/2023  Impression: 1. Stable right and left perihilar masses as previously described. 2. Improving bibasilar pulmonary interstitial disease, now mild in extent. Electronically Signed: Mitch Sears  4/25/2023 8:57 AM EDT  Workstation ID: [id number]    XR Chest 1 View    Result Date: 4/24/2023  Impression: Left pleural drain remains in place. Stable changes of pulmonary fibrosis. No new chest disease is seen. Electronically Signed: Mitch Sears  4/24/2023 8:36 AM EDT  Workstation ID: [id number]    XR Chest 1 View    Result Date: 4/23/2023  Impression: Decreased size of pleural effusion on the left. It is now small. Small caliber chest tube is present. No pneumothorax. No change in masslike bilateral parenchymal airspace opacities. Electronically Signed: Patti Williamson  4/23/2023 8:30 AM EDT  Workstation ID: [id number]    XR Chest 1 View    Result Date: 4/20/2023  Impression: 1. No change in the masslike nodular appearing infiltrate in the right perihilar region. 2. Improvement in the left perihilar infiltrate compared with the last study with improvement in aeration at the left base. 3. Moderate pleural effusion,  unchanged. Electronically Signed: Miguelito Deras  4/20/2023 2:22 PM EDT  Workstation ID: [id number]    CT Angiogram Chest Pulmonary Embolism    Result Date: 5/10/2023  1. No evidence of a pulmonary embolus 2. Moderate left and small right pleural effusion 3. Dense areas of consolidation again noted bilaterally but similar to the prior study.  This report was finalized on 5/10/2023 1:06 PM by Dr. Jamie Smith MD.      X-ray chest PA and lateral    Result Date: 4/17/2023  No significant interval change. Images reviewed, interpreted, and dictated by Dr. Gauri Gtz. Transcribed by Tahmina Isaacs PA-C.    X-ray chest PA and lateral    Result Date: 4/10/2023  There has been no significant interval change in  the opacities, likely related to pneumoconiosis. Small left pleural effusion. Images reviewed, interpreted, and dictated by Dr. Gauri Gtz. Transcribed by JOHNATHON Huerta (R).      I have personally looked at the radiology images, my personal interpretation is as follows:    CXR with left sided effusion and multifocal dense consolidations    CT with moderate sized left effusion and noted b/l perihilar dense infiltrates/masses    Assessment & Plan       #hx of coal workers pneumoconiosis with dense b/l perihilar infiltrates/pulmonary fibrosis  #Recurrent left sided pleural effusion, previously exudative thought secondary to above  #Chronic hypoxic respiratory failure (2L)  -Extensive w/u including EBUS of perihilar mass performed at [facility] during last hospitalization with effusion found to be exudative in nature with negative cytology x2 samples and negative fungal and bacterial w/u. Currently on baseline O2 with no fever, inf clinical sx, or leukocytosis. Crp mildly elevated not dissimilar from prior. BNP wnl and CTPE neg for PE. Defer abx coverage  -Patient would be well served by pleurodesis or VATS but evaluated by CT surgery at [facility] and felt overall pulmonary condition not amenable to either intervention,  treatment ultimately narrowed to lung trx eval with pleuryx for palliation of sx in interim  -General surgery consulted for possible pleuryx catheter placement  -TTE added to complete cardiac w/u, no prior echo on chart but note effusion was exudative in nature, not likely cardiac origin and no other clinical evidence of HF  -NPO at midnight until evaluated by surgical consultant  -s/p IV lasix in ED, monitor for sx improvement but unlikely to significantly improve given exudative noncardiac nature    #COOPER  -Resume home cpap    #Hx of atrial tachycardia  -Resume home metoprolol pend med rec    #?COPD hx  -Reported on last hospitalization but no smoking hx and no PFTs available for review but likely secondary to environmental contaminant  -Resume home breztri formulary equivalent with prn duonebs    #HTN  -Resume antihypertensives post op pend med rec    #NIDDM  -Hold metformin and glipizide  -FSBG qac/qhs w/SSI        VTE Prophylaxis:   Mechanical Order History:     None      Pharmalogical Order History:      Ordered     Dose Route Frequency Stop    05/10/23 3811  Enoxaparin Sodium (LOVENOX) syringe 40 mg         40 mg SC Once --              The patient is high risk due to the following diagnoses/reasons:  Recurrent effusion requiring surgical drainage with chronic O2 usage    Admission Status:  I certify that this patient requires inpatient hospitalization for greater than 2 midnights in INPATIENT status.  I anticipate there to be the need for care which can only be reasonably provided in a hospital setting such as possible need for aggressive/expedited ancillary services and/or consultation services, IV medications, close physician monitoring, and/or procedures. In such, I feel patient’s risk for adverse outcomes and need for care warrant INPATIENT evaluation and predict the patient’s care encounter to likely last beyond 2 midnights.    Code Status and Medical Interventions:   Ordered at: 05/10/23 2785     Code  Status (Patient has no pulse and is not breathing):    CPR (Attempt to Resuscitate)     Medical Interventions (Patient has pulse or is breathing):    Full Support        Disposition: Admit to tele pend surgical eval    Filemon Campbell MD  [facility]  05/10/23  16:56 EDT        Electronically signed by Filemon Campbell MD at 05/10/23 9640       Vital Signs (last day)     Date/Time Temp Temp src Pulse Resp BP Patient Position SpO2    05/12/23 1000 98.1 (36.7) Oral 98 16 147/70 Lying 93    05/12/23 0651 -- -- 71 16 -- -- 94    05/12/23 0600 98.4 (36.9) Oral 80 18 114/73 Lying 92    05/12/23 0200 97.8 (36.6) Oral 88 18 113/63 Lying 95    05/11/23 2230 98.1 (36.7) Oral 83 18 101/54 Lying 96    05/11/23 2130 -- -- 81 18 109/60 Lying 96    05/11/23 2030 -- -- 86 18 113/56 Lying 96    05/11/23 1930 -- -- 95 18 119/61 Lying 97    05/11/23 1853 -- -- 100 18 -- -- 96    05/11/23 1830 -- -- 91 18 111/64 Lying 96    05/11/23 1800 97.8 (36.6) Oral 97 18 110/58 Lying 95    05/11/23 1700 -- -- 101 18 99/62 -- 96    05/11/23 1630 98 (36.7) -- 93 18 130/80 -- 95    05/11/23 1615 98.1 (36.7) -- 81 18 123/89 -- 96    05/11/23 1600 97.6 (36.4) -- 94 18 137/89 -- 96    05/11/23 1545 97.4 (36.3) -- 91 18 121/82 -- 95    05/11/23 1532 -- -- 75 16 124/80 Lying 95    05/11/23 1528 -- -- 89 16 120/78 Lying 95    05/11/23 1523 -- -- 94 16 121/80 Lying 95    05/11/23 1518 97.3 (36.3) Temporal 76 16 113/81 Lying 95    05/11/23 1257 -- Temporal 94 18 127/77 Lying 95    05/11/23 0708 -- -- 91 18 -- -- 94    05/11/23 0658 -- -- 96 18 -- -- 96    05/11/23 0600 98.9 (37.2) Oral 85 18 93/55 Lying 95    05/11/23 0207 98.3 (36.8) Oral 77 18 90/54 Lying 93          Lines, Drains & Airways     Active LDAs     Name Placement date Placement time Site Days    Peripheral IV 05/10/23 1248 Anterior;Proximal;Right;Upper Arm 05/10/23  1248  Arm  1    Chest Tube 1 Left Pleural 05/11/23  1502  Pleural  less than 1                  Current  Facility-Administered Medications   Medication Dose Route Frequency Provider Last Rate Last Admin   • budesonide-formoterol (SYMBICORT) 160-4.5 MCG/ACT inhaler 2 puff  2 puff Inhalation BID - RT Brigitte Kolb MD   2 puff at 05/12/23 0651   • dextrose (D50W) (25 g/50 mL) IV injection 25 g  25 g Intravenous Q15 Min PRN Brigitte Kolb MD       • dextrose (GLUTOSE) oral gel 15 g  15 g Oral Q15 Min PRN Brigitte Kolb MD       • glucagon HCl (Diagnostic) injection 1 mg  1 mg Intramuscular Q15 Min PRN Brigitte Kolb MD       • HYDROcodone-acetaminophen (NORCO) 7.5-325 MG per tablet 1 tablet  1 tablet Oral TID Brigitte Kolb MD   1 tablet at 05/12/23 0926   • hydrOXYzine (ATARAX) tablet 25 mg  25 mg Oral TID PRN Brigitte Kolb MD   25 mg at 05/11/23 2118   • Insulin Lispro (humaLOG) injection 2-9 Units  2-9 Units Subcutaneous TID With Meals Brigitte Kolb MD       • ipratropium (ATROVENT) nebulizer solution 0.5 mg  0.5 mg Nebulization 4x Daily - RT Brigitte Kolb MD   0.5 mg at 05/12/23 0651   • ipratropium-albuterol (DUO-NEB) nebulizer solution 3 mL  3 mL Nebulization Q6H PRN Brigitte Kolb MD   3 mL at 05/10/23 1640   • melatonin tablet 5 mg  5 mg Oral Nightly PRN Brigitte Kolb MD   5 mg at 05/11/23 2118   • metoprolol succinate XL (TOPROL-XL) 24 hr tablet 50 mg  50 mg Oral Daily Brigitte Kolb MD   50 mg at 05/12/23 0926   • nitroglycerin (NITROSTAT) SL tablet 0.4 mg  0.4 mg Sublingual Q5 Min PRN Brigitte Kolb MD       • ondansetron ODT (ZOFRAN-ODT) disintegrating tablet 4 mg  4 mg Oral Q12H PRN Brigitte Kolb MD       • pantoprazole (PROTONIX) EC tablet 40 mg  40 mg Oral QAM AC Brigitte Kolb MD   40 mg at 05/12/23 0926   • polyethylene glycol (MIRALAX) packet 17 g  17 g Oral Daily PRN Brigitte Kolb MD       • roflumilast (DALIRESP) tablet 250 mcg  250 mcg Oral Daily Brigitte Kolb MD   250 mcg at 05/10/23 1959   • sennosides-docusate (PERICOLACE)  8.6-50 MG per tablet 2 tablet  2 tablet Oral Nightly Brigitte Kolb MD   2 tablet at 05/11/23 2117   • sodium chloride 0.9 % flush 10 mL  10 mL Intravenous PRN Brigitte Kolb MD       • sodium chloride 0.9 % flush 10 mL  10 mL Intravenous Q12H Brigitte Kolb MD   10 mL at 05/12/23 0927   • sodium chloride 0.9 % flush 10 mL  10 mL Intravenous PRN Brigitte Kolb MD       • sodium chloride 0.9 % infusion 40 mL  40 mL Intravenous PRN Brigitte Kolb MD         Orders (last 24 hrs)      Start     Ordered    05/12/23 1010  POC Glucose Once  PROCEDURE ONCE         05/12/23 1000    05/12/23 0936  Discharge patient  Once         05/12/23 0940    05/12/23 0930  Discontinue IV  Once         05/12/23 0934    05/12/23 0647  POC Glucose Once  PROCEDURE ONCE         05/12/23 0635    05/12/23 0600  ceFAZolin 2 gm IVPB in 100 mL NS (VTB)  On Call to O.R.,   Status:  Discontinued         05/11/23 1302    05/12/23 0600  CBC (No Diff)  Morning Draw         05/11/23 1857    05/12/23 0500  XR Chest 1 View  1 Time Imaging         05/11/23 1753    05/12/23 0000  Discharge Follow-up with Specified Provider: Dr. Solis as previously scheduled 5/16/23 05/12/23 0934    05/12/23 0000  Ambulatory Referral to Home Health         05/12/23 0934    05/11/23 1649  POC Glucose Once  PROCEDURE ONCE         05/11/23 1642    05/11/23 1551  Diet: Regular/House Diet; Texture: Regular Texture (IDDSI 7); Fluid Consistency: Thin (IDDSI 0)  Diet Effective Now         05/11/23 1550    05/11/23 1537  Oxygen Therapy- Nasal Cannula; Titrate for SPO2: equal to or greater than, 96%, per policy  Continuous,   Status:  Canceled         05/11/23 1536    05/11/23 1537  Pulse Oximetry, Continuous  Continuous,   Status:  Canceled         05/11/23 1536    05/11/23 1537  POC Glucose STAT  STAT,   Status:  Canceled        Comments: Notify Anesthesia if blood sugar is less than 80 mg/dL or greater than 180mg/dL      05/11/23 1536    05/11/23 1538   Vital signs every 5 minutes for 15 minutes, every 15 minutes thereafter.  Once,   Status:  Canceled         05/11/23 1536    05/11/23 1537  Call Anesthesiologist for additional IV Fluid bolus for Hypotension/Tachycardia  Until Discontinued,   Status:  Canceled         05/11/23 1536    05/11/23 1537  Notify Anesthesia of Any Acute Changes in Patient Condition  Until Discontinued,   Status:  Canceled         05/11/23 1536    05/11/23 1537  Notify Anesthesia for Unrelieved Pain  Until Discontinued,   Status:  Canceled         05/11/23 1536    05/11/23 1537  Once DC criteria to floor met, follow surgeon's orders.  Until Discontinued,   Status:  Canceled         05/11/23 1536    05/11/23 1537  Discharge patient from PACU when discharge criteria is met.  Until Discontinued,   Status:  Canceled         05/11/23 1536 05/11/23 1536  lactated ringers infusion  Once As Needed,   Status:  Discontinued         05/11/23 1536 05/11/23 1536  oxyCODONE-acetaminophen (PERCOCET) 5-325 MG per tablet 1 tablet  Once As Needed,   Status:  Discontinued         05/11/23 1536 05/11/23 1536  ketorolac (TORADOL) injection 30 mg  Every 6 Hours PRN,   Status:  Discontinued         05/11/23 1536 05/11/23 1536  fentaNYL citrate (PF) (SUBLIMAZE) injection 50 mcg  Every 5 Minutes PRN,   Status:  Discontinued         05/11/23 1536 05/11/23 1536  ondansetron (ZOFRAN) injection 4 mg  As Needed,   Status:  Discontinued         05/11/23 1536 05/11/23 1536  ipratropium-albuterol (DUO-NEB) nebulizer solution 3 mL  Once As Needed,   Status:  Discontinued         05/11/23 1536    05/11/23 1517  XR Chest AP  1 Time Imaging         05/11/23 1517    05/11/23 1459  sodium chloride (NS) irrigation solution  As Needed,   Status:  Discontinued         05/11/23 1459    05/11/23 1457  PT Consult: Eval & Treat Functional Mobility Below Baseline  Once         05/11/23 1456    05/11/23 1413  sodium chloride 0.9 % flush 10 mL  Every 12 Hours Scheduled,    Status:  Discontinued         05/11/23 1411    05/11/23 1413  lactated ringers infusion  Once         05/11/23 1411    05/11/23 1412  POC Glucose Once  Once,   Status:  Canceled        Comments: For all diabetic patients and all patients who are to be admitted. Notify Anesthesiologist for blood sugar > 180.      05/11/23 1411    05/11/23 1412  Insert Peripheral IV  Once,   Status:  Canceled         05/11/23 1411    05/11/23 1412  Saline Lock & Maintain IV Access  Continuous,   Status:  Canceled         05/11/23 1411    05/11/23 1411  Vital Signs - Per Anesthesia Protocol  As Needed,   Status:  Canceled       05/11/23 1411    05/11/23 1411  sodium chloride 0.9 % flush 10 mL  As Needed,   Status:  Discontinued         05/11/23 1411    05/11/23 1411  sodium chloride 0.9 % infusion 40 mL  As Needed,   Status:  Discontinued         05/11/23 1411    05/11/23 1411  midazolam (VERSED) injection 1 mg  Every 10 Minutes PRN,   Status:  Discontinued         05/11/23 1411    05/11/23 1411  midazolam (VERSED) injection 0.5 mg  Every 10 Minutes PRN,   Status:  Discontinued         05/11/23 1411    05/11/23 1359  Case Management  Consult  Once        Provider:  (Not yet assigned)    05/11/23 1358    05/11/23 1338  FL Surgery Fluoro  1 Time Imaging         05/11/23 1337    05/11/23 1116  POC Glucose Once  PROCEDURE ONCE         05/11/23 1108    05/11/23 0900  metoprolol succinate XL (TOPROL-XL) 24 hr tablet 50 mg  Daily         05/10/23 1745    05/11/23 0000  Wheelchair         05/11/23 1455    05/10/23 2100  sodium chloride 0.9 % flush 10 mL  Every 12 Hours Scheduled         05/10/23 1556    05/10/23 2100  sennosides-docusate (PERICOLACE) 8.6-50 MG per tablet 2 tablet  Nightly         05/10/23 1556    05/10/23 1900  roflumilast (DALIRESP) tablet 250 mcg  Daily         05/10/23 1745    05/10/23 1900  budesonide-formoterol (SYMBICORT) 160-4.5 MCG/ACT inhaler 2 puff  2 Times Daily - RT         05/10/23 1745    05/10/23  1900  ipratropium (ATROVENT) nebulizer solution 0.5 mg  4 Times Daily - RT         05/10/23 1745    05/10/23 1845  pantoprazole (PROTONIX) EC tablet 40 mg  Every Morning Before Breakfast         05/10/23 1745    05/10/23 1800  Oral Care  2 Times Daily       05/10/23 1556    05/10/23 1800  Insulin Lispro (humaLOG) injection 2-9 Units  3 Times Daily With Meals         05/10/23 1556    05/10/23 1720  hydrOXYzine (ATARAX) tablet 25 mg  3 Times Daily PRN         05/10/23 1720    05/10/23 1700  POC Glucose 4x Daily AC & at Bedtime  4 Times Daily Before Meals & at Bedtime       05/10/23 1556    05/10/23 1600  Vital Signs  Every 4 Hours       05/10/23 1556    05/10/23 1600  HYDROcodone-acetaminophen (NORCO) 7.5-325 MG per tablet 1 tablet  3 Times Daily         05/10/23 1556    05/10/23 1557  Intake & Output  Every Shift       05/10/23 1556    05/10/23 1556  polyethylene glycol (MIRALAX) packet 17 g  Daily PRN         05/10/23 1556    05/10/23 1556  ipratropium-albuterol (DUO-NEB) nebulizer solution 3 mL  Every 6 Hours PRN         05/10/23 1556    05/10/23 1556  ondansetron ODT (ZOFRAN-ODT) disintegrating tablet 4 mg  Every 12 Hours PRN         05/10/23 1556    05/10/23 1556  melatonin tablet 5 mg  Nightly PRN         05/10/23 1556    05/10/23 1556  dextrose (GLUTOSE) oral gel 15 g  Every 15 Minutes PRN         05/10/23 1556    05/10/23 1556  dextrose (D50W) (25 g/50 mL) IV injection 25 g  Every 15 Minutes PRN         05/10/23 1556    05/10/23 1556  glucagon HCl (Diagnostic) injection 1 mg  Every 15 Minutes PRN         05/10/23 1556    05/10/23 1556  sodium chloride 0.9 % flush 10 mL  As Needed         05/10/23 1556    05/10/23 1556  sodium chloride 0.9 % infusion 40 mL  As Needed         05/10/23 1556    05/10/23 1556  Telemetry - Pulse Oximetry  Continuous PRN,   Status:  Canceled      Comments: If Patient Develops Unresponsiveness, Acute Dyspnea, Cyanosis or Suspected Hypoxemia Start Continuous Pulse Ox Monitoring, Apply  Oxygen & Notify Provider    05/10/23 1556    05/10/23 1556  Oxygen Therapy- Nasal Cannula; Titrate for SPO2: 90% - 95%  Continuous PRN,   Status:  Canceled      Comments: If Patient Develops Unresponsiveness, Acute Dyspnea, Cyanosis or Suspected Hypoxemia Start Continuous Pulse Ox Monitoring, Apply Oxygen & Notify Provider    05/10/23 1556    05/10/23 1556  nitroglycerin (NITROSTAT) SL tablet 0.4 mg  Every 5 Minutes PRN         05/10/23 1556    05/10/23 1031  sodium chloride 0.9 % flush 10 mL  As Needed        See Hyperspace for full Linked Orders Report.    05/10/23 1031    Unscheduled  ECG 12 Lead Chest Pain  As Needed      Comments: Nurse to Release if Patient Expericences Acute Chest Pain or Dysrhythmias    05/10/23 1556    Unscheduled  Potassium  As Needed      Comments: For Ventricular Arrhythmias      05/10/23 1556    Unscheduled  Magnesium  As Needed      Comments: For Ventricular Arrhythmias      05/10/23 1556    Unscheduled  High Sensitivity Troponin T  As Needed      Comments: For Chest Pain      05/10/23 1556    Unscheduled  Blood Gas, Arterial -With Co-Ox Panel: Yes  As Needed      Comments: Draw for Acute Dyspnea, Cyanosis, Suspected Hypoxemia or UnresponsivenessNotify Provider of Results      05/10/23 1556    Unscheduled  Follow Hypoglycemia Standing Orders For Blood Glucose <70 & Notify Provider of Treatment  As Needed      Comments: Follow Hypoglycemia Orders As Outlined in Process Instructions (Open Order Report to View Full Instructions)  Notify Provider Any Time Hypoglycemia Treatment is Administered    05/10/23 1556    --  esomeprazole (nexIUM) 40 MG capsule  Every Morning Before Breakfast         05/10/23 1659    --  metoprolol succinate XL (TOPROL-XL) 50 MG 24 hr tablet  Daily         05/10/23 1701    --  SCANNED - TELEMETRY           05/10/23 0000                   Operative/Procedure Notes (last 24 hours)      Brigitte Kolb MD at 05/11/23 1459        Insertion Pleurx catheter left chest  with fluoroscopic guidance    Pre-op: Recurrent left pleural effusion    Post-op:  Same    Surgeon:  Brigitte Kolb M.D., F.A.C.S.    Assistant:  None    Anesthesia:  general      Indications: Recurrent left pleural effusion       Procedure Details   After obtaining informed consent and receiving preoperative antibiotics and with venous compression boots in place, the patient was taken to the operating room and placed under anesthesia.  The left lateral chest was prepped and draped in a sterile fashion.  Using the chest tube incision as the landmark an 8 mm incision was made in the skin.  The needle was inserted into the thoracic cavity and fluid was aspirated.  Guidewire was passed without resistance.  Following this a counterincision inferior to the skin entry incision was made and the catheter was brought through the tunnel.  Under fluoroscopic guidance the dilator sheath was passed over the guidewire, directing the catheter inferior and posterior.  Catheter was passed over the peel-away sheath which was then removed.  Catheter was sutured in with 0 silk and the skin incision site was closed with 3-0 Vicryl subdermal suture and 4-0 Monocryl.  550 cc were drained at the time of the procedure.  Dressings were placed patient was taken to the recovery room where chest x-ray demonstrated the catheter to be in good position    Findings:         Estimated Blood Loss:  Minimal    Blood Administered: none           Drains: none           Specimens: None     Grafts and Implants: No       Complications:  none           Disposition: PACU - hemodynamically stable.           Condition: stable        Electronically signed by Brigitte Kolb MD at 23 1655          Physician Progress Notes (last 24 hours)      Filemon Campbell MD at 23 1747              [facility]     Patient Identification:  Name:  Rory Rios Jr.  Age:  63 y.o.  Sex:  male  :  1960  MRN:   [id number]  Visit Number:  [id number]  ROOM: [address] [place] [address]     Primary Care Provider:  Alejandro Monique MD     Date of Admission: 5/10/2023    Length of stay in inpatient status:  1    Subjective     Chief Compliant:    Chief Complaint   Patient presents with   • Shortness of Breath       Patient seen following return from OR this afternoon. Dyspnea resolved after removal of 550cc pleural fluid with placement of left sided pleuryx catheter. No CP and minimal soreness and plauritic pain reported at pleuryx site.         Objective     Current Hospital Meds:  budesonide-formoterol, 2 puff, Inhalation, BID - RT  HYDROcodone-acetaminophen, 1 tablet, Oral, TID  insulin lispro, 2-9 Units, Subcutaneous, TID With Meals  ipratropium, 0.5 mg, Nebulization, 4x Daily - RT  metoprolol succinate XL, 50 mg, Oral, Daily  pantoprazole, 40 mg, Oral, QAM AC  roflumilast, 250 mcg, Oral, Daily  senna-docusate sodium, 2 tablet, Oral, Nightly  sodium chloride, 10 mL, Intravenous, Q12H       Current Antimicrobial Therapy:  Anti-Infectives (From admission, onward)    None        Current Diuretic Therapy:  Diuretics (From admission, onward)    Ordered     Dose/Rate Route Frequency Start Stop    05/10/23 1116  furosemide (LASIX) injection 80 mg        Ordering Provider: Ap Reyes MD    80 mg Intravenous Once 05/10/23 1118 05/10/23 1202        ----------------------------------------------------------------------------------------------------------------------  Vital Signs:  Temp:  [97.3 °F (36.3 °C)-99.1 °F (37.3 °C)] 98 °F (36.7 °C)  Heart Rate:  [] 101  Resp:  [16-18] 18  BP: ()/(54-89) 99/62  SpO2:  [93 %-97 %] 96 %  on  Flow (L/min):  [2-4] 2;   Device (Oxygen Therapy): nasal cannula  Body mass index is 34.87 kg/m².    Wt Readings from Last 3 Encounters:   05/11/23 127 kg (279 lb)   04/20/23 131 kg (288 lb)   02/09/23 134 kg (295 lb)     Intake & Output (last 3 days)       05/08 0701  05/09 0700 05/09 0701  05/10  0700 05/10 0701  05/11 0700 05/11 0701  05/12 0700    P.O.   240     I.V. (mL/kg)    700 (5.5)    Total Intake(mL/kg)   240 (1.9) 700 (5.5)    Net   +240 +700            Urine Unmeasured Occurrence   6 x         Diet: Regular/House Diet; Texture: Regular Texture (IDDSI 7); Fluid Consistency: Thin (IDDSI 0)  ----------------------------------------------------------------------------------------------------------------------  Physical Exam  Vitals and nursing note reviewed.   Constitutional:       General: He is not in acute distress.  HENT:      Mouth/Throat:      Mouth: Mucous membranes are moist.      Pharynx: Oropharynx is clear.   Eyes:      General: No scleral icterus.     Extraocular Movements: Extraocular movements intact.   Cardiovascular:      Rate and Rhythm: Normal rate and regular rhythm.      Pulses: Normal pulses.   Pulmonary:      Effort: Pulmonary effort is normal. No respiratory distress.      Breath sounds: No wheezing.      Comments: Diminished breath sounds at left lung base but breath sounds audible deep into middle anterior and posterior lung fields. Pleuryx catheter in place with clear dlean dressing overlying insertion site  Abdominal:      Palpations: Abdomen is soft.      Tenderness: There is no abdominal tenderness.   Musculoskeletal:      Right lower leg: No edema.      Left lower leg: No edema.   Skin:     General: Skin is warm and dry.      Capillary Refill: Capillary refill takes less than 2 seconds.   Neurological:      General: No focal deficit present.      Mental Status: He is alert. Mental status is at baseline.   Psychiatric:         Mood and Affect: Mood normal.         Behavior: Behavior normal.   ----------------------------------------------------------------------------------------------------------------------    ----------------------------------------------------------------------------------------------------------------------  LABS:    CBC and coagulation:  Results from  last 7 days   Lab Units 05/11/23  0755 05/10/23  2212 05/10/23  1043   PROCALCITONIN ng/mL 0.09  --   --    SED RATE mm/hr  --   --  39*   CRP mg/dL 4.78*  --  1.54*   WBC 10*3/mm3  --  12.44* 7.32   HEMOGLOBIN g/dL  --  13.8 13.8   HEMATOCRIT %  --  45.2 45.2   MCV fL  --  82.6 82.9   MCHC g/dL  --  30.5* 30.5*   PLATELETS 10*3/mm3  --  199 223   INR   --   --  1.00   D DIMER QUANT MCGFEU/mL  --   --  1.85*     Acid/base balance:      Renal and electrolytes:  Results from last 7 days   Lab Units 05/10/23  2212 05/10/23  1043   SODIUM mmol/L 140 142   POTASSIUM mmol/L 3.8 3.9   MAGNESIUM mg/dL 1.7 1.9   CHLORIDE mmol/L 103 106   CO2 mmol/L 26.2 26.7   BUN mg/dL 8 7*   CREATININE mg/dL 0.86 0.80   CALCIUM mg/dL 9.1 9.5   GLUCOSE mg/dL 131* 106*     Estimated Creatinine Clearance: 123.5 mL/min (by C-G formula based on SCr of 0.86 mg/dL).    Liver and pancreatic function:  Results from last 7 days   Lab Units 05/10/23  1043   ALBUMIN g/dL 3.7   BILIRUBIN mg/dL 0.4   ALK PHOS U/L 90   AST (SGOT) U/L 16   ALT (SGPT) U/L 14     Endocrine function:  Lab Results   Component Value Date    HGBA1C 6.30 (H) 04/21/2023     Point of care bedside glucose levels:  Results from last 7 days   Lab Units 05/11/23  1642 05/11/23  1108 05/11/23  0644 05/10/23  1709   GLUCOSE mg/dL 121 98 105 123     Glucose levels from the [facility]:  Results from last 7 days   Lab Units 05/10/23  2212 05/10/23  1043   GLUCOSE mg/dL 131* 106*     Lab Results   Component Value Date    TSH 2.210 05/10/2023    FREET4 1.33 05/10/2023     Cardiac:  Results from last 7 days   Lab Units 05/10/23  1347 05/10/23  1043   HSTROP T ng/L 16* 15*   PROBNP pg/mL  --  189.9       Cultures:  Lab Results   Component Value Date    COLORU Yellow 05/10/2023    CLARITYU Clear 05/10/2023    PHUR 7.5 05/10/2023    GLUCOSEU Negative 05/10/2023    KETONESU Negative 05/10/2023    BLOODU Moderate (2+) (A) 05/10/2023    NITRITEU Negative 05/10/2023    LEUKOCYTESUR Negative 05/10/2023     BILIRUBINUR Negative 05/10/2023    UROBILINOGEN 0.2 E.U./dL 05/10/2023    RBCUA 13-20 (A) 05/10/2023    WBCUA 0-2 05/10/2023    BACTERIA Trace (A) 05/10/2023     Microbiology Results (last 10 days)     Procedure Component Value - Date/Time    COVID-19 and FLU A/B PCR - Swab, Nasopharynx [[id number]]  (Normal) Collected: 05/10/23 1044    Lab Status: Final result Specimen: Swab from Nasopharynx Updated: 05/10/23 1116     COVID19 Not Detected     Influenza A PCR Not Detected     Influenza B PCR Not Detected    Narrative:      Fact sheet for providers: https://www.fda.gov/media/697248/download    Fact sheet for patients: https://www.fda.gov/media/960965/download    Test performed by PCR.          No results found for: PREGTESTUR, PREGSERUM, HCG, HCGQUANT  Pain Management Panel         Latest Ref Rng & Units 5/10/2023   Pain Management Panel   Amphetamine, Urine Qual Negative Negative     Barbiturates Screen, Urine Negative Negative     Benzodiazepine Screen, Urine Negative Negative     Buprenorphine, Screen, Urine Negative Negative     Cocaine Screen, Urine Negative Negative     Methadone Screen , Urine Negative Negative     Methamphetamine, Ur Negative Negative                  I have personally looked at the labs and they are summarized above.  ----------------------------------------------------------------------------------------------------------------------  Detailed radiology reports for the last 24 hours:    Imaging Results (Last 24 Hours)     Procedure Component Value Units Date/Time    FL Surgery Fluoro [[id number]] Collected: 05/11/23 1603     Updated: 05/11/23 1606    Narrative:      EXAMINATION: FL SURGERY FLUORO-      CLINICAL INDICATION: pleurx catheter; N94-Xjqdtdr effusion, not  elsewhere classified        COMPARISON: None available     Total fluoroscopy time 25.5 seconds     Fluoroscopic or was placed under fluoroscopy and 1 image was obtained     For a full procedural report, please see the report  provided by the  performing physician     This report was finalized on 5/11/2023 4:04 PM by Dr. Jamie Smith MD.       XR Chest AP [[id number]] Collected: 05/11/23 1602     Updated: 05/11/23 1605    Narrative:      XR CHEST AP-     CLINICAL INDICATION: Pluerx cath ; C51-Yheajsk effusion, not elsewhere  classified        COMPARISON: 05/10/2023      TECHNIQUE: Single frontal view of the chest.     FINDINGS:      LUNGS: Patchy areas of consolidation bilaterally.  Left-sided catheter is present. No measurable pleural fluid is seen      HEART AND MEDIASTINUM: Heart and mediastinal contours are unremarkable        SKELETON: Bony and soft tissue structures are unremarkable.             Impression:      No residual effusion identified.     This report was finalized on 5/11/2023 4:03 PM by Dr. Jamie Smith MD.             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 Currently on baseline O2 with no fever, inf clinical sx, or leukocytosis. Crp mildly elevated not dissimilar from prior. BNP wnl and CTPE neg for PE. Defer abx coverage  -Patient would be well served by pleurodesis or VATS but evaluated by CT surgery at [facility] and felt overall pulmonary condition not amenable to either intervention, treatment ultimately narrowed to lung trx eval with pleuryx for palliation of sx in interim  -TTE added to complete cardiac w/u, no prior echo on chart but note effusion was exudative in nature, not likely cardiac origin and no other clinical evidence of HF. Performed with read pending. No physical exam evidence of HF  -s/p left  pleuryz cath placement 5/11, 550cc pleural fluid removed with sx improvement  -repeat cxr in am, if stable can likely d/c home with outpatient home health for pleuryx care and drainage periodically  -f/u Dr. Solis 5/16 at [facility] as previously scheduled      #COOPER  -Cont home cpap     #Hx of atrial tachycardia  -Resumed home metoprolol     #?COPD hx  -Reported on last hospitalization but no smoking hx and no PFTs available for review but likely secondary to environmental contaminant  -Resume home breztri formulary equivalent with prn duonebs     #HTN  -Holding antihypertensives post-op, within normal range currently    #NIDDM  -Hold metformin and glipizide  -FSBG qac/qhs w/SSI    VTE Prophylaxis:   Mechanical Order History:     None      Pharmalogical Order History:      Ordered     Dose Route Frequency Stop    05/10/23 1556  Enoxaparin Sodium (LOVENOX) syringe 40 mg         40 mg SC Once 05/10/23 1720                Code Status and Medical Interventions:   Ordered at: 05/10/23 1555     Code Status (Patient has no pulse and is not breathing):    CPR (Attempt to Resuscitate)     Medical Interventions (Patient has pulse or is breathing):    Full Support         Disposition: Pend cxr in am, discharge <48hrs    I have reviewed any copied/forwarded text or data, verified its accuracy, and updated as necessary above.    Filemon Campbell MD  [facility] Hospitalist  05/11/23  17:45 EDT      Electronically signed by Filemon Campbell MD at 05/11/23 1753          Consult Notes (most recent note)      Brigitte Kolb MD at 05/11/23 1014      Consult Orders    1. Inpatient General Surgery Consult [[id number]] ordered by Brigitte Kolb MD at 05/10/23 1502               Consulting physician:  Dr. Kolb    Referring physician: Hospitalist    Date of consultation: 05/11/23     Chief complaint symptomatic pleural effusion    Subjective     Patient is a 63 y.o. male who was admitted on 5/10/2023 with a symptomatic left  pleural effusion.  The patient has been followed by pulmonary in [place] for some time.  Recently he was admitted to [facility] and was evaluated by Dr. Solis.  He had a chest tube placed at that time.  Apparently he was to have a Pleurx catheter placed but the chest tube was removed and then there was not enough fluid to allow for tube placement again.  Over the course of the next week or so the fluid reaccumulated and the patient became symptomatic.  He presented to the emergency room and was admitted for consideration for Pleurx catheter insertion.  Hypertension      Review of Systems  Review of Systems - General ROS: negative for - weight loss  Psychological ROS: negative for - behavioral disorder  Ophthalmic ROS: negative for - dry eyes  ENT ROS: negative for - vertigo or vocal changes  Hematological and Lymphatic ROS: negative for - swollen lymph nodes, DVT, PE.   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   • Diabetes mellitus    • GERD (gastroesophageal reflux disease)    • Hypertension    • Pneumonia    • Pulmonary fibrosis 4/27/2023     Past Surgical History:   Procedure Laterality Date   • BRONCHOSCOPY N/A 4/24/2023    Procedure: BRONCHOSCOPY WITH ENDOBRONCHIAL ULTRASOUND;  Surgeon: Kota Willard MD;  Location[facility] ENDOSCOPY;  Service: Pulmonary;  Laterality: N/A;   • FOOT SURGERY Left      Family History   Problem Relation Age of Onset   • Diabetes Mother    • Heart failure Father    • Diabetes Sister    • Heart disease Brother    • Diabetes Brother      Social History     Tobacco Use    • Smoking status: Never     Passive exposure: Never   • Smokeless tobacco: Current     Types: Chew   Vaping Use   • Vaping Use: Never used   Substance Use Topics   • Alcohol use: No   • Drug use: No     Medications Prior to Admission   Medication Sig Dispense Refill Last Dose   • Budeson-Glycopyrrol-Formoterol (BREZTRI) 160-9-4.8 MCG/ACT aerosol inhaler Inhale 2 puffs 2 (Two) Times a Day.   5/9/2023   • esomeprazole (nexIUM) 40 MG capsule Take 1 capsule by mouth Every Morning Before Breakfast.   5/9/2023   • hydrOXYzine (ATARAX) 25 MG tablet Take 1 tablet by mouth 3 (Three) Times a Day As Needed for Anxiety. 21 tablet 0 5/10/2023   • ibuprofen (ADVIL,MOTRIN) 800 MG tablet Take 1 tablet by mouth 3 (Three) Times a Day.   5/9/2023   • lisinopril (PRINIVIL,ZESTRIL) 10 MG tablet Take 1 tablet by mouth Every Night.   5/9/2023   • metoprolol succinate XL (TOPROL-XL) 50 MG 24 hr tablet Take 1 tablet by mouth Daily.   5/10/2023   • omeprazole (priLOSEC) 40 MG capsule Take 1 capsule by mouth Daily.   5/9/2023   • roflumilast (DALIRESP) 250 MCG tablet tablet Take 1 tablet by mouth Daily.   5/9/2023   • albuterol (PROVENTIL HFA;VENTOLIN HFA) 108 (90 BASE) MCG/ACT inhaler Inhale 2 puffs Every 4 (Four) Hours As Needed for Wheezing or Shortness of Air. 1 inhaler 0 Unknown   • HYDROcodone-acetaminophen (NORCO) 7.5-325 MG per tablet Take 1 tablet by mouth Every 8 (Eight) Hours As Needed for Moderate Pain.   Unknown   • ipratropium-albuterol (DUO-NEB) 0.5-2.5 mg/3 ml nebulizer Take 3 mL by nebulization Every 4 (Four) Hours As Needed for Wheezing or Shortness of Air.   Unknown     Allergies:  Patient has no known allergies.    Objective     Vital Signs  Temp:  [97.1 °F (36.2 °C)-99.1 °F (37.3 °C)] 98.9 °F (37.2 °C)  Heart Rate:  [71-96] 91  Resp:  [18-24] 18  BP: ()/(54-95) 93/55    Physical Exam:  General:  This is a WD WN male in no acute distress  Vital signs: Stable, afebrile  HEENT exam:  WNL. Sclerae are anicteric.   EOMI  Neck:  Supple, FROM.  No JVD.  Trachea midline  Lungs:  Respiratory effort normal. Auscultation: Clear, without wheezes, rhonchi, rales  Heart:  Regular rate and rhythm, without murmur, gallop, rub.  No pedal edema  Abdomen: non tender  Musculoskeletal:  Muscle strength/tone is normal.    Psych:  Alert, oriented x 3.  Mood and affect are appropriate  Skin:  Warm with good turgor.  Without rash or lesion  Extremities:  Examination of the extremities revealed no cyanosis, clubbing or edema.    Results Review:   Results from last 7 days   Lab Units 05/10/23  1347 05/10/23  1043   HSTROP T ng/L 16* 15*     Results from last 7 days   Lab Units 05/11/23  0755 05/10/23  2212 05/10/23  1043   CRP mg/dL 4.78*  --  1.54*   WBC 10*3/mm3  --  12.44* 7.32   HEMOGLOBIN g/dL  --  13.8 13.8   HEMATOCRIT %  --  45.2 45.2   PLATELETS 10*3/mm3  --  199 223   INR   --   --  1.00         Results from last 7 days   Lab Units 05/10/23  2212 05/10/23  1043   SODIUM mmol/L 140 142   POTASSIUM mmol/L 3.8 3.9   MAGNESIUM mg/dL 1.7 1.9   CHLORIDE mmol/L 103 106   CO2 mmol/L 26.2 26.7   BUN mg/dL 8 7*   CREATININE mg/dL 0.86 0.80   CALCIUM mg/dL 9.1 9.5   GLUCOSE mg/dL 131* 106*   ALBUMIN g/dL  --  3.7   BILIRUBIN mg/dL  --  0.4   ALK PHOS U/L  --  90   AST (SGOT) U/L  --  16   ALT (SGPT) U/L  --  14   Estimated Creatinine Clearance: 126 mL/min (by C-G formula based on SCr of 0.86 mg/dL).  No results found for: AMMONIA      No results found for: BLOODCX  No results found for: URINECX  No results found for: WOUNDCX  No results found for: STOOLCX    Imaging:  Imaging Results (Last 24 Hours)     Procedure Component Value Units Date/Time    CT Angiogram Chest Pulmonary Embolism [[id number]] Collected: 05/10/23 1305     Updated: 05/10/23 1308    Narrative:      CT ANGIOGRAM CHEST PULMONARY EMBOLISM-     CLINICAL INDICATION: Pulmonary embolism (PE) suspected, high prob        COMPARISON: 04/22/2023      PROCEDURE: Thin cut axial images were  acquired through the pulmonary  vessels during the rapid infusion of IV contrast.     Additional 3-D reformatted images obtained via post-processing for  improved diagnostic accuracy and procedural planning.     Radiation dose reduction techniques were utilized per ALARA protocol.  Automated exposure control was initiated through either or [employer] or  DoseRight software packages by  protocol.           FINDINGS: Today's study demonstrates opacification of the central  pulmonary vessels.   There are no filling defects.   There is no truncation.     No evidence of a pulmonary embolus.     Dense areas of consolidation are present bilaterally and are very  similar to the prior study..     Consolidation in the perihilar regions bilaterally     Moderate left and small right pleural effusion       Impression:      1. No evidence of a pulmonary embolus  2. Moderate left and small right pleural effusion  3. Dense areas of consolidation again noted bilaterally but similar to  the prior study.     This report was finalized on 5/10/2023 1:06 PM by Dr. Jamie Smith MD.       XR Chest 1 View [[id number]] Collected: 05/10/23 1100     Updated: 05/10/23 1103    Narrative:      XR CHEST 1 VW-     CLINICAL INDICATION: sob        COMPARISON: 04/27/2023      TECHNIQUE: Single frontal view of the chest.     FINDINGS:      LUNGS: Dense consolidation and nodularity bilaterally. Worse than on the  previous exam      HEART AND MEDIASTINUM: Heart and mediastinal contours are unremarkable        SKELETON: Bony and soft tissue structures are unremarkable.             Impression:      Overall appearance worse than on the prior study     This report was finalized on 5/10/2023 11:01 AM by Dr. Jamie Smith MD.             CT chest reports and images were reviewed.  I agree with the assessment      Impression:  Patient Active Problem List   Diagnosis Code   • Recurrent left pleural effusion J90   • PAT (paroxysmal atrial tachycardia)  I47.1   • Black lung J60   • Chronic obstructive pulmonary disease J44.9   • Primary hypertension I10   • Lung mass R91.8   • Coal workers pneumoconiosis J60   • Pulmonary fibrosis J84.10   • Pleural effusion exudative J90       Plan:  Pleurx catheter insertion left chest      Discussion:  The risks of the surgical procedure were discussed.  Options of alternative treatments including no treatment (if applicable) were discussed.  Patient voiced understanding of the above issues and wishes to proceed    Brigitte Kolb MD  05/11/23  10:14 EDT    Time: Time spent:    Please note that portions of this note were completed with a voice recognition program.    Electronically signed by Brigitte Kolb MD at 05/11/23 1655       Physical Therapy Notes (most recent note)    No notes exist for this encounter.         Occupational Therapy Notes (most recent note)    No notes exist for this encounter.         Speech Language Pathology Notes (most recent note)    No notes exist for this encounter.         ADL Documentation (last day)     Date/Time Transferring Toileting Bathing Dressing Eating Communication Swallowing Equipment Currently Used at Home    05/12/23 0720 0 - independent 0 - independent 0 - independent 0 - independent 0 - independent 0 - understands/communicates without difficulty 0 - swallows foods/liquids without difficulty --    05/11/23 2142 0 - independent 0 - independent 0 - independent 0 - independent 0 - independent 0 - understands/communicates without difficulty 0 - swallows foods/liquids without difficulty --    05/11/23 1356 -- -- -- -- -- -- -- cpap;oxygen    05/11/23 0715 0 - independent 0 - independent 0 - independent 0 - independent 0 - independent 0 - understands/communicates without difficulty 0 - swallows foods/liquids without difficulty --          [address] [address]  Phone:  [phone]  Fax:  [phone] Date: May 12, 2023      Ambulatory Referral to Home Health      Patient:  Rory Rios Jr. MRN:  [id number]   [address]   [facility] [address] :  1960  SSN:    Phone: [phone] Sex:  M      INSURANCE PAYOR PLAN GROUP # SUBSCRIBER ID   Primary:  Secondary:    WORKERS COMPENSATION  MEDICARE [id number]  [id number] [employer]    [id number]  [id number]      Referring Provider Information:  MARIO CAMPBELL Phone: [phone] Fax: [phone]       Referral Information:   # Visits:  999 Referral Type: Home Health [42]   Urgency:  Routine Referral Reason: Specialty Services Required   Start Date: May 12, 2023 End Date:  To be determined by Insurer   Diagnosis: Pleural effusion (J90 [ICD-10-CM] 511.9 [ICD-9-CM])      Refer to Dept:   Refer to Provider:   Refer to Provider Phone:   Refer to Facility:       Face to Face Visit Date: 2023  Follow-up provider for Plan of Care? I treated the patient in an acute care facility and will not continue treatment after discharge.  Follow-up provider: ANDRIA PHELPS [[id number]]  Reason/Clinical Findings: f/u pleuryx catheter  Describe mobility limitations that make leaving home difficult: dyspnea  Nursing/Therapeutic Services Requested: Skilled Nursing  Skilled nursing orders: Other (Pleuryz catheter care and drainage)  Frequency: 1 Week 1     This document serves as a request of services and does not constitute Insurance authorization or approval of services.  To determine eligibility, please contact the members Insurance carrier to verify and review coverage.     If you have medical questions regarding this request for services. Please contact [address] at [phone] during normal business hours.        Authorizing Provider:Mario Campbell MD  Authorizing Provider's NPI: [id number]  Order Entered By: Mario Campbell MD 2023  9:34 AM     Electronically signed by: Mario Campbell MD 2023  9:34 AM       Discharge Summary    No notes of this type exist for this encounter.         Discharge Order  (From admission, onward)     Start     Ordered    05/12/23 0936  Discharge patient  Once        Expected Discharge Date: 05/12/23    Discharge Disposition: Home or Self Care    Physician of Record for Attribution - Please select from Treatment Team: MARIO CHAVEZ [[id number]]    Review needed by CMO to determine Physician of Record: No       Question Answer Comment   Physician of Record for Attribution - Please select from Treatment Team MARIO CHAVEZ    Review needed by CMO to determine Physician of Record No        05/12/23 0940

## 2023-05-12 NOTE — DISCHARGE PLACEMENT REQUEST
"Rory Rios Jr. (63 y.o. Male)     Date of Birth   1960    Social Security Number       Address   PO  [facility] [address]    Home Phone   [phone]    MRN   [id number]       Religious   None    Marital Status                               Admission Date   5/10/23    Admission Type   Emergency    Admitting Provider   Filemon Campbell MD    Attending Provider   Filemon Campbell MD    Department, Room/Bed   [address], 3312/1P       Discharge Date       Discharge Disposition       Discharge Destination                               Attending Provider: Filemon Campbell MD    Allergies: No Known Allergies    Isolation: None   Infection: None   Code Status: CPR    Ht: 190.5 cm (75\")   Wt: 127 kg (279 lb)    Admission Cmt: None   Principal Problem: Pleural effusion exudative [J90]                 Active Insurance as of 5/10/2023     Primary Coverage     Payor Plan Insurance Group Employer/Plan Group    WORKERS COMPENSATION [employer]     Payor Plan Address Payor Plan Phone Number Payor Plan Fax Number Effective Dates    [address] [phone]  6/25/2009 - None Entered    [facility] [address]       Subscriber Name Subscriber Birth Date Member ID       RORY RIOS JR. 1960 [id number]           Secondary Coverage     Payor Plan Insurance Group Employer/Plan Group    MEDICARE MEDICARE A & B      Payor Plan Address Payor Plan Phone Number Payor Plan Fax Number Effective Dates    [address] [phone]  4/1/2013 - None Entered    [facility] [address]       Subscriber Name Subscriber Birth Date Member ID       RORY RIOS JR. 1960 [id number]                 Emergency Contacts      (Rel.) Home Phone Work Phone Mobile Phone    KATIE KENDRICK (Daughter) [phone] -- --        [address] [address]  Dept. Phone:  [phone]  Dept. Fax:  [phone] Date Ordered: May 11, 2023    " "     Patient:  Rory Rios Jr. MRN:  [id number]   PO   [facility] [address] :  1960  SSN:    Phone: [phone] Sex:  M     Weight: 127 kg (279 lb)         Ht Readings from Last 1 Encounters:   23 190.5 cm (75\")         Wheelchair      (Order ID: [id number])    Diagnosis:  Pleural effusion (J90 [ICD-10-CM] 511.9 [ICD-9-CM])   Quantity:  1     Wheelchair accessories:  Manual W/C Seat Widths 20-23 inches  Length of Need (99 Months = Lifetime): 99 Months = Lifetime        Authorizing Provider's Phone: [phone]  Authorizing Provider:Filemon Campbell MD  Authorizing Provider's NPI: [id number]  Order Entered By: Filemon Campbell MD 2023  2:55 PM     Electronically signed by: Filemon Campbell MD 2023  2:55 PM            History & Physical      Filemon Campbell MD at 05/10/23 [address] [place], [place] [address]IST HISTORY AND PHYSICAL    Patient Identification:  Name:  Rory Rios Jr.  Age:  63 y.o.  Sex:  male  :  1960  MRN:  [id number]   Visit Number:  [id number]  Admit Date: 5/10/2023   Room number:  3312/1P  Primary Care Physician:  Alejandro Monique MD    Date of Admission: 5/10/2023     Subjective     Chief complaint:    Chief Complaint   Patient presents with   • Shortness of Breath     History of presenting illness:  63 y.o. male who was admitted on 5/10/2023 for progressive dyspnea with known recurrent left sided pleural effusion.    Rory Rios Jr. 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 In summary, during last hospitalization he was seen by pulmonologist and CT surgery w/thoracentesis on  showing exudative fluid " w/negative cytology and cx. In setting of significant perihilar fibrosis/masses noted, underwent bronchoscopy with EBUS 4/24 w/biopsies negative for malignancy and cx NGTD. After evaluation it was determined that the recurrent effusion was secondary to his coal workers pneumoconiosis with progressive massive fibrosis and was recommended to f/u with  outpatient lung trx clinic which he has not been able to do yet. Tentative plan was for him to see CT surgery in outpatient clinic 5/16 with repeat CXR at that time and possible direct admit for pleuryx cath placement if reaccumulating.    Unfortunately patient began developing recurrent progressive dyspnea without cough, CP, fever, or chills over last 3-4 days that slowly worsened without any alleviating factors. Also notes dyspnea not significantly worsened lying flat. Does improve some with nebulizers and nightly cpap but otherwise no alleviating factors. Patient has home O2 prn but had not been using it more than usual in last week.    On arrival to ED, he underwent CXR similar to prior and in setting of dyspnea and elevated d-dimer, had CTPE that showed recurrence of moderate sized left sided effusion. Otherwise not significantly changed dense consolidations similar to prior imaging. No tachycardia or significant hypoxia noted. Discussed admission for pleuryx here vs discussion with [facility] and in setting of current stability but with progressive sx and recurrent effusion, decision made to admit to floor service with general surgery consult for possible pleuryx catheter placement.     Prior to admission I discussed patient's presentation and management with attending ER physician and verbal handoff received.  ---------------------------------------------------------------------------------------------------------------------   A thorough systems based relevant ROS was asked and was negative except as noted  above.  ---------------------------------------------------------------------------------------------------------------------   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   • Diabetes mellitus    • GERD (gastroesophageal reflux disease)    • Hypertension    • Pneumonia    • Pulmonary fibrosis 4/27/2023     Past Surgical History:   Procedure Laterality Date   • BRONCHOSCOPY N/A 4/24/2023    Procedure: BRONCHOSCOPY WITH ENDOBRONCHIAL ULTRASOUND;  Surgeon: Kota Willard MD;  Location: [facility];  Service: Pulmonary;  Laterality: N/A;   • FOOT SURGERY Left      Family History   Problem Relation Age of Onset   • Diabetes Mother    • Heart failure Father    • Diabetes Sister    • Heart disease Brother    • Diabetes Brother      Social History     Socioeconomic History   • Marital status:    Tobacco Use   • Smoking status: Never     Passive exposure: Never   • Smokeless tobacco: Current     Types: Chew   Vaping Use   • Vaping Use: Never used   Substance and Sexual Activity   • Alcohol use: No   • Drug use: No   • Sexual activity: Defer     ---------------------------------------------------------------------------------------------------------------------   Allergies:  Patient has no known allergies.  ---------------------------------------------------------------------------------------------------------------------   Medications below are reported home medications pulling from within the system; at this time, these medications have not been reconciled unless otherwise specified and are in the verification process for further verifcation as current home medications.      Prior to Admission Medications     Prescriptions Last Dose Informant Patient Reported? Taking?    albuterol (PROVENTIL HFA;VENTOLIN HFA) 108 (90 BASE) MCG/ACT inhaler   No No    Inhale 2 puffs Every 4 (Four) Hours As Needed for Wheezing or  Shortness of Air.    aspirin 81 MG EC tablet   Yes No    Take 1 tablet by mouth Daily. OTC    Budeson-Glycopyrrol-Formoterol (BREZTRI) 160-9-4.8 MCG/ACT aerosol inhaler   Yes No    Inhale 2 puffs 2 (Two) Times a Day.    busPIRone (BUSPAR) 5 MG tablet   Yes No    Take 1 tablet by mouth every night at bedtime.    esomeprazole (nexIUM) 20 MG capsule   No No    Take 1 capsule by mouth Every Morning Before Breakfast.    glipizide (GLUCOTROL XL) 5 MG ER tablet   Yes No    Take 1 tablet by mouth Every Morning.    HYDROcodone-acetaminophen (NORCO) 7.5-325 MG per tablet   Yes No    Take 1 tablet by mouth 3 (Three) Times a Day.    hydrOXYzine (ATARAX) 25 MG tablet   No No    Take 1 tablet by mouth 3 (Three) Times a Day As Needed for Anxiety.    ibuprofen (ADVIL,MOTRIN) 800 MG tablet   Yes No    Take 1 tablet by mouth 3 (Three) Times a Day.    ipratropium-albuterol (DUO-NEB) 0.5-2.5 mg/3 ml nebulizer   Yes No    Take 3 mL by nebulization Every 4 (Four) Hours As Needed for Wheezing or Shortness of Air.    lisinopril (PRINIVIL,ZESTRIL) 10 MG tablet   Yes No    Take 1 tablet by mouth Every Night.    metFORMIN (GLUCOPHAGE) 500 MG tablet   Yes No    Take 1 tablet by mouth 2 (Two) Times a Day With Meals.    metoprolol succinate XL (TOPROL-XL) 25 MG 24 hr tablet   No No    Take 1 tablet by mouth Daily for 30 days.    Patient taking differently:  Take 2 tablets by mouth Every Morning.    omeprazole (priLOSEC) 40 MG capsule   Yes No    Take 1 capsule by mouth Daily.    roflumilast (DALIRESP) 250 MCG tablet tablet   Yes No    Take 1 tablet by mouth Daily.        Objective     Vital Signs:  Temp:  [97.1 °F (36.2 °C)-98 °F (36.7 °C)] 98 °F (36.7 °C)  Heart Rate:  [71-89] 79  Resp:  [18-24] 18  BP: (117-147)/(70-95) 147/80    Mean Arterial Pressure (Non-Invasive) for the past 24 hrs (Last 3 readings):   Noninvasive MAP (mmHg)   05/10/23 1611 99   05/10/23 1450 96   05/10/23 1435 98     SpO2:  [94 %-98 %] 97 %  on  Flow (L/min):  [3] 3;    Device (Oxygen Therapy): nasal cannula  Body mass index is 33.57 kg/m².    Wt Readings from Last 3 Encounters:   05/10/23 128 kg (283 lb 1.6 oz)   04/20/23 131 kg (288 lb)   02/09/23 134 kg (295 lb)      ----------------------------------------------------------------------------------------------------------------------  Physical Exam  Vitals and nursing note reviewed.   Constitutional:       General: He is not in acute distress.  HENT:      Mouth/Throat:      Mouth: Mucous membranes are moist.      Pharynx: Oropharynx is clear.   Eyes:      General: No scleral icterus.     Extraocular Movements: Extraocular movements intact.   Cardiovascular:      Rate and Rhythm: Normal rate and regular rhythm.      Pulses: Normal pulses.   Pulmonary:      Effort: Pulmonary effort is normal. No respiratory distress.      Breath sounds: No wheezing.      Comments: Diminished breath sounds at left lung base but breath sounds audible deep into middle anterior and posterior lung fields  Abdominal:      Palpations: Abdomen is soft.      Tenderness: There is no abdominal tenderness.   Musculoskeletal:      Right lower leg: No edema.      Left lower leg: No edema.   Skin:     General: Skin is warm and dry.      Capillary Refill: Capillary refill takes less than 2 seconds.   Neurological:      General: No focal deficit present.      Mental Status: He is alert. Mental status is at baseline.   Psychiatric:         Mood and Affect: Mood normal.         Behavior: Behavior normal.       --------------------------------------------------------------------------------------------------------------------  LABS:    CBC and coagulation:  Results from last 7 days   Lab Units 05/10/23  1043   SED RATE mm/hr 39*   CRP mg/dL 1.54*   WBC 10*3/mm3 7.32   HEMOGLOBIN g/dL 13.8   HEMATOCRIT % 45.2   MCV fL 82.9   MCHC g/dL 30.5*   PLATELETS 10*3/mm3 223   INR  1.00   D DIMER QUANT MCGFEU/mL 1.85*     Acid/base balance:      Renal and  electrolytes:  Results from last 7 days   Lab Units 05/10/23  1043   SODIUM mmol/L 142   POTASSIUM mmol/L 3.9   MAGNESIUM mg/dL 1.9   CHLORIDE mmol/L 106   CO2 mmol/L 26.7   BUN mg/dL 7*   CREATININE mg/dL 0.80   CALCIUM mg/dL 9.5   GLUCOSE mg/dL 106*     Estimated Creatinine Clearance: 136.7 mL/min (by C-G formula based on SCr of 0.8 mg/dL).    Liver and pancreatic function:  Results from last 7 days   Lab Units 05/10/23  1043   ALBUMIN g/dL 3.7   BILIRUBIN mg/dL 0.4   ALK PHOS U/L 90   AST (SGOT) U/L 16   ALT (SGPT) U/L 14     Endocrine function:  Lab Results   Component Value Date    HGBA1C 6.30 (H) 04/21/2023     Point of care bedside glucose levels:      Lab Results   Component Value Date    TSH 2.210 05/10/2023    FREET4 1.33 05/10/2023     Cardiac:  Results from last 7 days   Lab Units 05/10/23  1347 05/10/23  1043   HSTROP T ng/L 16* 15*   PROBNP pg/mL  --  189.9       Cultures:  Lab Results   Component Value Date    COLORU Yellow 05/10/2023    CLARITYU Clear 05/10/2023    PHUR 7.5 05/10/2023    GLUCOSEU Negative 05/10/2023    KETONESU Negative 05/10/2023    BLOODU Moderate (2+) (A) 05/10/2023    NITRITEU Negative 05/10/2023    LEUKOCYTESUR Negative 05/10/2023    BILIRUBINUR Negative 05/10/2023    UROBILINOGEN 0.2 E.U./dL 05/10/2023    RBCUA 13-20 (A) 05/10/2023    WBCUA 0-2 05/10/2023    BACTERIA Trace (A) 05/10/2023     Microbiology Results (last 10 days)     Procedure Component Value - Date/Time    COVID-19 and FLU A/B PCR - Swab, Nasopharynx [[id number]]  (Normal) Collected: 05/10/23 1044    Lab Status: Final result Specimen: Swab from Nasopharynx Updated: 05/10/23 1116     COVID19 Not Detected     Influenza A PCR Not Detected     Influenza B PCR Not Detected    Narrative:      Fact sheet for providers: https://www.fda.gov/media/796249/download    Fact sheet for patients: https://www.fda.gov/media/756216/download    Test performed by PCR.          No results found for: PREGTESTUR, PREGSERUM, HCG,  HCGQUANT  Pain Management Panel         Latest Ref Rng & Units 5/10/2023   Pain Management Panel   Amphetamine, Urine Qual Negative Negative     Barbiturates Screen, Urine Negative Negative     Benzodiazepine Screen, Urine Negative Negative     Buprenorphine, Screen, Urine Negative Negative     Cocaine Screen, Urine Negative Negative     Methadone Screen , Urine Negative Negative     Methamphetamine, Ur Negative Negative                  I have personally looked at the labs and they are summarized above.  ----------------------------------------------------------------------------------------------------------------------  Detailed radiology reports for the last 24 hours:    Imaging Results (Last 24 Hours)     Procedure Component Value Units Date/Time    CT Angiogram Chest Pulmonary Embolism [[id number]] Collected: 05/10/23 1305     Updated: 05/10/23 1308    Narrative:      CT ANGIOGRAM CHEST PULMONARY EMBOLISM-     CLINICAL INDICATION: Pulmonary embolism (PE) suspected, high prob        COMPARISON: 04/22/2023      PROCEDURE: Thin cut axial images were acquired through the pulmonary  vessels during the rapid infusion of IV contrast.     Additional 3-D reformatted images obtained via post-processing for  improved diagnostic accuracy and procedural planning.     Radiation dose reduction techniques were utilized per ALARA protocol.  Automated exposure control was initiated through either or Power OLEDs or  DoseRight software packages by  protocol.           FINDINGS: Today's study demonstrates opacification of the central  pulmonary vessels.   There are no filling defects.   There is no truncation.     No evidence of a pulmonary embolus.     Dense areas of consolidation are present bilaterally and are very  similar to the prior study..     Consolidation in the perihilar regions bilaterally     Moderate left and small right pleural effusion       Impression:      1. No evidence of a pulmonary embolus  2.  Moderate left and small right pleural effusion  3. Dense areas of consolidation again noted bilaterally but similar to  the prior study.     This report was finalized on 5/10/2023 1:06 PM by Dr. Jamie Smith MD.       XR Chest 1 View [[id number]] Collected: 05/10/23 1100     Updated: 05/10/23 1103    Narrative:      XR CHEST 1 VW-     CLINICAL INDICATION: sob        COMPARISON: 04/27/2023      TECHNIQUE: Single frontal view of the chest.     FINDINGS:      LUNGS: Dense consolidation and nodularity bilaterally. Worse than on the  previous exam      HEART AND MEDIASTINUM: Heart and mediastinal contours are unremarkable        SKELETON: Bony and soft tissue structures are unremarkable.             Impression:      Overall appearance worse than on the prior study     This report was finalized on 5/10/2023 11:01 AM by Dr. Jamie Smith MD.           Final impressions for the last 30 days of radiology reports:    CT Chest With & Without Contrast Diagnostic    Result Date: 4/21/2023  Impression: 1. Extensive masslike perihilar disease, consistent with history of longstanding inhalational disease and progressive massive fibrosis. 2. Asymmetrically rounded and masslike area associated with left perihilar PMF in the left lower lobe, 4.5 cm in diameter, which may also reflect advanced PMF, but at least potentially could represent superimposed neoplasm, as discussed above. 3. Large, free-flowing left pleural effusion of uncertain significance. Electronically Signed: Mitch Sears  4/21/2023 3:48 PM EDT  Workstation ID: [id number]    XR Chest 1 View    Result Date: 5/10/2023  Overall appearance worse than on the prior study  This report was finalized on 5/10/2023 11:01 AM by Dr. Jamie Smith MD.      XR Chest 1 View    Result Date: 4/27/2023  Impression: Stable chest. No pneumothorax. Multifocal infiltrates. Electronically Signed: Bud Britton  4/27/2023 8:21 AM EDT  Workstation ID: [id number]    XR Chest 1 View    Result Date:  4/26/2023  Impression: Removal of left-sided chest tube. No pneumothorax. Otherwise, stable exam. Electronically Signed: Shantelle Grimm  4/26/2023 3:04 PM EDT  Workstation ID: [id number]    XR Chest 1 View    Result Date: 4/26/2023  Impression: Left basilar pleural drain. No significant effusion or evidence of pneumothorax. Perihilar airspace masses present bilaterally, similar as compared to the previous study Electronically Signed: Shantelle Grimm  4/26/2023 7:09 AM EDT  Workstation ID: [id number]    XR Chest 1 View    Result Date: 4/25/2023  Impression: 1. Stable right and left perihilar masses as previously described. 2. Improving bibasilar pulmonary interstitial disease, now mild in extent. Electronically Signed: Mitch Sears  4/25/2023 8:57 AM EDT  Workstation ID: [id number]    XR Chest 1 View    Result Date: 4/24/2023  Impression: Left pleural drain remains in place. Stable changes of pulmonary fibrosis. No new chest disease is seen. Electronically Signed: Mitch Sears  4/24/2023 8:36 AM EDT  Workstation ID: [id number]    XR Chest 1 View    Result Date: 4/23/2023  Impression: Decreased size of pleural effusion on the left. It is now small. Small caliber chest tube is present. No pneumothorax. No change in masslike bilateral parenchymal airspace opacities. Electronically Signed: Patti Williamson  4/23/2023 8:30 AM EDT  Workstation ID: [id number]    XR Chest 1 View    Result Date: 4/20/2023  Impression: 1. No change in the masslike nodular appearing infiltrate in the right perihilar region. 2. Improvement in the left perihilar infiltrate compared with the last study with improvement in aeration at the left base. 3. Moderate pleural effusion, unchanged. Electronically Signed: Miguelito Deras  4/20/2023 2:22 PM EDT  Workstation ID: [id number]    CT Angiogram Chest Pulmonary Embolism    Result Date: 5/10/2023  1. No evidence of a pulmonary embolus 2. Moderate left and small right pleural effusion 3. Dense areas of consolidation again noted  bilaterally but similar to the prior study.  This report was finalized on 5/10/2023 1:06 PM by Dr. Jamie Smith MD.      X-ray chest PA and lateral    Result Date: 4/17/2023  No significant interval change. Images reviewed, interpreted, and dictated by Dr. Gauri Gtz. Transcribed by Tahmina Isaacs PA-C.    X-ray chest PA and lateral    Result Date: 4/10/2023  There has been no significant interval change in  the opacities, likely related to pneumoconiosis. Small left pleural effusion. Images reviewed, interpreted, and dictated by Dr. Gauri Gtz. Transcribed by JOHNATHON Huerta (R).      I have personally looked at the radiology images, my personal interpretation is as follows:    CXR with left sided effusion and multifocal dense consolidations    CT with moderate sized left effusion and noted b/l perihilar dense infiltrates/masses    Assessment & Plan       #hx of coal workers pneumoconiosis with dense b/l perihilar infiltrates/pulmonary fibrosis  #Recurrent left sided pleural effusion, previously exudative thought secondary to above  #Chronic hypoxic respiratory failure (2L)  -Extensive w/u including EBUS of perihilar mass performed at [facility] during last hospitalization with effusion found to be exudative in nature with negative cytology x2 samples and negative fungal and bacterial w/u. Currently on baseline O2 with no fever, inf clinical sx, or leukocytosis. Crp mildly elevated not dissimilar from prior. BNP wnl and CTPE neg for PE. Defer abx coverage  -Patient would be well served by pleurodesis or VATS but evaluated by CT surgery at [facility] and felt overall pulmonary condition not amenable to either intervention, treatment ultimately narrowed to lung trx eval with pleuryx for palliation of sx in interim  -General surgery consulted for possible pleuryx catheter placement  -TTE added to complete cardiac w/u, no prior echo on chart but note effusion was exudative in nature, not likely cardiac origin and no other  clinical evidence of HF  -NPO at midnight until evaluated by surgical consultant  -s/p IV lasix in ED, monitor for sx improvement but unlikely to significantly improve given exudative noncardiac nature    #COOPER  -Resume home cpap    #Hx of atrial tachycardia  -Resume home metoprolol pend med rec    #?COPD hx  -Reported on last hospitalization but no smoking hx and no PFTs available for review but likely secondary to environmental contaminant  -Resume home breztri formulary equivalent with prn duonebs    #HTN  -Resume antihypertensives post op pend med rec    #NIDDM  -Hold metformin and glipizide  -FSBG qac/qhs w/SSI        VTE Prophylaxis:   Mechanical Order History:     None      Pharmalogical Order History:      Ordered     Dose Route Frequency Stop    05/10/23 0246  Enoxaparin Sodium (LOVENOX) syringe 40 mg         40 mg SC Once --              The patient is high risk due to the following diagnoses/reasons:  Recurrent effusion requiring surgical drainage with chronic O2 usage    Admission Status:  I certify that this patient requires inpatient hospitalization for greater than 2 midnights in INPATIENT status.  I anticipate there to be the need for care which can only be reasonably provided in a hospital setting such as possible need for aggressive/expedited ancillary services and/or consultation services, IV medications, close physician monitoring, and/or procedures. In such, I feel patient’s risk for adverse outcomes and need for care warrant INPATIENT evaluation and predict the patient’s care encounter to likely last beyond 2 midnights.    Code Status and Medical Interventions:   Ordered at: 05/10/23 6844     Code Status (Patient has no pulse and is not breathing):    CPR (Attempt to Resuscitate)     Medical Interventions (Patient has pulse or is breathing):    Full Support        Disposition: Admit to tele pend surgical eval    Filemon Campbell MD  [facility] Hospitalist  05/10/23  16:56  EDT        Electronically signed by Filemon Campbell MD at 05/10/23 1853          Physician Progress Notes (last 24 hours)      Filemon Campbell MD at 23 1740              [facility]     Patient Identification:  Name:  Rory Rios Jr.  Age:  63 y.o.  Sex:  male  :  1960  MRN:  [id number]  Visit Number:  [id number]  ROOM: [address] [place] [address]     Primary Care Provider:  Alejandro Monique MD     Date of Admission: 5/10/2023    Length of stay in inpatient status:  1    Subjective     Chief Compliant:    Chief Complaint   Patient presents with   • Shortness of Breath       Patient seen following return from OR this afternoon. Dyspnea resolved after removal of 550cc pleural fluid with placement of left sided pleuryx catheter. No CP and minimal soreness and plauritic pain reported at pleuryx site.         Objective     Current Hospital Meds:  budesonide-formoterol, 2 puff, Inhalation, BID - RT  HYDROcodone-acetaminophen, 1 tablet, Oral, TID  insulin lispro, 2-9 Units, Subcutaneous, TID With Meals  ipratropium, 0.5 mg, Nebulization, 4x Daily - RT  metoprolol succinate XL, 50 mg, Oral, Daily  pantoprazole, 40 mg, Oral, QAM AC  roflumilast, 250 mcg, Oral, Daily  senna-docusate sodium, 2 tablet, Oral, Nightly  sodium chloride, 10 mL, Intravenous, Q12H       Current Antimicrobial Therapy:  Anti-Infectives (From admission, onward)    None        Current Diuretic Therapy:  Diuretics (From admission, onward)    Ordered     Dose/Rate Route Frequency Start Stop    05/10/23 1116  furosemide (LASIX) injection 80 mg        Ordering Provider: Ap Reyes MD    80 mg Intravenous Once 05/10/23 1118 05/10/23 1202        ----------------------------------------------------------------------------------------------------------------------  Vital Signs:  Temp:  [97.3 °F (36.3 °C)-99.1 °F (37.3 °C)] 98 °F (36.7 °C)  Heart Rate:  [] 101  Resp:  [16-18] 18  BP: ()/(54-89)  99/62  SpO2:  [93 %-97 %] 96 %  on  Flow (L/min):  [2-4] 2;   Device (Oxygen Therapy): nasal cannula  Body mass index is 34.87 kg/m².    Wt Readings from Last 3 Encounters:   05/11/23 127 kg (279 lb)   04/20/23 131 kg (288 lb)   02/09/23 134 kg (295 lb)     Intake & Output (last 3 days)       05/08 0701 05/09 0700 05/09 0701  05/10 0700 05/10 0701 05/11 0700 05/11 0701 05/12 0700    P.O.   240     I.V. (mL/kg)    700 (5.5)    Total Intake(mL/kg)   240 (1.9) 700 (5.5)    Net   +240 +700            Urine Unmeasured Occurrence   6 x         Diet: Regular/House Diet; Texture: Regular Texture (IDDSI 7); Fluid Consistency: Thin (IDDSI 0)  ----------------------------------------------------------------------------------------------------------------------  Physical Exam  Vitals and nursing note reviewed.   Constitutional:       General: He is not in acute distress.  HENT:      Mouth/Throat:      Mouth: Mucous membranes are moist.      Pharynx: Oropharynx is clear.   Eyes:      General: No scleral icterus.     Extraocular Movements: Extraocular movements intact.   Cardiovascular:      Rate and Rhythm: Normal rate and regular rhythm.      Pulses: Normal pulses.   Pulmonary:      Effort: Pulmonary effort is normal. No respiratory distress.      Breath sounds: No wheezing.      Comments: Diminished breath sounds at left lung base but breath sounds audible deep into middle anterior and posterior lung fields. Pleuryx catheter in place with clear dlean dressing overlying insertion site  Abdominal:      Palpations: Abdomen is soft.      Tenderness: There is no abdominal tenderness.   Musculoskeletal:      Right lower leg: No edema.      Left lower leg: No edema.   Skin:     General: Skin is warm and dry.      Capillary Refill: Capillary refill takes less than 2 seconds.   Neurological:      General: No focal deficit present.      Mental Status: He is alert. Mental status is at baseline.   Psychiatric:         Mood and Affect:  Mood normal.         Behavior: Behavior normal.   ----------------------------------------------------------------------------------------------------------------------    ----------------------------------------------------------------------------------------------------------------------  LABS:    CBC and coagulation:  Results from last 7 days   Lab Units 05/11/23  0755 05/10/23  2212 05/10/23  1043   PROCALCITONIN ng/mL 0.09  --   --    SED RATE mm/hr  --   --  39*   CRP mg/dL 4.78*  --  1.54*   WBC 10*3/mm3  --  12.44* 7.32   HEMOGLOBIN g/dL  --  13.8 13.8   HEMATOCRIT %  --  45.2 45.2   MCV fL  --  82.6 82.9   MCHC g/dL  --  30.5* 30.5*   PLATELETS 10*3/mm3  --  199 223   INR   --   --  1.00   D DIMER QUANT MCGFEU/mL  --   --  1.85*     Acid/base balance:      Renal and electrolytes:  Results from last 7 days   Lab Units 05/10/23  2212 05/10/23  1043   SODIUM mmol/L 140 142   POTASSIUM mmol/L 3.8 3.9   MAGNESIUM mg/dL 1.7 1.9   CHLORIDE mmol/L 103 106   CO2 mmol/L 26.2 26.7   BUN mg/dL 8 7*   CREATININE mg/dL 0.86 0.80   CALCIUM mg/dL 9.1 9.5   GLUCOSE mg/dL 131* 106*     Estimated Creatinine Clearance: 123.5 mL/min (by C-G formula based on SCr of 0.86 mg/dL).    Liver and pancreatic function:  Results from last 7 days   Lab Units 05/10/23  1043   ALBUMIN g/dL 3.7   BILIRUBIN mg/dL 0.4   ALK PHOS U/L 90   AST (SGOT) U/L 16   ALT (SGPT) U/L 14     Endocrine function:  Lab Results   Component Value Date    HGBA1C 6.30 (H) 04/21/2023     Point of care bedside glucose levels:  Results from last 7 days   Lab Units 05/11/23  1642 05/11/23  1108 05/11/23  0644 05/10/23  1709   GLUCOSE mg/dL 121 98 105 123     Glucose levels from the CMP:  Results from last 7 days   Lab Units 05/10/23  2212 05/10/23  1043   GLUCOSE mg/dL 131* 106*     Lab Results   Component Value Date    TSH 2.210 05/10/2023    FREET4 1.33 05/10/2023     Cardiac:  Results from last 7 days   Lab Units 05/10/23  1347 05/10/23  1043   HSTROP T ng/L 16*  15*   PROBNP pg/mL  --  189.9       Cultures:  Lab Results   Component Value Date    COLORU Yellow 05/10/2023    CLARITYU Clear 05/10/2023    PHUR 7.5 05/10/2023    GLUCOSEU Negative 05/10/2023    KETONESU Negative 05/10/2023    BLOODU Moderate (2+) (A) 05/10/2023    NITRITEU Negative 05/10/2023    LEUKOCYTESUR Negative 05/10/2023    BILIRUBINUR Negative 05/10/2023    UROBILINOGEN 0.2 E.U./dL 05/10/2023    RBCUA 13-20 (A) 05/10/2023    WBCUA 0-2 05/10/2023    BACTERIA Trace (A) 05/10/2023     Microbiology Results (last 10 days)     Procedure Component Value - Date/Time    COVID-19 and FLU A/B PCR - Swab, Nasopharynx [[id number]]  (Normal) Collected: 05/10/23 1044    Lab Status: Final result Specimen: Swab from Nasopharynx Updated: 05/10/23 1116     COVID19 Not Detected     Influenza A PCR Not Detected     Influenza B PCR Not Detected    Narrative:      Fact sheet for providers: https://www.fda.gov/media/267682/download    Fact sheet for patients: https://www.fda.gov/media/049842/download    Test performed by PCR.          No results found for: PREGTESTUR, PREGSERUM, HCG, HCGQUANT  Pain Management Panel         Latest Ref Rng & Units 5/10/2023   Pain Management Panel   Amphetamine, Urine Qual Negative Negative     Barbiturates Screen, Urine Negative Negative     Benzodiazepine Screen, Urine Negative Negative     Buprenorphine, Screen, Urine Negative Negative     Cocaine Screen, Urine Negative Negative     Methadone Screen , Urine Negative Negative     Methamphetamine, Ur Negative Negative                  I have personally looked at the labs and they are summarized above.  ----------------------------------------------------------------------------------------------------------------------  Detailed radiology reports for the last 24 hours:    Imaging Results (Last 24 Hours)     Procedure Component Value Units Date/Time    FL Surgery Fluoro [[id number]] Collected: 05/11/23 1603     Updated: 05/11/23 1606    Narrative:       EXAMINATION: FL SURGERY FLUORO-      CLINICAL INDICATION: pleurx catheter; V48-Lcduker effusion, not  elsewhere classified        COMPARISON: None available     Total fluoroscopy time 25.5 seconds     Fluoroscopic or was placed under fluoroscopy and 1 image was obtained     For a full procedural report, please see the report provided by the  performing physician     This report was finalized on 5/11/2023 4:04 PM by Dr. Jamie Smith MD.       XR Chest AP [[id number]] Collected: 05/11/23 1602     Updated: 05/11/23 1605    Narrative:      XR CHEST AP-     CLINICAL INDICATION: Pluerx cath ; D34-Bobldfo effusion, not elsewhere  classified        COMPARISON: 05/10/2023      TECHNIQUE: Single frontal view of the chest.     FINDINGS:      LUNGS: Patchy areas of consolidation bilaterally.  Left-sided catheter is present. No measurable pleural fluid is seen      HEART AND MEDIASTINUM: Heart and mediastinal contours are unremarkable        SKELETON: Bony and soft tissue structures are unremarkable.             Impression:      No residual effusion identified.     This report was finalized on 5/11/2023 4:03 PM by Dr. Jamie Smith MD.             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 Currently on baseline O2 with no fever, inf clinical sx, or leukocytosis. Crp mildly elevated not dissimilar from prior. BNP wnl and CTPE neg for PE. Defer abx coverage  -Patient would be well served by pleurodesis or VATS but evaluated by CT surgery at [facility] and felt overall pulmonary condition not amenable to either intervention,  treatment ultimately narrowed to lung trx eval with pleuryx for palliation of sx in interim  -TTE added to complete cardiac w/u, no prior echo on chart but note effusion was exudative in nature, not likely cardiac origin and no other clinical evidence of HF. Performed with read pending. No physical exam evidence of HF  -s/p left pleuryz cath placement 5/11, 550cc pleural fluid removed with sx improvement  -repeat cxr in am, if stable can likely d/c home with outpatient home health for pleuryx care and drainage periodically  -f/u Dr. Solis 5/16 at [facility] as previously scheduled      #COOPER  -Cont home cpap     #Hx of atrial tachycardia  -Resumed home metoprolol     #?COPD hx  -Reported on last hospitalization but no smoking hx and no PFTs available for review but likely secondary to environmental contaminant  -Resume home breztri formulary equivalent with prn duonebs     #HTN  -Holding antihypertensives post-op, within normal range currently    #NIDDM  -Hold metformin and glipizide  -FSBG qac/qhs w/SSI    VTE Prophylaxis:   Mechanical Order History:     None      Pharmalogical Order History:      Ordered     Dose Route Frequency Stop    05/10/23 1556  Enoxaparin Sodium (LOVENOX) syringe 40 mg         40 mg SC Once 05/10/23 1720                Code Status and Medical Interventions:   Ordered at: 05/10/23 1555     Code Status (Patient has no pulse and is not breathing):    CPR (Attempt to Resuscitate)     Medical Interventions (Patient has pulse or is breathing):    Full Support         Disposition: Pend cxr in am, discharge <48hrs    I have reviewed any copied/forwarded text or data, verified its accuracy, and updated as necessary above.    Filemon Campbell MD  [facility]  05/11/23  17:45 EDT      Electronically signed by Filemon Campbell MD at 05/11/23 2254

## 2023-05-12 NOTE — THERAPY EVALUATION
Acute Care - Physical Therapy Initial Evaluation   [facility]     Patient Name: Rory Rios Jr.  : 1960  MRN: [id number]  Today's Date: 2023      Visit Dx:     ICD-10-CM ICD-9-CM   1. Pleural effusion  J90 511.9     Patient Active Problem List   Diagnosis   • Recurrent left pleural effusion   • PAT (paroxysmal atrial tachycardia)   • Black lung   • Chronic obstructive pulmonary disease   • Primary hypertension   • Lung mass   • Coal workers pneumoconiosis   • Pulmonary fibrosis   • Pleural effusion exudative     Past Medical History:   Diagnosis Date   • Arthritis    • Black lung    • Black lung disease    • Coal workers pneumoconiosis 2023   • Diabetes mellitus    • GERD (gastroesophageal reflux disease)    • Hypertension    • Pneumonia    • Pulmonary fibrosis 2023   • Sleep apnea      Past Surgical History:   Procedure Laterality Date   • BRONCHOSCOPY N/A 2023    Procedure: BRONCHOSCOPY WITH ENDOBRONCHIAL ULTRASOUND;  Surgeon: Kota Willard MD;  Location:  [facility];  Service: Pulmonary;  Laterality: N/A;   • FOOT SURGERY Left    • PLEURAL CATHETER INSERTION Left 2023    Procedure: PLEURX CATHETER INSERTION;  Surgeon: Brigitte Kolb MD;  Location: [facility] OR;  Service: General;  Laterality: Left;     PT Assessment (last 12 hours)     PT Evaluation and Treatment     Row Name 23 1400          Physical Therapy Time and Intention    Subjective Information no complaints  -KM     Document Type evaluation  -KM     Mode of Treatment individual therapy;physical therapy  -KM     Patient Effort good  -KM     Symptoms Noted During/After Treatment none  -KM     Row Name 23 1400          General Information    Patient Profile Reviewed yes  -KM     Patient Observations alert;cooperative;agree to therapy  -KM     Prior Level of Function independent:;all household mobility;ADL's  -KM     Existing Precautions/Restrictions oxygen therapy device and L/min  -KM     Row Name  05/12/23 1400          Living Environment    Current Living Arrangements home  -KM     People in Home alone  -KM     Primary Care Provided by self  -KM     Row Name 05/12/23 1400          Cognition    Affect/Mental Status (Cognition) WFL  -KM     Orientation Status (Cognition) oriented x 4  -KM     Follows Commands (Cognition) WFL  -KM     Row Name 05/12/23 1400          Range of Motion (ROM)    Range of Motion bilateral lower extremities;ROM is [facility]  -KM     Row Name 05/12/23 1400          Strength (Manual Muscle Testing)    Strength (Manual Muscle Testing) bilateral lower extremities;strength is [facility]  -     Row Name 05/12/23 1400          Bed Mobility    Bed Mobility bed mobility (all) activities  -KM     All Activities, [place] (Bed Mobility) modified independence  -KM     Assistive Device (Bed Mobility) bed rails  -KM     Row Name 05/12/23 1400          Transfers    Transfers sit-stand transfer;stand-sit transfer  -KM     Row Name 05/12/23 1400          Sit-Stand Transfer    Sit-Stand [place] (Transfers) supervision  -KM     Row Name 05/12/23 1400          Stand-Sit Transfer    Stand-Sit [place] (Transfers) supervision  -KM     Row Name 05/12/23 1400          Gait/Stairs (Locomotion)    Gait/Stairs Locomotion gait/ambulation independence;distance ambulated  -KM     [place] Level (Gait) supervision  -KM     Distance in Feet (Gait) 120' in room  -KM     Pattern (Gait) swing-through  -KM     Row Name 05/12/23 1400          Balance    Balance Assessment sitting static balance;standing dynamic balance  -KM     Static Sitting Balance independent  -KM     Position, Sitting Balance sitting edge of bed  -KM     Dynamic Standing Balance supervision  -KM     Row Name             Wound 05/11/23 1511 Left upper abdomen Incision    Wound - Properties Group Placement Date: 05/11/23  -ES Placement Time: 1511  -ES Side: Left  -ES Orientation: upper  -ES Location: abdomen  -ES Primary Wound Type: Incision  -ES     Retired Wound - Properties Group Placement Date: 05/11/23  -ES Placement Time: 1511  -ES Side: Left  -ES Orientation: upper  -ES Location: abdomen  -ES Primary Wound Type: Incision  -ES    Retired Wound - Properties Group Date first assessed: 05/11/23  -ES Time first assessed: 1511  -ES Side: Left  -ES Location: abdomen  -ES Primary Wound Type: Incision  -ES    Row Name 05/12/23 1400          Plan of Care Review    Plan of Care Reviewed With patient  -KM     Outcome Evaluation PT. evaluation completed during PT session. He was able to perform functional mobility skills independently. He ambulated moderate distance in room w/ no AD. He tolerated session well w/ no complaints. Pt. does not qualify for skilled PT services d/t high level of function.  -KM     Row Name 05/12/23 1400          Therapy Assessment/Plan (PT)    Functional Level at Time of Evaluation (PT) independent  -KM     Criteria for Skilled Interventions Met (PT) no;no problems identified which require skilled intervention;does not meet criteria for skilled intervention  -KM     Therapy Frequency (PT) evaluation only  -     Row Name 05/12/23 1400          Therapy Plan Review/Discharge Plan (PT)    Therapy Plan Review (PT) evaluation/treatment results reviewed;patient  -KM           User Key  (r) = Recorded By, (t) = Taken By, (c) = Cosigned By    Initials Name Provider Type    Chetna Wallace, RN Registered Nurse    Valentín Leung, CINDY Physical Therapist                Physical Therapy Education     Title: PT OT SLP Therapies (Resolved)     Topic: Physical Therapy (Resolved)     Point: Mobility training (Resolved)     Learner Progress:  Not documented in this visit.          Point: Home exercise program (Resolved)     Learner Progress:  Not documented in this visit.          Point: Body mechanics (Resolved)     Learner Progress:  Not documented in this visit.          Point: Precautions (Resolved)     Learner Progress:  Not documented  in this visit.                          PT Recommendation and Plan  Therapy Frequency (PT): evaluation only  Plan of Care Reviewed With: patient  Outcome Evaluation: PT. evaluation completed during PT session. He was able to perform functional mobility skills independently. He ambulated moderate distance in room w/ no AD. He tolerated session well w/ no complaints. Pt. does not qualify for skilled PT services d/t high level of function.       Time Calculation:    PT Charges     Row Name 05/12/23 1420             Time Calculation    PT Received On 05/12/23  -SHERI            User Key  (r) = Recorded By, (t) = Taken By, (c) = Cosigned By    Initials Name Provider Type    Valentín Leung, PT Physical Therapist              Therapy Charges for Today     Code Description Service Date Service Provider Modifiers Qty    67154223852 HC PT EVAL MOD COMPLEXITY 4 5/12/2023 Valentín Biswas, PT GP 1          PT G-Codes  AM-PAC 6 Clicks Score (PT): 24    Valentín Biswas PT  5/12/2023

## 2023-05-12 NOTE — NURSING NOTE
Patient discharged per [facility]. IV and telemetry D/C. Discharge paperwork discussed with patient and daughter. Patient has tolerated all interventions. No complaints or concerns at this time. No signs of acute distress noted. Will continue to monitor until patient off unit.

## 2023-05-12 NOTE — DISCHARGE INSTR - APPOINTMENTS
Dr. Alejandro Monique's  office  closed on  Friday. We will call on Monday with the appointment.

## 2023-05-12 NOTE — CASE MANAGEMENT/SOCIAL WORK
Discharge Planning Assessment   [facility]     Patient Name: Rory Rios Jr.  MRN: [id number]  Today's Date: 5/12/2023    Admit Date: 5/10/2023    Plan: SS spoke with Workers [employer] luba Mccollum at [phone] who requested SS fax home health order and pt clinical information to [phone] for review for approval of home health order.  SS faxed pt information and will follow.     Discharge Plan     Row Name 05/12/23 0940       Plan    Plan SS spoke with Workers [employer] luba Mccollum at [phone] who requested SS fax home health order and pt clinical information to [phone] for review for approval of home health order.  SS faxed pt information and will follow.                    Destinee Hoffman, MARYW

## 2023-05-12 NOTE — DISCHARGE PLACEMENT REQUEST
"Rory Rios Jr. (63 y.o. Male)     Date of Birth   1960    Social Security Number       Address   [address]  [facility] [address]    Home Phone   [phone]    MRN   [id number]       Congregational   None    Marital Status                               Admission Date   5/10/23    Admission Type   Emergency    Admitting Provider   Filemon Campbell MD    Attending Provider   Filemon Campbell MD    Department, Room/Bed   [address], [address]/       Discharge Date       Discharge Disposition   Home or Self Care    Discharge Destination                               Attending Provider: Filemon Campbell MD    Allergies: No Known Allergies    Isolation: None   Infection: None   Code Status: CPR    Ht: 190.5 cm (75\")   Wt: 126 kg (277 lb 6.4 oz)    Admission Cmt: None   Principal Problem: Pleural effusion exudative [J90]                 Active Insurance as of 5/10/2023     Primary Coverage     Payor Plan Insurance Group Employer/Plan Group    WORKERS COMPENSATION [id number]     Payor Plan Address Payor Plan Phone Number Payor Plan Fax Number Effective Dates    [address] [phone]  6/25/2009 - None Entered    [facility] [address]       Subscriber Name Subscriber Birth Date Member ID       RORY RIOS JR. 1960 [id number]           Secondary Coverage     Payor Plan Insurance Group Employer/Plan Group    MEDICARE MEDICARE A & B      Payor Plan Address Payor Plan Phone Number Payor Plan Fax Number Effective Dates    [address] [phone]  4/1/2013 - None Entered    [facility] [id number]       Subscriber Name Subscriber Birth Date Member ID       RORY RIOS JR. 1960 [id number]                 Emergency Contacts      (Rel.) Home Phone Work Phone Mobile Phone    KATIE KENDRICK (Daughter) [phone] -- --        [address]  [address] [facility] [address]  Dept. Phone:  [phone]  Dept. Fax:  [phone] Date " "Ordered: May 11, 2023         Patient:  Rory Rios Jr. MRN:  [id number]   [address]   [facility] [address] :  1960  SSN:    Phone: [phone] Sex:  M     Weight: 126 kg (277 lb 6.4 oz)         Ht Readings from Last 1 Encounters:   23 190.5 cm (75\")         Wheelchair      (Order ID: [id number])    Diagnosis:  Pleural effusion (J90 [ICD-10-CM] 511.9 [ICD-9-CM])   Quantity:  1     Wheelchair accessories:  Manual W/C Seat Widths 20-23 inches  Length of Need (99 Months = Lifetime): 99 Months = Lifetime        Authorizing Provider's Phone: [phone]  Authorizing Provider:Filemon Campbell MD  Authorizing Provider's NPI: [id number]  Order Entered By: Filemon Campbell MD 2023  2:55 PM     Electronically signed by: Filemon Campbell MD 2023  2:55 PM            History & Physical      Filemon Campbell MD at 05/10/23 1651              [facility]    Patient Identification:  Name:  Rory Rios Jr.  Age:  63 y.o.  Sex:  male  :  1960  MRN:  [id number]   Visit Number:  [id number]  Admit Date: 5/10/2023   Room number:  3312/1P  Primary Care Physician:  Alejandro Monique MD    Date of Admission: 5/10/2023     Subjective     Chief complaint:    Chief Complaint   Patient presents with   • Shortness of Breath     History of presenting illness:  63 y.o. male who was admitted on 5/10/2023 for progressive dyspnea with known recurrent left sided pleural effusion.    Rory Rios Jr. 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 In summary, during last hospitalization he was seen by pulmonologist and CT surgery w/thoracentesis on  " showing exudative fluid w/negative cytology and cx. In setting of significant perihilar fibrosis/masses noted, underwent bronchoscopy with EBUS 4/24 w/biopsies negative for malignancy and cx NGTD. After evaluation it was determined that the recurrent effusion was secondary to his coal workers pneumoconiosis with progressive massive fibrosis and was recommended to f/u with  outpatient lung trx clinic which he has not been able to do yet. Tentative plan was for him to see CT surgery in outpatient clinic 5/16 with repeat CXR at that time and possible direct admit for pleuryx cath placement if reaccumulating.    Unfortunately patient began developing recurrent progressive dyspnea without cough, CP, fever, or chills over last 3-4 days that slowly worsened without any alleviating factors. Also notes dyspnea not significantly worsened lying flat. Does improve some with nebulizers and nightly cpap but otherwise no alleviating factors. Patient has home O2 prn but had not been using it more than usual in last week.    On arrival to ED, he underwent CXR similar to prior and in setting of dyspnea and elevated d-dimer, had CTPE that showed recurrence of moderate sized left sided effusion. Otherwise not significantly changed dense consolidations similar to prior imaging. No tachycardia or significant hypoxia noted. Discussed admission for pleuryx here vs discussion with [facility] and in setting of current stability but with progressive sx and recurrent effusion, decision made to admit to floor service with general surgery consult for possible pleuryx catheter placement.     Prior to admission I discussed patient's presentation and management with attending ER physician and verbal handoff received.  ---------------------------------------------------------------------------------------------------------------------   A thorough systems based relevant ROS was asked and was negative except as noted  above.  ---------------------------------------------------------------------------------------------------------------------   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   • Diabetes mellitus    • GERD (gastroesophageal reflux disease)    • Hypertension    • Pneumonia    • Pulmonary fibrosis 4/27/2023     Past Surgical History:   Procedure Laterality Date   • BRONCHOSCOPY N/A 4/24/2023    Procedure: BRONCHOSCOPY WITH ENDOBRONCHIAL ULTRASOUND;  Surgeon: Kota Willard MD;  Location: [facility];  Service: Pulmonary;  Laterality: N/A;   • FOOT SURGERY Left      Family History   Problem Relation Age of Onset   • Diabetes Mother    • Heart failure Father    • Diabetes Sister    • Heart disease Brother    • Diabetes Brother      Social History     Socioeconomic History   • Marital status:    Tobacco Use   • Smoking status: Never     Passive exposure: Never   • Smokeless tobacco: Current     Types: Chew   Vaping Use   • Vaping Use: Never used   Substance and Sexual Activity   • Alcohol use: No   • Drug use: No   • Sexual activity: Defer     ---------------------------------------------------------------------------------------------------------------------   Allergies:  Patient has no known allergies.  ---------------------------------------------------------------------------------------------------------------------   Medications below are reported home medications pulling from within the system; at this time, these medications have not been reconciled unless otherwise specified and are in the verification process for further verifcation as current home medications.      Prior to Admission Medications     Prescriptions Last Dose Informant Patient Reported? Taking?    albuterol (PROVENTIL HFA;VENTOLIN HFA) 108 (90 BASE) MCG/ACT inhaler   No No    Inhale 2 puffs Every 4 (Four) Hours As Needed for Wheezing or  Shortness of Air.    aspirin 81 MG EC tablet   Yes No    Take 1 tablet by mouth Daily. OTC    Budeson-Glycopyrrol-Formoterol (BREZTRI) 160-9-4.8 MCG/ACT aerosol inhaler   Yes No    Inhale 2 puffs 2 (Two) Times a Day.    busPIRone (BUSPAR) 5 MG tablet   Yes No    Take 1 tablet by mouth every night at bedtime.    esomeprazole (nexIUM) 20 MG capsule   No No    Take 1 capsule by mouth Every Morning Before Breakfast.    glipizide (GLUCOTROL XL) 5 MG ER tablet   Yes No    Take 1 tablet by mouth Every Morning.    HYDROcodone-acetaminophen (NORCO) 7.5-325 MG per tablet   Yes No    Take 1 tablet by mouth 3 (Three) Times a Day.    hydrOXYzine (ATARAX) 25 MG tablet   No No    Take 1 tablet by mouth 3 (Three) Times a Day As Needed for Anxiety.    ibuprofen (ADVIL,MOTRIN) 800 MG tablet   Yes No    Take 1 tablet by mouth 3 (Three) Times a Day.    ipratropium-albuterol (DUO-NEB) 0.5-2.5 mg/3 ml nebulizer   Yes No    Take 3 mL by nebulization Every 4 (Four) Hours As Needed for Wheezing or Shortness of Air.    lisinopril (PRINIVIL,ZESTRIL) 10 MG tablet   Yes No    Take 1 tablet by mouth Every Night.    metFORMIN (GLUCOPHAGE) 500 MG tablet   Yes No    Take 1 tablet by mouth 2 (Two) Times a Day With Meals.    metoprolol succinate XL (TOPROL-XL) 25 MG 24 hr tablet   No No    Take 1 tablet by mouth Daily for 30 days.    Patient taking differently:  Take 2 tablets by mouth Every Morning.    omeprazole (priLOSEC) 40 MG capsule   Yes No    Take 1 capsule by mouth Daily.    roflumilast (DALIRESP) 250 MCG tablet tablet   Yes No    Take 1 tablet by mouth Daily.        Objective     Vital Signs:  Temp:  [97.1 °F (36.2 °C)-98 °F (36.7 °C)] 98 °F (36.7 °C)  Heart Rate:  [71-89] 79  Resp:  [18-24] 18  BP: (117-147)/(70-95) 147/80    Mean Arterial Pressure (Non-Invasive) for the past 24 hrs (Last 3 readings):   Noninvasive MAP (mmHg)   05/10/23 1611 99   05/10/23 1450 96   05/10/23 1435 98     SpO2:  [94 %-98 %] 97 %  on  Flow (L/min):  [3] 3;    Device (Oxygen Therapy): nasal cannula  Body mass index is 33.57 kg/m².    Wt Readings from Last 3 Encounters:   05/10/23 128 kg (283 lb 1.6 oz)   04/20/23 131 kg (288 lb)   02/09/23 134 kg (295 lb)      ----------------------------------------------------------------------------------------------------------------------  Physical Exam  Vitals and nursing note reviewed.   Constitutional:       General: He is not in acute distress.  HENT:      Mouth/Throat:      Mouth: Mucous membranes are moist.      Pharynx: Oropharynx is clear.   Eyes:      General: No scleral icterus.     Extraocular Movements: Extraocular movements intact.   Cardiovascular:      Rate and Rhythm: Normal rate and regular rhythm.      Pulses: Normal pulses.   Pulmonary:      Effort: Pulmonary effort is normal. No respiratory distress.      Breath sounds: No wheezing.      Comments: Diminished breath sounds at left lung base but breath sounds audible deep into middle anterior and posterior lung fields  Abdominal:      Palpations: Abdomen is soft.      Tenderness: There is no abdominal tenderness.   Musculoskeletal:      Right lower leg: No edema.      Left lower leg: No edema.   Skin:     General: Skin is warm and dry.      Capillary Refill: Capillary refill takes less than 2 seconds.   Neurological:      General: No focal deficit present.      Mental Status: He is alert. Mental status is at baseline.   Psychiatric:         Mood and Affect: Mood normal.         Behavior: Behavior normal.       --------------------------------------------------------------------------------------------------------------------  LABS:    CBC and coagulation:  Results from last 7 days   Lab Units 05/10/23  1043   SED RATE mm/hr 39*   CRP mg/dL 1.54*   WBC 10*3/mm3 7.32   HEMOGLOBIN g/dL 13.8   HEMATOCRIT % 45.2   MCV fL 82.9   MCHC g/dL 30.5*   PLATELETS 10*3/mm3 223   INR  1.00   D DIMER QUANT MCGFEU/mL 1.85*     Acid/base balance:      Renal and  electrolytes:  Results from last 7 days   Lab Units 05/10/23  1043   SODIUM mmol/L 142   POTASSIUM mmol/L 3.9   MAGNESIUM mg/dL 1.9   CHLORIDE mmol/L 106   CO2 mmol/L 26.7   BUN mg/dL 7*   CREATININE mg/dL 0.80   CALCIUM mg/dL 9.5   GLUCOSE mg/dL 106*     Estimated Creatinine Clearance: 136.7 mL/min (by C-G formula based on SCr of 0.8 mg/dL).    Liver and pancreatic function:  Results from last 7 days   Lab Units 05/10/23  1043   ALBUMIN g/dL 3.7   BILIRUBIN mg/dL 0.4   ALK PHOS U/L 90   AST (SGOT) U/L 16   ALT (SGPT) U/L 14     Endocrine function:  Lab Results   Component Value Date    HGBA1C 6.30 (H) 04/21/2023     Point of care bedside glucose levels:      Lab Results   Component Value Date    TSH 2.210 05/10/2023    FREET4 1.33 05/10/2023     Cardiac:  Results from last 7 days   Lab Units 05/10/23  1347 05/10/23  1043   HSTROP T ng/L 16* 15*   PROBNP pg/mL  --  189.9       Cultures:  Lab Results   Component Value Date    COLORU Yellow 05/10/2023    CLARITYU Clear 05/10/2023    PHUR 7.5 05/10/2023    GLUCOSEU Negative 05/10/2023    KETONESU Negative 05/10/2023    BLOODU Moderate (2+) (A) 05/10/2023    NITRITEU Negative 05/10/2023    LEUKOCYTESUR Negative 05/10/2023    BILIRUBINUR Negative 05/10/2023    UROBILINOGEN 0.2 E.U./dL 05/10/2023    RBCUA 13-20 (A) 05/10/2023    WBCUA 0-2 05/10/2023    BACTERIA Trace (A) 05/10/2023     Microbiology Results (last 10 days)     Procedure Component Value - Date/Time    COVID-19 and FLU A/B PCR - Swab, Nasopharynx [[id number]]  (Normal) Collected: 05/10/23 1044    Lab Status: Final result Specimen: Swab from Nasopharynx Updated: 05/10/23 1116     COVID19 Not Detected     Influenza A PCR Not Detected     Influenza B PCR Not Detected    Narrative:      Fact sheet for providers: https://www.fda.gov/media/420596/download    Fact sheet for patients: https://www.fda.gov/media/467356/download    Test performed by PCR.          No results found for: PREGTESTUR, PREGSERUM, HCG,  HCGQUANT  Pain Management Panel         Latest Ref Rng & Units 5/10/2023   Pain Management Panel   Amphetamine, Urine Qual Negative Negative     Barbiturates Screen, Urine Negative Negative     Benzodiazepine Screen, Urine Negative Negative     Buprenorphine, Screen, Urine Negative Negative     Cocaine Screen, Urine Negative Negative     Methadone Screen , Urine Negative Negative     Methamphetamine, Ur Negative Negative                  I have personally looked at the labs and they are summarized above.  ----------------------------------------------------------------------------------------------------------------------  Detailed radiology reports for the last 24 hours:    Imaging Results (Last 24 Hours)     Procedure Component Value Units Date/Time    CT Angiogram Chest Pulmonary Embolism [[id number]] Collected: 05/10/23 1305     Updated: 05/10/23 1308    Narrative:      CT ANGIOGRAM CHEST PULMONARY EMBOLISM-     CLINICAL INDICATION: Pulmonary embolism (PE) suspected, high prob        COMPARISON: 04/22/2023      PROCEDURE: Thin cut axial images were acquired through the pulmonary  vessels during the rapid infusion of IV contrast.     Additional 3-D reformatted images obtained via post-processing for  improved diagnostic accuracy and procedural planning.     Radiation dose reduction techniques were utilized per ALARA protocol.  Automated exposure control was initiated through either or BrandProject or  DoseRight software packages by  protocol.           FINDINGS: Today's study demonstrates opacification of the central  pulmonary vessels.   There are no filling defects.   There is no truncation.     No evidence of a pulmonary embolus.     Dense areas of consolidation are present bilaterally and are very  similar to the prior study..     Consolidation in the perihilar regions bilaterally     Moderate left and small right pleural effusion       Impression:      1. No evidence of a pulmonary embolus  2.  Moderate left and small right pleural effusion  3. Dense areas of consolidation again noted bilaterally but similar to  the prior study.     This report was finalized on 5/10/2023 1:06 PM by Dr. Jamie Smith MD.       XR Chest 1 View [[id number]] Collected: 05/10/23 1100     Updated: 05/10/23 1103    Narrative:      XR CHEST 1 VW-     CLINICAL INDICATION: sob        COMPARISON: 04/27/2023      TECHNIQUE: Single frontal view of the chest.     FINDINGS:      LUNGS: Dense consolidation and nodularity bilaterally. Worse than on the  previous exam      HEART AND MEDIASTINUM: Heart and mediastinal contours are unremarkable        SKELETON: Bony and soft tissue structures are unremarkable.             Impression:      Overall appearance worse than on the prior study     This report was finalized on 5/10/2023 11:01 AM by Dr. Jamie Smith MD.           Final impressions for the last 30 days of radiology reports:    CT Chest With & Without Contrast Diagnostic    Result Date: 4/21/2023  Impression: 1. Extensive masslike perihilar disease, consistent with history of longstanding inhalational disease and progressive massive fibrosis. 2. Asymmetrically rounded and masslike area associated with left perihilar PMF in the left lower lobe, 4.5 cm in diameter, which may also reflect advanced PMF, but at least potentially could represent superimposed neoplasm, as discussed above. 3. Large, free-flowing left pleural effusion of uncertain significance. Electronically Signed: Mitch Sears  4/21/2023 3:48 PM EDT  Workstation ID: [id number]    XR Chest 1 View    Result Date: 5/10/2023  Overall appearance worse than on the prior study  This report was finalized on 5/10/2023 11:01 AM by Dr. Jamie Smith MD.      XR Chest 1 View    Result Date: 4/27/2023  Impression: Stable chest. No pneumothorax. Multifocal infiltrates. Electronically Signed: Bud Britton  4/27/2023 8:21 AM EDT  Workstation ID: [id number]    XR Chest 1 View    Result Date:  4/26/2023  Impression: Removal of left-sided chest tube. No pneumothorax. Otherwise, stable exam. Electronically Signed: Shantelle Grimm  4/26/2023 3:04 PM EDT  Workstation ID: [id number]    XR Chest 1 View    Result Date: 4/26/2023  Impression: Left basilar pleural drain. No significant effusion or evidence of pneumothorax. Perihilar airspace masses present bilaterally, similar as compared to the previous study Electronically Signed: Shantelle Grimm  4/26/2023 7:09 AM EDT  Workstation ID: [id number]    XR Chest 1 View    Result Date: 4/25/2023  Impression: 1. Stable right and left perihilar masses as previously described. 2. Improving bibasilar pulmonary interstitial disease, now mild in extent. Electronically Signed: Mitch Sears  4/25/2023 8:57 AM EDT  Workstation ID: [id number]    XR Chest 1 View    Result Date: 4/24/2023  Impression: Left pleural drain remains in place. Stable changes of pulmonary fibrosis. No new chest disease is seen. Electronically Signed: Mitch Sears  4/24/2023 8:36 AM EDT  Workstation ID: [id number]    XR Chest 1 View    Result Date: 4/23/2023  Impression: Decreased size of pleural effusion on the left. It is now small. Small caliber chest tube is present. No pneumothorax. No change in masslike bilateral parenchymal airspace opacities. Electronically Signed: Patti Williamson  4/23/2023 8:30 AM EDT  Workstation ID: [id number]    XR Chest 1 View    Result Date: 4/20/2023  Impression: 1. No change in the masslike nodular appearing infiltrate in the right perihilar region. 2. Improvement in the left perihilar infiltrate compared with the last study with improvement in aeration at the left base. 3. Moderate pleural effusion, unchanged. Electronically Signed: Miguelito Deras  4/20/2023 2:22 PM EDT  Workstation ID: [id number]    CT Angiogram Chest Pulmonary Embolism    Result Date: 5/10/2023  1. No evidence of a pulmonary embolus 2. Moderate left and small right pleural effusion 3. Dense areas of consolidation again noted  bilaterally but similar to the prior study.  This report was finalized on 5/10/2023 1:06 PM by Dr. Jamie Smith MD.      X-ray chest PA and lateral    Result Date: 4/17/2023  No significant interval change. Images reviewed, interpreted, and dictated by Dr. Gauri Gtz. Transcribed by Tahmina Isaacs PA-C.    X-ray chest PA and lateral    Result Date: 4/10/2023  There has been no significant interval change in  the opacities, likely related to pneumoconiosis. Small left pleural effusion. Images reviewed, interpreted, and dictated by Dr. Gauri Gtz. Transcribed by JOHNATHON Huerta (R).      I have personally looked at the radiology images, my personal interpretation is as follows:    CXR with left sided effusion and multifocal dense consolidations    CT with moderate sized left effusion and noted b/l perihilar dense infiltrates/masses    Assessment & Plan       #hx of coal workers pneumoconiosis with dense b/l perihilar infiltrates/pulmonary fibrosis  #Recurrent left sided pleural effusion, previously exudative thought secondary to above  #Chronic hypoxic respiratory failure (2L)  -Extensive w/u including EBUS of perihilar mass performed at [facility] during last hospitalization with effusion found to be exudative in nature with negative cytology x2 samples and negative fungal and bacterial w/u. Currently on baseline O2 with no fever, inf clinical sx, or leukocytosis. Crp mildly elevated not dissimilar from prior. BNP wnl and CTPE neg for PE. Defer abx coverage  -Patient would be well served by pleurodesis or VATS but evaluated by CT surgery at [facility] and felt overall pulmonary condition not amenable to either intervention, treatment ultimately narrowed to lung trx eval with pleuryx for palliation of sx in interim  -General surgery consulted for possible pleuryx catheter placement  -TTE added to complete cardiac w/u, no prior echo on chart but note effusion was exudative in nature, not likely cardiac origin and no other  clinical evidence of HF  -NPO at midnight until evaluated by surgical consultant  -s/p IV lasix in ED, monitor for sx improvement but unlikely to significantly improve given exudative noncardiac nature    #COOPER  -Resume home cpap    #Hx of atrial tachycardia  -Resume home metoprolol pend med rec    #?COPD hx  -Reported on last hospitalization but no smoking hx and no PFTs available for review but likely secondary to environmental contaminant  -Resume home breztri formulary equivalent with prn duonebs    #HTN  -Resume antihypertensives post op pend med rec    #NIDDM  -Hold metformin and glipizide  -FSBG qac/qhs w/SSI        VTE Prophylaxis:   Mechanical Order History:     None      Pharmalogical Order History:      Ordered     Dose Route Frequency Stop    05/10/23 9183  Enoxaparin Sodium (LOVENOX) syringe 40 mg         40 mg SC Once --              The patient is high risk due to the following diagnoses/reasons:  Recurrent effusion requiring surgical drainage with chronic O2 usage    Admission Status:  I certify that this patient requires inpatient hospitalization for greater than 2 midnights in INPATIENT status.  I anticipate there to be the need for care which can only be reasonably provided in a hospital setting such as possible need for aggressive/expedited ancillary services and/or consultation services, IV medications, close physician monitoring, and/or procedures. In such, I feel patient’s risk for adverse outcomes and need for care warrant INPATIENT evaluation and predict the patient’s care encounter to likely last beyond 2 midnights.    Code Status and Medical Interventions:   Ordered at: 05/10/23 4158     Code Status (Patient has no pulse and is not breathing):    CPR (Attempt to Resuscitate)     Medical Interventions (Patient has pulse or is breathing):    Full Support        Disposition: Admit to tele pend surgical eval    Filemon Campbell MD  [facility] Hospitalist  05/10/23  16:56  EDT        Electronically signed by Filemon Campbell MD at 05/10/23 1340

## 2023-05-13 NOTE — OUTREACH NOTE
Prep Survey    Flowsheet Row Responses   [facility] facility patient discharged from? [facility]   Is LACE score < 7 ? No   Eligibility Readm Mgmt   Discharge diagnosis Pleural effusion exudative   Does the patient have one of the following disease processes/diagnoses(primary or secondary)? Other   Does the patient have Home health ordered? No  [PCP to order HH]   Is there a DME ordered? No   Prep survey completed? Yes          Lexus PEDRAZA - Registered Nurse

## 2023-05-13 NOTE — PAYOR COMM NOTE
"[facility]  JENN LUNA  PHONE  [phone]  FAX  [phone]  NPI:  [id number]    PATIENT D/C 5/12/2023    BLACK LUNG CLAIM # [id number]    Rory Vera Jr. (63 y.o. Male)     Date of Birth   1960    Social Security Number       Address   [address]  [place] [address]    Home Phone   [phone]    MRN   [id number]       Yarsani   None    Marital Status                               Admission Date   5/10/23    Admission Type   Emergency    Admitting Provider   Filemon Campbell MD    Attending Provider       Department, Room/Bed   [address], [address]/       Discharge Date   5/12/2023    Discharge Disposition   Home or Self Care    Discharge Destination   Home                            Attending Provider: (none)   Allergies: No Known Allergies    Isolation: None   Infection: None   Code Status: Prior    Ht: 190.5 cm (75\")   Wt: 126 kg (277 lb 6.4 oz)    Admission Cmt: None   Principal Problem: Pleural effusion exudative [J90]                 Active Insurance as of 5/10/2023     Primary Coverage     Payor Plan Insurance Group Employer/Plan Group    WORKERS COMPENSATION [employer]     Payor Plan Address Payor Plan Phone Number Payor Plan Fax Number Effective Dates    [address] [phone]  6/25/2009 - None Entered    [facility] [address]       Subscriber Name Subscriber Birth Date Member ID       RORY VERA JR. 1960 [id number]           Secondary Coverage     Payor Plan Insurance Group Employer/Plan Group    MEDICARE MEDICARE A & B      Payor Plan Address Payor Plan Phone Number Payor Plan Fax Number Effective Dates    [address] [phone]  4/1/2013 - None Entered    [facility] [place] [address]       Subscriber Name Subscriber Birth Date Member ID       RORY VERA JR. 1960 [id number]                 Emergency Contacts      (Rel.) Home Phone Work Phone Mobile Phone    KATIE KENDRICK (Daughter) [phone] " -- --            Discharge Summary    No notes of this type exist for this encounter.

## 2023-05-15 ENCOUNTER — TELEPHONE (OUTPATIENT)
Dept: CARDIAC SURGERY | Facility: CLINIC | Age: 63
End: 2023-05-15

## 2023-05-15 NOTE — PAYOR COMM NOTE
"CONTACT:  DORIS TOBIAS MSN, APRN  UTILIZATION MANAGEMENT DEPT.  [facility]  [address] [facility] [address]  PHONE:  [phone]  FAX: [phone]    PATIENT DISCHARGED TO HOME ON 5/12/23. PLEASE LET ME KNOW IF AN AUTHORIZATION IS REQUIRED OR IF WE JUST NEED TO USE THE BLACK LUNG CLAIM # [id number] FOR BILLING REFERENCE. THANK YOU.    Rory Vera Jr. (63 y.o. Male)       Date of Birth   1960    Social Security Number       Address   [address]  [facility] [address]    Home Phone   [phone]    MRN   [id number]       Orthodox   None    Marital Status                               Admission Date   5/10/23    Admission Type   Emergency    Admitting Provider   Filemon Campbell MD    Attending Provider       Department, Room/Bed   [facility] [address] [facility], [address]/       Discharge Date   5/12/2023    Discharge Disposition   Home or Self Care    Discharge Destination   Home                              Attending Provider: (none)   Allergies: No Known Allergies    Isolation: None   Infection: None   Code Status: Prior    Ht: 190.5 cm (75\")   Wt: 126 kg (277 lb 6.4 oz)    Admission Cmt: None   Principal Problem: Pleural effusion exudative [J90]                   Active Insurance as of 5/10/2023       Primary Coverage       Payor Plan Insurance Group Employer/Plan Group    WORKERS COMPENSATION [employer]A BLACKWELL       Payor Plan Address Payor Plan Phone Number Payor Plan Fax Number Effective Dates    [address] [phone]  6/25/2009 - None Entered    [facility] [address]         Subscriber Name Subscriber Birth Date Member ID       RORY VERA JR. 1960 [id number]               Secondary Coverage       Payor Plan Insurance Group Employer/Plan Group    MEDICARE MEDICARE A & B        Payor Plan Address Payor Plan Phone Number Payor Plan Fax Number Effective Dates    [address] [phone]  4/1/2013 - None Entered    [facility] [address]         Subscriber " Name Subscriber Birth Date Member ID       EVGENY VERA JR. 1960 [id number]                     Emergency Contacts        (Rel.) Home Phone Work Phone Mobile Phone    KATIE KENDRICK (Daughter) [phone] -- --              Discharge Summary    No notes of this type exist for this encounter.

## 2023-05-15 NOTE — TELEPHONE ENCOUNTER
Caller: KATIE KENDRICK    Relationship: Emergency Contact    Best call back number: [phone]    What is the best time to reach you: ANY- PREFERS LATER AFTERNOON IF POSSIBLE    Who are you requesting to speak with (clinical staff, provider,  specific staff member): CLINICAL    What was the call regarding: PT DAUGHTER CFALLING IN WANTING TO DISCUSS PT UPCOMING APPT TO ADVISE IF THEY FELT VISIT WAS NEEDED OR COULD PUSH OUT A MONTH- RESCHEDULE OUTSIDE OF HUB TIMEFRAME    Do you require a callback:YES PLEASE CALL BACK PT DAUGHTER- THANKS!

## 2023-05-15 NOTE — DISCHARGE SUMMARY
[facility]    Patient Identification:  Name:  Rory Rios Jr.  Age:  63 y.o.  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 Rios is a 63 yoM with PMH noted above presenting with recurrent left sided pleural effusion and associated dyspnea. He was recently discharged from [facility] where he was seen by CT Surgery with left sided chest tube placement for large volume pleural effusion at that time and eventual removal. He was set to bee seen back in clinic later this week with repeat CXR to determine need for pleurx catheter placement but presented to ED before appointment given progressive dyspnea and concern for fluid reaccumulation. Fluid again seen here on CT chest and after GOC discussion patient elected to undergo left sided pleurx catheter placement prior to discharge with 550cc fluid removed without complication and resolution of sx with return to baseline WOB and O2 requirement prior to discharge. Home health established for him to cont periodic drainage prn at home and he will f/u as previously scheduled with Dr. Solis at [facility] on . He has not established appointment time to be seen at  lung  trx clinic and after discussion with patient and daughter, is unlikely to pursue w/u or transplant listing given patient's GOC. Informed them that should sx progress despite improvement in pleural effusion, they could consider hospice transition to facilitate patient's desire to maximize time at home. 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 Currently on baseline O2 with no fever, inf clinical sx, or leukocytosis. Crp mildly elevated not dissimilar from prior. BNP wnl and CTPE neg for PE. Defer abx coverage  -Patient would be well served by pleurodesis or VATS but evaluated by CT surgery at [facility] and felt overall pulmonary condition not amenable to either intervention, treatment ultimately narrowed to lung trx eval with pleuryx for palliation of sx in interim  -TTE added to complete cardiac w/u (EF 66-70%), no prior echo on chart but note effusion was exudative in nature, not likely cardiac origin and no other clinical evidence of HF.  -s/p left pleuryz cath placement 5/11, 550cc pleural fluid removed with sx improvement to be repeated prn with home health, would not drain an more frequently than q weekly  -Repeat cxr after drainage stable prior to discharge  -f/u Dr. Solis 5/16 at [facility] as previously scheduled      #COOPER  -Cont home cpap     #Hx of atrial tachycardia  -Resumed home metoprolol     #?COPD hx  -Reported on last hospitalization but no smoking hx and no PFTs available for review but likely secondary to environmental contaminant  -Resume home breztri     #HTN  -Held  lisinopril on discharge given BP low normal and plan to cont with regular volume removal. Conservative BP managed advised given GOC and prognosis, Target <160/100     #NIDDM  -Ok to resume home metformin and glipizide          VITAL SIGNS:        on   ;        Body mass index is 34.67 kg/m².  Wt Readings from Last 3 Encounters:   05/12/23 126 kg (277 lb 6.4 oz)   04/20/23 131 kg (288 lb)   02/09/23 134 kg (295 lb)       PHYSICAL EXAM:    Vitals and nursing note reviewed.   Constitutional:       General: He is not in acute distress.  HENT:      Mouth/Throat:      Mouth: Mucous membranes are moist.      Pharynx: Oropharynx is clear.   Eyes:      General: No scleral icterus.     Extraocular Movements: Extraocular movements intact.   Cardiovascular:      Rate and Rhythm: Normal rate and regular rhythm.      Pulses: Normal pulses.   Pulmonary:      Effort: Pulmonary effort is normal. No respiratory distress.      Breath sounds: No wheezing.      Comments: Diminished breath sounds at left lung base but breath sounds audible deep into middle anterior and posterior lung fields. Pleuryx catheter in place with clear dlean dressing overlying insertion site  Abdominal:      Palpations: Abdomen is soft.      Tenderness: There is no abdominal tenderness.   Musculoskeletal:      Right lower leg: No edema.      Left lower leg: No edema.   Skin:     General: Skin is warm and dry.      Capillary Refill: Capillary refill takes less than 2 seconds.   Neurological:      General: No focal deficit present.      Mental Status: He is alert. Mental status is at baseline.   Psychiatric:         Mood and Affect: Mood normal.         Behavior: Behavior normal.     DISCHARGE DISPOSITION   Stable       Discharge Medications      Continue These Medications      Instructions Start Date   albuterol sulfate  (90 Base) MCG/ACT inhaler  Commonly known as: PROVENTIL HFA;VENTOLIN HFA;PROAIR HFA   2 puffs, Inhalation, Every 4 Hours PRN       Budeson-Glycopyrrol-Formoterol 160-9-4.8 MCG/ACT aerosol inhaler  Commonly known as: BREZTRI   2 puffs, Inhalation, 2 Times Daily      HYDROcodone-acetaminophen 7.5-325 MG per tablet  Commonly known as: NORCO   1 tablet, Oral, Every 8 Hours PRN      hydrOXYzine 25 MG tablet  Commonly known as: ATARAX   25 mg, Oral, 3 Times Daily PRN      ibuprofen 800 MG tablet  Commonly known as: ADVIL,MOTRIN   800 mg, Oral, 3 Times Daily      ipratropium-albuterol 0.5-2.5 mg/3 ml nebulizer  Commonly known as: DUO-NEB   3 mL, Nebulization, Every 4 Hours PRN      metoprolol succinate XL 50 MG 24 hr tablet  Commonly known as: TOPROL-XL   50 mg, Oral, Daily      omeprazole 40 MG capsule  Commonly known as: priLOSEC   40 mg, Oral, Daily      roflumilast 250 MCG tablet tablet  Commonly known as: DALIRESP   250 mcg, Oral, Daily         Stop These Medications    esomeprazole 40 MG capsule  Commonly known as: nexIUM     lisinopril 10 MG tablet  Commonly known as: PRINIVIL,ZESTRIL            Diet Instructions    Regular diet         Activity Instructions    As tolerated.          Additional Instructions for the Follow-ups that You Need to Schedule     Ambulatory Referral to Home Health   As directed      Face to Face Visit Date: 5/12/2023    Follow-up provider for Plan of Care?: I treated the patient in an acute care facility and will not continue treatment after discharge.    Follow-up provider: ANDRIA PHELPS [[id number]]    Reason/Clinical Findings: f/u pleuryx catheter    Describe mobility limitations that make leaving home difficult: dyspnea    Nursing/Therapeutic Services Requested: Skilled Nursing    Skilled nursing orders: Other (Pleuryz catheter care and drainage)    Frequency: 1 Week 1         Discharge Follow-up with Specified Provider: Dr. Solis as previously scheduled 5/16/23   As directed      To: Dr. Solis as previously scheduled 5/16/23    Follow Up Details: f/u pleural effusion, CWP            Follow-up Information      Alejandro Monique MD .    Specialty: General Surgery  Contact information:  [address]  [facility] [address] [phone]                          TEST  RESULTS PENDING AT DISCHARGE      I will notify patient via phone-call should above lab work result with any significant abnormal findings     CODE STATUS  Code Status and Medical Interventions:   Ordered at: 05/10/23 1555     Code Status (Patient has no pulse and is not breathing):    CPR (Attempt to Resuscitate)     Medical Interventions (Patient has pulse or is breathing):    Full Support       The ASCVD Risk score (Eladia DK, et al., 2019) failed to calculate for the following reasons:    The patient has a prior MI or stroke diagnosis       Filemon Campbell MD  [facility]  05/15/23  14:59 EDT    Please note that this discharge summary required more than 30 minutes to complete.

## 2023-05-16 ENCOUNTER — READMISSION MANAGEMENT (OUTPATIENT)
Dept: CALL CENTER | Facility: HOSPITAL | Age: 63
End: 2023-05-16
Payer: MEDICARE

## 2023-05-16 NOTE — OUTREACH NOTE
Medical Week 1 Survey    Flowsheet Row Responses   [facility] patient discharged from? [facility]   Does the patient have one of the following disease processes/diagnoses(primary or secondary)? Other   Week 1 attempt successful? Yes   Call start time 1540   Call end time 1543   Meds reviewed with patient/caregiver? Yes   Is the patient having any side effects they believe may be caused by any medication additions or changes? No   Does the patient have all medications ordered at discharge? Yes   Is the patient taking all medications as directed (includes completed medication regime)? Yes   Does the patient have a primary care provider?  Yes   Does the patient have an appointment with their PCP within 7 days of discharge? Yes   Has the patient kept scheduled appointments due by today? N/A   Has home health visited the patient within 72 hours of discharge? N/A   Psychosocial issues? No   Did the patient receive a copy of their discharge instructions? Yes   Nursing interventions Reviewed instructions with patient   What is the patient's perception of their health status since discharge? Same   Is the patient/caregiver able to teach back signs and symptoms related to disease process for when to call PCP? Yes   Is the patient/caregiver able to teach back signs and symptoms related to disease process for when to call 911? Yes   Is the patient/caregiver able to teach back the hierarchy of who to call/visit for symptoms/problems? PCP, Specialist, Home health nurse, Urgent Care, ED, 911 Yes   If the patient is a current smoker, are they able to teach back resources for cessation? Not a smoker   Additional teach back comments He says his fluid is up again, dtr will take it off tomorrow.   Week 1 call completed? Yes   Is the patient interested in additional calls from an ambulatory ?  NOTE:  applies to high risk patients requiring additional follow-up. No   Wrap up additional comments No questions at this time.           Radha ERAZO - Registered Nurse

## 2023-05-25 ENCOUNTER — READMISSION MANAGEMENT (OUTPATIENT)
Dept: CALL CENTER | Facility: HOSPITAL | Age: 63
End: 2023-05-25
Payer: MEDICARE

## 2023-05-25 NOTE — OUTREACH NOTE
Medical Week 2 Survey    Flowsheet Row Responses   [facility] patient discharged from? [facility]   Does the patient have one of the following disease processes/diagnoses(primary or secondary)? Other   Week 2 attempt successful? No   Unsuccessful attempts Attempt 1          Victoria FREEMAN - Registered Nurse

## 2023-05-30 ENCOUNTER — OFFICE VISIT (OUTPATIENT)
Dept: CARDIAC SURGERY | Facility: CLINIC | Age: 63
End: 2023-05-30

## 2023-05-30 VITALS
DIASTOLIC BLOOD PRESSURE: 90 MMHG | HEIGHT: 77 IN | WEIGHT: 283 LBS | TEMPERATURE: 97.4 F | HEART RATE: 91 BPM | BODY MASS INDEX: 33.42 KG/M2 | SYSTOLIC BLOOD PRESSURE: 160 MMHG | OXYGEN SATURATION: 96 %

## 2023-05-30 DIAGNOSIS — J60 COAL WORKERS PNEUMOCONIOSIS: Primary | ICD-10-CM

## 2023-05-30 DIAGNOSIS — J90 RECURRENT LEFT PLEURAL EFFUSION: ICD-10-CM

## 2023-05-30 RX ORDER — LISINOPRIL 10 MG/1
10 TABLET ORAL DAILY
COMMUNITY

## 2023-05-30 RX ORDER — BUSPIRONE HYDROCHLORIDE 5 MG/1
5 TABLET ORAL NIGHTLY
COMMUNITY

## 2023-05-30 NOTE — PROGRESS NOTES
[facility] Cardiothoracic Surgery Office Follow Up Note     Date of Encounter: 2023     Name: Rory Rios Jr.  : 1960     Referred By: No ref. provider found  PCP: Alejandro Monique MD    Chief Complaint:    Chief Complaint   Patient presents with   • Follow-up     FU for Left Pleural Effusion-Pleurx Placed [facility]       Subjective      History of Present Illness:    Rory Rios Jr. is a 63 y.o. male lifetime non-smoker s/p inpatient evaluation per Dr. Solis in regards to recurrent large left pleural effusion on 2023.  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  Patient presented to [facility] and admitted on 2022 for worsening shortness of breath at rest and JAUREGUI.  Prior to admission pulmonology had referred patient to Dr. Solis but he had not yet attended his clinic consultation.  Patient gave a history of repeated thoracentesis at [facility] over the past several months.  He experienced an acute increase in his dyspnea as well as lower extremity edema and tachycardia which prompted him to come to ER.  During the course of his admission 2023 - 23, a large 4.5 cm left lower lobe mass was identified.  Patient underwent bronchoscopy with endobronchial ultrasound-guided biopsy 2023.  Biopsy results negative for malignancy and cultures no growth to date. By the end of the hospitalization, patient related he was inclined to undergo Pleurx catheter placement on an outpatient basis and consider referral to  lung transplant clinic for evaluation of his advanced coal workers pneumoconiosis with extensive progressive masslike fibrosis or palliative care.    On 5/10/2023, patient presented to [facility] with progressive dyspnea and recurrent left pleural effusion.  A Pleurx catheter was placed by Dr. Brigitte Kolb on 23.     Patient accompanied by daughter.  Patient states he begins to feel  dyspnea on exertion the day before Pleurx cath is drained weekly.  Both state weekly drainage is approximately 1000 mL.    Review of Systems:  Review of Systems   Constitutional: Positive for malaise/fatigue. Negative for chills, decreased appetite, diaphoresis, fever, night sweats, weight gain and weight loss.   HENT: Negative for hoarse voice.    Eyes: Negative for blurred vision, double vision and visual disturbance.   Cardiovascular: Positive for dyspnea on exertion and leg swelling. Negative for chest pain, claudication, irregular heartbeat, near-syncope, orthopnea, palpitations, paroxysmal nocturnal dyspnea and syncope.   Respiratory: Positive for cough, shortness of breath and wheezing. Negative for hemoptysis and sputum production.    Hematologic/Lymphatic: Negative for adenopathy and bleeding problem. Does not bruise/bleed easily.   Skin: Negative for color change, nail changes, poor wound healing and rash.   Musculoskeletal: Positive for back pain and joint pain. Negative for falls and muscle cramps.   Gastrointestinal: Positive for heartburn. Negative for abdominal pain and dysphagia.   Genitourinary: Negative for flank pain.   Neurological: Positive for dizziness and light-headedness. Negative for brief paralysis, disturbances in coordination, focal weakness, headaches, loss of balance, numbness, paresthesias, sensory change, vertigo and weakness.   Psychiatric/Behavioral: Negative for depression and suicidal ideas.   Allergic/Immunologic: Negative for persistent infections.       I have reviewed the following portions of the patient's history: allergies, current medications, past family history, past medical history, past social history, past surgical history, problem list and ROS and confirm it's accurate.    Allergies:  No Known Allergies    Medications:      Current Outpatient Medications:   •  albuterol (PROVENTIL HFA;VENTOLIN HFA) 108 (90 BASE) MCG/ACT inhaler, Inhale 2 puffs Every 4 (Four) Hours  As Needed for Wheezing or Shortness of Air., Disp: 1 inhaler, Rfl: 0  •  Budeson-Glycopyrrol-Formoterol (BREZTRI) 160-9-4.8 MCG/ACT aerosol inhaler, Inhale 2 puffs 2 (Two) Times a Day., Disp: , Rfl:   •  busPIRone (BUSPAR) 5 MG tablet, Take 1 tablet by mouth Every Night., Disp: , Rfl:   •  HYDROcodone-acetaminophen (NORCO) 7.5-325 MG per tablet, Take 1 tablet by mouth Every 8 (Eight) Hours As Needed for Moderate Pain., Disp: , Rfl:   •  hydrOXYzine (ATARAX) 25 MG tablet, Take 1 tablet by mouth 3 (Three) Times a Day As Needed for Anxiety., Disp: 21 tablet, Rfl: 0  •  ibuprofen (ADVIL,MOTRIN) 800 MG tablet, Take 1 tablet by mouth 3 (Three) Times a Day., Disp: , Rfl:   •  ipratropium-albuterol (DUO-NEB) 0.5-2.5 mg/3 ml nebulizer, Take 3 mL by nebulization Every 4 (Four) Hours As Needed for Wheezing or Shortness of Air., Disp: , Rfl:   •  lisinopril (PRINIVIL,ZESTRIL) 10 MG tablet, Take 1 tablet by mouth Daily., Disp: , Rfl:   •  metFORMIN (GLUCOPHAGE) 500 MG tablet, Take 1 tablet by mouth As Needed., Disp: , Rfl:   •  metoprolol succinate XL (TOPROL-XL) 50 MG 24 hr tablet, Take 1 tablet by mouth Daily., Disp: , Rfl:   •  omeprazole (priLOSEC) 40 MG capsule, Take 20 mg by mouth 2 (Two) Times a Day., Disp: , Rfl:   •  roflumilast (DALIRESP) 250 MCG tablet tablet, Take 1 tablet by mouth Daily., Disp: , Rfl:     History:   Past Medical History:   Diagnosis Date   • Arthritis    • Black lung    • Black lung disease    • 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   • Diabetes mellitus    • GERD (gastroesophageal reflux disease)    • Hypertension    • Pleural effusion    • Pneumonia    • Pulmonary fibrosis 04/27/2023   • Sleep apnea        Past Surgical History:   Procedure Laterality Date   • BRONCHOSCOPY N/A 04/24/2023    Procedure: BRONCHOSCOPY WITH ENDOBRONCHIAL ULTRASOUND;  Surgeon: Kota Willard MD;  Location: [facility] ENDOSCOPY;  Service: Pulmonary;  Laterality: N/A;   • FOOT SURGERY Left    • PLEURAL CATHETER INSERTION  "Left 5/11/2023    Procedure: PLEURX CATHETER INSERTION;  Surgeon: Brigitte Kolb MD;  Location: [facility] OR;  Service: General;  Laterality: Left;       Social History     Socioeconomic History   • Marital status:    • Number of children: 1   Tobacco Use   • Smoking status: Never     Passive exposure: Never   • Smokeless tobacco: Current     Types: Chew   Vaping Use   • Vaping Use: Never used   Substance and Sexual Activity   • Alcohol use: No   • Drug use: No   • Sexual activity: Defer        Family History   Problem Relation Age of Onset   • Diabetes Mother    • Heart failure Father    • Diabetes Sister    • Heart disease Brother    • Diabetes Brother        Objective   Physical Exam:  Vitals:    05/30/23 1405   BP: 160/90   BP Location: Left arm   Patient Position: Sitting   Pulse: 91   Temp: 97.4 °F (36.3 °C)   SpO2: 96%   Weight: 128 kg (283 lb)   Height: 195.6 cm (77\")  Comment: patient reported      Body mass index is 33.56 kg/m².    Physical Exam  Vitals reviewed.   Constitutional:       General: He is not in acute distress.     Appearance: He is obese.   HENT:      Head: Normocephalic and atraumatic.   Eyes:      General: Lids are normal.      Conjunctiva/sclera: Conjunctivae normal.      Pupils: Pupils are equal, round, and reactive to light.   Cardiovascular:      Rate and Rhythm: Normal rate and regular rhythm.      Heart sounds: S1 normal and S2 normal. No murmur heard.  Pulmonary:      Effort: Pulmonary effort is normal. No respiratory distress.      Breath sounds: Examination of the right-middle field reveals decreased breath sounds. Examination of the left-middle field reveals decreased breath sounds. Examination of the right-lower field reveals decreased breath sounds. Examination of the left-lower field reveals decreased breath sounds. Decreased breath sounds present. No wheezing, rhonchi or rales.      Comments: Left Pleurx catheter dressing clean dry and intact.  Catheter appears intact " without fluid in the collection tube.  Wearing supplemental oxygen 2 L  Musculoskeletal:         General: Normal range of motion.      Cervical back: Normal range of motion and neck supple.   Lymphadenopathy:      Upper Body:      Right upper body: No supraclavicular or axillary adenopathy.      Left upper body: No supraclavicular or axillary adenopathy.   Skin:     General: Skin is warm and dry.      Capillary Refill: Capillary refill takes less than 2 seconds.   Neurological:      General: No focal deficit present.      Mental Status: He is alert and oriented to person, place, and time.   Psychiatric:         Attention and Perception: Attention normal.         Mood and Affect: Mood normal.         Speech: Speech normal.         Behavior: Behavior is cooperative.         Imaging/Labs:    XR Chest 2 View: 5/30/2023  Impression: Worsening of bilateral airspace disease. Small left pleural effusion with left Pleurx catheter in place. Electronically Signed: Can Hernandez  5/30/2023 4:35 PM EDT  Workstation ID: [id number]    CT Angiogram Chest Pulmonary Embolism  Result Date: 5/10/2023  1. No evidence of a pulmonary embolus 2. Moderate left and small right pleural effusion 3. Dense areas of consolidation again noted bilaterally but similar to the prior study.  This report was finalized on 5/10/2023 1:06 PM by Dr. Jamie Smith MD.      Tissue Pathology Exam: [id number]  Order: [id number]   Status: Final result      Visible to patient: Yes (seen)      Next appt: Today at 02:30 PM in Cardiothoracic Surgery (Clalie Dhaliwal, APRN)      Dx: Lung mass     Specimen Information: A: Lymph Node; Tissue    B: Lymph Node; Tissue    D: Lung, Left Lower Lobe; Tissue    0 Result Notes      Component    Case Report   Surgical Pathology Report                         Case: [id number]                                   Authorizing Provider:  Kota Willard MD      Collected:           04/24/2023 09:25 AM           Ordering Location:      [facility]   Received:            04/24/2023 10:50 AM                                  ENDO SUITES                                                                   Pathologist:           Obey Elliott MD                                                         Specimens:   1) - Lymph Node, STATION 10R TBNA                                                                    2) - Lymph Node, STATION 11L TBNA                                                                    3) - Lung, Left Lower Lobe, LLL BX                                                         Clinical Information    Lung mass   Final Diagnosis   1.  LYMPH NODE, STATION 10 R, TRANSBRONCHIAL NEEDLE ASPIRATION:  Fragments of bronchial wall including cartilage and benign peribronchial glands.  No lymphoid tissue identified.  Negative for tumor and granuloma.    2.  LYMPH NODE, STATION 10 L, TRANSBRONCHIAL NEEDLE ASPIRATION:  Minute fragment of anthracotic/fibrotic tissue.  No tumor or granuloma identified.  No lymphoid tissue identified.    3.  LUNG, LEFT LOWER LOBE, BIOPSY:  Fibrosis and anthracosis with no tumor identified.  Portions of benign bronchial tissue.  No tumor or granuloma identified.   Electronically signed by Obey Elliott MD on 4/25/2023 at 1511      Respiratory Culture - Wash, Bronchus (04/24/2023 10:04)  Fungus Smear - Wash, Bronchus (04/24/2023 10:04)  AFB Culture - Wash, Bronchus (04/24/2023 10:04)  AFB Culture - Wash, Bronchus (04/24/2023 10:04)  Fungus Culture - Wash, Bronchus (04/24/2023 10:04)  NON-GYN CYTOLOGY, [facility] (KADY,COR,MAD,GLORIA) (04/24/2023 09:43)      Assessment / Plan      Assessment / Plan:  1. Coal workers pneumoconiosis (Primary)  2. Recurrent left pleural effusion  - 63 y.o. male lifetime non-smoker s/p inpatient evaluation per Dr. Solis in regards to recurrent large left pleural effusion on 4/21/2023. - PMH: HTN, type 2 diabetes (hemoglobin A1c 6.3%), coal workers pneumoconiosis on  home O2 and recurrent left pleural effusion.    - Presented to [facility] and admitted on 4/20/2022 for worsening shortness of breath at rest and JAUREGUI.  Prior to admission pulmonology had referred patient to Dr. Solis but he had not yet attended his clinic consultation.   - Hx repeated thoracentesis at [facility] over the past several months.  He experienced an acute increase in his dyspnea as well as lower extremity edema and tachycardia which prompted him to come to ER.    - During the course of his admission 4/20/2023 - 4/27/23, a large 4.5 cm left lower lobe mass was identified.  Patient underwent bronchoscopy with endobronchial ultrasound-guided biopsy 4/24/2023: Biopsy results negative for malignancy and cultures no growth to date.   - By the end of the hospitalization, patient related he was inclined to undergo Pleurx catheter placement on an outpatient basis and consider referral to  lung transplant clinic for evaluation of his advanced coal workers pneumoconiosis with extensive progressive masslike fibrosis or palliative care.  - On 5/10/2023, patient presented to [facility] with progressive dyspnea and recurrent left pleural effusion.  A Pleurx catheter was placed by Dr. Brigitte Kolb on 5/11/23.   - Patient accompanied by daughter: states he begins to feel dyspnea on exertion the day before Pleurx cath is drained weekly.  Both state weekly drainage is approximately 1000 mL.  -Recommend draining Pleurx catheter twice weekly  -Follow-up with [facility] as scheduled 6/7/2023  -Follow-up with Cardiology 7/5/2023 as scheduled  -Patient is still considering referral to  lung transplant program.  He states he will discuss with his pulmonologist  -Patient/daughter have good understanding of care and operation of the Pleurx catheter.  -May follow-up with Dr. Solis PRBARRINGTON      Patient Education: Continue twice weekly drainage of Pleurx catheter with sterile dressing change each drainage  episode      Follow Up:   Return PRN @ request of pulmonology.   Or sooner for any further concerns or worsening sign and symptoms. If unable to reach us in the office please dial 911 or go to the nearest emergency department.      DILMA Mckoy  [facility] Cardiothoracic Surgery    Time Spent: I spent 36 minutes caring for Rory on this date of service. This time includes time spent by me in the following activities: preparing for the visit, reviewing tests, obtaining and/or reviewing a separately obtained history, performing a medically appropriate examination and/or evaluation, counseling and educating the patient/family/caregiver, documenting information in the medical record, independently interpreting results and communicating that information with the patient/family/caregiver and care coordination.

## 2023-06-01 ENCOUNTER — READMISSION MANAGEMENT (OUTPATIENT)
Dept: CALL CENTER | Facility: HOSPITAL | Age: 63
End: 2023-06-01

## 2023-06-01 NOTE — OUTREACH NOTE
Medical Week 3 Survey    Flowsheet Row Responses   [facility] facility patient discharged from? [facility]   Does the patient have one of the following disease processes/diagnoses(primary or secondary)? Other   Week 3 attempt successful? No   Unsuccessful attempts Attempt 1          Victoria FREEMAN - Registered Nurse

## 2023-06-05 LAB
FUNGUS WND CULT: NORMAL
MYCOBACTERIUM SPEC CULT: NORMAL
NIGHT BLUE STAIN TISS: NORMAL

## 2023-08-30 ENCOUNTER — TRANSCRIBE ORDERS (OUTPATIENT)
Dept: ADMINISTRATIVE | Facility: HOSPITAL | Age: 63
End: 2023-08-30
Payer: MEDICARE

## 2023-08-30 DIAGNOSIS — R93.89 ABNORMAL RADIOLOGICAL FINDINGS IN SKIN AND SUBCUTANEOUS TISSUE: Primary | ICD-10-CM

## 2023-09-15 ENCOUNTER — HOSPITAL ENCOUNTER (OUTPATIENT)
Dept: CT IMAGING | Facility: HOSPITAL | Age: 63
Discharge: HOME | End: 2023-09-15
Admitting: SURGERY
Payer: OTHER MISCELLANEOUS

## 2023-09-15 DIAGNOSIS — R93.89 ABNORMAL RADIOLOGICAL FINDINGS IN SKIN AND SUBCUTANEOUS TISSUE: ICD-10-CM

## 2023-09-15 PROCEDURE — 71250 CT THORAX DX C-: CPT

## 2023-09-22 ENCOUNTER — TRANSCRIBE ORDERS (OUTPATIENT)
Dept: ADMINISTRATIVE | Facility: HOSPITAL | Age: 63
End: 2023-09-22
Payer: MEDICARE

## 2023-09-22 DIAGNOSIS — R91.1 LUNG NODULE: Primary | ICD-10-CM

## 2023-09-26 ENCOUNTER — TRANSCRIBE ORDERS (OUTPATIENT)
Dept: ADMINISTRATIVE | Facility: HOSPITAL | Age: 63
End: 2023-09-26
Payer: MEDICARE

## 2023-09-26 DIAGNOSIS — R91.8 LUNG NODULES: Primary | ICD-10-CM

## 2023-09-27 ENCOUNTER — HOSPITAL ENCOUNTER (OUTPATIENT)
Dept: PET IMAGING | Facility: HOSPITAL | Age: 63
Discharge: HOME | End: 2023-09-27
Admitting: THORACIC SURGERY (CARDIOTHORACIC VASCULAR SURGERY)
Payer: MEDICARE

## 2023-09-27 DIAGNOSIS — R91.8 LUNG NODULES: ICD-10-CM

## 2023-09-27 PROCEDURE — A9552 F18 FDG: HCPCS | Performed by: THORACIC SURGERY (CARDIOTHORACIC VASCULAR SURGERY)

## 2023-09-27 PROCEDURE — 78815 PET IMAGE W/CT SKULL-THIGH: CPT

## 2023-09-27 PROCEDURE — 0 FLUDEOXYGLUCOSE F18 SOLUTION: Performed by: THORACIC SURGERY (CARDIOTHORACIC VASCULAR SURGERY)

## 2023-09-27 RX ADMIN — FLUDEOXYGLUCOSE F18 1 DOSE: 300 INJECTION INTRAVENOUS at 11:12

## 2024-03-14 ENCOUNTER — LAB (OUTPATIENT)
Dept: LAB | Facility: HOSPITAL | Age: 64
End: 2024-03-14
Payer: MEDICARE

## 2024-03-14 ENCOUNTER — TRANSCRIBE ORDERS (OUTPATIENT)
Dept: ADMINISTRATIVE | Facility: HOSPITAL | Age: 64
End: 2024-03-14
Payer: MEDICARE

## 2024-03-14 DIAGNOSIS — J60 COAL WORKERS' PNEUMOCONIOSIS: ICD-10-CM

## 2024-03-14 DIAGNOSIS — J62.8 PNEUMOCONIOSIS DUE TO SILICA: ICD-10-CM

## 2024-03-14 DIAGNOSIS — E55.9 VITAMIN D DEFICIENCY, UNSPECIFIED: Primary | ICD-10-CM

## 2024-03-14 DIAGNOSIS — J90 PLEURAL EFFUSION: ICD-10-CM

## 2024-03-14 DIAGNOSIS — E55.9 VITAMIN D DEFICIENCY, UNSPECIFIED: ICD-10-CM

## 2024-03-14 DIAGNOSIS — E11.9 DIABETES MELLITUS WITHOUT COMPLICATION: ICD-10-CM

## 2024-03-14 DIAGNOSIS — J84.10 POSTINFLAMMATORY PULMONARY FIBROSIS: ICD-10-CM

## 2024-03-14 DIAGNOSIS — I10 ESSENTIAL HYPERTENSION, MALIGNANT: ICD-10-CM

## 2024-03-14 LAB
APPEARANCE FLD: CLEAR
COLOR FLD: YELLOW
LYMPHOCYTES NFR FLD MANUAL: 67 %
MONOS+MACROS NFR FLD: 15 %
NEUTROPHILS NFR FLD MANUAL: 18 %
NUC CELL # FLD: 1144 /MM3

## 2024-03-14 PROCEDURE — 84550 ASSAY OF BLOOD/URIC ACID: CPT

## 2024-03-14 PROCEDURE — 86200 CCP ANTIBODY: CPT

## 2024-03-14 PROCEDURE — 85652 RBC SED RATE AUTOMATED: CPT

## 2024-03-14 PROCEDURE — 86431 RHEUMATOID FACTOR QUANT: CPT

## 2024-03-14 PROCEDURE — 36415 COLL VENOUS BLD VENIPUNCTURE: CPT

## 2024-03-14 PROCEDURE — 86235 NUCLEAR ANTIGEN ANTIBODY: CPT

## 2024-03-14 PROCEDURE — 83615 LACTATE (LD) (LDH) ENZYME: CPT

## 2024-03-14 PROCEDURE — 87186 SC STD MICRODIL/AGAR DIL: CPT

## 2024-03-14 PROCEDURE — 86225 DNA ANTIBODY NATIVE: CPT

## 2024-03-14 PROCEDURE — 86140 C-REACTIVE PROTEIN: CPT

## 2024-03-14 PROCEDURE — 89051 BODY FLUID CELL COUNT: CPT

## 2024-03-14 PROCEDURE — 87205 SMEAR GRAM STAIN: CPT

## 2024-03-14 PROCEDURE — 85025 COMPLETE CBC W/AUTO DIFF WBC: CPT

## 2024-03-14 PROCEDURE — 80053 COMPREHEN METABOLIC PANEL: CPT

## 2024-03-14 PROCEDURE — 84157 ASSAY OF PROTEIN OTHER: CPT

## 2024-03-14 PROCEDURE — 87070 CULTURE OTHR SPECIMN AEROBIC: CPT

## 2024-03-14 PROCEDURE — 80061 LIPID PANEL: CPT

## 2024-03-14 PROCEDURE — 87077 CULTURE AEROBIC IDENTIFY: CPT

## 2024-03-14 PROCEDURE — 87075 CULTR BACTERIA EXCEPT BLOOD: CPT

## 2024-03-14 PROCEDURE — 87015 SPECIMEN INFECT AGNT CONCNTJ: CPT

## 2024-03-15 LAB
ALBUMIN SERPL-MCNC: 3.5 G/DL (ref 3.5–5.2)
ALBUMIN/GLOB SERPL: 1.1 G/DL
ALP SERPL-CCNC: 87 U/L (ref 39–117)
ALT SERPL W P-5'-P-CCNC: 25 U/L (ref 1–41)
ANION GAP SERPL CALCULATED.3IONS-SCNC: 13.8 MMOL/L (ref 5–15)
AST SERPL-CCNC: 21 U/L (ref 1–40)
BASOPHILS # BLD AUTO: 0.06 10*3/MM3 (ref 0–0.2)
BASOPHILS NFR BLD AUTO: 0.6 % (ref 0–1.5)
BILIRUB SERPL-MCNC: 0.2 MG/DL (ref 0–1.2)
BUN SERPL-MCNC: 15 MG/DL (ref 8–23)
BUN/CREAT SERPL: 14.3 (ref 7–25)
CALCIUM SPEC-SCNC: 10 MG/DL (ref 8.6–10.5)
CHLORIDE SERPL-SCNC: 106 MMOL/L (ref 98–107)
CHOLEST SERPL-MCNC: 178 MG/DL (ref 0–200)
CHROMATIN AB SERPL-ACNC: <10 IU/ML (ref 0–14)
CO2 SERPL-SCNC: 20.2 MMOL/L (ref 22–29)
CREAT SERPL-MCNC: 1.05 MG/DL (ref 0.76–1.27)
CRP SERPL-MCNC: 2.55 MG/DL (ref 0–0.5)
DEPRECATED RDW RBC AUTO: 40.5 FL (ref 37–54)
EGFRCR SERPLBLD CKD-EPI 2021: 78.3 ML/MIN/1.73
EOSINOPHIL # BLD AUTO: 0.29 10*3/MM3 (ref 0–0.4)
EOSINOPHIL NFR BLD AUTO: 2.8 % (ref 0.3–6.2)
ERYTHROCYTE [DISTWIDTH] IN BLOOD BY AUTOMATED COUNT: 14.6 % (ref 12.3–15.4)
ERYTHROCYTE [SEDIMENTATION RATE] IN BLOOD: 49 MM/HR (ref 0–20)
GLOBULIN UR ELPH-MCNC: 3.1 GM/DL
GLUCOSE SERPL-MCNC: 113 MG/DL (ref 65–99)
HCT VFR BLD AUTO: 45.7 % (ref 37.5–51)
HDLC SERPL-MCNC: 31 MG/DL (ref 40–60)
HGB BLD-MCNC: 14 G/DL (ref 13–17.7)
IMM GRANULOCYTES # BLD AUTO: 0.04 10*3/MM3 (ref 0–0.05)
IMM GRANULOCYTES NFR BLD AUTO: 0.4 % (ref 0–0.5)
LDH FLD-CCNC: 180 U/L
LDH SERPL-CCNC: 265 U/L (ref 135–225)
LDLC SERPL CALC-MCNC: 122 MG/DL (ref 0–100)
LDLC/HDLC SERPL: 3.85 {RATIO}
LYMPHOCYTES # BLD AUTO: 1.71 10*3/MM3 (ref 0.7–3.1)
LYMPHOCYTES NFR BLD AUTO: 16.7 % (ref 19.6–45.3)
MCH RBC QN AUTO: 24 PG (ref 26.6–33)
MCHC RBC AUTO-ENTMCNC: 30.6 G/DL (ref 31.5–35.7)
MCV RBC AUTO: 78.3 FL (ref 79–97)
MONOCYTES # BLD AUTO: 0.91 10*3/MM3 (ref 0.1–0.9)
MONOCYTES NFR BLD AUTO: 8.9 % (ref 5–12)
NEUTROPHILS NFR BLD AUTO: 7.24 10*3/MM3 (ref 1.7–7)
NEUTROPHILS NFR BLD AUTO: 70.6 % (ref 42.7–76)
NRBC BLD AUTO-RTO: 0 /100 WBC (ref 0–0.2)
PLATELET # BLD AUTO: 296 10*3/MM3 (ref 140–450)
PMV BLD AUTO: 9.8 FL (ref 6–12)
POTASSIUM SERPL-SCNC: 4.4 MMOL/L (ref 3.5–5.2)
PROT FLD-MCNC: 3.1 G/DL
PROT SERPL-MCNC: 6.6 G/DL (ref 6–8.5)
RBC # BLD AUTO: 5.84 10*6/MM3 (ref 4.14–5.8)
SODIUM SERPL-SCNC: 140 MMOL/L (ref 136–145)
TRIGL SERPL-MCNC: 138 MG/DL (ref 0–150)
URATE SERPL-MCNC: 7.5 MG/DL (ref 3.4–7)
VLDLC SERPL-MCNC: 25 MG/DL (ref 5–40)
WBC NRBC COR # BLD AUTO: 10.25 10*3/MM3 (ref 3.4–10.8)

## 2024-03-16 LAB — CCP IGA+IGG SERPL IA-ACNC: 1 UNITS (ref 0–19)

## 2024-03-18 LAB
CENTROMERE B AB SER-ACNC: <0.2 AI (ref 0–0.9)
CHROMATIN AB SERPL-ACNC: <0.2 AI (ref 0–0.9)
DSDNA AB SER-ACNC: 10 IU/ML (ref 0–9)
ENA JO1 AB SER-ACNC: <0.2 AI (ref 0–0.9)
ENA RNP AB SER-ACNC: <0.2 AI (ref 0–0.9)
ENA SCL70 AB SER-ACNC: <0.2 AI (ref 0–0.9)
ENA SM AB SER-ACNC: <0.2 AI (ref 0–0.9)
ENA SS-A AB SER-ACNC: <0.2 AI (ref 0–0.9)
ENA SS-B AB SER-ACNC: <0.2 AI (ref 0–0.9)
Lab: ABNORMAL

## 2024-03-19 LAB
BACTERIA FLD CULT: ABNORMAL
GRAM STN SPEC: ABNORMAL

## 2024-03-20 LAB — BACTERIA SPEC ANAEROBE CULT: NORMAL

## 2024-04-15 ENCOUNTER — TRANSCRIBE ORDERS (OUTPATIENT)
Dept: ADMINISTRATIVE | Facility: HOSPITAL | Age: 64
End: 2024-04-15
Payer: MEDICARE

## 2024-04-15 DIAGNOSIS — J90 EXUDATIVE PLEURISY: ICD-10-CM

## 2024-04-15 DIAGNOSIS — J84.10 POSTINFLAMMATORY PULMONARY FIBROSIS: Primary | ICD-10-CM

## 2024-04-18 ENCOUNTER — HOSPITAL ENCOUNTER (EMERGENCY)
Facility: HOSPITAL | Age: 64
Discharge: HOME | End: 2024-04-18
Attending: STUDENT IN AN ORGANIZED HEALTH CARE EDUCATION/TRAINING PROGRAM
Payer: MEDICARE

## 2024-04-18 ENCOUNTER — APPOINTMENT (OUTPATIENT)
Dept: GENERAL RADIOLOGY | Facility: HOSPITAL | Age: 64
End: 2024-04-18
Payer: MEDICARE

## 2024-04-18 ENCOUNTER — APPOINTMENT (OUTPATIENT)
Dept: CT IMAGING | Facility: HOSPITAL | Age: 64
End: 2024-04-18
Payer: MEDICARE

## 2024-04-18 VITALS
TEMPERATURE: 97.6 F | DIASTOLIC BLOOD PRESSURE: 78 MMHG | BODY MASS INDEX: 33.89 KG/M2 | OXYGEN SATURATION: 95 % | WEIGHT: 287 LBS | HEART RATE: 95 BPM | SYSTOLIC BLOOD PRESSURE: 128 MMHG | RESPIRATION RATE: 20 BRPM | HEIGHT: 77 IN

## 2024-04-18 DIAGNOSIS — J44.1 COPD EXACERBATION: Primary | ICD-10-CM

## 2024-04-18 DIAGNOSIS — J90 CHRONIC PLEURAL EFFUSION: ICD-10-CM

## 2024-04-18 LAB
A-A DO2: 68.3 MMHG (ref 0–300)
ALBUMIN SERPL-MCNC: 3.1 G/DL (ref 3.5–5.2)
ALBUMIN/GLOB SERPL: 1.5 G/DL
ALP SERPL-CCNC: 72 U/L (ref 39–117)
ALT SERPL W P-5'-P-CCNC: 17 U/L (ref 1–41)
ANION GAP SERPL CALCULATED.3IONS-SCNC: 11.4 MMOL/L (ref 5–15)
APTT PPP: 31.9 SECONDS (ref 26.5–34.5)
ARTERIAL PATENCY WRIST A: ABNORMAL
AST SERPL-CCNC: 14 U/L (ref 1–40)
ATMOSPHERIC PRESS: 729 MMHG
BASE EXCESS BLDA CALC-SCNC: 3.4 MMOL/L (ref 0–2)
BASOPHILS # BLD AUTO: 0.06 10*3/MM3 (ref 0–0.2)
BASOPHILS NFR BLD AUTO: 0.7 % (ref 0–1.5)
BDY SITE: ABNORMAL
BILIRUB SERPL-MCNC: <0.2 MG/DL (ref 0–1.2)
BILIRUB UR QL STRIP: NEGATIVE
BUN SERPL-MCNC: 9 MG/DL (ref 8–23)
BUN/CREAT SERPL: 13.6 (ref 7–25)
CALCIUM SPEC-SCNC: 7.7 MG/DL (ref 8.6–10.5)
CHLORIDE SERPL-SCNC: 109 MMOL/L (ref 98–107)
CLARITY UR: ABNORMAL
CO2 BLDA-SCNC: 29.5 MMOL/L (ref 22–33)
CO2 SERPL-SCNC: 21.6 MMOL/L (ref 22–29)
COHGB MFR BLD: 1.2 % (ref 0–5)
COLOR UR: ABNORMAL
CREAT SERPL-MCNC: 0.66 MG/DL (ref 0.76–1.27)
CRP SERPL-MCNC: 5.75 MG/DL (ref 0–0.5)
D-LACTATE SERPL-SCNC: 2 MMOL/L (ref 0.5–2)
DEPRECATED RDW RBC AUTO: 43.7 FL (ref 37–54)
EGFRCR SERPLBLD CKD-EPI 2021: 103.4 ML/MIN/1.73
EOSINOPHIL # BLD AUTO: 0.24 10*3/MM3 (ref 0–0.4)
EOSINOPHIL NFR BLD AUTO: 2.6 % (ref 0.3–6.2)
ERYTHROCYTE [DISTWIDTH] IN BLOOD BY AUTOMATED COUNT: 15.1 % (ref 12.3–15.4)
FLUAV RNA RESP QL NAA+PROBE: NOT DETECTED
FLUBV RNA RESP QL NAA+PROBE: NOT DETECTED
GAS FLOW AIRWAY: 2 LPM
GEN 5 2HR TROPONIN T REFLEX: 20 NG/L
GLOBULIN UR ELPH-MCNC: 2.1 GM/DL
GLUCOSE SERPL-MCNC: 132 MG/DL (ref 65–99)
GLUCOSE UR STRIP-MCNC: NEGATIVE MG/DL
HCO3 BLDA-SCNC: 28.2 MMOL/L (ref 20–26)
HCT VFR BLD AUTO: 42 % (ref 37.5–51)
HCT VFR BLD CALC: 40.2 % (ref 38–51)
HGB BLD-MCNC: 12.8 G/DL (ref 13–17.7)
HGB BLDA-MCNC: 13.1 G/DL (ref 14–18)
HGB UR QL STRIP.AUTO: NEGATIVE
HOLD SPECIMEN: NORMAL
HOLD SPECIMEN: NORMAL
IMM GRANULOCYTES # BLD AUTO: 0.11 10*3/MM3 (ref 0–0.05)
IMM GRANULOCYTES NFR BLD AUTO: 1.2 % (ref 0–0.5)
INHALED O2 CONCENTRATION: 28 %
INR PPP: 1 (ref 0.9–1.1)
KETONES UR QL STRIP: NEGATIVE
LEUKOCYTE ESTERASE UR QL STRIP.AUTO: NEGATIVE
LYMPHOCYTES # BLD AUTO: 1.54 10*3/MM3 (ref 0.7–3.1)
LYMPHOCYTES NFR BLD AUTO: 16.8 % (ref 19.6–45.3)
Lab: ABNORMAL
MAGNESIUM SERPL-MCNC: 1.5 MG/DL (ref 1.6–2.4)
MCH RBC QN AUTO: 24.7 PG (ref 26.6–33)
MCHC RBC AUTO-ENTMCNC: 30.5 G/DL (ref 31.5–35.7)
MCV RBC AUTO: 80.9 FL (ref 79–97)
METHGB BLD QL: 0 % (ref 0–3)
MODALITY: ABNORMAL
MONOCYTES # BLD AUTO: 0.93 10*3/MM3 (ref 0.1–0.9)
MONOCYTES NFR BLD AUTO: 10.1 % (ref 5–12)
NEUTROPHILS NFR BLD AUTO: 6.29 10*3/MM3 (ref 1.7–7)
NEUTROPHILS NFR BLD AUTO: 68.6 % (ref 42.7–76)
NITRITE UR QL STRIP: NEGATIVE
NRBC BLD AUTO-RTO: 0 /100 WBC (ref 0–0.2)
NT-PROBNP SERPL-MCNC: 147 PG/ML (ref 0–900)
OXYHGB MFR BLDV: 94.7 % (ref 94–99)
PCO2 BLDA: 42.5 MM HG (ref 35–45)
PCO2 TEMP ADJ BLD: ABNORMAL MM[HG]
PH BLDA: 7.43 PH UNITS (ref 7.35–7.45)
PH UR STRIP.AUTO: 5.5 [PH] (ref 5–8)
PH, TEMP CORRECTED: ABNORMAL
PHOSPHATE SERPL-MCNC: 1.9 MG/DL (ref 2.5–4.5)
PLATELET # BLD AUTO: 407 10*3/MM3 (ref 140–450)
PMV BLD AUTO: 8.6 FL (ref 6–12)
PO2 BLDA: 75.2 MM HG (ref 83–108)
PO2 TEMP ADJ BLD: ABNORMAL MM[HG]
POTASSIUM SERPL-SCNC: 3 MMOL/L (ref 3.5–5.2)
PROT SERPL-MCNC: 5.2 G/DL (ref 6–8.5)
PROT UR QL STRIP: NEGATIVE
PROTHROMBIN TIME: 13.7 SECONDS (ref 12.1–14.7)
QT INTERVAL: 318 MS
QTC INTERVAL: 414 MS
RBC # BLD AUTO: 5.19 10*6/MM3 (ref 4.14–5.8)
SAO2 % BLDCOA: 95.9 % (ref 94–99)
SARS-COV-2 RNA RESP QL NAA+PROBE: NOT DETECTED
SODIUM SERPL-SCNC: 142 MMOL/L (ref 136–145)
SP GR UR STRIP: 1.02 (ref 1–1.03)
TROPONIN T DELTA: 4 NG/L
TROPONIN T SERPL HS-MCNC: 16 NG/L
UROBILINOGEN UR QL STRIP: ABNORMAL
VENTILATOR MODE: ABNORMAL
WBC NRBC COR # BLD AUTO: 9.17 10*3/MM3 (ref 3.4–10.8)
WHOLE BLOOD HOLD COAG: NORMAL
WHOLE BLOOD HOLD SPECIMEN: NORMAL

## 2024-04-18 PROCEDURE — 94799 UNLISTED PULMONARY SVC/PX: CPT

## 2024-04-18 PROCEDURE — 93005 ELECTROCARDIOGRAM TRACING: CPT | Performed by: STUDENT IN AN ORGANIZED HEALTH CARE EDUCATION/TRAINING PROGRAM

## 2024-04-18 PROCEDURE — 82805 BLOOD GASES W/O2 SATURATION: CPT

## 2024-04-18 PROCEDURE — 25010000002 METHYLPREDNISOLONE PER 125 MG: Performed by: STUDENT IN AN ORGANIZED HEALTH CARE EDUCATION/TRAINING PROGRAM

## 2024-04-18 PROCEDURE — 94640 AIRWAY INHALATION TREATMENT: CPT

## 2024-04-18 PROCEDURE — 87040 BLOOD CULTURE FOR BACTERIA: CPT | Performed by: STUDENT IN AN ORGANIZED HEALTH CARE EDUCATION/TRAINING PROGRAM

## 2024-04-18 PROCEDURE — 93010 ELECTROCARDIOGRAM REPORT: CPT | Performed by: INTERNAL MEDICINE

## 2024-04-18 PROCEDURE — 83880 ASSAY OF NATRIURETIC PEPTIDE: CPT | Performed by: STUDENT IN AN ORGANIZED HEALTH CARE EDUCATION/TRAINING PROGRAM

## 2024-04-18 PROCEDURE — 71045 X-RAY EXAM CHEST 1 VIEW: CPT | Performed by: RADIOLOGY

## 2024-04-18 PROCEDURE — 84484 ASSAY OF TROPONIN QUANT: CPT | Performed by: STUDENT IN AN ORGANIZED HEALTH CARE EDUCATION/TRAINING PROGRAM

## 2024-04-18 PROCEDURE — 80053 COMPREHEN METABOLIC PANEL: CPT | Performed by: STUDENT IN AN ORGANIZED HEALTH CARE EDUCATION/TRAINING PROGRAM

## 2024-04-18 PROCEDURE — 87636 SARSCOV2 & INF A&B AMP PRB: CPT | Performed by: STUDENT IN AN ORGANIZED HEALTH CARE EDUCATION/TRAINING PROGRAM

## 2024-04-18 PROCEDURE — 83605 ASSAY OF LACTIC ACID: CPT | Performed by: STUDENT IN AN ORGANIZED HEALTH CARE EDUCATION/TRAINING PROGRAM

## 2024-04-18 PROCEDURE — 71045 X-RAY EXAM CHEST 1 VIEW: CPT

## 2024-04-18 PROCEDURE — 83050 HGB METHEMOGLOBIN QUAN: CPT

## 2024-04-18 PROCEDURE — 82375 ASSAY CARBOXYHB QUANT: CPT

## 2024-04-18 PROCEDURE — 71250 CT THORAX DX C-: CPT | Performed by: RADIOLOGY

## 2024-04-18 PROCEDURE — 25010000002 CEFTRIAXONE PER 250 MG: Performed by: STUDENT IN AN ORGANIZED HEALTH CARE EDUCATION/TRAINING PROGRAM

## 2024-04-18 PROCEDURE — 96365 THER/PROPH/DIAG IV INF INIT: CPT

## 2024-04-18 PROCEDURE — 99284 EMERGENCY DEPT VISIT MOD MDM: CPT

## 2024-04-18 PROCEDURE — 81003 URINALYSIS AUTO W/O SCOPE: CPT | Performed by: STUDENT IN AN ORGANIZED HEALTH CARE EDUCATION/TRAINING PROGRAM

## 2024-04-18 PROCEDURE — 96367 TX/PROPH/DG ADDL SEQ IV INF: CPT

## 2024-04-18 PROCEDURE — 86140 C-REACTIVE PROTEIN: CPT | Performed by: STUDENT IN AN ORGANIZED HEALTH CARE EDUCATION/TRAINING PROGRAM

## 2024-04-18 PROCEDURE — P9612 CATHETERIZE FOR URINE SPEC: HCPCS

## 2024-04-18 PROCEDURE — 71250 CT THORAX DX C-: CPT

## 2024-04-18 PROCEDURE — 25010000002 MAGNESIUM SULFATE 2 GM/50ML SOLUTION: Performed by: STUDENT IN AN ORGANIZED HEALTH CARE EDUCATION/TRAINING PROGRAM

## 2024-04-18 PROCEDURE — 85730 THROMBOPLASTIN TIME PARTIAL: CPT | Performed by: STUDENT IN AN ORGANIZED HEALTH CARE EDUCATION/TRAINING PROGRAM

## 2024-04-18 PROCEDURE — 85025 COMPLETE CBC W/AUTO DIFF WBC: CPT | Performed by: STUDENT IN AN ORGANIZED HEALTH CARE EDUCATION/TRAINING PROGRAM

## 2024-04-18 PROCEDURE — 96375 TX/PRO/DX INJ NEW DRUG ADDON: CPT

## 2024-04-18 PROCEDURE — 25810000003 SODIUM CHLORIDE 0.9 % SOLUTION: Performed by: STUDENT IN AN ORGANIZED HEALTH CARE EDUCATION/TRAINING PROGRAM

## 2024-04-18 PROCEDURE — 36415 COLL VENOUS BLD VENIPUNCTURE: CPT

## 2024-04-18 PROCEDURE — 83735 ASSAY OF MAGNESIUM: CPT | Performed by: STUDENT IN AN ORGANIZED HEALTH CARE EDUCATION/TRAINING PROGRAM

## 2024-04-18 PROCEDURE — 96366 THER/PROPH/DIAG IV INF ADDON: CPT

## 2024-04-18 PROCEDURE — 36600 WITHDRAWAL OF ARTERIAL BLOOD: CPT

## 2024-04-18 PROCEDURE — 85610 PROTHROMBIN TIME: CPT | Performed by: STUDENT IN AN ORGANIZED HEALTH CARE EDUCATION/TRAINING PROGRAM

## 2024-04-18 PROCEDURE — 84100 ASSAY OF PHOSPHORUS: CPT | Performed by: STUDENT IN AN ORGANIZED HEALTH CARE EDUCATION/TRAINING PROGRAM

## 2024-04-18 RX ORDER — DOXYCYCLINE 100 MG/1
100 CAPSULE ORAL 2 TIMES DAILY
Qty: 13 CAPSULE | Refills: 0 | Status: SHIPPED | OUTPATIENT
Start: 2024-04-18

## 2024-04-18 RX ORDER — PREDNISONE 50 MG/1
50 TABLET ORAL DAILY
Qty: 5 TABLET | Refills: 0 | Status: SHIPPED | OUTPATIENT
Start: 2024-04-18

## 2024-04-18 RX ORDER — POTASSIUM CHLORIDE 20 MEQ/1
20 TABLET, EXTENDED RELEASE ORAL ONCE
Status: COMPLETED | OUTPATIENT
Start: 2024-04-18 | End: 2024-04-18

## 2024-04-18 RX ORDER — POTASSIUM CHLORIDE 1.5 G/1.58G
40 POWDER, FOR SOLUTION ORAL ONCE
Status: COMPLETED | OUTPATIENT
Start: 2024-04-18 | End: 2024-04-18

## 2024-04-18 RX ORDER — SODIUM CHLORIDE 0.9 % (FLUSH) 0.9 %
10 SYRINGE (ML) INJECTION AS NEEDED
Status: DISCONTINUED | OUTPATIENT
Start: 2024-04-18 | End: 2024-04-18 | Stop reason: HOSPADM

## 2024-04-18 RX ORDER — METHYLPREDNISOLONE SODIUM SUCCINATE 125 MG/2ML
125 INJECTION, POWDER, LYOPHILIZED, FOR SOLUTION INTRAMUSCULAR; INTRAVENOUS ONCE
Status: COMPLETED | OUTPATIENT
Start: 2024-04-18 | End: 2024-04-18

## 2024-04-18 RX ORDER — MAGNESIUM SULFATE HEPTAHYDRATE 40 MG/ML
2 INJECTION, SOLUTION INTRAVENOUS ONCE
Status: COMPLETED | OUTPATIENT
Start: 2024-04-18 | End: 2024-04-18

## 2024-04-18 RX ORDER — FENTANYL/ROPIVACAINE/NS/PF 2-625MCG/1
15 PLASTIC BAG, INJECTION (ML) EPIDURAL ONCE
Status: COMPLETED | OUTPATIENT
Start: 2024-04-18 | End: 2024-04-18

## 2024-04-18 RX ORDER — IPRATROPIUM BROMIDE AND ALBUTEROL SULFATE 2.5; .5 MG/3ML; MG/3ML
3 SOLUTION RESPIRATORY (INHALATION) ONCE
Status: COMPLETED | OUTPATIENT
Start: 2024-04-18 | End: 2024-04-18

## 2024-04-18 RX ADMIN — IPRATROPIUM BROMIDE AND ALBUTEROL SULFATE 3 ML: 2.5; .5 SOLUTION RESPIRATORY (INHALATION) at 09:01

## 2024-04-18 RX ADMIN — MAGNESIUM SULFATE HEPTAHYDRATE 2 G: 40 INJECTION, SOLUTION INTRAVENOUS at 10:19

## 2024-04-18 RX ADMIN — METHYLPREDNISOLONE SODIUM SUCCINATE 125 MG: 125 INJECTION, POWDER, FOR SOLUTION INTRAMUSCULAR; INTRAVENOUS at 09:05

## 2024-04-18 RX ADMIN — POTASSIUM CHLORIDE 20 MEQ: 1500 TABLET, EXTENDED RELEASE ORAL at 10:18

## 2024-04-18 RX ADMIN — POTASSIUM PHOSPHATE, MONOBASIC POTASSIUM PHOSPHATE, DIBASIC 15 MMOL: 224; 236 INJECTION, SOLUTION, CONCENTRATE INTRAVENOUS at 13:24

## 2024-04-18 RX ADMIN — SODIUM CHLORIDE 500 ML: 9 INJECTION, SOLUTION INTRAVENOUS at 08:57

## 2024-04-18 RX ADMIN — SODIUM CHLORIDE 2000 MG: 9 INJECTION, SOLUTION INTRAVENOUS at 08:59

## 2024-04-18 RX ADMIN — POTASSIUM CHLORIDE 40 MEQ: 1.5 POWDER, FOR SOLUTION ORAL at 10:18

## 2024-04-18 NOTE — CASE MANAGEMENT/SOCIAL WORK
Discharge Planning Assessment   [facility]     Patient Name: Rory Rios Jr.  MRN: [id number]  Today's Date: 4/18/2024    Admit Date: 4/18/2024    Plan: Spoke with patient at bedside. Patient lives alone and will return home at discharge. Patient has POA and Living Will on file. Patient uses straight cane, cpap, oxygen and nebulizer from "[employer]. Patient receives no HH. Patient PCP Alejandro Monique and pharmacy [place], [place]. Patient will drive self home at discharge.   Discharge Needs Assessment       Row Name 04/18/24 1217       Living Environment    People in Home alone    Potentially Unsafe Housing Conditions none    In the past 12 months has the electric, gas, oil, or water company threatened to shut off services in your home? No    Primary Care Provided by self    Provides Primary Care For no one    Family Caregiver if Needed child(andre), adult    Family Caregiver Names KATIE KENDRICK Daughter [phone]    Quality of Family Relationships helpful;involved;supportive    Able to Return to Prior Arrangements yes       Resource/Environmental Concerns    Resource/Environmental Concerns none    Transportation Concerns none       Transportation Needs    In the past 12 months, has lack of transportation kept you from medical appointments or from getting medications? no    In the past 12 months, has lack of transportation kept you from meetings, work, or from getting things needed for daily living? No       Food Insecurity    Within the past 12 months, you worried that your food would run out before you got the money to buy more. Never true    Within the past 12 months, the food you bought just didn't last and you didn't have money to get more. Never true       Transition Planning    Patient/Family Anticipates Transition to home    Patient/Family Anticipated Services at Transition none    Transportation Anticipated car, drives self       Discharge Needs Assessment    Readmission Within the  Last 30 Days no previous admission in last 30 days    Equipment Currently Used at Home cane, straight;cpap;nebulizer    Concerns to be Addressed discharge planning    Anticipated Changes Related to Illness none    Equipment Needed After Discharge none                   Discharge Plan       Row Name 04/18/24 1219       Plan    Plan Spoke with patient at bedside. Patient lives alone and will return home at discharge. Patient has POA and Living Will on file. Patient uses straight cane, cpap, oxygen and nebulizer from [employer]. Patient receives no HH. Patient PCP Alejandro Monique and pharmacy [place], [place]. Patient will drive self home at discharge.    Patient/Family in Agreement with Plan yes                     Edwige Arevalo

## 2024-04-18 NOTE — ED PROVIDER NOTES
Subjective   History of Present Illness  64-year-old male with a past medical history of coal workers pneumoconiosis, chronic left-sided pleural effusion with Pleurx catheter, diabetes, and hypertension presents to the ER due to concerns for increasing shortness of breath.  Patient.  Symptoms been present for the past 2 to 3 days.  Patient's family normally assist in draining approximately 1100 cc of pleural fluid every 2 to 3 days.  However, decreased pleural fluid production has been noted to approximately a few hundred cc.  Patient confirms left-sided rib pain in the area of his Pleurx catheter.  Cough.  No sputum production.  No hemoptysis.  Vital signs stable      Review of Systems   Respiratory:  Positive for cough and shortness of breath.    Cardiovascular:  Positive for chest pain.   All other systems reviewed and are negative.      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    Diabetes mellitus     GERD (gastroesophageal reflux disease)     Hypertension     Pleural effusion     Pneumonia     Pulmonary fibrosis 04/27/2023    Sleep apnea        No Known Allergies    Past Surgical History:   Procedure Laterality Date    BRONCHOSCOPY N/A 04/24/2023    Procedure: BRONCHOSCOPY WITH ENDOBRONCHIAL ULTRASOUND;  Surgeon: Kota Willard MD;  Location:  [facility];  Service: Pulmonary;  Laterality: N/A;    FOOT SURGERY Left     PLEURAL CATHETER INSERTION Left 5/11/2023    Procedure: PLEURX CATHETER INSERTION;  Surgeon: Brigitte Kolb MD;  Location:  [facility];  Service: General;  Laterality: Left;       Family History   Problem Relation Age of Onset    Diabetes Mother     Heart failure Father     Diabetes Sister     Heart disease Brother     Diabetes Brother        Social History     Socioeconomic History    Marital status:     Number of children: 1   Tobacco Use    Smoking status: Never     Passive exposure:  Never    Smokeless tobacco: Current     Types: Chew   Vaping Use    Vaping status: Never Used   Substance and Sexual Activity    Alcohol use: No    Drug use: No    Sexual activity: Defer           Objective   Physical Exam  Constitutional:       General: He is not in acute distress.     Appearance: He is well-developed. He is not ill-appearing.   HENT:      Head: Normocephalic and atraumatic.   Eyes:      Extraocular Movements: Extraocular movements intact.      Pupils: Pupils are equal, round, and reactive to light.   Neck:      Vascular: No JVD.   Cardiovascular:      Rate and Rhythm: Normal rate and regular rhythm.      Heart sounds: Normal heart sounds. No murmur heard.  Pulmonary:      Effort: No tachypnea, accessory muscle usage or respiratory distress.      Breath sounds: Normal breath sounds. No stridor. Examination of the right-upper field reveals wheezing. Examination of the left-upper field reveals wheezing. Examination of the right-lower field reveals decreased breath sounds. Examination of the left-lower field reveals decreased breath sounds. Decreased breath sounds and wheezing present. No rhonchi or rales.   Chest:      Chest wall: No deformity, tenderness or crepitus.   Abdominal:      General: Bowel sounds are normal.      Palpations: Abdomen is soft.      Tenderness: There is no abdominal tenderness. There is no guarding or rebound.   Musculoskeletal:         General: Normal range of motion.      Cervical back: Normal range of motion and neck supple.      Right lower leg: No tenderness. No edema.      Left lower leg: No tenderness. No edema.   Lymphadenopathy:      Cervical: No cervical adenopathy.   Skin:     General: Skin is warm and dry.      Coloration: Skin is not cyanotic.      Findings: No ecchymosis or erythema.   Neurological:      General: No focal deficit present.      Mental Status: He is alert and oriented to person, place, and time.      Cranial Nerves: No cranial nerve deficit.       Motor: No weakness.   Psychiatric:         Mood and Affect: Mood normal. Mood is not anxious.         Behavior: Behavior normal. Behavior is not agitated.         Procedures           ED Course  ED Course as of 04/18/24 1521   Thu Apr 18, 2024   0823 EKG notes sinus tachycardia 102 bpm.  No acute ST elevation.  QTc 414.  Electronically signed by Vitaly Sears DO, 04/18/24, 8:23 AM EDT. [SF]   1252 Patient noted considerable improvement in symptoms after DuoNeb therapy and Solu-Medrol.  CT imaging listed noted improving left pleural effusion with new right pleural effusion.  Patient did not wish to be admitted.  I discussed this patient's case with the hospitalist on-call given multiple electrolyte abnormalities as well.  Hospitalist team determined that this patient could be safely discharged home after electrolyte replacement therapy. [SF]      ED Course User Index  [SF] Vitaly Sears DO                                   XR Chest 1 View    Result Date: 4/18/2024  Interval decrease in size of the patient's left pleural effusion.   This report was finalized on 4/18/2024 9:43 AM by Jj Munoz M.D..      CT Chest Without Contrast Diagnostic    Result Date: 4/18/2024  1. Left Pleurx catheter in place with interval decrease in size of the patient's left effusion compared to the prior exam. 2. Small right effusion which has increased in size compared to the prior exam. 3. Stable bilateral masslike opacities and pulmonary nodules.   This report was finalized on 4/18/2024 9:40 AM by Jj Munoz M.D..       Results for orders placed or performed during the hospital encounter of 04/18/24   COVID-19 and FLU A/B PCR, 1 HR TAT - Swab, Nasopharynx    Specimen: Nasopharynx; Swab   Result Value Ref Range    COVID19 Not Detected Not Detected - Ref. Range    Influenza A PCR Not Detected Not Detected    Influenza B PCR Not Detected Not Detected   Comprehensive Metabolic Panel    Specimen: Arm, Right; Blood   Result  Value Ref Range    Glucose 132 (H) 65 - 99 mg/dL    BUN 9 8 - 23 mg/dL    Creatinine 0.66 (L) 0.76 - 1.27 mg/dL    Sodium 142 136 - 145 mmol/L    Potassium 3.0 (L) 3.5 - 5.2 mmol/L    Chloride 109 (H) 98 - 107 mmol/L    CO2 21.6 (L) 22.0 - 29.0 mmol/L    Calcium 7.7 (L) 8.6 - 10.5 mg/dL    Total Protein 5.2 (L) 6.0 - 8.5 g/dL    Albumin 3.1 (L) 3.5 - 5.2 g/dL    ALT (SGPT) 17 1 - 41 U/L    AST (SGOT) 14 1 - 40 U/L    Alkaline Phosphatase 72 39 - 117 U/L    Total Bilirubin <0.2 0.0 - 1.2 mg/dL    Globulin 2.1 gm/dL    A/G Ratio 1.5 g/dL    BUN/Creatinine Ratio 13.6 7.0 - 25.0    Anion Gap 11.4 5.0 - 15.0 mmol/L    eGFR 103.4 >60.0 mL/min/1.73   High Sensitivity Troponin T    Specimen: Arm, Right; Blood   Result Value Ref Range    HS Troponin T 16 <22 ng/L   BNP    Specimen: Arm, Right; Blood   Result Value Ref Range    proBNP 147.0 0.0 - 900.0 pg/mL   CBC Auto Differential    Specimen: Arm, Right; Blood   Result Value Ref Range    WBC 9.17 3.40 - 10.80 10*3/mm3    RBC 5.19 4.14 - 5.80 10*6/mm3    Hemoglobin 12.8 (L) 13.0 - 17.7 g/dL    Hematocrit 42.0 37.5 - 51.0 %    MCV 80.9 79.0 - 97.0 fL    MCH 24.7 (L) 26.6 - 33.0 pg    MCHC 30.5 (L) 31.5 - 35.7 g/dL    RDW 15.1 12.3 - 15.4 %    RDW-SD 43.7 37.0 - 54.0 fl    MPV 8.6 6.0 - 12.0 fL    Platelets 407 140 - 450 10*3/mm3    Neutrophil % 68.6 42.7 - 76.0 %    Lymphocyte % 16.8 (L) 19.6 - 45.3 %    Monocyte % 10.1 5.0 - 12.0 %    Eosinophil % 2.6 0.3 - 6.2 %    Basophil % 0.7 0.0 - 1.5 %    Immature Grans % 1.2 (H) 0.0 - 0.5 %    Neutrophils, Absolute 6.29 1.70 - 7.00 10*3/mm3    Lymphocytes, Absolute 1.54 0.70 - 3.10 10*3/mm3    Monocytes, Absolute 0.93 (H) 0.10 - 0.90 10*3/mm3    Eosinophils, Absolute 0.24 0.00 - 0.40 10*3/mm3    Basophils, Absolute 0.06 0.00 - 0.20 10*3/mm3    Immature Grans, Absolute 0.11 (H) 0.00 - 0.05 10*3/mm3    nRBC 0.0 0.0 - 0.2 /100 WBC   Protime-INR    Specimen: Arm, Right; Blood   Result Value Ref Range    Protime 13.7 12.1 - 14.7 Seconds     INR 1.00 0.90 - 1.10   aPTT    Specimen: Arm, Right; Blood   Result Value Ref Range    PTT 31.9 26.5 - 34.5 seconds   Magnesium    Specimen: Arm, Right; Blood   Result Value Ref Range    Magnesium 1.5 (L) 1.6 - 2.4 mg/dL   Phosphorus    Specimen: Arm, Right; Blood   Result Value Ref Range    Phosphorus 1.9 (C) 2.5 - 4.5 mg/dL   Lactic Acid, Plasma    Specimen: Arm, Right; Blood   Result Value Ref Range    Lactate 2.0 0.5 - 2.0 mmol/L   C-reactive Protein    Specimen: Arm, Right; Blood   Result Value Ref Range    C-Reactive Protein 5.75 (H) 0.00 - 0.50 mg/dL   Urinalysis With Microscopic If Indicated (No Culture) - Urine, Catheter    Specimen: Urine, Catheter   Result Value Ref Range    Color, UA Dark Yellow (A) Yellow, Straw    Appearance, UA Cloudy (A) Clear    pH, UA 5.5 5.0 - 8.0    Specific Gravity, UA 1.018 1.005 - 1.030    Glucose, UA Negative Negative    Ketones, UA Negative Negative    Bilirubin, UA Negative Negative    Blood, UA Negative Negative    Protein, UA Negative Negative    Leuk Esterase, UA Negative Negative    Nitrite, UA Negative Negative    Urobilinogen, UA 1.0 E.U./dL 0.2 - 1.0 E.U./dL   Blood Gas, Arterial With Co-Ox    Specimen: Arterial Blood   Result Value Ref Range    Site Left Brachial     Mike's Test N/A     pH, Arterial 7.429 7.350 - 7.450 pH units    pCO2, Arterial 42.5 35.0 - 45.0 mm Hg    pO2, Arterial 75.2 (L) 83.0 - 108.0 mm Hg    HCO3, Arterial 28.2 (H) 20.0 - 26.0 mmol/L    Base Excess, Arterial 3.4 (H) 0.0 - 2.0 mmol/L    O2 Saturation, Arterial 95.9 94.0 - 99.0 %    Hemoglobin, Blood Gas 13.1 (L) 14 - 18 g/dL    Hematocrit, Blood Gas 40.2 38.0 - 51.0 %    Oxyhemoglobin 94.7 94 - 99 %    Methemoglobin 0.00 0.00 - 3.00 %    Carboxyhemoglobin 1.2 0 - 5 %    A-a DO2 68.3 0.0 - 300.0 mmHg    CO2 Content 29.5 22 - 33 mmol/L    Barometric Pressure for Blood Gas 729 mmHg    Modality Nasal Cannula     FIO2 28 %    Flow Rate 2.0 lpm    Ventilator Mode NA     Collected by [id number]      pH, Temp Corrected      pCO2, Temperature Corrected      pO2, Temperature Corrected     High Sensitivity Troponin T 2Hr    Specimen: Blood   Result Value Ref Range    HS Troponin T 20 <22 ng/L    Troponin T Delta 4 (C) >=-4 - <+4 ng/L   ECG 12 Lead Chest Pain   Result Value Ref Range    QT Interval 318 ms    QTC Interval 414 ms   Green Top (Gel)   Result Value Ref Range    Extra Tube Hold for add-ons.    Lavender Top   Result Value Ref Range    Extra Tube hold for add-on    Gold Top - SST   Result Value Ref Range    Extra Tube Hold for add-ons.    Light Blue Top   Result Value Ref Range    Extra Tube Hold for add-ons.                Medical Decision Making  CBC and CMP listed.  Sepsis screening was triggered for tachypnea and tachycardia.  However, patient is not clinically septic.  Symptom improvement noted with Solu-Medrol and DuoNeb therapy.  Multiple electrolyte abnormalities noted.  Electrolyte therapy initiated with potassium and phosphorus.  CT imaging of the chest noted improving left pleural effusion with Pleurx catheter in place.  CT also noted findings consistent with a new right-sided pleural effusion.  Patient's vitals remained stable throughout ER course.  Troponin trend listed and non-ACS.  Patient has a history of chronically elevated troponins.    I discussed this patient's case with the hospitalist team on-call.  They noted that this patient did not require admission.  Also the patient noted that he did not wish to be admitted.  Hospitalist team recommended electrolyte replacement therapy and discharged home.  Dr. Davila agreed that this patient could be discharged home with COPD exacerbation therapy.    Work up and results were discussed throughly with the patient.  The patient will be discharged for further monitoring and management with their PCP.  Red flags, warning signs, worsening symptoms, and when to return to the ER discussed with and understood by the patient.  Patient will follow up with  their PCP in a timely manner.  Vitals stable at discharge.    Amount and/or Complexity of Data Reviewed  Labs: ordered. Decision-making details documented in ED Course.  Radiology: ordered. Decision-making details documented in ED Course.  ECG/medicine tests: ordered.    Risk  OTC drugs.  Prescription drug management.        Final diagnoses:   COPD exacerbation   Chronic pleural effusion       ED Disposition  ED Disposition       ED Disposition   Discharge    Condition   Stable    Comment   --               No follow-up provider specified.       Medication List        New Prescriptions      doxycycline 100 MG capsule  Commonly known as: MONODOX  Take 1 capsule by mouth 2 (Two) Times a Day.     predniSONE 50 MG tablet  Commonly known as: DELTASONE  Take 1 tablet by mouth Daily.               Where to Get Your Medications        These medications were sent to [facility][id number] - [address] AT [facility] [address] [facility] - [phone]  - [phone] [address] [address]      Phone: [phone]   doxycycline 100 MG capsule  predniSONE 50 MG tablet            Vitaly Sears DO  04/18/24 1524

## 2024-04-23 LAB
BACTERIA SPEC AEROBE CULT: NORMAL
BACTERIA SPEC AEROBE CULT: NORMAL

## 2024-05-06 ENCOUNTER — OFFICE VISIT (OUTPATIENT)
Dept: SURGERY | Facility: CLINIC | Age: 64
End: 2024-05-06
Payer: MEDICARE

## 2024-05-06 VITALS
DIASTOLIC BLOOD PRESSURE: 78 MMHG | BODY MASS INDEX: 33.75 KG/M2 | HEIGHT: 77 IN | SYSTOLIC BLOOD PRESSURE: 124 MMHG | HEART RATE: 94 BPM | WEIGHT: 285.8 LBS

## 2024-05-06 DIAGNOSIS — J90 PLEURAL EFFUSION EXUDATIVE: Primary | ICD-10-CM

## 2024-05-06 PROCEDURE — 1159F MED LIST DOCD IN RCRD: CPT | Performed by: SURGERY

## 2024-05-06 PROCEDURE — 1160F RVW MEDS BY RX/DR IN RCRD: CPT | Performed by: SURGERY

## 2024-05-06 PROCEDURE — 3074F SYST BP LT 130 MM HG: CPT | Performed by: SURGERY

## 2024-05-06 PROCEDURE — 99213 OFFICE O/P EST LOW 20 MIN: CPT | Performed by: SURGERY

## 2024-05-06 PROCEDURE — 3078F DIAST BP <80 MM HG: CPT | Performed by: SURGERY

## 2024-05-06 RX ORDER — ERGOCALCIFEROL 1.25 MG/1
50000 CAPSULE ORAL 2 TIMES WEEKLY
COMMUNITY
Start: 2024-03-13

## 2024-05-06 RX ORDER — CYANOCOBALAMIN 1000 UG/ML
INJECTION, SOLUTION INTRAMUSCULAR; SUBCUTANEOUS
COMMUNITY
Start: 2024-04-18

## 2024-05-06 RX ORDER — GLIPIZIDE 5 MG/1
5 TABLET, FILM COATED, EXTENDED RELEASE ORAL DAILY
COMMUNITY

## 2024-05-06 NOTE — H&P (VIEW-ONLY)
Subjective   Rory Rios Jr. is a 64 y.o. male here today for possible drain removal.    History of Present Illness  Mr. Rios was seen in the office today to discuss removal of his Pleurx catheter.  This is actually been present since May 2023.  Patient went from having large volume ascites to essentially none for the last several weeks and would like to have the catheter removed.  He is still on oxygen secondary to his underlying pneumoconiosis.  No Known Allergies    Current Outpatient Medications   Medication Sig Dispense Refill    albuterol (PROVENTIL HFA;VENTOLIN HFA) 108 (90 BASE) MCG/ACT inhaler Inhale 2 puffs Every 4 (Four) Hours As Needed for Wheezing or Shortness of Air. 1 inhaler 0    Budeson-Glycopyrrol-Formoterol (BREZTRI) 160-9-4.8 MCG/ACT aerosol inhaler Inhale 2 puffs 2 (Two) Times a Day.      busPIRone (BUSPAR) 5 MG tablet Take 1 tablet by mouth Every Night.      cyanocobalamin 1000 MCG/ML injection INJECT 1 ML INTO MUSCLE EVERY 2 WEEKS      glipizide (GLUCOTROL XL) 5 MG ER tablet Take 1 tablet by mouth Daily.      HYDROcodone-acetaminophen (NORCO) 7.5-325 MG per tablet Take 1 tablet by mouth Every 8 (Eight) Hours As Needed for Moderate Pain.      hydrOXYzine (ATARAX) 25 MG tablet Take 1 tablet by mouth 3 (Three) Times a Day As Needed for Anxiety. 21 tablet 0    ibuprofen (ADVIL,MOTRIN) 800 MG tablet Take 1 tablet by mouth 3 (Three) Times a Day.      ipratropium-albuterol (DUO-NEB) 0.5-2.5 mg/3 ml nebulizer Take 3 mL by nebulization Every 4 (Four) Hours As Needed for Wheezing or Shortness of Air.      lisinopril (PRINIVIL,ZESTRIL) 10 MG tablet Take 1 tablet by mouth Daily.      metFORMIN (GLUCOPHAGE) 500 MG tablet Take 1 tablet by mouth As Needed.      metoprolol succinate XL (TOPROL-XL) 50 MG 24 hr tablet Take 1 tablet by mouth Daily.      omeprazole (priLOSEC) 40 MG capsule Take 20 mg by mouth 2 (Two) Times a Day.      roflumilast (DALIRESP) 250 MCG tablet tablet Take 1 tablet by mouth Daily.    "   vitamin D (ERGOCALCIFEROL) 1.25 MG (68960 UT) capsule capsule Take 1 capsule by mouth 2 (Two) Times a Week.       No current facility-administered medications for this visit.     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    Diabetes mellitus     GERD (gastroesophageal reflux disease)     Hypertension     Pleural effusion     Pneumonia     Pulmonary fibrosis 04/27/2023    Sleep apnea      Past Surgical History:   Procedure Laterality Date    BRONCHOSCOPY N/A 04/24/2023    Procedure: BRONCHOSCOPY WITH ENDOBRONCHIAL ULTRASOUND;  Surgeon: Kota Willard MD;  Location:  [facility];  Service: Pulmonary;  Laterality: N/A;    FOOT SURGERY Left     PLEURAL CATHETER INSERTION Left 5/11/2023    Procedure: PLEURX CATHETER INSERTION;  Surgeon: Brigitte Kolb MD;  Location:  [facility];  Service: General;  Laterality: Left;       Pertinent Review of Systems  Positive for chronic shortness of breath, negative for chest pain    Objective   /78 (BP Location: Left arm)   Pulse 94   Ht 195.6 cm (77\")   Wt 130 kg (285 lb 12.8 oz)   BMI 33.89 kg/m²    Physical Exam  General:  This is a WD WN male in no acute distress  HEENT examination: Within normal limits  Lungs:  Respiratory effort normal. Auscultation: Clear, without wheezes, rhonchi, rales.  Breath sounds distant  Heart:  Regular rate and rhythm, without murmur, gallop, rub.  No pedal edema  Chest wall: Pleurx catheter in the left lateral chest  Procedures     Results/Data:  Imaging: CT chest report and images from 4/18/2024 were reviewed.  Minimal fluid in the left lung base  Lab: Laboratory studies including CBC, CMP, PT/INR from 4/18/2024 were reviewed  Other:     Assessment & Plan   Resolution of chronic left pleural effusion    Proceed with removal of Pleurx catheter       Discussion/Summary:    Time spent:     BMI is >= 30 and <35. (Class 1 Obesity). The " following options were offered after discussion;: nutrition counseling/recommendations         No future appointments.    Please note that portions of this note were completed with a voice recognition program.

## 2024-05-07 ENCOUNTER — TELEPHONE (OUTPATIENT)
Dept: SURGERY | Facility: CLINIC | Age: 64
End: 2024-05-07
Payer: MEDICARE

## 2024-05-13 ENCOUNTER — TELEPHONE (OUTPATIENT)
Dept: SURGERY | Facility: CLINIC | Age: 64
End: 2024-05-13
Payer: MEDICARE

## 2024-05-14 ENCOUNTER — ANESTHESIA (OUTPATIENT)
Dept: PERIOP | Facility: HOSPITAL | Age: 64
End: 2024-05-14
Payer: MEDICARE

## 2024-05-14 ENCOUNTER — ANESTHESIA EVENT (OUTPATIENT)
Dept: PERIOP | Facility: HOSPITAL | Age: 64
End: 2024-05-14
Payer: MEDICARE

## 2024-05-14 ENCOUNTER — HOSPITAL ENCOUNTER (OUTPATIENT)
Facility: HOSPITAL | Age: 64
Setting detail: HOSPITAL OUTPATIENT SURGERY
Discharge: HOME | End: 2024-05-14
Attending: SURGERY | Admitting: SURGERY
Payer: MEDICARE

## 2024-05-14 VITALS
RESPIRATION RATE: 18 BRPM | HEIGHT: 77 IN | BODY MASS INDEX: 33.65 KG/M2 | DIASTOLIC BLOOD PRESSURE: 80 MMHG | TEMPERATURE: 98.7 F | WEIGHT: 285 LBS | OXYGEN SATURATION: 95 % | SYSTOLIC BLOOD PRESSURE: 125 MMHG | HEART RATE: 85 BPM

## 2024-05-14 DIAGNOSIS — J90 PLEURAL EFFUSION EXUDATIVE: ICD-10-CM

## 2024-05-14 LAB — GLUCOSE BLDC GLUCOMTR-MCNC: 138 MG/DL (ref 70–130)

## 2024-05-14 PROCEDURE — 25010000002 BUPIVACAINE 0.5 % SOLUTION: Performed by: SURGERY

## 2024-05-14 PROCEDURE — 82948 REAGENT STRIP/BLOOD GLUCOSE: CPT

## 2024-05-14 PROCEDURE — 32552 REMOVE LUNG CATHETER: CPT | Performed by: SURGERY

## 2024-05-14 PROCEDURE — 25810000003 LACTATED RINGERS PER 1000 ML: Performed by: ANESTHESIOLOGY

## 2024-05-14 PROCEDURE — 25010000002 CEFAZOLIN 3 G RECONSTITUTED SOLUTION 1 EACH VIAL: Performed by: SURGERY

## 2024-05-14 PROCEDURE — 25010000002 PROPOFOL 200 MG/20ML EMULSION: Performed by: NURSE ANESTHETIST, CERTIFIED REGISTERED

## 2024-05-14 RX ORDER — BACITRACIN ZINC 500 [USP'U]/G
OINTMENT TOPICAL AS NEEDED
Status: DISCONTINUED | OUTPATIENT
Start: 2024-05-14 | End: 2024-05-14 | Stop reason: HOSPADM

## 2024-05-14 RX ORDER — SODIUM CHLORIDE 9 MG/ML
40 INJECTION, SOLUTION INTRAVENOUS AS NEEDED
Status: DISCONTINUED | OUTPATIENT
Start: 2024-05-14 | End: 2024-05-14 | Stop reason: HOSPADM

## 2024-05-14 RX ORDER — ONDANSETRON 2 MG/ML
4 INJECTION INTRAMUSCULAR; INTRAVENOUS AS NEEDED
Status: DISCONTINUED | OUTPATIENT
Start: 2024-05-14 | End: 2024-05-14 | Stop reason: HOSPADM

## 2024-05-14 RX ORDER — SODIUM CHLORIDE, SODIUM LACTATE, POTASSIUM CHLORIDE, CALCIUM CHLORIDE 600; 310; 30; 20 MG/100ML; MG/100ML; MG/100ML; MG/100ML
125 INJECTION, SOLUTION INTRAVENOUS ONCE
Status: COMPLETED | OUTPATIENT
Start: 2024-05-14 | End: 2024-05-14

## 2024-05-14 RX ORDER — OXYCODONE HYDROCHLORIDE AND ACETAMINOPHEN 5; 325 MG/1; MG/1
1 TABLET ORAL ONCE AS NEEDED
Status: COMPLETED | OUTPATIENT
Start: 2024-05-14 | End: 2024-05-14

## 2024-05-14 RX ORDER — IPRATROPIUM BROMIDE AND ALBUTEROL SULFATE 2.5; .5 MG/3ML; MG/3ML
3 SOLUTION RESPIRATORY (INHALATION) ONCE AS NEEDED
Status: DISCONTINUED | OUTPATIENT
Start: 2024-05-14 | End: 2024-05-14 | Stop reason: HOSPADM

## 2024-05-14 RX ORDER — KETOROLAC TROMETHAMINE 30 MG/ML
15 INJECTION, SOLUTION INTRAMUSCULAR; INTRAVENOUS EVERY 6 HOURS PRN
Status: DISCONTINUED | OUTPATIENT
Start: 2024-05-14 | End: 2024-05-14 | Stop reason: HOSPADM

## 2024-05-14 RX ORDER — SODIUM CHLORIDE 0.9 % (FLUSH) 0.9 %
10 SYRINGE (ML) INJECTION AS NEEDED
Status: DISCONTINUED | OUTPATIENT
Start: 2024-05-14 | End: 2024-05-14 | Stop reason: HOSPADM

## 2024-05-14 RX ORDER — BUPIVACAINE HYDROCHLORIDE 5 MG/ML
INJECTION, SOLUTION PERINEURAL AS NEEDED
Status: DISCONTINUED | OUTPATIENT
Start: 2024-05-14 | End: 2024-05-14 | Stop reason: HOSPADM

## 2024-05-14 RX ORDER — SODIUM CHLORIDE 0.9 % (FLUSH) 0.9 %
10 SYRINGE (ML) INJECTION EVERY 12 HOURS SCHEDULED
Status: DISCONTINUED | OUTPATIENT
Start: 2024-05-14 | End: 2024-05-14 | Stop reason: HOSPADM

## 2024-05-14 RX ORDER — PROPOFOL 10 MG/ML
INJECTION, EMULSION INTRAVENOUS AS NEEDED
Status: DISCONTINUED | OUTPATIENT
Start: 2024-05-14 | End: 2024-05-14 | Stop reason: SURG

## 2024-05-14 RX ORDER — MIDAZOLAM HYDROCHLORIDE 1 MG/ML
0.5 INJECTION INTRAMUSCULAR; INTRAVENOUS
Status: DISCONTINUED | OUTPATIENT
Start: 2024-05-14 | End: 2024-05-14 | Stop reason: HOSPADM

## 2024-05-14 RX ORDER — FENTANYL CITRATE 50 UG/ML
50 INJECTION, SOLUTION INTRAMUSCULAR; INTRAVENOUS
Status: DISCONTINUED | OUTPATIENT
Start: 2024-05-14 | End: 2024-05-14 | Stop reason: HOSPADM

## 2024-05-14 RX ORDER — MAGNESIUM HYDROXIDE 1200 MG/15ML
LIQUID ORAL AS NEEDED
Status: DISCONTINUED | OUTPATIENT
Start: 2024-05-14 | End: 2024-05-14 | Stop reason: HOSPADM

## 2024-05-14 RX ORDER — MEPERIDINE HYDROCHLORIDE 25 MG/ML
12.5 INJECTION INTRAMUSCULAR; INTRAVENOUS; SUBCUTANEOUS
Status: DISCONTINUED | OUTPATIENT
Start: 2024-05-14 | End: 2024-05-14 | Stop reason: HOSPADM

## 2024-05-14 RX ORDER — SODIUM CHLORIDE, SODIUM LACTATE, POTASSIUM CHLORIDE, CALCIUM CHLORIDE 600; 310; 30; 20 MG/100ML; MG/100ML; MG/100ML; MG/100ML
100 INJECTION, SOLUTION INTRAVENOUS ONCE AS NEEDED
Status: DISCONTINUED | OUTPATIENT
Start: 2024-05-14 | End: 2024-05-14 | Stop reason: HOSPADM

## 2024-05-14 RX ADMIN — PROPOFOL 50 MG: 10 INJECTION, EMULSION INTRAVENOUS at 08:48

## 2024-05-14 RX ADMIN — SODIUM CHLORIDE, POTASSIUM CHLORIDE, SODIUM LACTATE AND CALCIUM CHLORIDE: 600; 310; 30; 20 INJECTION, SOLUTION INTRAVENOUS at 08:36

## 2024-05-14 RX ADMIN — SODIUM CHLORIDE, POTASSIUM CHLORIDE, SODIUM LACTATE AND CALCIUM CHLORIDE 125 ML/HR: 600; 310; 30; 20 INJECTION, SOLUTION INTRAVENOUS at 08:28

## 2024-05-14 RX ADMIN — OXYCODONE HYDROCHLORIDE AND ACETAMINOPHEN 1 TABLET: 5; 325 TABLET ORAL at 09:31

## 2024-05-14 RX ADMIN — PROPOFOL 20 MG: 10 INJECTION, EMULSION INTRAVENOUS at 08:50

## 2024-05-14 RX ADMIN — PROPOFOL 40 MG: 10 INJECTION, EMULSION INTRAVENOUS at 08:49

## 2024-05-14 RX ADMIN — SODIUM CHLORIDE 3000 MG: 9 INJECTION, SOLUTION INTRAVENOUS at 08:36

## 2024-05-14 RX ADMIN — PROPOFOL 20 MG: 10 INJECTION, EMULSION INTRAVENOUS at 08:52

## 2024-05-14 NOTE — ANESTHESIA PREPROCEDURE EVALUATION
Anesthesia Evaluation     Patient summary reviewed and Nursing notes reviewed   no history of anesthetic complications:   NPO Solid Status: > 8 hours  NPO Liquid Status: > 8 hours           Airway   Mallampati: II  TM distance: >3 FB  Neck ROM: full  No difficulty expected  Dental    (+) lower dentures and upper dentures    Pulmonary - normal exam    breath sounds clear to auscultation  (+) pneumonia resolved , pleural effusion, COPD severe,home oxygen (2 liters at night, PRN daily), sleep apnea    ROS comment: Lung mass  Cardiovascular - normal exam    ECG reviewed  Rhythm: regular  Rate: normal    (+) hypertension      Neuro/Psych- negative ROS  GI/Hepatic/Renal/Endo    (+) GERD, diabetes mellitus    Musculoskeletal     Abdominal    Substance History - negative use     OB/GYN negative ob/gyn ROS         Other   arthritis,                   Anesthesia Plan    ASA 4     MAC and general     intravenous induction     Anesthetic plan, risks, benefits, and alternatives have been provided, discussed and informed consent has been obtained with: patient.      CODE STATUS:

## 2024-05-14 NOTE — OP NOTE
Pleurx catheter removal left chest    Pre-op: Resolution of pleural effusion    Post-op:  Same    Surgeon:  Brigitte Kolb M.D., F.A.C.S.    Assistant:  None    Anesthesia: MAC      Indications: Resolution of pleural effusion       Procedure Details   After obtaining informed consent and receiving preoperative antibiotics and with venous compression boots in place, the patient was taken to the operating room and placed under anesthesia.  The left lateral chest was prepped and draped in a sterile fashion.  The catheter exit site was infiltrated with half percent Marcaine.  A small counterincision was made at the catheter entrance site into the chest.  With blunt dissection the catheter was freed up from the surrounding tissue including the cuff which was well incorporated.  Catheter was removed without complication and dressing was placed    Findings:           Estimated Blood Loss:  Minimal    Blood Administered: none           Drains: none           Specimens: None     Grafts and Implants: No       Complications:  none           Disposition: PACU - hemodynamically stable.           Condition: stable

## 2024-05-14 NOTE — ANESTHESIA POSTPROCEDURE EVALUATION
Patient: Rory Rios Jr.    Procedure Summary       Date: 05/14/24 Room / Location: [facility] OR  /  [facility]    Anesthesia Start: 0836 Anesthesia Stop: 0859    Procedure: PLEURX CATHETER REMOVAL Diagnosis:       Pleural effusion exudative      (Pleural effusion exudative [J90])    Surgeons: Brigitte Kolb MD Provider: Sedrick Gonzalez MD    Anesthesia Type: MAC, general ASA Status: 4            Anesthesia Type: MAC, general    Vitals  Vitals Value Taken Time   /84 05/14/24 0916   Temp 99.1 °F (37.3 °C) 05/14/24 0901   Pulse 85 05/14/24 0918   Resp 16 05/14/24 0916   SpO2 95 % 05/14/24 0918   Vitals shown include unfiled device data.        Post Anesthesia Care and Evaluation    Patient location during evaluation: PHASE II  Patient participation: complete - patient participated  Level of consciousness: awake and alert  Pain score: 1  Pain management: adequate    Airway patency: patent  Anesthetic complications: No anesthetic complications  PONV Status: controlled  Cardiovascular status: acceptable  Respiratory status: acceptable  Hydration status: acceptable

## 2024-08-11 ENCOUNTER — APPOINTMENT (OUTPATIENT)
Dept: GENERAL RADIOLOGY | Facility: HOSPITAL | Age: 64
DRG: 193 | End: 2024-08-11
Payer: COMMERCIAL

## 2024-08-11 ENCOUNTER — HOSPITAL ENCOUNTER (INPATIENT)
Facility: HOSPITAL | Age: 64
LOS: 4 days | Discharge: HOME | DRG: 193 | End: 2024-08-15
Attending: EMERGENCY MEDICINE | Admitting: INTERNAL MEDICINE
Payer: OTHER MISCELLANEOUS

## 2024-08-11 DIAGNOSIS — J96.21 ACUTE ON CHRONIC RESPIRATORY FAILURE WITH HYPOXIA AND HYPERCAPNIA: Primary | ICD-10-CM

## 2024-08-11 DIAGNOSIS — J44.9 CHRONIC OBSTRUCTIVE PULMONARY DISEASE, UNSPECIFIED COPD TYPE: ICD-10-CM

## 2024-08-11 DIAGNOSIS — J60 COAL WORKERS PNEUMOCONIOSIS: ICD-10-CM

## 2024-08-11 DIAGNOSIS — J44.1 ACUTE EXACERBATION OF CHRONIC OBSTRUCTIVE PULMONARY DISEASE (COPD): ICD-10-CM

## 2024-08-11 DIAGNOSIS — J96.22 ACUTE ON CHRONIC RESPIRATORY FAILURE WITH HYPOXIA AND HYPERCAPNIA: Primary | ICD-10-CM

## 2024-08-11 LAB
A-A DO2: 111.1 MMHG (ref 0–300)
ALBUMIN SERPL-MCNC: 3.8 G/DL (ref 3.5–5.2)
ALBUMIN/GLOB SERPL: 1.3 G/DL
ALP SERPL-CCNC: 92 U/L (ref 39–117)
ALT SERPL W P-5'-P-CCNC: 19 U/L (ref 1–41)
ANION GAP SERPL CALCULATED.3IONS-SCNC: 13.4 MMOL/L (ref 5–15)
ARTERIAL PATENCY WRIST A: ABNORMAL
AST SERPL-CCNC: 25 U/L (ref 1–40)
ATMOSPHERIC PRESS: 729 MMHG
BASE EXCESS BLDA CALC-SCNC: 4.1 MMOL/L (ref 0–2)
BASOPHILS # BLD AUTO: 0.06 10*3/MM3 (ref 0–0.2)
BASOPHILS NFR BLD AUTO: 0.6 % (ref 0–1.5)
BDY SITE: ABNORMAL
BILIRUB SERPL-MCNC: 0.3 MG/DL (ref 0–1.2)
BUN SERPL-MCNC: 10 MG/DL (ref 8–23)
BUN/CREAT SERPL: 11.8 (ref 7–25)
CALCIUM SPEC-SCNC: 10 MG/DL (ref 8.6–10.5)
CHLORIDE SERPL-SCNC: 103 MMOL/L (ref 98–107)
CO2 BLDA-SCNC: 31.6 MMOL/L (ref 22–33)
CO2 SERPL-SCNC: 25.6 MMOL/L (ref 22–29)
COHGB MFR BLD: 0.9 % (ref 0–5)
CREAT SERPL-MCNC: 0.85 MG/DL (ref 0.76–1.27)
D-LACTATE SERPL-SCNC: 1.6 MMOL/L (ref 0.5–2)
DEPRECATED RDW RBC AUTO: 44.5 FL (ref 37–54)
EGFRCR SERPLBLD CKD-EPI 2021: 95.8 ML/MIN/1.73
EOSINOPHIL # BLD AUTO: 0.3 10*3/MM3 (ref 0–0.4)
EOSINOPHIL NFR BLD AUTO: 3.1 % (ref 0.3–6.2)
ERYTHROCYTE [DISTWIDTH] IN BLOOD BY AUTOMATED COUNT: 14.6 % (ref 12.3–15.4)
GAS FLOW AIRWAY: 3.5 LPM
GLOBULIN UR ELPH-MCNC: 3 GM/DL
GLUCOSE SERPL-MCNC: 140 MG/DL (ref 65–99)
HCO3 BLDA-SCNC: 30.1 MMOL/L (ref 20–26)
HCT VFR BLD AUTO: 47.4 % (ref 37.5–51)
HCT VFR BLD CALC: 43 % (ref 38–51)
HGB BLD-MCNC: 14.2 G/DL (ref 13–17.7)
HGB BLDA-MCNC: 14 G/DL (ref 14–18)
HOLD SPECIMEN: NORMAL
HOLD SPECIMEN: NORMAL
IMM GRANULOCYTES # BLD AUTO: 0.02 10*3/MM3 (ref 0–0.05)
IMM GRANULOCYTES NFR BLD AUTO: 0.2 % (ref 0–0.5)
INHALED O2 CONCENTRATION: 35 %
LYMPHOCYTES # BLD AUTO: 1.26 10*3/MM3 (ref 0.7–3.1)
LYMPHOCYTES NFR BLD AUTO: 13 % (ref 19.6–45.3)
Lab: ABNORMAL
MCH RBC QN AUTO: 25.1 PG (ref 26.6–33)
MCHC RBC AUTO-ENTMCNC: 30 G/DL (ref 31.5–35.7)
MCV RBC AUTO: 83.9 FL (ref 79–97)
METHGB BLD QL: 0.1 % (ref 0–3)
MODALITY: ABNORMAL
MONOCYTES # BLD AUTO: 0.84 10*3/MM3 (ref 0.1–0.9)
MONOCYTES NFR BLD AUTO: 8.7 % (ref 5–12)
NEUTROPHILS NFR BLD AUTO: 7.18 10*3/MM3 (ref 1.7–7)
NEUTROPHILS NFR BLD AUTO: 74.4 % (ref 42.7–76)
NRBC BLD AUTO-RTO: 0 /100 WBC (ref 0–0.2)
NT-PROBNP SERPL-MCNC: 195.8 PG/ML (ref 0–900)
OXYHGB MFR BLDV: 93.7 % (ref 94–99)
PCO2 BLDA: 49.5 MM HG (ref 35–45)
PCO2 TEMP ADJ BLD: ABNORMAL MM[HG]
PH BLDA: 7.39 PH UNITS (ref 7.35–7.45)
PH, TEMP CORRECTED: ABNORMAL
PLATELET # BLD AUTO: 267 10*3/MM3 (ref 140–450)
PMV BLD AUTO: 9.1 FL (ref 6–12)
PO2 BLDA: 72.8 MM HG (ref 83–108)
PO2 TEMP ADJ BLD: ABNORMAL MM[HG]
POTASSIUM SERPL-SCNC: 4.2 MMOL/L (ref 3.5–5.2)
PROCALCITONIN SERPL-MCNC: 0.04 NG/ML (ref 0–0.25)
PROT SERPL-MCNC: 6.8 G/DL (ref 6–8.5)
QT INTERVAL: 332 MS
QTC INTERVAL: 432 MS
RBC # BLD AUTO: 5.65 10*6/MM3 (ref 4.14–5.8)
SAO2 % BLDCOA: 94.6 % (ref 94–99)
SODIUM SERPL-SCNC: 142 MMOL/L (ref 136–145)
TROPONIN T SERPL HS-MCNC: 18 NG/L
VENTILATOR MODE: ABNORMAL
WBC NRBC COR # BLD AUTO: 9.66 10*3/MM3 (ref 3.4–10.8)
WHOLE BLOOD HOLD COAG: NORMAL
WHOLE BLOOD HOLD SPECIMEN: NORMAL

## 2024-08-11 PROCEDURE — 71045 X-RAY EXAM CHEST 1 VIEW: CPT | Performed by: RADIOLOGY

## 2024-08-11 PROCEDURE — 83605 ASSAY OF LACTIC ACID: CPT | Performed by: EMERGENCY MEDICINE

## 2024-08-11 PROCEDURE — 80053 COMPREHEN METABOLIC PANEL: CPT | Performed by: EMERGENCY MEDICINE

## 2024-08-11 PROCEDURE — 94761 N-INVAS EAR/PLS OXIMETRY MLT: CPT

## 2024-08-11 PROCEDURE — 87040 BLOOD CULTURE FOR BACTERIA: CPT | Performed by: EMERGENCY MEDICINE

## 2024-08-11 PROCEDURE — 25010000002 METHYLPREDNISOLONE PER 125 MG: Performed by: EMERGENCY MEDICINE

## 2024-08-11 PROCEDURE — 36415 COLL VENOUS BLD VENIPUNCTURE: CPT

## 2024-08-11 PROCEDURE — 36600 WITHDRAWAL OF ARTERIAL BLOOD: CPT

## 2024-08-11 PROCEDURE — 94799 UNLISTED PULMONARY SVC/PX: CPT

## 2024-08-11 PROCEDURE — 25010000002 CEFTRIAXONE PER 250 MG: Performed by: EMERGENCY MEDICINE

## 2024-08-11 PROCEDURE — 94664 DEMO&/EVAL PT USE INHALER: CPT

## 2024-08-11 PROCEDURE — 94640 AIRWAY INHALATION TREATMENT: CPT

## 2024-08-11 PROCEDURE — 82805 BLOOD GASES W/O2 SATURATION: CPT

## 2024-08-11 PROCEDURE — 99285 EMERGENCY DEPT VISIT HI MDM: CPT

## 2024-08-11 PROCEDURE — 82375 ASSAY CARBOXYHB QUANT: CPT

## 2024-08-11 PROCEDURE — 84484 ASSAY OF TROPONIN QUANT: CPT | Performed by: EMERGENCY MEDICINE

## 2024-08-11 PROCEDURE — 93010 ELECTROCARDIOGRAM REPORT: CPT | Performed by: INTERNAL MEDICINE

## 2024-08-11 PROCEDURE — 25010000002 ENOXAPARIN PER 10 MG: Performed by: INTERNAL MEDICINE

## 2024-08-11 PROCEDURE — 83050 HGB METHEMOGLOBIN QUAN: CPT

## 2024-08-11 PROCEDURE — 83880 ASSAY OF NATRIURETIC PEPTIDE: CPT | Performed by: EMERGENCY MEDICINE

## 2024-08-11 PROCEDURE — 85025 COMPLETE CBC W/AUTO DIFF WBC: CPT | Performed by: EMERGENCY MEDICINE

## 2024-08-11 PROCEDURE — 71045 X-RAY EXAM CHEST 1 VIEW: CPT

## 2024-08-11 PROCEDURE — 25010000002 METHYLPREDNISOLONE PER 40 MG: Performed by: INTERNAL MEDICINE

## 2024-08-11 PROCEDURE — 93005 ELECTROCARDIOGRAM TRACING: CPT | Performed by: EMERGENCY MEDICINE

## 2024-08-11 PROCEDURE — 99223 1ST HOSP IP/OBS HIGH 75: CPT | Performed by: INTERNAL MEDICINE

## 2024-08-11 PROCEDURE — 84145 PROCALCITONIN (PCT): CPT | Performed by: INTERNAL MEDICINE

## 2024-08-11 RX ORDER — CEFDINIR 300 MG/1
300 CAPSULE ORAL 2 TIMES DAILY
COMMUNITY
End: 2024-08-15 | Stop reason: HOSPADM

## 2024-08-11 RX ORDER — BISACODYL 10 MG
10 SUPPOSITORY, RECTAL RECTAL DAILY PRN
Status: DISCONTINUED | OUTPATIENT
Start: 2024-08-11 | End: 2024-08-15 | Stop reason: HOSPADM

## 2024-08-11 RX ORDER — BISACODYL 5 MG/1
5 TABLET, DELAYED RELEASE ORAL DAILY PRN
Status: DISCONTINUED | OUTPATIENT
Start: 2024-08-11 | End: 2024-08-15 | Stop reason: HOSPADM

## 2024-08-11 RX ORDER — SODIUM CHLORIDE 0.9 % (FLUSH) 0.9 %
10 SYRINGE (ML) INJECTION AS NEEDED
Status: DISCONTINUED | OUTPATIENT
Start: 2024-08-11 | End: 2024-08-15 | Stop reason: HOSPADM

## 2024-08-11 RX ORDER — IPRATROPIUM BROMIDE AND ALBUTEROL SULFATE 2.5; .5 MG/3ML; MG/3ML
3 SOLUTION RESPIRATORY (INHALATION) EVERY 4 HOURS PRN
Status: DISCONTINUED | OUTPATIENT
Start: 2024-08-11 | End: 2024-08-11

## 2024-08-11 RX ORDER — IPRATROPIUM BROMIDE AND ALBUTEROL SULFATE 2.5; .5 MG/3ML; MG/3ML
3 SOLUTION RESPIRATORY (INHALATION) ONCE
Status: COMPLETED | OUTPATIENT
Start: 2024-08-11 | End: 2024-08-11

## 2024-08-11 RX ORDER — HYDROCODONE BITARTRATE AND ACETAMINOPHEN 10; 325 MG/1; MG/1
1 TABLET ORAL EVERY 8 HOURS PRN
COMMUNITY

## 2024-08-11 RX ORDER — POLYETHYLENE GLYCOL 3350 17 G/17G
17 POWDER, FOR SOLUTION ORAL DAILY PRN
Status: DISCONTINUED | OUTPATIENT
Start: 2024-08-11 | End: 2024-08-15 | Stop reason: HOSPADM

## 2024-08-11 RX ORDER — SODIUM CHLORIDE 9 MG/ML
40 INJECTION, SOLUTION INTRAVENOUS AS NEEDED
Status: DISCONTINUED | OUTPATIENT
Start: 2024-08-11 | End: 2024-08-15 | Stop reason: HOSPADM

## 2024-08-11 RX ORDER — METHYLPREDNISOLONE SODIUM SUCCINATE 40 MG/ML
40 INJECTION, POWDER, LYOPHILIZED, FOR SOLUTION INTRAMUSCULAR; INTRAVENOUS 2 TIMES DAILY
Status: DISCONTINUED | OUTPATIENT
Start: 2024-08-11 | End: 2024-08-14

## 2024-08-11 RX ORDER — OMEPRAZOLE 20 MG/1
20 CAPSULE, DELAYED RELEASE ORAL 2 TIMES DAILY
COMMUNITY

## 2024-08-11 RX ORDER — AMOXICILLIN 250 MG
2 CAPSULE ORAL 2 TIMES DAILY PRN
Status: DISCONTINUED | OUTPATIENT
Start: 2024-08-11 | End: 2024-08-15 | Stop reason: HOSPADM

## 2024-08-11 RX ORDER — METHYLPREDNISOLONE SODIUM SUCCINATE 125 MG/2ML
125 INJECTION, POWDER, LYOPHILIZED, FOR SOLUTION INTRAMUSCULAR; INTRAVENOUS ONCE
Status: COMPLETED | OUTPATIENT
Start: 2024-08-11 | End: 2024-08-11

## 2024-08-11 RX ORDER — IPRATROPIUM BROMIDE AND ALBUTEROL SULFATE 2.5; .5 MG/3ML; MG/3ML
3 SOLUTION RESPIRATORY (INHALATION)
Status: DISCONTINUED | OUTPATIENT
Start: 2024-08-11 | End: 2024-08-11

## 2024-08-11 RX ORDER — SODIUM CHLORIDE 0.9 % (FLUSH) 0.9 %
10 SYRINGE (ML) INJECTION EVERY 12 HOURS SCHEDULED
Status: DISCONTINUED | OUTPATIENT
Start: 2024-08-11 | End: 2024-08-15 | Stop reason: HOSPADM

## 2024-08-11 RX ORDER — ENOXAPARIN SODIUM 100 MG/ML
40 INJECTION SUBCUTANEOUS DAILY
Status: DISCONTINUED | OUTPATIENT
Start: 2024-08-11 | End: 2024-08-15 | Stop reason: HOSPADM

## 2024-08-11 RX ADMIN — IPRATROPIUM BROMIDE AND ALBUTEROL SULFATE 3 ML: .5; 2.5 SOLUTION RESPIRATORY (INHALATION) at 12:43

## 2024-08-11 RX ADMIN — ENOXAPARIN SODIUM 40 MG: 40 INJECTION SUBCUTANEOUS at 17:41

## 2024-08-11 RX ADMIN — SODIUM CHLORIDE 2000 MG: 9 INJECTION, SOLUTION INTRAVENOUS at 14:46

## 2024-08-11 RX ADMIN — DOXYCYCLINE 100 MG: 100 INJECTION, POWDER, LYOPHILIZED, FOR SOLUTION INTRAVENOUS at 21:33

## 2024-08-11 RX ADMIN — IPRATROPIUM BROMIDE AND ALBUTEROL SULFATE 3 ML: .5; 2.5 SOLUTION RESPIRATORY (INHALATION) at 18:29

## 2024-08-11 RX ADMIN — METHYLPREDNISOLONE SODIUM SUCCINATE 40 MG: 40 INJECTION, POWDER, FOR SOLUTION INTRAMUSCULAR; INTRAVENOUS at 21:32

## 2024-08-11 RX ADMIN — Medication 10 ML: at 21:35

## 2024-08-11 RX ADMIN — DOXYCYCLINE 100 MG: 100 INJECTION, POWDER, LYOPHILIZED, FOR SOLUTION INTRAVENOUS at 12:44

## 2024-08-11 RX ADMIN — METHYLPREDNISOLONE SODIUM SUCCINATE 125 MG: 125 INJECTION, POWDER, FOR SOLUTION INTRAMUSCULAR; INTRAVENOUS at 12:44

## 2024-08-11 NOTE — CASE MANAGEMENT/SOCIAL WORK
Discharge Planning Assessment   [place]     Patient Name: Rory Rios Jr.  MRN: [id number]  Today's Date: 8/11/2024    Admit Date: 8/11/2024    Plan: Pt lives at home and plans to return home at discharge will drive self or family will assist. Pcp is Alejandro Monique, he uses [facility] and has Medicare a+b. Pt uses o2 prn, cpap, straight cane and nebs from [facility]. Pt does not use home health.   Discharge Needs Assessment       Row Name 08/11/24 1838       Living Environment    People in Home alone    Current Living Arrangements home    Potentially Unsafe Housing Conditions none    [facility] Provided by [facility]    Quality of Family Relationships helpful;involved;supportive    Able to Return to Prior Arrangements yes       Resource/Environmental Concerns    Resource/Environmental Concerns none       Transition Planning    Patient/Family Anticipates Transition to home    Patient/Family Anticipated Services at Transition none    Transportation Anticipated car, drives self;family or friend will provide       Discharge Needs Assessment    Readmission Within the Last 30 Days no previous admission in last 30 days    Equipment Currently Used at Home oxygen;cpap;nebulizer;cane, straight    Concerns to be Addressed no discharge needs identified;denies needs/concerns at this time    Anticipated Changes Related to Illness none    Equipment Needed After Discharge none                   Discharge Plan       Row Name 08/11/24 1838       Plan    Plan Pt lives at home and plans to return home at discharge will drive self or family will assist. Pcp is Alejandro Monique, he uses [facility] and has Medicare a+b. Pt uses o2 prn, cpap, straight cane and nebs from [facility]. Pt does not use home health.    Patient/Family in Agreement with Plan yes                  Continued Care and Services - Admitted Since 8/11/2024    No active coordination exists for this encounter.       Expected  Discharge Date and Time       Expected Discharge Date Expected Discharge Time    Aug 13, 2024            Demographic Summary       Row Name 08/11/24 0129       General Information    Admission Type inpatient    Arrived From home    Referral Source emergency department    Reason for Consult discharge planning                      Princess Monique RN

## 2024-08-11 NOTE — H&P
[facility] HOSPITALIST HISTORY AND PHYSICAL    Patient Identification:  Name:  Rory Rios Jr.  Age:  64 y.o.  Sex:  male  :  1960  MRN:  [id number]   Admit Date: 2024   Visit Number:  [id number]  Primary Care Physician:  Alejandro Monique MD       Chief complaint: Shortness of breath    History of presenting illness: Mr. Rios is a 64 y.o. male who presented to [facility] Emergency Department on 2024 with a chief complaint of shortness of breath.  Patient has a past medical history remarkable for coal workers pneumoconiosis, essential hypertension, type 2 diabetes mellitus and chronic hypoxic respiratory failure on 2 L nasal cannula.  Patient reports being in his usual state of health until approximately 2 weeks prior to evaluation in the emergency department.  He reported feeling increasing shortness of breath, especially on exertion.  Patient states he had to increase his supplemental oxygen from 2 L up to 4 L prior to evaluation in the emergency department.  He denies any fevers, chills, sweats, rigors, nausea, vomiting, exposure to sick contacts or any other symptoms other than shortness of breath.  For this reason, he presented to the emergency department for further treatment and evaluation.  Initial evaluation in the emergency department did consist of basic laboratory work as well as physical exam and vital signs.  Initial vital signs found patient's blood pressure 135/74, respiratory rate 28, heart rate 100, temperature 97.3 °F and oxygen saturation 88% on 4 L nasal cannula.  Initial lab work did include ABG, CBC and CMP.  ABG demonstrated pH 7.39, pCO2 49, pO2 72 and bicarb 30 on 4 L nasal cannula.  CBC was within normal limits.  CMP was within normal limits.  Chest x-ray demonstrated coarsened bronchovascular pattern to the lungs with mild pulmonary edema and what appeared to be multifocal pneumonia.  Overall, patient's presentation appears most consistent  with acute on chronic hypoxic respiratory failure felt to be secondary to bilateral pneumonia.  As such, patient will be admitted for further treatment and evaluation.  -------------------------------------------------------------------------------------------------------------------  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    COPD (chronic obstructive pulmonary disease)     Diabetes mellitus     GERD (gastroesophageal reflux disease)     Hypertension     Pleural effusion     Pneumonia     Pulmonary fibrosis 04/27/2023    Sleep apnea      Past Surgical History:   Procedure Laterality Date    BRONCHOSCOPY N/A 04/24/2023    Procedure: BRONCHOSCOPY WITH ENDOBRONCHIAL ULTRASOUND;  Surgeon: Kota Willard MD;  Location:  [facility];  Service: Pulmonary;  Laterality: N/A;    FOOT SURGERY Left     hardware present    PLEURAL CATHETER INSERTION Left 05/11/2023    Procedure: PLEURX CATHETER INSERTION;  Surgeon: Brigitte Kolb MD;  Location:  [facility];  Service: General;  Laterality: Left;    VENOUS ACCESS DEVICE (PORT) REMOVAL N/A 5/14/2024    Procedure: PLEURX CATHETER REMOVAL;  Surgeon: Brigitte Kolb MD;  Location:  [facility];  Service: General;  Laterality: N/A;     Family History   Problem Relation Age of Onset    Diabetes Mother     Heart failure Father     Diabetes Sister     Heart disease Brother     Diabetes Brother      Social History     Socioeconomic History    Marital status:     Number of children: 1   Tobacco Use    Smoking status: Never     Passive exposure: Never    Smokeless tobacco: Current     Types: Chew   Vaping Use    Vaping status: Never Used   Substance and Sexual Activity    Alcohol use: No    Drug use: No    Sexual activity: Defer     ---------------------------------------------------------------------------------------------------------------------   Allergies:  Patient has no  known allergies.  ---------------------------------------------------------------------------------------------------------------------   Prior to Admission Medications       Prescriptions Last Dose Informant Patient Reported? Taking?    albuterol (PROVENTIL HFA;VENTOLIN HFA) 108 (90 BASE) MCG/ACT inhaler   No No    Inhale 2 puffs Every 4 (Four) Hours As Needed for Wheezing or Shortness of Air.    Budeson-Glycopyrrol-Formoterol (BREZTRI) 160-9-4.8 MCG/ACT aerosol inhaler   Yes No    Inhale 2 puffs 2 (Two) Times a Day.    busPIRone (BUSPAR) 5 MG tablet   Yes No    Take 1 tablet by mouth Every Night.    cyanocobalamin 1000 MCG/ML injection   Yes No    INJECT 1 ML INTO MUSCLE EVERY 2 WEEKS    glipizide (GLUCOTROL XL) 5 MG ER tablet   Yes No    Take 1 tablet by mouth Daily.    HYDROcodone-acetaminophen (NORCO) 7.5-325 MG per tablet  Pharmacy Yes No    Take 1 tablet by mouth Every 8 (Eight) Hours As Needed for Moderate Pain.    hydrOXYzine (ATARAX) 25 MG tablet   No No    Take 1 tablet by mouth 3 (Three) Times a Day As Needed for Anxiety.    ibuprofen (ADVIL,MOTRIN) 800 MG tablet  Pharmacy Yes No    Take 1 tablet by mouth 3 (Three) Times a Day.    ipratropium-albuterol (DUO-NEB) 0.5-2.5 mg/3 ml nebulizer   Yes No    Take 3 mL by nebulization Every 4 (Four) Hours As Needed for Wheezing or Shortness of Air.    lisinopril (PRINIVIL,ZESTRIL) 10 MG tablet   Yes No    Take 1 tablet by mouth Daily.    metFORMIN (GLUCOPHAGE) 500 MG tablet   Yes No    Take 1 tablet by mouth As Needed.    metoprolol succinate XL (TOPROL-XL) 50 MG 24 hr tablet  Pharmacy Yes No    Take 1 tablet by mouth Daily.    omeprazole (priLOSEC) 40 MG capsule  Pharmacy Yes No    Take 20 mg by mouth 2 (Two) Times a Day.    roflumilast (DALIRESP) 250 MCG tablet tablet   Yes No    Take 1 tablet by mouth Daily.    vitamin D (ERGOCALCIFEROL) 1.25 MG (66064 UT) capsule capsule   Yes No    Take 1 capsule by mouth 2 (Two) Times a Week.          Hospital Scheduled  Meds:        ---------------------------------------------------------------------------------------------------------------------   Review of Systems   On review of systems the patient denies the following unless noted above:     Constitutional:  Fevers, chills, weight change, fatigue     Eyes: Vision changes, headache, double vision, loss of vision     ENT: Runny nose, nose bleeds, ringing in ears, pain with swallowing, sore throat     Cardiovascular: Chest pains, palpitations, PND, orthopnea     Respiratory: Cough, wheezing, SOA, hemoptysis     GI:  Abdominal pain, diarrhea, constipation, change in stool caliber,    Rectal bleeding, vomiting or nausea     : Difficulty voiding, dysuria, hematuria     Musculoskeletal: Changes of any chronic joint pain, swelling     Skin: Rash, itching, easy bruisability     Neurological: Unilateral weakness, new onset numbness, speech difficulties     Psychiatric: Sadness, tearfulness, feelings of hopelessness, racing thoughts     Endocrine:  Heat or cold intolerance, mood swings, polydipsia, polyphagia,    recent hypoglycemia  --------------------------------------------------------------------------------------------------------------------  Vital Signs:  Temp:  [97.3 °F (36.3 °C)-98.1 °F (36.7 °C)] 98.1 °F (36.7 °C)  Heart Rate:  [] 92  Resp:  [20-28] 20  BP: (114-159)/(74-96) 148/81      08/11/24  1212   Weight: 128 kg (283 lb)     Body mass index is 33.56 kg/m².  ---------------------------------------------------------------------------------------------------------------------   Physical exam:  Constitutional: Well-nourished  male in no apparent distress.     HENT:  Head:  Normocephalic and atraumatic.  Mouth:  Moist mucous membranes.    Eyes:  Conjunctivae and EOM are normal.  Pupils are equal, round, and reactive to light.  No scleral icterus.    Neck:  Neck supple. No thyromegaly.  No JVD present.    Cardiovascular:  Regular rate and rhythm with no  "murmurs, rubs, clicks or gallops appreciated.  Pulmonary/Chest: Decreased breath sounds bilaterally with faint end expiratory wheezing  Abdominal:  Soft. Nontender. Nondistended  Bowel sounds are normal in all four quadrants. No organomegally appreciated.   Musculoskeletal:  5/5 muscle strength bilateral upper and lower extermties with equal muscle tone and bulk. No edema, no tenderness, and no deformity.  No red or swollen joints anywhere.    Neurological:  Alert, Cranial nerves II-XII intact with no focal defecits.  No facial droop.  No slurred speech.   Skin:  Warm and dry to palpation with no rashes or lesions appreciated.  Peripheral vascular:  2+ radial and pedal pulses in bilateral upper and lower extremities.  Psychiatric:  Alert and oriented x3, demonstrates appropriate judgement and insight.  ------------------------------------------------------------------  ---------------------------------------------------------------------------------------------------------------------   Results from last 7 days   Lab Units 08/11/24  1237 08/11/24  1228   LACTATE mmol/L 1.6  --    WBC 10*3/mm3  --  9.66   HEMOGLOBIN g/dL  --  14.2   HEMATOCRIT %  --  47.4   MCV fL  --  83.9   MCHC g/dL  --  30.0*   PLATELETS 10*3/mm3  --  267     Results from last 7 days   Lab Units 08/11/24  1457   PH, ARTERIAL pH units 7.392   PO2 ART mm Hg 72.8*   PCO2, ARTERIAL mm Hg 49.5*   HCO3 ART mmol/L 30.1*     Results from last 7 days   Lab Units 08/11/24  1228   SODIUM mmol/L 142   POTASSIUM mmol/L 4.2   CHLORIDE mmol/L 103   CO2 mmol/L 25.6   BUN mg/dL 10   CREATININE mg/dL 0.85   CALCIUM mg/dL 10.0   GLUCOSE mg/dL 140*   ALBUMIN g/dL 3.8   BILIRUBIN mg/dL 0.3   ALK PHOS U/L 92   AST (SGOT) U/L 25   ALT (SGPT) U/L 19   Estimated Creatinine Clearance: 127 mL/min (by C-G formula based on SCr of 0.85 mg/dL).  No results found for: \"AMMONIA\"  Results from last 7 days   Lab Units 08/11/24  1228   HSTROP T ng/L 18     Results from last 7 days " "  Lab Units 08/11/24  1228   PROBNP pg/mL 195.8     Lab Results   Component Value Date    HGBA1C 6.30 (H) 04/21/2023     Lab Results   Component Value Date    TSH 2.210 05/10/2023    FREET4 1.33 05/10/2023     No results found for: \"PREGTESTUR\", \"PREGSERUM\", \"HCG\", \"HCGQUANT\"  Pain Management Panel          Latest Ref Rng & Units 5/10/2023   Pain Management Panel   Amphetamine, Urine Qual Negative Negative    Barbiturates Screen, Urine Negative Negative    Benzodiazepine Screen, Urine Negative Negative    Buprenorphine, Screen, Urine Negative Negative    Cocaine Screen, Urine Negative Negative    Methadone Screen , Urine Negative Negative    Methamphetamine, Ur Negative Negative       Details                 No results found for: \"BLOODCX\"  No results found for: \"URINECX\"  No results found for: \"WOUNDCX\"  No results found for: \"STOOLCX\"      ---------------------------------------------------------------------------------------------------------------------  Imaging Results (Last 7 Days)       Procedure Component Value Units Date/Time    XR Chest 1 View [[id number]] Collected: 08/11/24 1257     Updated: 08/11/24 1301    Narrative:      PROCEDURE: Portable chest x-ray examination performed on August 11, 2024. Single view. Upright position.     HISTORY: Shortness of breath.     COMPARISON: None.     FINDINGS:     Enlarged heart size  Coarsened bronchovascular pattern to the lungs  Edema.  Patchy airspace opacities in the perihilar areas and right upper lobe  could reflect underlying multifocal pneumonia.  Additional hazy airspace opacities in the left midlung and left lower  lobe.  Small right and left pleural effusion.  No pneumothorax.       Impression:         1.  Enlarged heart size  2.  Coarsened bronchovascular pattern to the lungs.  3.  Mild edema.  4.  Multifocal pneumonia.  5.  Small right and left pleural effusion.  6.  No pneumothorax.  7.  No fracture or foreign body.     This report was finalized on " 8/11/2024 12:59 PM by Kirt Fam MD.               I have personally reviewed the radiology images and read the final radiology report.  ---------------------------------------------------------------------------------------------------------------------  Assessment and Plan:    Acute on chronic hypoxic respiratory failure -continue supplemental oxygen to maintain O2 saturation greater than 90%    2.  Suspected Bilateral pneumonia -have started empiric antibiotic therapy with Rocephin and doxycycline, CT chest currently pending, will obtain procalcitonin as well to assist in de-escalation of antibiotic therapy.    3.  COPD exacerbation -continue Solu-Medrol 40 mg IV twice daily and DuoNebs every 6 hours    4.  Type 2 diabetes mellitus -will start Accu-Cheks before every meal and nightly with sliding scale insulin    5.  Essential hypertension -will obtain home antihypertensive medications and restart once available    Oskar Rice DO  08/11/24  17:04 EDT

## 2024-08-11 NOTE — PLAN OF CARE
Goal Outcome Evaluation:   Pt resting in bed. No signs or symptoms of distress noted. No complaints at this time. Plan of care on going.

## 2024-08-12 ENCOUNTER — APPOINTMENT (OUTPATIENT)
Dept: CT IMAGING | Facility: HOSPITAL | Age: 64
DRG: 193 | End: 2024-08-12
Payer: COMMERCIAL

## 2024-08-12 ENCOUNTER — APPOINTMENT (OUTPATIENT)
Dept: CARDIOLOGY | Facility: HOSPITAL | Age: 64
DRG: 193 | End: 2024-08-12
Payer: OTHER MISCELLANEOUS

## 2024-08-12 LAB
ANION GAP SERPL CALCULATED.3IONS-SCNC: 11.1 MMOL/L (ref 5–15)
BH CV ECHO MEAS - ACS: 2.1 CM
BH CV ECHO MEAS - AO MAX PG: 8.6 MMHG
BH CV ECHO MEAS - AO MEAN PG: 5 MMHG
BH CV ECHO MEAS - AO ROOT DIAM: 3.1 CM
BH CV ECHO MEAS - AO V2 MAX: 147 CM/SEC
BH CV ECHO MEAS - AO V2 VTI: 28.1 CM
BH CV ECHO MEAS - EDV(CUBED): 115.9 ML
BH CV ECHO MEAS - EDV(MOD-SP4): 73 ML
BH CV ECHO MEAS - EF(MOD-SP4): 57.7 %
BH CV ECHO MEAS - ESV(CUBED): 26.3 ML
BH CV ECHO MEAS - ESV(MOD-SP4): 30.9 ML
BH CV ECHO MEAS - FS: 39 %
BH CV ECHO MEAS - IVS/LVPW: 1 CM
BH CV ECHO MEAS - IVSD: 1.23 CM
BH CV ECHO MEAS - LA DIMENSION: 2.8 CM
BH CV ECHO MEAS - LAT PEAK E' VEL: 7.5 CM/SEC
BH CV ECHO MEAS - LV DIASTOLIC VOL/BSA (35-75): 28.2 CM2
BH CV ECHO MEAS - LV MASS(C)D: 231.2 GRAMS
BH CV ECHO MEAS - LV SYSTOLIC VOL/BSA (12-30): 11.9 CM2
BH CV ECHO MEAS - LVIDD: 4.9 CM
BH CV ECHO MEAS - LVIDS: 3 CM
BH CV ECHO MEAS - LVOT AREA: 3.5 CM2
BH CV ECHO MEAS - LVOT DIAM: 2.1 CM
BH CV ECHO MEAS - LVPWD: 1.23 CM
BH CV ECHO MEAS - MED PEAK E' VEL: 7.4 CM/SEC
BH CV ECHO MEAS - MV A MAX VEL: 96 CM/SEC
BH CV ECHO MEAS - MV E MAX VEL: 32.6 CM/SEC
BH CV ECHO MEAS - MV E/A: 0.34
BH CV ECHO MEAS - PA ACC TIME: 0.05 SEC
BH CV ECHO MEAS - SV(MOD-SP4): 42.1 ML
BH CV ECHO MEAS - SVI(MOD-SP4): 16.3 ML/M2
BH CV ECHO MEASUREMENTS AVERAGE E/E' RATIO: 4.38
BUN SERPL-MCNC: 10 MG/DL (ref 8–23)
BUN/CREAT SERPL: 12.8 (ref 7–25)
CALCIUM SPEC-SCNC: 10.1 MG/DL (ref 8.6–10.5)
CHLORIDE SERPL-SCNC: 102 MMOL/L (ref 98–107)
CO2 SERPL-SCNC: 24.9 MMOL/L (ref 22–29)
CREAT SERPL-MCNC: 0.78 MG/DL (ref 0.76–1.27)
DEPRECATED RDW RBC AUTO: 44.3 FL (ref 37–54)
EGFRCR SERPLBLD CKD-EPI 2021: 98.4 ML/MIN/1.73
ERYTHROCYTE [DISTWIDTH] IN BLOOD BY AUTOMATED COUNT: 14.6 % (ref 12.3–15.4)
GLUCOSE BLDC GLUCOMTR-MCNC: 128 MG/DL (ref 70–130)
GLUCOSE BLDC GLUCOMTR-MCNC: 153 MG/DL (ref 70–130)
GLUCOSE BLDC GLUCOMTR-MCNC: 162 MG/DL (ref 70–130)
GLUCOSE BLDC GLUCOMTR-MCNC: 173 MG/DL (ref 70–130)
GLUCOSE BLDC GLUCOMTR-MCNC: 176 MG/DL (ref 70–130)
GLUCOSE SERPL-MCNC: 205 MG/DL (ref 65–99)
HCT VFR BLD AUTO: 44.2 % (ref 37.5–51)
HGB BLD-MCNC: 13.6 G/DL (ref 13–17.7)
LEFT ATRIUM VOLUME INDEX: 6.4 ML/M2
MCH RBC QN AUTO: 25.7 PG (ref 26.6–33)
MCHC RBC AUTO-ENTMCNC: 30.8 G/DL (ref 31.5–35.7)
MCV RBC AUTO: 83.6 FL (ref 79–97)
PLATELET # BLD AUTO: 241 10*3/MM3 (ref 140–450)
PMV BLD AUTO: 9.3 FL (ref 6–12)
POTASSIUM SERPL-SCNC: 4 MMOL/L (ref 3.5–5.2)
RBC # BLD AUTO: 5.29 10*6/MM3 (ref 4.14–5.8)
SODIUM SERPL-SCNC: 138 MMOL/L (ref 136–145)
WBC NRBC COR # BLD AUTO: 9.23 10*3/MM3 (ref 3.4–10.8)

## 2024-08-12 PROCEDURE — 82948 REAGENT STRIP/BLOOD GLUCOSE: CPT

## 2024-08-12 PROCEDURE — 25010000002 METHYLPREDNISOLONE PER 40 MG: Performed by: INTERNAL MEDICINE

## 2024-08-12 PROCEDURE — 25010000002 ENOXAPARIN PER 10 MG: Performed by: INTERNAL MEDICINE

## 2024-08-12 PROCEDURE — 63710000001 INSULIN LISPRO (HUMAN) PER 5 UNITS

## 2024-08-12 PROCEDURE — 94799 UNLISTED PULMONARY SVC/PX: CPT

## 2024-08-12 PROCEDURE — 25010000002 FUROSEMIDE PER 20 MG: Performed by: INTERNAL MEDICINE

## 2024-08-12 PROCEDURE — 93306 TTE W/DOPPLER COMPLETE: CPT | Performed by: INTERNAL MEDICINE

## 2024-08-12 PROCEDURE — 93306 TTE W/DOPPLER COMPLETE: CPT

## 2024-08-12 PROCEDURE — 85027 COMPLETE CBC AUTOMATED: CPT | Performed by: INTERNAL MEDICINE

## 2024-08-12 PROCEDURE — 94761 N-INVAS EAR/PLS OXIMETRY MLT: CPT

## 2024-08-12 PROCEDURE — 71250 CT THORAX DX C-: CPT

## 2024-08-12 PROCEDURE — 71250 CT THORAX DX C-: CPT | Performed by: RADIOLOGY

## 2024-08-12 PROCEDURE — 25010000002 SULFUR HEXAFLUORIDE MICROSPH 60.7-25 MG RECONSTITUTED SUSPENSION: Performed by: INTERNAL MEDICINE

## 2024-08-12 PROCEDURE — 80048 BASIC METABOLIC PNL TOTAL CA: CPT | Performed by: INTERNAL MEDICINE

## 2024-08-12 PROCEDURE — 25010000002 CYANOCOBALAMIN PER 1000 MCG: Performed by: INTERNAL MEDICINE

## 2024-08-12 PROCEDURE — 25010000002 CEFTRIAXONE PER 250 MG: Performed by: INTERNAL MEDICINE

## 2024-08-12 PROCEDURE — 97161 PT EVAL LOW COMPLEX 20 MIN: CPT

## 2024-08-12 PROCEDURE — 94664 DEMO&/EVAL PT USE INHALER: CPT

## 2024-08-12 PROCEDURE — 99232 SBSQ HOSP IP/OBS MODERATE 35: CPT | Performed by: INTERNAL MEDICINE

## 2024-08-12 PROCEDURE — 97165 OT EVAL LOW COMPLEX 30 MIN: CPT | Performed by: OCCUPATIONAL THERAPIST

## 2024-08-12 RX ORDER — IPRATROPIUM BROMIDE AND ALBUTEROL SULFATE 2.5; .5 MG/3ML; MG/3ML
3 SOLUTION RESPIRATORY (INHALATION)
Status: DISCONTINUED | OUTPATIENT
Start: 2024-08-12 | End: 2024-08-15 | Stop reason: HOSPADM

## 2024-08-12 RX ORDER — PANTOPRAZOLE SODIUM 40 MG/1
40 TABLET, DELAYED RELEASE ORAL
Status: DISCONTINUED | OUTPATIENT
Start: 2024-08-13 | End: 2024-08-15 | Stop reason: HOSPADM

## 2024-08-12 RX ORDER — LISINOPRIL 10 MG/1
10 TABLET ORAL DAILY
Status: DISCONTINUED | OUTPATIENT
Start: 2024-08-12 | End: 2024-08-15 | Stop reason: HOSPADM

## 2024-08-12 RX ORDER — FUROSEMIDE 10 MG/ML
40 INJECTION INTRAMUSCULAR; INTRAVENOUS DAILY
Status: DISCONTINUED | OUTPATIENT
Start: 2024-08-12 | End: 2024-08-15 | Stop reason: HOSPADM

## 2024-08-12 RX ORDER — CYANOCOBALAMIN 1000 UG/ML
1000 INJECTION, SOLUTION INTRAMUSCULAR; SUBCUTANEOUS
Status: DISCONTINUED | OUTPATIENT
Start: 2024-08-12 | End: 2024-08-15 | Stop reason: HOSPADM

## 2024-08-12 RX ORDER — HYDROCODONE BITARTRATE AND ACETAMINOPHEN 10; 325 MG/1; MG/1
1 TABLET ORAL EVERY 8 HOURS PRN
Status: DISCONTINUED | OUTPATIENT
Start: 2024-08-12 | End: 2024-08-15 | Stop reason: HOSPADM

## 2024-08-12 RX ORDER — ROFLUMILAST 500 UG/1
250 TABLET ORAL DAILY
Status: DISCONTINUED | OUTPATIENT
Start: 2024-08-12 | End: 2024-08-15 | Stop reason: HOSPADM

## 2024-08-12 RX ORDER — ALBUTEROL SULFATE 2.5 MG/3ML
2.5 SOLUTION RESPIRATORY (INHALATION) EVERY 4 HOURS PRN
Status: DISCONTINUED | OUTPATIENT
Start: 2024-08-12 | End: 2024-08-15 | Stop reason: HOSPADM

## 2024-08-12 RX ORDER — GLUCAGON 1 MG/ML
1 KIT INJECTION
Status: DISCONTINUED | OUTPATIENT
Start: 2024-08-12 | End: 2024-08-15 | Stop reason: HOSPADM

## 2024-08-12 RX ORDER — INSULIN LISPRO 100 [IU]/ML
2-7 INJECTION, SOLUTION INTRAVENOUS; SUBCUTANEOUS
Status: DISCONTINUED | OUTPATIENT
Start: 2024-08-12 | End: 2024-08-15 | Stop reason: HOSPADM

## 2024-08-12 RX ORDER — NICOTINE POLACRILEX 4 MG
15 LOZENGE BUCCAL
Status: DISCONTINUED | OUTPATIENT
Start: 2024-08-12 | End: 2024-08-15 | Stop reason: HOSPADM

## 2024-08-12 RX ORDER — CEFDINIR 300 MG/1
300 CAPSULE ORAL 2 TIMES DAILY
Status: DISCONTINUED | OUTPATIENT
Start: 2024-08-12 | End: 2024-08-15 | Stop reason: HOSPADM

## 2024-08-12 RX ORDER — DEXTROSE MONOHYDRATE 25 G/50ML
25 INJECTION, SOLUTION INTRAVENOUS
Status: DISCONTINUED | OUTPATIENT
Start: 2024-08-12 | End: 2024-08-15 | Stop reason: HOSPADM

## 2024-08-12 RX ADMIN — LISINOPRIL 10 MG: 10 TABLET ORAL at 17:26

## 2024-08-12 RX ADMIN — DOXYCYCLINE 100 MG: 100 INJECTION, POWDER, LYOPHILIZED, FOR SOLUTION INTRAVENOUS at 09:51

## 2024-08-12 RX ADMIN — SULFUR HEXAFLUORIDE 3 ML: KIT at 16:06

## 2024-08-12 RX ADMIN — IPRATROPIUM BROMIDE AND ALBUTEROL SULFATE 3 ML: .5; 2.5 SOLUTION RESPIRATORY (INHALATION) at 22:44

## 2024-08-12 RX ADMIN — CYANOCOBALAMIN 1000 MCG: 1000 INJECTION, SOLUTION INTRAMUSCULAR at 17:26

## 2024-08-12 RX ADMIN — INSULIN LISPRO 2 UNITS: 100 INJECTION, SOLUTION INTRAVENOUS; SUBCUTANEOUS at 09:50

## 2024-08-12 RX ADMIN — METHYLPREDNISOLONE SODIUM SUCCINATE 40 MG: 40 INJECTION, POWDER, FOR SOLUTION INTRAMUSCULAR; INTRAVENOUS at 21:33

## 2024-08-12 RX ADMIN — FUROSEMIDE 40 MG: 10 INJECTION, SOLUTION INTRAMUSCULAR; INTRAVENOUS at 17:26

## 2024-08-12 RX ADMIN — IPRATROPIUM BROMIDE AND ALBUTEROL SULFATE 3 ML: .5; 2.5 SOLUTION RESPIRATORY (INHALATION) at 16:43

## 2024-08-12 RX ADMIN — Medication 10 ML: at 09:52

## 2024-08-12 RX ADMIN — IPRATROPIUM BROMIDE 0.5 MG: 0.5 SOLUTION RESPIRATORY (INHALATION) at 00:11

## 2024-08-12 RX ADMIN — IPRATROPIUM BROMIDE 0.5 MG: 0.5 SOLUTION RESPIRATORY (INHALATION) at 06:34

## 2024-08-12 RX ADMIN — INSULIN LISPRO 2 UNITS: 100 INJECTION, SOLUTION INTRAVENOUS; SUBCUTANEOUS at 13:27

## 2024-08-12 RX ADMIN — CEFDINIR 300 MG: 300 CAPSULE ORAL at 21:34

## 2024-08-12 RX ADMIN — ROFLUMILAST 250 MCG: 500 TABLET ORAL at 17:26

## 2024-08-12 RX ADMIN — METHYLPREDNISOLONE SODIUM SUCCINATE 40 MG: 40 INJECTION, POWDER, FOR SOLUTION INTRAMUSCULAR; INTRAVENOUS at 09:52

## 2024-08-12 RX ADMIN — ENOXAPARIN SODIUM 40 MG: 40 INJECTION SUBCUTANEOUS at 09:52

## 2024-08-12 RX ADMIN — Medication 10 ML: at 21:34

## 2024-08-12 RX ADMIN — CEFTRIAXONE 1000 MG: 1 INJECTION, POWDER, FOR SOLUTION INTRAMUSCULAR; INTRAVENOUS at 09:51

## 2024-08-12 NOTE — PLAN OF CARE
Goal Outcome Evaluation:  Patient resting in bed this shift. Patient remained alert and oriented x4, on 4L nasal cannula. Denies any questions or concerns. No signs or symptoms of acute distress noted. Call light and patient belongings within reach. Plan of care ongoing.

## 2024-08-12 NOTE — CONSULTS
COPD Education        NAME:___Rory Rios Jr.  :_1960_  Sex: male  ____24___12:42 EDT___________    COPD Education Evaluation            COPD Education Completed (See Note) Yes     COPD Education Encounter: 1st    Referring/consulting Provider: Dr. Rice     Reason for Consultation:  COPD Exacerbation   COPD Diagnosis Length 3 years per patient.      Current Outpatient Pulmonologist     YES and [facility]     Last Pulmonology Visit 5/10/2024         Subjective .     Age: 64 y.o.    What stage have you been told you are in? Stage II  Do you use a CPAP or BIPAP? yes CPAP  Have you had any recent weight loss? no  How many pillows do you use?  Inclined bed  Do you have dyspnea? yes Dyspnea on exertion  Home O2: 2L NC    Smoking Cessation: No    Airway Clearance methods utilized: no    History of Sleep Apnea: yes    Patient's Last ABG:  Site   Date Value Ref Range Status   2024 Left Brachial  Final     Mike's Test   Date Value Ref Range Status   2024 N/A  Final     pH, Arterial   Date Value Ref Range Status   2024 7.392 7.350 - 7.450 pH units Final     pCO2, Arterial   Date Value Ref Range Status   2024 49.5 (H) 35.0 - 45.0 mm Hg Final     pO2, Arterial   Date Value Ref Range Status   2024 72.8 (L) 83.0 - 108.0 mm Hg Final     Comment:     84 Value below reference range     HCO3, Arterial   Date Value Ref Range Status   2024 30.1 (H) 20.0 - 26.0 mmol/L Final     Comment:     83 Value above reference range     Base Excess, Arterial   Date Value Ref Range Status   2024 4.1 (H) 0.0 - 2.0 mmol/L Final     O2 Saturation, Arterial   Date Value Ref Range Status   2024 94.6 94.0 - 99.0 % Final     Hemoglobin, Blood Gas   Date Value Ref Range Status   2024 14.0 14 - 18 g/dL Final     Hematocrit, Blood Gas   Date Value Ref Range Status   2024 43.0 38.0 - 51.0 % Final     Oxyhemoglobin   Date Value Ref Range Status   2024 93.7  (L) 94 - 99 % Final     Comment:     84 Value below reference range     Methemoglobin   Date Value Ref Range Status   08/11/2024 0.10 0.00 - 3.00 % Final     Carboxyhemoglobin   Date Value Ref Range Status   08/11/2024 0.9 0 - 5 % Final     CO2 Content   Date Value Ref Range Status   08/11/2024 31.6 22 - 33 mmol/L Final     Barometric Pressure for Blood Gas   Date Value Ref Range Status   08/11/2024 729 mmHg Final     Modality   Date Value Ref Range Status   08/11/2024 Nasal Cannula  Final     FIO2   Date Value Ref Range Status   08/11/2024 35 % Final        Social History:  Social History     Socioeconomic History    Marital status:     Number of children: 1   Tobacco Use    Smoking status: Never     Passive exposure: Never    Smokeless tobacco: Current     Types: Chew   Vaping Use    Vaping status: Never Used   Substance and Sexual Activity    Alcohol use: No    Drug use: No    Sexual activity: Defer       [facility]  FVC: 76%  FEV1: 69%  FEV1/FVC: 68  Classification of Airflow Limitation Severity in COPD (Based on Post-Bronchodilator FEV1)  Gold 1: Mild FEV1 ? 80% predicted   Gold 2:  Moderate 50% ? FEV1 < 80% predicted   Gold 3: Severe 30% ? FEV1 < 50% predicted   Gold 4: Very Severe FEV1 < 30% predicted         Objective     SpO2 SpO2: 92 % (08/12/24 1124)  Device Device (Oxygen Therapy): nasal cannula (08/12/24 0634)  Flow Flow (L/min): 4 (08/12/24 0634)  Home NIPPV Compliance: Yes, describe: He does use his CPAP every night  Home Equipment Used: CPAP   Breath Sounds: diminished breath sounds- anterior        Home Medications:  Medications Prior to Admission   Medication Sig Dispense Refill Last Dose    Budeson-Glycopyrrol-Formoterol (BREZTRI) 160-9-4.8 MCG/ACT aerosol inhaler Inhale 2 puffs 2 (Two) Times a Day.   8/10/2024    cefdinir (OMNICEF) 300 MG capsule Take 1 capsule by mouth 2 (Two) Times a Day.   8/11/2024 at AM    cyanocobalamin 1000 MCG/ML  "injection INJECT 1 ML INTO MUSCLE EVERY 2 WEEKS   Past Month    ipratropium-albuterol (DUO-NEB) 0.5-2.5 mg/3 ml nebulizer Take 3 mL by nebulization 3 (Three) Times a Day.   8/10/2024    lisinopril (PRINIVIL,ZESTRIL) 10 MG tablet Take 1 tablet by mouth Daily.   8/11/2024    omeprazole (priLOSEC) 20 MG capsule Take 1 capsule by mouth 2 (Two) Times a Day.   8/10/2024    roflumilast (DALIRESP) 250 MCG tablet tablet Take 1 tablet by mouth Daily.   8/10/2024    vitamin D (ERGOCALCIFEROL) 1.25 MG (13212 UT) capsule capsule Take 1 capsule by mouth Every 7 (Seven) Days.   Past Week    albuterol (PROVENTIL HFA;VENTOLIN HFA) 108 (90 BASE) MCG/ACT inhaler Inhale 2 puffs Every 4 (Four) Hours As Needed for Wheezing or Shortness of Air. 1 inhaler 0 Unknown    HYDROcodone-acetaminophen (NORCO)  MG per tablet Take 1 tablet by mouth Every 8 (Eight) Hours As Needed for Moderate Pain.   Unknown     Barriers to Learning? No    COPD education given via the booklet \"A Patient's Guide to COPD\".     COPD Zones: (Green/yellow/Red): YES     Exacerbation or Flare up signs and symptoms: YES     Causes of COPD Exacerbation:      Lung Infection YES     Indoor and Outdoor Irratants YES     COPD Medication Non-Compliance YES     Healthy eating and drinking habbits: YES     Exercise and Activity: YES     Managing Medications: YES     Patient understands use of Rescue Medications: YES and Albuterol MDI and nebulizer solution       Patient understands use of Maintenance Medications: YES and Breztri. He stated he may be interested in trying Triple therapy via nebulizer (Yupelri, Brovana, Pulmicort). However, he did not utilize correct Inhaler technique. I did instruct him how to use his MDI's with d without a SPACER. He verbally recognized he had previously been using his inhalers incorrectly. He is eager to try correct inhaler technique to see if he can tell a difference.       Proper MDI technique (w/wo Spacer): YES       How to use a " "nebulizer: YES     How to clean a nebulizer: YES     Breathing Techniques:       Purse-lipped breathing YES     Oxygen therapy SAFETY:  YES       Action Plan            COPD/Lung Health Clinic follow up scheduled: NO and He has a follow up with Dr. Aguilar on 8/15/2024. He stated he will follow up with Dr. Aguilar and see how it goes. He stated he may possibly call and schedule a appointment in the COPD Clinic. I instructed him, that either way, he needs to continue to follow up with Pulmonology for longevity. Either with [facility] or hospitals Pulmonology.      Education Minutes with patient: YES and 45 minutes             Goals Discussed with patient: Keep home smoke free., Take medications as ordered., GO to [facility] appointments., Increase activity., Eat healthier., Increase use of pursed lip breathing., Decrease flare-ups., and Increase COPD Knowledge.      Comments:     COPD education was given to Mr. Rios via the booklet , \"A Patient's Guide to COPD\". Discussion of the COPD zones (Green/Yellow/Red) was competed with emphasizes on what to do in the yellow and red zones. He was in his room and pleasantly interested in COPD education.     Proper Inhaler technique was illustrated with and without the given Aero Chamber spacer I provided to the patient. Instruction on rescue and maintenance medications, the function of each and the importance of using them as prescribed.     Pursed Lip breathing was instructed as well as when to utilize the breathing technique.     Thank you for allowing me to participate in his care,    YURIDIA Crane, RRT  COPD Navigator  08/12/24  12:42 EDT   "

## 2024-08-12 NOTE — PROGRESS NOTES
[facility]     Patient Identification:  Name:  Rory Rios Jr.  Age:  64 y.o.  Sex:  male  :  1960  MRN:  [id number]  Visit Number:  [id number]  Primary Care Provider:  Alejandro Monique MD    Length of stay:  1    Chief complaint: None    Subjective:    Patient seen and examined at bedside with no nursing staff present.  Patient was asleep when entered the room but easily awakened.  Patient does quickly fall back asleep during examination.  No new events since admission, patient currently on 4 L nasal cannula with oxygen saturation 97%.  ----------------------------------------------------------------------------------------------------------------------  Current Hospital Meds:  cefTRIAXone, 1,000 mg, Intravenous, Q24H  doxycycline, 100 mg, Intravenous, BID  enoxaparin, 40 mg, Subcutaneous, Daily  methylPREDNISolone sodium succinate, 40 mg, Intravenous, BID  sodium chloride, 10 mL, Intravenous, Q12H         ----------------------------------------------------------------------------------------------------------------------  Vital Signs:  Temp:  [97.3 °F (36.3 °C)-98.3 °F (36.8 °C)] 98 °F (36.7 °C)  Heart Rate:  [] 108  Resp:  [18-28] 20  BP: (114-159)/(66-97) 143/66      24  1212   Weight: 128 kg (283 lb)     Body mass index is 33.56 kg/m².    Intake/Output Summary (Last 24 hours) at 2024 0403  Last data filed at 2024 2140  Gross per 24 hour   Intake 100 ml   Output 225 ml   Net -125 ml     Diet: Regular/House; Fluid Consistency: Thin (IDDSI 0)  ----------------------------------------------------------------------------------------------------------------------  Physical exam:  Constitutional: Well-nourished  male in no apparent distress.     HENT:  Head:  Normocephalic and atraumatic.  Mouth:  Moist mucous membranes.    Eyes:  Conjunctivae and EOM are normal.  Pupils are equal, round, and reactive to light.  No scleral icterus.     Neck:  Neck supple. No thyromegaly.  No JVD present.    Cardiovascular:  Regular rate and rhythm with no murmurs, rubs, clicks or gallops appreciated.  Pulmonary/Chest: Decreased breath sounds bilaterally with faint end expiratory wheezing  Abdominal:  Soft. Nontender. Nondistended  Bowel sounds are normal in all four quadrants. No organomegally appreciated.   Musculoskeletal:  5/5 muscle strength bilateral upper and lower extermties with equal muscle tone and bulk. No edema, no tenderness, and no deformity.  No red or swollen joints anywhere.    Neurological:  Alert, Cranial nerves II-XII intact with no focal defecits.  No facial droop.  No slurred speech.   Skin:  Warm and dry to palpation with no rashes or lesions appreciated.  Peripheral vascular:  2+ radial and pedal pulses in bilateral upper and lower extremities.  Psychiatric:  Alert and oriented x3, demonstrates appropriate judgement and insight.    No significant change in physical exam in comparison to 8/11/2024  ------------------------------------------------------------------  ----------------------------------------------------------------------------------------------------------------------  Results from last 7 days   Lab Units 08/11/24  1228   HSTROP T ng/L 18     Results from last 7 days   Lab Units 08/12/24  0131 08/11/24  1237 08/11/24  1228   LACTATE mmol/L  --  1.6  --    WBC 10*3/mm3 9.23  --  9.66   HEMOGLOBIN g/dL 13.6  --  14.2   HEMATOCRIT % 44.2  --  47.4   MCV fL 83.6  --  83.9   MCHC g/dL 30.8*  --  30.0*   PLATELETS 10*3/mm3 241  --  267     Results from last 7 days   Lab Units 08/11/24  1457   PH, ARTERIAL pH units 7.392   PO2 ART mm Hg 72.8*   PCO2, ARTERIAL mm Hg 49.5*   HCO3 ART mmol/L 30.1*     Results from last 7 days   Lab Units 08/12/24  0131 08/11/24  1228   SODIUM mmol/L 138 142   POTASSIUM mmol/L 4.0 4.2   CHLORIDE mmol/L 102 103   CO2 mmol/L 24.9 25.6   BUN mg/dL 10 10   CREATININE mg/dL 0.78 0.85   CALCIUM mg/dL 10.1  "10.0   GLUCOSE mg/dL 205* 140*   ALBUMIN g/dL  --  3.8   BILIRUBIN mg/dL  --  0.3   ALK PHOS U/L  --  92   AST (SGOT) U/L  --  25   ALT (SGPT) U/L  --  19   Estimated Creatinine Clearance: 138.4 mL/min (by C-G formula based on SCr of 0.78 mg/dL).    No results found for: \"AMMONIA\"      No results found for: \"BLOODCX\"  No results found for: \"URINECX\"  No results found for: \"WOUNDCX\"  No results found for: \"STOOLCX\"    I have personally looked at the labs and they are summarized above.  ----------------------------------------------------------------------------------------------------------------------  Imaging Results (Last 24 Hours)       Procedure Component Value Units Date/Time    XR Chest 1 View [[id number]] Collected: 08/11/24 1257     Updated: 08/11/24 1301    Narrative:      PROCEDURE: Portable chest x-ray examination performed on August 11, 2024. Single view. Upright position.     HISTORY: Shortness of breath.     COMPARISON: None.     FINDINGS:     Enlarged heart size  Coarsened bronchovascular pattern to the lungs  Edema.  Patchy airspace opacities in the perihilar areas and right upper lobe  could reflect underlying multifocal pneumonia.  Additional hazy airspace opacities in the left midlung and left lower  lobe.  Small right and left pleural effusion.  No pneumothorax.       Impression:         1.  Enlarged heart size  2.  Coarsened bronchovascular pattern to the lungs.  3.  Mild edema.  4.  Multifocal pneumonia.  5.  Small right and left pleural effusion.  6.  No pneumothorax.  7.  No fracture or foreign body.     This report was finalized on 8/11/2024 12:59 PM by Kirt Fam MD.             ----------------------------------------------------------------------------------------------------------------------  Assessment and Plan:  Acute on chronic hypoxic respiratory failure -continue supplemental oxygen to maintain O2 saturation greater than 90%     2.  Suspected Bilateral pneumonia - will continue " antibiotic therapy with Rocephin and doxycycline for now, procalcitonin within normal limits, CT chest currently pending.  Pending CT results, will likely de-escalate/discontinue antibiotic therapy.     3.  COPD exacerbation -continue Solu-Medrol 40 mg IV twice daily and DuoNebs every 6 hours     4.  Type 2 diabetes mellitus -will start Accu-Cheks before every meal and nightly with sliding scale insulin     5.  Essential hypertension -will obtain home antihypertensive medications and restart once available    Disposition possible discharge in the next 24 to 48 hours pending resolution of acute hypoxic respiratory failure    Oskar Rice,    08/12/24   04:03 EDT

## 2024-08-12 NOTE — PAYOR COMM NOTE
"CONTACT: ZANE ANGULO RN  UTILIZATION MANAGEMENT DEPT.  [facility]  [address] [place] [address]  PHONE: [phone]  FAX: [phone]      INPATIENT ADMISSION    CASE ID#  [id number]        Rory Rios Jr. (64 y.o. Male)       Date of Birth   1960    Social Security Number       Address   [address]  [facility] [address]    Home Phone   [phone]    MRN   [id number]       Quaker   None    Marital Status                               Admission Date   8/11/24    Admission Type   Emergency    Admitting Provider   Oskar Rice DO    Attending Provider   Oksar Rice DO    Department, Room/Bed   [facility] 3 [facility], [facility]/       Discharge Date       Discharge Disposition       Discharge Destination                                 Attending Provider: Oskar Rice DO    Allergies: No Known Allergies    Isolation: None   Infection: None   Code Status: CPR    Ht: 195.6 cm (77\")   Wt: 128 kg (282 lb 8 oz)    Admission Cmt: None   Principal Problem: Acute on chronic hypoxic respiratory failure [J96.21]                   Active Insurance as of 8/11/2024       Primary Coverage       Payor Plan Insurance Group Employer/Plan Group    MEDICARE MEDICARE A & B        Payor Plan Address Payor Plan Phone Number Payor Plan Fax Number Effective Dates    [address] [phone]  4/1/2013 - None Entered    [facility] [id number]         Subscriber Name Subscriber Birth Date Member ID       RORY RIOS JR. 1960 [id number]               Secondary Coverage       Payor Plan Insurance Group Employer/Plan Group    MISC THIRD PARTY MISC THIRD PARTY        Coverage Address Coverage Phone Number Coverage Fax Number Effective Dates    [address] [phone]  8/11/2024 - None Entered    [facility] [id number]         Subscriber Name Subscriber Birth Date Member ID       RORY RIOS JR. 1960 [id number]                     Emergency " Contacts        (Rel.) Home Phone Work Phone Mobile Phone    KATIE KENDRICK (Daughter) [phone] -- --                 History & Physical        Oskar Rice DO at 24 1704              [facility] HOSPITALIST HISTORY AND PHYSICAL    Patient Identification:  Name:  Rory Rios Jr.  Age:  64 y.o.  Sex:  male  :  1960  MRN:  [id number]   Admit Date: 2024   Visit Number:  [id number]  Primary Care Physician:  Alejandro Monique MD       Chief complaint: Shortness of breath    History of presenting illness: Mr. Rios is a 64 y.o. male who presented to [facility] Emergency Department on 2024 with a chief complaint of shortness of breath.  Patient has a past medical history remarkable for coal workers pneumoconiosis, essential hypertension, type 2 diabetes mellitus and chronic hypoxic respiratory failure on 2 L nasal cannula.  Patient reports being in his usual state of health until approximately 2 weeks prior to evaluation in the emergency department.  He reported feeling increasing shortness of breath, especially on exertion.  Patient states he had to increase his supplemental oxygen from 2 L up to 4 L prior to evaluation in the emergency department.  He denies any fevers, chills, sweats, rigors, nausea, vomiting, exposure to sick contacts or any other symptoms other than shortness of breath.  For this reason, he presented to the emergency department for further treatment and evaluation.  Initial evaluation in the emergency department did consist of basic laboratory work as well as physical exam and vital signs.  Initial vital signs found patient's blood pressure 135/74, respiratory rate 28, heart rate 100, temperature 97.3 °F and oxygen saturation 88% on 4 L nasal cannula.  Initial lab work did include ABG, CBC and CMP.  ABG demonstrated pH 7.39, pCO2 49, pO2 72 and bicarb 30 on 4 L nasal cannula.  CBC was within normal limits.  CMP was within  normal limits.  Chest x-ray demonstrated coarsened bronchovascular pattern to the lungs with mild pulmonary edema and what appeared to be multifocal pneumonia.  Overall, patient's presentation appears most consistent with acute on chronic hypoxic respiratory failure felt to be secondary to bilateral pneumonia.  As such, patient will be admitted for further treatment and evaluation.  -------------------------------------------------------------------------------------------------------------------  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    COPD (chronic obstructive pulmonary disease)     Diabetes mellitus     GERD (gastroesophageal reflux disease)     Hypertension     Pleural effusion     Pneumonia     Pulmonary fibrosis 04/27/2023    Sleep apnea      Past Surgical History:   Procedure Laterality Date    BRONCHOSCOPY N/A 04/24/2023    Procedure: BRONCHOSCOPY WITH ENDOBRONCHIAL ULTRASOUND;  Surgeon: Kota Willard MD;  Location:  [facility];  Service: Pulmonary;  Laterality: N/A;    FOOT SURGERY Left     hardware present    PLEURAL CATHETER INSERTION Left 05/11/2023    Procedure: PLEURX CATHETER INSERTION;  Surgeon: Brigitte Kolb MD;  Location:  [facility];  Service: General;  Laterality: Left;    VENOUS ACCESS DEVICE (PORT) REMOVAL N/A 5/14/2024    Procedure: PLEURX CATHETER REMOVAL;  Surgeon: Brigitte Kolb MD;  Location:  [facility];  Service: General;  Laterality: N/A;     Family History   Problem Relation Age of Onset    Diabetes Mother     Heart failure Father     Diabetes Sister     Heart disease Brother     Diabetes Brother      Social History     Socioeconomic History    Marital status:     Number of children: 1   Tobacco Use    Smoking status: Never     Passive exposure: Never    Smokeless tobacco: Current     Types: Chew   Vaping Use    Vaping status: Never Used   Substance and Sexual Activity     Alcohol use: No    Drug use: No    Sexual activity: Defer     ---------------------------------------------------------------------------------------------------------------------   Allergies:  Patient has no known allergies.  ---------------------------------------------------------------------------------------------------------------------   Prior to Admission Medications       Prescriptions Last Dose Informant Patient Reported? Taking?    albuterol (PROVENTIL HFA;VENTOLIN HFA) 108 (90 BASE) MCG/ACT inhaler   No No    Inhale 2 puffs Every 4 (Four) Hours As Needed for Wheezing or Shortness of Air.    Budeson-Glycopyrrol-Formoterol (BREZTRI) 160-9-4.8 MCG/ACT aerosol inhaler   Yes No    Inhale 2 puffs 2 (Two) Times a Day.    busPIRone (BUSPAR) 5 MG tablet   Yes No    Take 1 tablet by mouth Every Night.    cyanocobalamin 1000 MCG/ML injection   Yes No    INJECT 1 ML INTO MUSCLE EVERY 2 WEEKS    glipizide (GLUCOTROL XL) 5 MG ER tablet   Yes No    Take 1 tablet by mouth Daily.    HYDROcodone-acetaminophen (NORCO) 7.5-325 MG per tablet  Pharmacy Yes No    Take 1 tablet by mouth Every 8 (Eight) Hours As Needed for Moderate Pain.    hydrOXYzine (ATARAX) 25 MG tablet   No No    Take 1 tablet by mouth 3 (Three) Times a Day As Needed for Anxiety.    ibuprofen (ADVIL,MOTRIN) 800 MG tablet  Pharmacy Yes No    Take 1 tablet by mouth 3 (Three) Times a Day.    ipratropium-albuterol (DUO-NEB) 0.5-2.5 mg/3 ml nebulizer   Yes No    Take 3 mL by nebulization Every 4 (Four) Hours As Needed for Wheezing or Shortness of Air.    lisinopril (PRINIVIL,ZESTRIL) 10 MG tablet   Yes No    Take 1 tablet by mouth Daily.    metFORMIN (GLUCOPHAGE) 500 MG tablet   Yes No    Take 1 tablet by mouth As Needed.    metoprolol succinate XL (TOPROL-XL) 50 MG 24 hr tablet  Pharmacy Yes No    Take 1 tablet by mouth Daily.    omeprazole (priLOSEC) 40 MG capsule  Pharmacy Yes No    Take 20 mg by mouth 2 (Two) Times a Day.    roflumilast (DALIRESP) 250 MCG  tablet tablet   Yes No    Take 1 tablet by mouth Daily.    vitamin D (ERGOCALCIFEROL) 1.25 MG (44952 UT) capsule capsule   Yes No    Take 1 capsule by mouth 2 (Two) Times a Week.          Hospital Scheduled Meds:        ---------------------------------------------------------------------------------------------------------------------   Review of Systems   On review of systems the patient denies the following unless noted above:     Constitutional:  Fevers, chills, weight change, fatigue     Eyes: Vision changes, headache, double vision, loss of vision     ENT: Runny nose, nose bleeds, ringing in ears, pain with swallowing, sore throat     Cardiovascular: Chest pains, palpitations, PND, orthopnea     Respiratory: Cough, wheezing, SOA, hemoptysis     GI:  Abdominal pain, diarrhea, constipation, change in stool caliber,    Rectal bleeding, vomiting or nausea     : Difficulty voiding, dysuria, hematuria     Musculoskeletal: Changes of any chronic joint pain, swelling     Skin: Rash, itching, easy bruisability     Neurological: Unilateral weakness, new onset numbness, speech difficulties     Psychiatric: Sadness, tearfulness, feelings of hopelessness, racing thoughts     Endocrine:  Heat or cold intolerance, mood swings, polydipsia, polyphagia,    recent hypoglycemia  --------------------------------------------------------------------------------------------------------------------  Vital Signs:  Temp:  [97.3 °F (36.3 °C)-98.1 °F (36.7 °C)] 98.1 °F (36.7 °C)  Heart Rate:  [] 92  Resp:  [20-28] 20  BP: (114-159)/(74-96) 148/81      08/11/24  1212   Weight: 128 kg (283 lb)     Body mass index is 33.56 kg/m².  ---------------------------------------------------------------------------------------------------------------------   Physical exam:  Constitutional: Well-nourished  male in no apparent distress.     HENT:  Head:  Normocephalic and atraumatic.  Mouth:  Moist mucous membranes.    Eyes:   Conjunctivae and EOM are normal.  Pupils are equal, round, and reactive to light.  No scleral icterus.    Neck:  Neck supple. No thyromegaly.  No JVD present.    Cardiovascular:  Regular rate and rhythm with no murmurs, rubs, clicks or gallops appreciated.  Pulmonary/Chest: Decreased breath sounds bilaterally with faint end expiratory wheezing  Abdominal:  Soft. Nontender. Nondistended  Bowel sounds are normal in all four quadrants. No organomegally appreciated.   Musculoskeletal:  5/5 muscle strength bilateral upper and lower extermties with equal muscle tone and bulk. No edema, no tenderness, and no deformity.  No red or swollen joints anywhere.    Neurological:  Alert, Cranial nerves II-XII intact with no focal defecits.  No facial droop.  No slurred speech.   Skin:  Warm and dry to palpation with no rashes or lesions appreciated.  Peripheral vascular:  2+ radial and pedal pulses in bilateral upper and lower extremities.  Psychiatric:  Alert and oriented x3, demonstrates appropriate judgement and insight.  ------------------------------------------------------------------  ---------------------------------------------------------------------------------------------------------------------   Results from last 7 days   Lab Units 08/11/24  1237 08/11/24  1228   LACTATE mmol/L 1.6  --    WBC 10*3/mm3  --  9.66   HEMOGLOBIN g/dL  --  14.2   HEMATOCRIT %  --  47.4   MCV fL  --  83.9   MCHC g/dL  --  30.0*   PLATELETS 10*3/mm3  --  267     Results from last 7 days   Lab Units 08/11/24  1457   PH, ARTERIAL pH units 7.392   PO2 ART mm Hg 72.8*   PCO2, ARTERIAL mm Hg 49.5*   HCO3 ART mmol/L 30.1*     Results from last 7 days   Lab Units 08/11/24  1228   SODIUM mmol/L 142   POTASSIUM mmol/L 4.2   CHLORIDE mmol/L 103   CO2 mmol/L 25.6   BUN mg/dL 10   CREATININE mg/dL 0.85   CALCIUM mg/dL 10.0   GLUCOSE mg/dL 140*   ALBUMIN g/dL 3.8   BILIRUBIN mg/dL 0.3   ALK PHOS U/L 92   AST (SGOT) U/L 25   ALT (SGPT) U/L 19   Estimated  "Creatinine Clearance: 127 mL/min (by C-G formula based on SCr of 0.85 mg/dL).  No results found for: \"AMMONIA\"  Results from last 7 days   Lab Units 08/11/24  1228   HSTROP T ng/L 18     Results from last 7 days   Lab Units 08/11/24  1228   PROBNP pg/mL 195.8     Lab Results   Component Value Date    HGBA1C 6.30 (H) 04/21/2023     Lab Results   Component Value Date    TSH 2.210 05/10/2023    FREET4 1.33 05/10/2023     No results found for: \"PREGTESTUR\", \"PREGSERUM\", \"HCG\", \"HCGQUANT\"  Pain Management Panel          Latest Ref Rng & Units 5/10/2023   Pain Management Panel   Amphetamine, Urine Qual Negative Negative    Barbiturates Screen, Urine Negative Negative    Benzodiazepine Screen, Urine Negative Negative    Buprenorphine, Screen, Urine Negative Negative    Cocaine Screen, Urine Negative Negative    Methadone Screen , Urine Negative Negative    Methamphetamine, Ur Negative Negative       Details                 No results found for: \"BLOODCX\"  No results found for: \"URINECX\"  No results found for: \"WOUNDCX\"  No results found for: \"STOOLCX\"      ---------------------------------------------------------------------------------------------------------------------  Imaging Results (Last 7 Days)       Procedure Component Value Units Date/Time    XR Chest 1 View [[id number]] Collected: 08/11/24 1257     Updated: 08/11/24 1301    Narrative:      PROCEDURE: Portable chest x-ray examination performed on August 11, 2024. Single view. Upright position.     HISTORY: Shortness of breath.     COMPARISON: None.     FINDINGS:     Enlarged heart size  Coarsened bronchovascular pattern to the lungs  Edema.  Patchy airspace opacities in the perihilar areas and right upper lobe  could reflect underlying multifocal pneumonia.  Additional hazy airspace opacities in the left midlung and left lower  lobe.  Small right and left pleural effusion.  No pneumothorax.       Impression:         1.  Enlarged heart size  2.  Coarsened " bronchovascular pattern to the lungs.  3.  Mild edema.  4.  Multifocal pneumonia.  5.  Small right and left pleural effusion.  6.  No pneumothorax.  7.  No fracture or foreign body.     This report was finalized on 8/11/2024 12:59 PM by Kirt Fam MD.               I have personally reviewed the radiology images and read the final radiology report.  ---------------------------------------------------------------------------------------------------------------------  Assessment and Plan:    Acute on chronic hypoxic respiratory failure -continue supplemental oxygen to maintain O2 saturation greater than 90%    2.  Suspected Bilateral pneumonia -have started empiric antibiotic therapy with Rocephin and doxycycline, CT chest currently pending, will obtain procalcitonin as well to assist in de-escalation of antibiotic therapy.    3.  COPD exacerbation -continue Solu-Medrol 40 mg IV twice daily and DuoNebs every 6 hours    4.  Type 2 diabetes mellitus -will start Accu-Cheks before every meal and nightly with sliding scale insulin    5.  Essential hypertension -will obtain home antihypertensive medications and restart once available    Oskar Rice DO  08/11/24  17:04 EDT    Electronically signed by Oskar Rice DO at 08/11/24 9998          Emergency Department Notes        Turner Carballo MD at 08/11/24 1230          Subjective   History of Present Illness  64-year-old white male complains of shortness of breath.  Patient with a history of COPD, coal workers pneumoconiosis, pulmonary fibrosis, sleep apnea on CPAP with supplemental oxygen at 2 L presents with complaint of increasing shortness of breath over the past 2 weeks.  He has dyspnea on exertion even walking 15 feet at this time.  He was having to wear his oxygen throughout the day, and then increased it to 3 L, and then in the last couple of days had increasing to 4 L.  He has had increased productive cough, but denies fever or chills.  He  has a history of a chronic left pleural effusion and had a Pleurx catheter for 1 year which was removed about 3 months ago.      Review of Systems   All other systems reviewed and are negative.      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    COPD (chronic obstructive pulmonary disease)     Diabetes mellitus     GERD (gastroesophageal reflux disease)     Hypertension     Pleural effusion     Pneumonia     Pulmonary fibrosis 04/27/2023    Sleep apnea        No Known Allergies    Past Surgical History:   Procedure Laterality Date    BRONCHOSCOPY N/A 04/24/2023    Procedure: BRONCHOSCOPY WITH ENDOBRONCHIAL ULTRASOUND;  Surgeon: Kota Willard MD;  Location:  [facility];  Service: Pulmonary;  Laterality: N/A;    FOOT SURGERY Left     hardware present    PLEURAL CATHETER INSERTION Left 05/11/2023    Procedure: PLEURX CATHETER INSERTION;  Surgeon: Brigitte Kolb MD;  Location:  [facility];  Service: General;  Laterality: Left;    VENOUS ACCESS DEVICE (PORT) REMOVAL N/A 5/14/2024    Procedure: PLEURX CATHETER REMOVAL;  Surgeon: Brigitte Kolb MD;  Location:  [facility];  Service: General;  Laterality: N/A;       Family History   Problem Relation Age of Onset    Diabetes Mother     Heart failure Father     Diabetes Sister     Heart disease Brother     Diabetes Brother        Social History     Socioeconomic History    Marital status:     Number of children: 1   Tobacco Use    Smoking status: Never     Passive exposure: Never    Smokeless tobacco: Current     Types: Chew   Vaping Use    Vaping status: Never Used   Substance and Sexual Activity    Alcohol use: No    Drug use: No    Sexual activity: Defer           Objective   Physical Exam  Vitals and nursing note reviewed. Exam conducted with a chaperone present (Isaac).   Constitutional:       Appearance: Normal appearance. He is normal weight.   HENT:      Head:  Normocephalic and atraumatic.      Mouth/Throat:      Mouth: Mucous membranes are moist.      Pharynx: Oropharynx is clear.   Cardiovascular:      Rate and Rhythm: Normal rate and regular rhythm.      Heart sounds: Normal heart sounds. No murmur heard.     No friction rub. No gallop.   Pulmonary:      Effort: Prolonged expiration and respiratory distress (Moderate) present.      Breath sounds: Rhonchi (Few scattered coarse) present. No wheezing or rales.   Abdominal:      General: Abdomen is flat. Bowel sounds are normal. There is no distension.      Palpations: Abdomen is soft.      Tenderness: There is no abdominal tenderness.   Musculoskeletal:         General: Normal range of motion.      Right lower leg: No edema.      Left lower leg: No edema.   Skin:     General: Skin is warm and dry.   Neurological:      General: No focal deficit present.      Mental Status: He is alert and oriented to person, place, and time.   Psychiatric:         Mood and Affect: Mood normal.         Behavior: Behavior normal.         Procedures  Results for orders placed or performed during the hospital encounter of 08/11/24   Comprehensive Metabolic Panel    Specimen: Blood   Result Value Ref Range    Glucose 140 (H) 65 - 99 mg/dL    BUN 10 8 - 23 mg/dL    Creatinine 0.85 0.76 - 1.27 mg/dL    Sodium 142 136 - 145 mmol/L    Potassium 4.2 3.5 - 5.2 mmol/L    Chloride 103 98 - 107 mmol/L    CO2 25.6 22.0 - 29.0 mmol/L    Calcium 10.0 8.6 - 10.5 mg/dL    Total Protein 6.8 6.0 - 8.5 g/dL    Albumin 3.8 3.5 - 5.2 g/dL    ALT (SGPT) 19 1 - 41 U/L    AST (SGOT) 25 1 - 40 U/L    Alkaline Phosphatase 92 39 - 117 U/L    Total Bilirubin 0.3 0.0 - 1.2 mg/dL    Globulin 3.0 gm/dL    A/G Ratio 1.3 g/dL    BUN/Creatinine Ratio 11.8 7.0 - 25.0    Anion Gap 13.4 5.0 - 15.0 mmol/L    eGFR 95.8 >60.0 mL/min/1.73   BNP    Specimen: Blood   Result Value Ref Range    proBNP 195.8 0.0 - 900.0 pg/mL   Single High Sensitivity Troponin T    Specimen: Blood    Result Value Ref Range    HS Troponin T 18 <22 ng/L   CBC Auto Differential    Specimen: Blood   Result Value Ref Range    WBC 9.66 3.40 - 10.80 10*3/mm3    RBC 5.65 4.14 - 5.80 10*6/mm3    Hemoglobin 14.2 13.0 - 17.7 g/dL    Hematocrit 47.4 37.5 - 51.0 %    MCV 83.9 79.0 - 97.0 fL    MCH 25.1 (L) 26.6 - 33.0 pg    MCHC 30.0 (L) 31.5 - 35.7 g/dL    RDW 14.6 12.3 - 15.4 %    RDW-SD 44.5 37.0 - 54.0 fl    MPV 9.1 6.0 - 12.0 fL    Platelets 267 140 - 450 10*3/mm3    Neutrophil % 74.4 42.7 - 76.0 %    Lymphocyte % 13.0 (L) 19.6 - 45.3 %    Monocyte % 8.7 5.0 - 12.0 %    Eosinophil % 3.1 0.3 - 6.2 %    Basophil % 0.6 0.0 - 1.5 %    Immature Grans % 0.2 0.0 - 0.5 %    Neutrophils, Absolute 7.18 (H) 1.70 - 7.00 10*3/mm3    Lymphocytes, Absolute 1.26 0.70 - 3.10 10*3/mm3    Monocytes, Absolute 0.84 0.10 - 0.90 10*3/mm3    Eosinophils, Absolute 0.30 0.00 - 0.40 10*3/mm3    Basophils, Absolute 0.06 0.00 - 0.20 10*3/mm3    Immature Grans, Absolute 0.02 0.00 - 0.05 10*3/mm3    nRBC 0.0 0.0 - 0.2 /100 WBC   Lactic Acid, Plasma    Specimen: Arm, Left; Blood   Result Value Ref Range    Lactate 1.6 0.5 - 2.0 mmol/L   Blood Gas, Arterial With Co-Ox    Specimen: Arterial Blood   Result Value Ref Range    Site Left Brachial     Mike's Test N/A     pH, Arterial 7.392 7.350 - 7.450 pH units    pCO2, Arterial 49.5 (H) 35.0 - 45.0 mm Hg    pO2, Arterial 72.8 (L) 83.0 - 108.0 mm Hg    HCO3, Arterial 30.1 (H) 20.0 - 26.0 mmol/L    Base Excess, Arterial 4.1 (H) 0.0 - 2.0 mmol/L    O2 Saturation, Arterial 94.6 94.0 - 99.0 %    Hemoglobin, Blood Gas 14.0 14 - 18 g/dL    Hematocrit, Blood Gas 43.0 38.0 - 51.0 %    Oxyhemoglobin 93.7 (L) 94 - 99 %    Methemoglobin 0.10 0.00 - 3.00 %    Carboxyhemoglobin 0.9 0 - 5 %    A-a DO2 111.1 0.0 - 300.0 mmHg    CO2 Content 31.6 22 - 33 mmol/L    Barometric Pressure for Blood Gas 729 mmHg    Modality Nasal Cannula     FIO2 35 %    Flow Rate 3.5 lpm    Ventilator Mode NA     Collected by [id number]     pH,  Temp Corrected      pCO2, Temperature Corrected      pO2, Temperature Corrected     ECG 12 Lead ED Triage Standing Order; SOA   Result Value Ref Range    QT Interval 332 ms    QTC Interval 432 ms   Green Top (Gel)   Result Value Ref Range    Extra Tube Hold for add-ons.    Lavender Top   Result Value Ref Range    Extra Tube hold for add-on    Gold Top - SST   Result Value Ref Range    Extra Tube Hold for add-ons.    Light Blue Top   Result Value Ref Range    Extra Tube Hold for add-ons.      XR Chest 1 View    Result Date: 8/11/2024  Narrative: PROCEDURE: Portable chest x-ray examination performed on August 11, 2024. Single view. Upright position.  HISTORY: Shortness of breath.  COMPARISON: None.  FINDINGS:  Enlarged heart size Coarsened bronchovascular pattern to the lungs Edema. Patchy airspace opacities in the perihilar areas and right upper lobe could reflect underlying multifocal pneumonia. Additional hazy airspace opacities in the left midlung and left lower lobe. Small right and left pleural effusion. No pneumothorax.      Impression:  1.  Enlarged heart size 2.  Coarsened bronchovascular pattern to the lungs. 3.  Mild edema. 4.  Multifocal pneumonia. 5.  Small right and left pleural effusion. 6.  No pneumothorax. 7.  No fracture or foreign body.  This report was finalized on 8/11/2024 12:59 PM by Kirt Fam MD.             ED Course  ED Course as of 08/11/24 1553   Sun Aug 11, 2024   1242 ECG 12 Lead ED Triage Standing Order; SOA  Sinus tachycardia.  Rate 102.  Normal axis.  Normal QT interval.  Q waves in leads V1 through V3.  Irregular baseline.  No ST elevation or depression.  Abnormal EKG. [BC]   4438 Discussed with Dr. Rice.  Patient admitted, see orders. [BC]      ED Course User Index  [BC] Turner Carballo MD                                             Medical Decision Making  Problems Addressed:  Acute on chronic respiratory failure with hypoxia and hypercapnia: complicated acute illness or  injury  Coal workers pneumoconiosis: complicated acute illness or injury  Exacerbation of COPD associated with volcanic smog exposure: complicated acute illness or injury    Amount and/or Complexity of Data Reviewed  Labs: ordered.  Radiology: ordered.  ECG/medicine tests: ordered. Decision-making details documented in ED Course.    Risk  Prescription drug management.  Decision regarding hospitalization.        Final diagnoses:   Acute on chronic respiratory failure with hypoxia and hypercapnia   Exacerbation of COPD associated with volcanic smog exposure   Coal workers pneumoconiosis       ED Disposition  ED Disposition       ED Disposition   Decision to Admit    Condition   --    Comment   Level of Care: Telemetry [5]   Diagnosis: Acute on chronic hypoxic respiratory failure [1977204]   Certification: I Certify That Inpatient Hospital Services Are Medically Necessary For Greater Than 2 Midnights                 No follow-up provider specified.       Medication List      No changes were made to your prescriptions during this visit.            Turner Carballo MD  08/11/24 1553      Electronically signed by Turner Carballo MD at 08/11/24 1553       Facility-Administered Medications as of 8/12/2024   Medication Dose Route Frequency Provider Last Rate Last Admin    albuterol (PROVENTIL) nebulizer solution 0.083% 2.5 mg/3mL  2.5 mg Nebulization Q4H PRN Oskar Rice DO        sennosides-docusate (PERICOLACE) 8.6-50 MG per tablet 2 tablet  2 tablet Oral BID PRN Oskar Rice DO        And    polyethylene glycol (MIRALAX) packet 17 g  17 g Oral Daily PRN Oskar Rice DO        And    bisacodyl (DULCOLAX) EC tablet 5 mg  5 mg Oral Daily PRN Oskar Rice DO        And    bisacodyl (DULCOLAX) suppository 10 mg  10 mg Rectal Daily PRN Oskar Rice DO        Budeson-Glycopyrrol-Formoterol (BREZTRI) inhaler 2 puff  2 puff Inhalation BID Oskar Rice DO         cefdinir (OMNICEF) capsule 300 mg  300 mg Oral BID Oskar Rice DO        [COMPLETED] cefTRIAXone (ROCEPHIN) 2,000 mg in sodium chloride 0.9 % 100 mL IVPB-VTB  2,000 mg Intravenous Once Turner Carballo  mL/hr at 08/11/24 1446 2,000 mg at 08/11/24 1446    [START ON 8/16/2024] cholecalciferol (VITAMIN D3) capsule 50,000 Units  50,000 Units Oral Every Friday Oskar Rice DO        cyanocobalamin injection 1,000 mcg  1,000 mcg Subcutaneous Q14 Days Oskar Rice DO        dextrose (D50W) (25 g/50 mL) IV injection 25 g  25 g Intravenous Q15 Min PRN Parminder Dempsey APRN        dextrose (GLUTOSE) oral gel 15 g  15 g Oral Q15 Min PRN Parminder Dempsey APRN        [COMPLETED] doxycycline (VIBRAMYCIN) 100 mg in sodium chloride 0.9 % 100 mL IVPB-VTB  100 mg Intravenous Once Turner Carballo MD   Stopped at 08/11/24 1344    Enoxaparin Sodium (LOVENOX) syringe 40 mg  40 mg Subcutaneous Daily Oskar Rice DO   40 mg at 08/12/24 0952    furosemide (LASIX) injection 40 mg  40 mg Intravenous Daily Oskar Rice DO        glucagon HCl (Diagnostic) injection 1 mg  1 mg Intramuscular Q15 Min PRN Parminder Dempsey APRN        HYDROcodone-acetaminophen (NORCO)  MG per tablet 1 tablet  1 tablet Oral Q8H PRN Oskar Rice DO        Insulin Lispro (humaLOG) injection 2-7 Units  2-7 Units Subcutaneous 4x Daily AC & at Bedtime Parminder Dempsey APRN   2 Units at 08/12/24 1327    ipratropium (ATROVENT) nebulizer solution 0.5 mg  0.5 mg Nebulization Q4H PRN Parminder Dempsey APRN   0.5 mg at 08/12/24 0634    [COMPLETED] ipratropium-albuterol (DUO-NEB) nebulizer solution 3 mL  3 mL Nebulization Once Turner Carballo MD   3 mL at 08/11/24 1243    ipratropium-albuterol (DUO-NEB) nebulizer solution 3 mL  3 mL Nebulization TID - RT Oskar Rice DO        lisinopril (PRINIVIL,ZESTRIL) tablet 10 mg  10 mg Oral Daily Oskar Rice DO        [COMPLETED] methylPREDNISolone sodium  succinate (SOLU-Medrol) injection 125 mg  125 mg Intravenous Once Turner Carballo MD   125 mg at 08/11/24 1244    methylPREDNISolone sodium succinate (SOLU-Medrol) injection 40 mg  40 mg Intravenous BID Oskar Rice DO   40 mg at 08/12/24 0952    [START ON 8/13/2024] pantoprazole (PROTONIX) EC tablet 40 mg  40 mg Oral Q AM Oskar Rice DO        roflumilast (DALIRESP) tablet 250 mcg  250 mcg Oral Daily Oskar Rice DO        sodium chloride 0.9 % flush 10 mL  10 mL Intravenous PRN Turner Carballo MD        sodium chloride 0.9 % flush 10 mL  10 mL Intravenous Q12H Oskar Rice DO   10 mL at 08/12/24 0952    sodium chloride 0.9 % flush 10 mL  10 mL Intravenous PRN Oskar Rice DO        sodium chloride 0.9 % infusion 40 mL  40 mL Intravenous PRN Oskar Rice DO         Orders (all)        Start     Ordered    08/16/24 0900  cholecalciferol (VITAMIN D3) capsule 50,000 Units  Every Friday 08/12/24 1429 08/13/24 0600  pantoprazole (PROTONIX) EC tablet 40 mg  Every Early Morning         08/12/24 1429 08/13/24 0600  CBC (No Diff)  Morning Draw         08/12/24 0405    08/13/24 0600  Basic Metabolic Panel  Morning Draw         08/12/24 0405    08/12/24 2100  Budeson-Glycopyrrol-Formoterol (BREZTRI) inhaler 2 puff  2 Times Daily         08/12/24 1429 08/12/24 2100  cefdinir (OMNICEF) capsule 300 mg  2 Times Daily         08/12/24 1429 08/12/24 1515  cyanocobalamin injection 1,000 mcg  Every 14 Days         08/12/24 1429 08/12/24 1515  ipratropium-albuterol (DUO-NEB) nebulizer solution 3 mL  3 Times Daily - RT         08/12/24 1429 08/12/24 1515  lisinopril (PRINIVIL,ZESTRIL) tablet 10 mg  Daily         08/12/24 1429 08/12/24 1515  roflumilast (DALIRESP) tablet 250 mcg  Daily         08/12/24 1429    08/12/24 1429  albuterol (PROVENTIL) nebulizer solution 0.083% 2.5 mg/3mL  Every 4 Hours PRN         08/12/24 1429    08/12/24  1429  HYDROcodone-acetaminophen (NORCO)  MG per tablet 1 tablet  Every 8 Hours PRN         08/12/24 1429    08/12/24 1415  furosemide (LASIX) injection 40 mg  Daily         08/12/24 1324    08/12/24 1325  Adult Transthoracic Echo Complete W/ Cont if Necessary Per Protocol  Once         08/12/24 1324    08/12/24 1148  POC Glucose Once  PROCEDURE ONCE        Comments: Complete no more than 45 minutes prior to patient eating      08/12/24 1126    08/12/24 1055  Pharmacy Consult  Continuous PRN,   Status:  Discontinued         08/12/24 1056    08/12/24 0900  cefTRIAXone (ROCEPHIN) 1,000 mg in sodium chloride 0.9 % 100 mL IVPB-VTB  Every 24 Hours,   Status:  Discontinued         08/11/24 1718 08/12/24 0730  Insulin Lispro (humaLOG) injection 2-7 Units  4 Times Daily Before Meals & Nightly         08/12/24 0427    08/12/24 0730  POC Glucose Once  PROCEDURE ONCE        Comments: Complete no more than 45 minutes prior to patient eating      08/12/24 0712    08/12/24 0700  POC Glucose 4x Daily Before Meals & at Bedtime  4 Times Daily Before Meals & at Bedtime      Comments: Complete no more than 45 minutes prior to patient eating      08/12/24 0427    08/12/24 0600  CBC (No Diff)  Morning Draw         08/11/24 1718    08/12/24 0600  Basic Metabolic Panel  Morning Draw         08/11/24 1718    08/12/24 0439  POC Glucose Once  PROCEDURE ONCE        Comments: Complete no more than 45 minutes prior to patient eating      08/12/24 0433    08/12/24 0427  dextrose (GLUTOSE) oral gel 15 g  Every 15 Minutes PRN         08/12/24 0427    08/12/24 0427  dextrose (D50W) (25 g/50 mL) IV injection 25 g  Every 15 Minutes PRN         08/12/24 0427    08/12/24 0427  glucagon HCl (Diagnostic) injection 1 mg  Every 15 Minutes PRN         08/12/24 0427    08/12/24 0406  Code Status and Medical Interventions: CPR (Attempt to Resuscitate); Full Support  Continuous         08/12/24 0405    08/11/24 2319  ipratropium (ATROVENT) nebulizer  solution 0.5 mg  Every 4 Hours PRN         08/11/24 2319 08/11/24 2318  ipratropium-albuterol (DUO-NEB) nebulizer solution 3 mL  Every 4 Hours PRN,   Status:  Discontinued         08/11/24 2318 08/11/24 2100  sodium chloride 0.9 % flush 10 mL  Every 12 Hours Scheduled         08/11/24 1718 08/11/24 2100  doxycycline (VIBRAMYCIN) 100 mg in sodium chloride 0.9 % 100 mL IVPB-VTB  2 Times Daily,   Status:  Discontinued         08/11/24 1718 08/11/24 2100  methylPREDNISolone sodium succinate (SOLU-Medrol) injection 40 mg  2 Times Daily         08/11/24 1726 08/11/24 2000  Vital Signs  Every 4 Hours       08/11/24 1718 08/11/24 1900  ipratropium-albuterol (DUO-NEB) nebulizer solution 3 mL  4 Times Daily - RT,   Status:  Discontinued         08/11/24 1726 08/11/24 1815  Enoxaparin Sodium (LOVENOX) syringe 40 mg  Daily         08/11/24 1718 08/11/24 1800  Oral Care  2 Times Daily       08/11/24 1718 08/11/24 1728  PT Consult: Eval & Treat Functional Mobility Below Baseline  Once        Comments: Reason Why PT Needed: Weakness    08/11/24 1727    08/11/24 1728  OT Consult: Eval & Treat ADL Performance Below Baseline  Once        Comments: Reason Why ot Needed: weakness    08/11/24 1727    08/11/24 1727  Procalcitonin  Once         08/11/24 1726    08/11/24 1726  Inpatient COPD Navigator Consult  Once        Provider:  (Not yet assigned)    08/11/24 1725    08/11/24 1725  CT Chest Without Contrast Diagnostic  1 Time Imaging         08/11/24 1725    08/11/24 1719  Intake & Output  Every Shift       08/11/24 1718    08/11/24 1719  Weigh Patient  Once         08/11/24 1718    08/11/24 1719  Insert Peripheral IV  Once         08/11/24 1718    08/11/24 1719  Saline Lock & Maintain IV Access  Continuous         08/11/24 1718    08/11/24 1719  Continuous Pulse Oximetry  Continuous         08/11/24 1718    08/11/24 1719  Diet: Regular/House; Fluid Consistency: Thin (IDDSI 0)  Diet Effective Now          "08/11/24 1718    08/11/24 1718  sodium chloride 0.9 % infusion 40 mL  As Needed         08/11/24 1718    08/11/24 1718  sennosides-docusate (PERICOLACE) 8.6-50 MG per tablet 2 tablet  2 Times Daily PRN        Placed in \"And\" Linked Group    08/11/24 1718    08/11/24 1718  polyethylene glycol (MIRALAX) packet 17 g  Daily PRN        Placed in \"And\" Linked Group    08/11/24 1718    08/11/24 1718  bisacodyl (DULCOLAX) EC tablet 5 mg  Daily PRN        Placed in \"And\" Linked Group    08/11/24 1718    08/11/24 1718  bisacodyl (DULCOLAX) suppository 10 mg  Daily PRN        Placed in \"And\" Linked Group    08/11/24 1718    08/11/24 1718  sodium chloride 0.9 % flush 10 mL  As Needed         08/11/24 1718    08/11/24 1548  Inpatient Admission  Once         08/11/24 1551    08/11/24 1500  Blood Gas, Arterial With Co-Ox  PROCEDURE ONCE         08/11/24 1457    08/11/24 1455  cefTRIAXone (ROCEPHIN) 2,000 mg in sodium chloride 0.9 % 100 mL IVPB-VTB  Once         08/11/24 1439    08/11/24 1439  Blood Gas, Arterial -With Co-Ox Panel: Yes  Once         08/11/24 1438    08/11/24 1246  ipratropium-albuterol (DUO-NEB) nebulizer solution 3 mL  Once         08/11/24 1230    08/11/24 1246  methylPREDNISolone sodium succinate (SOLU-Medrol) injection 125 mg  Once         08/11/24 1230    08/11/24 1246  doxycycline (VIBRAMYCIN) 100 mg in sodium chloride 0.9 % 100 mL IVPB-VTB  Once         08/11/24 1230    08/11/24 1227  Lactic Acid, Plasma  Once         08/11/24 1230    08/11/24 1227  Blood Culture - Blood, Arm, Right  Once        Placed in \"And\" Linked Group    08/11/24 1230    08/11/24 1227  Blood Culture - Blood, Arm, Left  Once        Placed in \"And\" Linked Group    08/11/24 1230    08/11/24 1206  NPO Diet NPO Type: Strict NPO  Diet Effective Now,   Status:  Canceled         08/11/24 1206    08/11/24 1206  Undress & Gown  Once         08/11/24 1206    08/11/24 1206  Cardiac Monitoring  Continuous        Comments: Follow Standing Orders As " Outlined in Process Instructions (Open Order Report to View Full Instructions)    08/11/24 1206    08/11/24 1206  Continuous Pulse Oximetry  Continuous,   Status:  Canceled         08/11/24 1206    08/11/24 1206  Vital Signs  Per Hospital Policy         08/11/24 1206    08/11/24 1206  ECG 12 Lead ED Triage Standing Order; SOA  Once         08/11/24 1206    08/11/24 1206  Insert Peripheral IV  Once         08/11/24 1206    08/11/24 1206  [place] Draw  Once         08/11/24 1206    08/11/24 1206  CBC & Differential  Once         08/11/24 1206    08/11/24 1206  Comprehensive Metabolic Panel  Once         08/11/24 1206    08/11/24 1206  BNP  Once         08/11/24 1206    08/11/24 1206  Single High Sensitivity Troponin T  Once         08/11/24 1206    08/11/24 1206  XR Chest 1 View  1 Time Imaging         08/11/24 1206    08/11/24 1206  Green Top (Gel)  PROCEDURE ONCE         08/11/24 1206    08/11/24 1206  Lavender Top  PROCEDURE ONCE         08/11/24 1206    08/11/24 1206  Gold Top - SST  PROCEDURE ONCE         08/11/24 1206    08/11/24 1206  Light Blue Top  PROCEDURE ONCE         08/11/24 1206    08/11/24 1206  CBC Auto Differential  PROCEDURE ONCE         08/11/24 1206    08/11/24 1205  sodium chloride 0.9 % flush 10 mL  As Needed         08/11/24 1206    08/11/24 0000  Telemetry Scan  Once         08/11/24 0000    08/11/24 0000  Telemetry Scan  Once         08/11/24 0000    Unscheduled  Oxygen Therapy- Nasal Cannula; Titrate 1-6 LPM Per SpO2; 90 - 95%  Continuous PRN       08/11/24 1206    Unscheduled  Up With Assistance  As Needed       08/11/24 1718    Unscheduled  Oxygen Therapy- Nasal Cannula; Titrate 1-6 LPM Per SpO2; 90 - 95%  Continuous PRN       08/11/24 1718    Unscheduled  Follow Hypoglycemia Standing Orders For Blood Glucose <70 & Notify Provider of Treatment  As Needed      Comments: Follow Hypoglycemia Orders As Outlined in Process Instructions (Open Order Report to View Full Instructions)  Notify  Provider Any Time Hypoglycemia Treatment is Administered    24    --  HYDROcodone-acetaminophen (NORCO)  MG per tablet  Every 8 Hours PRN         24    --  omeprazole (priLOSEC) 20 MG capsule  2 Times Daily         24    --  cefdinir (OMNICEF) 300 MG capsule  2 Times Daily         24                     Physician Progress Notes (all)        Oskar Rice DO at 24 0403              [facility]     Patient Identification:  Name:  Rory Rios Jr.  Age:  64 y.o.  Sex:  male  :  1960  MRN:  [id number]  Visit Number:  [id number]  Primary Care Provider:  Alejandro Monique MD    Length of stay:  1    Chief complaint: None    Subjective:    Patient seen and examined at bedside with no nursing staff present.  Patient was asleep when entered the room but easily awakened.  Patient does quickly fall back asleep during examination.  No new events since admission, patient currently on 4 L nasal cannula with oxygen saturation 97%.  ----------------------------------------------------------------------------------------------------------------------  Current Hospital Meds:  cefTRIAXone, 1,000 mg, Intravenous, Q24H  doxycycline, 100 mg, Intravenous, BID  enoxaparin, 40 mg, Subcutaneous, Daily  methylPREDNISolone sodium succinate, 40 mg, Intravenous, BID  sodium chloride, 10 mL, Intravenous, Q12H         ----------------------------------------------------------------------------------------------------------------------  Vital Signs:  Temp:  [97.3 °F (36.3 °C)-98.3 °F (36.8 °C)] 98 °F (36.7 °C)  Heart Rate:  [] 108  Resp:  [18-28] 20  BP: (114-159)/(66-97) 143/66      24  1212   Weight: 128 kg (283 lb)     Body mass index is 33.56 kg/m².    Intake/Output Summary (Last 24 hours) at 2024 0403  Last data filed at 2024 2140  Gross per 24 hour   Intake 100 ml   Output 225 ml   Net -125 ml     Diet:  Regular/House; Fluid Consistency: Thin (IDDSI 0)  ----------------------------------------------------------------------------------------------------------------------  Physical exam:  Constitutional: Well-nourished  male in no apparent distress.     HENT:  Head:  Normocephalic and atraumatic.  Mouth:  Moist mucous membranes.    Eyes:  Conjunctivae and EOM are normal.  Pupils are equal, round, and reactive to light.  No scleral icterus.    Neck:  Neck supple. No thyromegaly.  No JVD present.    Cardiovascular:  Regular rate and rhythm with no murmurs, rubs, clicks or gallops appreciated.  Pulmonary/Chest: Decreased breath sounds bilaterally with faint end expiratory wheezing  Abdominal:  Soft. Nontender. Nondistended  Bowel sounds are normal in all four quadrants. No organomegally appreciated.   Musculoskeletal:  5/5 muscle strength bilateral upper and lower extermties with equal muscle tone and bulk. No edema, no tenderness, and no deformity.  No red or swollen joints anywhere.    Neurological:  Alert, Cranial nerves II-XII intact with no focal defecits.  No facial droop.  No slurred speech.   Skin:  Warm and dry to palpation with no rashes or lesions appreciated.  Peripheral vascular:  2+ radial and pedal pulses in bilateral upper and lower extremities.  Psychiatric:  Alert and oriented x3, demonstrates appropriate judgement and insight.    No significant change in physical exam in comparison to 8/11/2024  ------------------------------------------------------------------  ----------------------------------------------------------------------------------------------------------------------  Results from last 7 days   Lab Units 08/11/24  1228   HSTROP T ng/L 18     Results from last 7 days   Lab Units 08/12/24  0131 08/11/24  1237 08/11/24  1228   LACTATE mmol/L  --  1.6  --    WBC 10*3/mm3 9.23  --  9.66   HEMOGLOBIN g/dL 13.6  --  14.2   HEMATOCRIT % 44.2  --  47.4   MCV fL 83.6  --  83.9   MCHC g/dL  "30.8*  --  30.0*   PLATELETS 10*3/mm3 241  --  267     Results from last 7 days   Lab Units 08/11/24  1457   PH, ARTERIAL pH units 7.392   PO2 ART mm Hg 72.8*   PCO2, ARTERIAL mm Hg 49.5*   HCO3 ART mmol/L 30.1*     Results from last 7 days   Lab Units 08/12/24  0131 08/11/24  1228   SODIUM mmol/L 138 142   POTASSIUM mmol/L 4.0 4.2   CHLORIDE mmol/L 102 103   CO2 mmol/L 24.9 25.6   BUN mg/dL 10 10   CREATININE mg/dL 0.78 0.85   CALCIUM mg/dL 10.1 10.0   GLUCOSE mg/dL 205* 140*   ALBUMIN g/dL  --  3.8   BILIRUBIN mg/dL  --  0.3   ALK PHOS U/L  --  92   AST (SGOT) U/L  --  25   ALT (SGPT) U/L  --  19   Estimated Creatinine Clearance: 138.4 mL/min (by C-G formula based on SCr of 0.78 mg/dL).    No results found for: \"AMMONIA\"      No results found for: \"BLOODCX\"  No results found for: \"URINECX\"  No results found for: \"WOUNDCX\"  No results found for: \"STOOLCX\"    I have personally looked at the labs and they are summarized above.  ----------------------------------------------------------------------------------------------------------------------  Imaging Results (Last 24 Hours)       Procedure Component Value Units Date/Time    XR Chest 1 View [[id number]] Collected: 08/11/24 1257     Updated: 08/11/24 1301    Narrative:      PROCEDURE: Portable chest x-ray examination performed on August 11, 2024. Single view. Upright position.     HISTORY: Shortness of breath.     COMPARISON: None.     FINDINGS:     Enlarged heart size  Coarsened bronchovascular pattern to the lungs  Edema.  Patchy airspace opacities in the perihilar areas and right upper lobe  could reflect underlying multifocal pneumonia.  Additional hazy airspace opacities in the left midlung and left lower  lobe.  Small right and left pleural effusion.  No pneumothorax.       Impression:         1.  Enlarged heart size  2.  Coarsened bronchovascular pattern to the lungs.  3.  Mild edema.  4.  Multifocal pneumonia.  5.  Small right and left pleural effusion.  6.  " No pneumothorax.  7.  No fracture or foreign body.     This report was finalized on 8/11/2024 12:59 PM by Kirt Fam MD.             ----------------------------------------------------------------------------------------------------------------------  Assessment and Plan:  Acute on chronic hypoxic respiratory failure -continue supplemental oxygen to maintain O2 saturation greater than 90%     2.  Suspected Bilateral pneumonia - will continue antibiotic therapy with Rocephin and doxycycline for now, procalcitonin within normal limits, CT chest currently pending.  Pending CT results, will likely de-escalate/discontinue antibiotic therapy.     3.  COPD exacerbation -continue Solu-Medrol 40 mg IV twice daily and DuoNebs every 6 hours     4.  Type 2 diabetes mellitus -will start Accu-Cheks before every meal and nightly with sliding scale insulin     5.  Essential hypertension -will obtain home antihypertensive medications and restart once available    Disposition possible discharge in the next 24 to 48 hours pending resolution of acute hypoxic respiratory failure    Oskar Rice DO   08/12/24   04:03 EDT       Electronically signed by Oskar Rice DO at 08/12/24 6042       Consult Notes (all)    No notes of this type exist for this encounter.

## 2024-08-12 NOTE — THERAPY EVALUATION
Acute Care - Occupational Therapy Initial Evaluation   [facility]     Patient Name: Rory Rios Jr.  : 1960  MRN: [id number]  Today's Date: 2024  Onset of Illness/Injury or Date of Surgery: 24  Date of Referral to OT: 24  Referring Physician: Dr. Rice    Admit Date: 2024       ICD-10-CM ICD-9-CM   1. Acute on chronic respiratory failure with hypoxia and hypercapnia  J96.21 518.84    J96.22 786.09     799.02   2. 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 Acute exacerbation of chronic obstructive pulmonary disease (COPD)  J44.1 491.21   4. Chronic obstructive pulmonary disease, unspecified COPD type  J44.9 496     Patient Active Problem List   Diagnosis    Recurrent left pleural effusion    PAT (paroxysmal atrial tachycardia)    Black lung    Chronic obstructive pulmonary disease    Primary hypertension    Lung mass    [place] pneumoconiosis    Pulmonary fibrosis    Acute on chronic hypoxic respiratory failure     Past Medical History:   Diagnosis Date    Arthritis     Black lung     Black lung disease     Coal workers pneumoconiosis 2023    COPD (chronic obstructive pulmonary disease)     Diabetes mellitus     GERD (gastroesophageal reflux disease)     Hypertension     Pleural effusion     Pneumonia     Pulmonary fibrosis 2023    Sleep apnea      Past Surgical History:   Procedure Laterality Date    BRONCHOSCOPY N/A 2023    Procedure: BRONCHOSCOPY WITH ENDOBRONCHIAL ULTRASOUND;  Surgeon: Kota Willard MD;  Location:  [facility];  Service: Pulmonary;  Laterality: N/A;    FOOT SURGERY Left     hardware present    PLEURAL CATHETER INSERTION Left 2023    Procedure: PLEURX CATHETER INSERTION;  Surgeon: Brigitte Kolb MD;  [facility]:  [facility];  Service: General;  Laterality: Left;    VENOUS ACCESS DEVICE (PORT) REMOVAL N/A 2024    Procedure: PLEURX CATHETER REMOVAL;  Surgeon: Brigitte Kolb MD;  Location:  [facility];  Service: General;   Laterality: N/A;         OT ASSESSMENT FLOWSHEET (Last 12 Hours)       OT Evaluation and Treatment       Row Name 08/12/24 4650                   OT Time and Intention    Subjective Information no complaints  -BF        Document Type evaluation  -BF        Mode of Treatment occupational therapy  -BF        Patient Effort good  -BF        Symptoms Noted During/After Treatment shortness of breath  -BF        Comment OT evaluation completed this date, pt is set-up/SBA with ADLs at this time. Pt reports that he still feels slightly weaker than his norm but that it is improved from yesterday. Pt provided with theraband for strengthening while in hospital. OT to be deferred at this time, please reconsult as needed.  -BF           General Information    Patient Profile Reviewed yes  -BF        Onset of Illness/Injury or Date of Surgery 08/11/24  -BF        Referring Physician Dr. Rice  -BF        Patient/Family/Caregiver Comments/Observations Pt sitting EOB, agreeable to OT, family present.  -BF        Prior Level of Function independent:;ADL's  -BF        Equipment Currently Used at Home oxygen  -BF        Pertinent History of Current Functional Problem Pt admitted to hospital with increased shortness of air, diagnosed with acute on chronic hypoxic respiratory failure.  -BF        Existing Precautions/Restrictions fall;oxygen therapy device and L/min  -BF           Previous Level of Function/Home Environm    BADLs, Premorbid Functional Level independent  -BF           Living Environment    Current Living Arrangements home  -BF        People in Home alone  -BF        Primary Care Provided by self  -BF           Cognition    Orientation Status (Cognition) oriented x 4  -BF        Follows Commands (Cognition) WFL  -BF           Range of Motion Comprehensive    General Range of Motion bilateral upper extremity ROM WFL  -BF           Strength Comprehensive (MMT)    Comment, General Manual Muscle Testing (MMT) Assessment ESTEBAN  grossly 5/5  -BF           Activities of Daily Living    BADL Assessment/Intervention bathing;upper body dressing;lower body dressing;grooming;feeding;toileting  -BF           Bathing Assessment/Intervention    Comment, (Bathing) SBA  -BF           Upper Body Dressing Assessment/Training    Comment, (Upper Body Dressing) Set-up  -BF           Lower Body Dressing Assessment/Training    Comment, (Lower Body Dressing) SBA  -BF           Grooming Assessment/Training    Comment, (Grooming) Set-up  -BF           Self-Feeding Assessment/Training    Comment, (Feeding) Set-up  -BF           Toileting Assessment/Training    Comment, (Toileting) SBA  -BF           BADL Safety/Performance    Impairments, BADL Safety/Performance endurance/activity tolerance;shortness of breath  -BF           Sit-Stand Transfer    Sit-Stand [place] (Transfers) standby assist  -BF           Stand-Sit Transfer    Stand-Sit [place] (Transfers) standby assist  -BF           Motor Skills    Motor Skills coordination  -BF        Coordination --  Pt has tremors in b/l hands, reports this is chronic  -BF           Positioning and Restraints    Post Treatment Position bed  -BF        In Bed sitting EOB;call light within reach;encouraged to call for assist  -BF           Therapy Assessment/Plan (OT)    Date of Referral to OT 08/12/24  -BF        Patient/Family Therapy Goal Statement (OT) To return home  -BF        OT Diagnosis Debility  -BF        Criteria for Skilled Therapeutic Interventions Met (OT) no;does not meet criteria for skilled intervention  -BF        Therapy Frequency (OT) evaluation only  -BF           Therapy Plan Review/Discharge Plan (OT)    Therapy Plan Review (OT) patient  -BF                  User Key  (r) = Recorded By, (t) = Taken By, (c) = Cosigned By      Initials Name Effective Dates    Thao Steward, OT 07/11/23 -                            OT Recommendation and Plan  Therapy Frequency (OT): evaluation  only           Time Calculation:     Therapy Charges for Today       Code Description Service Date Service Provider Modifiers Qty    20488014946 HC OT EVAL LOW COMPLEXITY 4 8/12/2024 Thao Wren, OT GO 1                 Thao Wren, OT  8/12/2024

## 2024-08-12 NOTE — THERAPY EVALUATION
Acute Care - Physical Therapy Initial Evaluation   [facility]     Patient Name: Rory Rios Jr.  : 1960  MRN: [id number]  Today's Date: 2024      Visit Dx:     ICD-10-CM ICD-9-CM   1. Acute on chronic respiratory failure with hypoxia and hypercapnia  J96.21 518.84    J96.22 786.09     799.02   2. 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 Acute exacerbation of chronic obstructive pulmonary disease (COPD)  J44.1 491.21   4. Chronic obstructive pulmonary disease, unspecified COPD type  J44.9 496     Patient Active Problem List   Diagnosis    Recurrent left pleural effusion    PAT (paroxysmal atrial tachycardia)    Black lung    Chronic obstructive pulmonary disease    Primary hypertension    Lung mass    [place] workers pneumoconiosis    Pulmonary fibrosis    Acute on chronic hypoxic respiratory failure     Past Medical History:   Diagnosis Date    Arthritis     Black lung     Black lung disease     Coal workers pneumoconiosis 2023    COPD (chronic obstructive pulmonary disease)     Diabetes mellitus     GERD (gastroesophageal reflux disease)     Hypertension     Pleural effusion     Pneumonia     Pulmonary fibrosis 2023    Sleep apnea      Past Surgical History:   Procedure Laterality Date    BRONCHOSCOPY N/A 2023    Procedure: BRONCHOSCOPY WITH ENDOBRONCHIAL ULTRASOUND;  Surgeon: Kota Willard MD;  Location:  [facility];  Service: Pulmonary;  Laterality: N/A;    FOOT SURGERY Left     hardware present    PLEURAL CATHETER INSERTION Left 2023    Procedure: PLEURX CATHETER INSERTION;  Surgeon: Brigitte Kolb MD;  Location:  [facility];  Service: General;  Laterality: Left;    VENOUS ACCESS DEVICE (PORT) REMOVAL N/A 2024    Procedure: PLEURX CATHETER REMOVAL;  Surgeon: Brigitte Kolb MD;  Location: [facility];  Service: General;  Laterality: N/A;     PT Assessment (Last 12 Hours)       PT Evaluation and Treatment       Row Name 24 1320          Physical Therapy  Time and Intention    Subjective Information complains of;dizziness (P)   -KT     Document Type evaluation (P)   -KT     Mode of Treatment physical therapy (P)   -KT     Patient Effort good (P)   -KT     Symptoms Noted During/After Treatment shortness of breath (P)   -KT       Row Name 08/12/24 1320          General Information    Patient Profile Reviewed yes (P)   -KT     Patient Observations alert;cooperative;agree to therapy (P)   -KT     Prior Level of Function independent:;all household mobility;ADL's (P)   -KT     Equipment Currently Used at Home oxygen (P)   -KT     Existing Precautions/Restrictions fall (P)   -KT     Risks Reviewed patient:;LOB;nausea/vomiting;dizziness;increased discomfort (P)   -KT     Benefits Reviewed patient:;improve function;increase independence;increase strength;increase balance (P)   -KT     Barriers to Rehab none identified (P)   -KT       Row Name 08/12/24 1320          Living Environment    Current Living Arrangements home (P)   -KT     People in Home alone (P)   -KT     Primary Care Provided by self (P)   -KT       Row Name 08/12/24 1320          Home Use of Assistive/Adaptive Equipment    Equipment Currently Used at Home oxygen;cpap;nebulizer;cane, straight (P)   -KT       Row Name 08/12/24 1320          Cognition    Affect/Mental Status (Cognition) WFL (P)   -KT     Orientation Status (Cognition) oriented x 4 (P)   -KT     Follows Commands (Cognition) WFL (P)   -KT       Row Name 08/12/24 1320          Range of Motion (ROM)    Range of Motion ROM is WFL;bilateral lower extremities (P)   -KT       [facility] Name 08/12/24 1320          Strength (Manual Muscle Testing)    Strength (Manual Muscle Testing) strength is WFL;bilateral lower extremities (P)   -KT       Row Name 08/12/24 1320          Bed Mobility    Bed Mobility bed mobility (all) activities (P)   -KT     All Activities, [place] (Bed Mobility) independent (P)   -KT     Assistive Device (Bed Mobility) head of bed  elevated (P)   -KT       Row Name 08/12/24 1320          Transfers    Transfers sit-stand transfer;stand-sit transfer (P)   -KT       Row Name 08/12/24 1320          Sit-Stand Transfer    Sit-Stand [place] (Transfers) independent (P)   -KT       Row Name 08/12/24 1320          Stand-Sit Transfer    Stand-Sit [place] (Transfers) independent (P)   -KT       Row Name 08/12/24 1320          Gait/Stairs (Locomotion)    Gait/Stairs Locomotion gait/ambulation independence (P)   -KT     [place] Level (Gait) independent (P)   -KT     Patient was able to Ambulate yes (P)   -KT     Distance in Feet (Gait) 25 (P)   -KT     Pattern (Gait) swing-through (P)   -KT       Row Name 08/12/24 1320          Safety Issues, Functional Mobility    Impairments Affecting Function (Mobility) shortness of breath (P)   -KT       Row Name 08/12/24 1320          Balance    Balance Assessment sitting static balance;standing static balance (P)   -KT     Static Sitting Balance independent (P)   -KT     Position, Sitting Balance sitting edge of bed;unsupported (P)   -KT     Static Standing Balance independent (P)   -KT     Position/Device Used, Standing Balance unsupported (P)   -KT       Row Name 08/12/24 1320          Plan of Care Review    Plan of Care Reviewed With patient (P)   -KT     Outcome Evaluation Pt. evaluatoin was completed during PT session. Pt. demonstrated independence w/ bed mobility, transfers, and ambulation. He was able to ambulate a moderate distance. Pt. does not meet criteria for skilled therapy at this time. (P)   -KT       Row Name 08/12/24 1320          Therapy Plan Review/Discharge Plan (PT)    Therapy Plan Review (PT) evaluation/treatment results reviewed;patient (P)   -KT               User Key  (r) = Recorded By, (t) = Taken By, (c) = Cosigned By      Initials Name Provider Type    Escobar Cooper, PT Student PT Student                    Physical Therapy Education       Title: PT OT SLP Therapies (Not  Started)       Topic: Physical Therapy (Not Started)       Point: Mobility training (Not Started)       Learner Progress:  Not documented in this visit.              Point: Home exercise program (Not Started)       Learner Progress:  Not documented in this visit.              Point: Body mechanics (Not Started)       Learner Progress:  Not documented in this visit.              Point: Precautions (Not Started)       Learner Progress:  Not documented in this visit.                                  PT Recommendation and Plan  Anticipated Discharge Disposition (PT): (P) home  Plan of Care Reviewed With: (P) patient  Outcome Evaluation: (P) Pt. evaluatoin was completed during PT session. Pt. demonstrated independence w/ bed mobility, transfers, and ambulation. He was able to ambulate a moderate distance. Pt. does not meet criteria for skilled therapy at this time.       Time Calculation:    PT Charges       Row Name 08/12/24 1320             Time Calculation    PT Received On 08/12/24 (P)   -KT                User Key  (r) = Recorded By, (t) = Taken By, (c) = Cosigned By      Initials Name Provider Type    Escobar Cooper, PT Student PT Student                  Therapy Charges for Today       Code Description Service Date Service Provider Modifiers Qty    [id number]  PT EVAL LOW COMPLEXITY 4 8/12/2024 Escobar Peña PT Student GP 1            PT G-Codes  AM-PAC 6 Clicks Score (PT): 22    Escobar Peña PT Student  8/12/2024

## 2024-08-13 LAB
ANION GAP SERPL CALCULATED.3IONS-SCNC: 9.9 MMOL/L (ref 5–15)
BUN SERPL-MCNC: 19 MG/DL (ref 8–23)
BUN/CREAT SERPL: 19.8 (ref 7–25)
CALCIUM SPEC-SCNC: 10.8 MG/DL (ref 8.6–10.5)
CHLORIDE SERPL-SCNC: 102 MMOL/L (ref 98–107)
CO2 SERPL-SCNC: 27.1 MMOL/L (ref 22–29)
CREAT SERPL-MCNC: 0.96 MG/DL (ref 0.76–1.27)
DEPRECATED RDW RBC AUTO: 45 FL (ref 37–54)
EGFRCR SERPLBLD CKD-EPI 2021: 87.2 ML/MIN/1.73
ERYTHROCYTE [DISTWIDTH] IN BLOOD BY AUTOMATED COUNT: 14.8 % (ref 12.3–15.4)
GLUCOSE BLDC GLUCOMTR-MCNC: 158 MG/DL (ref 70–130)
GLUCOSE BLDC GLUCOMTR-MCNC: 159 MG/DL (ref 70–130)
GLUCOSE BLDC GLUCOMTR-MCNC: 208 MG/DL (ref 70–130)
GLUCOSE BLDC GLUCOMTR-MCNC: 232 MG/DL (ref 70–130)
GLUCOSE SERPL-MCNC: 186 MG/DL (ref 65–99)
HCT VFR BLD AUTO: 43.8 % (ref 37.5–51)
HGB BLD-MCNC: 13.2 G/DL (ref 13–17.7)
MCH RBC QN AUTO: 25.2 PG (ref 26.6–33)
MCHC RBC AUTO-ENTMCNC: 30.1 G/DL (ref 31.5–35.7)
MCV RBC AUTO: 83.7 FL (ref 79–97)
PLATELET # BLD AUTO: 221 10*3/MM3 (ref 140–450)
PMV BLD AUTO: 10.9 FL (ref 6–12)
POTASSIUM SERPL-SCNC: 4.6 MMOL/L (ref 3.5–5.2)
PROCALCITONIN SERPL-MCNC: 0.03 NG/ML (ref 0–0.25)
RBC # BLD AUTO: 5.23 10*6/MM3 (ref 4.14–5.8)
SODIUM SERPL-SCNC: 139 MMOL/L (ref 136–145)
WBC NRBC COR # BLD AUTO: 19.98 10*3/MM3 (ref 3.4–10.8)

## 2024-08-13 PROCEDURE — 25010000002 METHYLPREDNISOLONE PER 40 MG: Performed by: INTERNAL MEDICINE

## 2024-08-13 PROCEDURE — 94799 UNLISTED PULMONARY SVC/PX: CPT

## 2024-08-13 PROCEDURE — 82948 REAGENT STRIP/BLOOD GLUCOSE: CPT

## 2024-08-13 PROCEDURE — 85027 COMPLETE CBC AUTOMATED: CPT | Performed by: INTERNAL MEDICINE

## 2024-08-13 PROCEDURE — 94761 N-INVAS EAR/PLS OXIMETRY MLT: CPT

## 2024-08-13 PROCEDURE — 94664 DEMO&/EVAL PT USE INHALER: CPT

## 2024-08-13 PROCEDURE — 63710000001 INSULIN LISPRO (HUMAN) PER 5 UNITS

## 2024-08-13 PROCEDURE — 25010000002 FUROSEMIDE PER 20 MG: Performed by: INTERNAL MEDICINE

## 2024-08-13 PROCEDURE — 25010000002 ENOXAPARIN PER 10 MG: Performed by: INTERNAL MEDICINE

## 2024-08-13 PROCEDURE — 80048 BASIC METABOLIC PNL TOTAL CA: CPT | Performed by: INTERNAL MEDICINE

## 2024-08-13 PROCEDURE — 84145 PROCALCITONIN (PCT): CPT | Performed by: INTERNAL MEDICINE

## 2024-08-13 PROCEDURE — 99232 SBSQ HOSP IP/OBS MODERATE 35: CPT | Performed by: INTERNAL MEDICINE

## 2024-08-13 RX ADMIN — IPRATROPIUM BROMIDE AND ALBUTEROL SULFATE 3 ML: .5; 2.5 SOLUTION RESPIRATORY (INHALATION) at 14:42

## 2024-08-13 RX ADMIN — INSULIN LISPRO 3 UNITS: 100 INJECTION, SOLUTION INTRAVENOUS; SUBCUTANEOUS at 17:46

## 2024-08-13 RX ADMIN — INSULIN LISPRO 2 UNITS: 100 INJECTION, SOLUTION INTRAVENOUS; SUBCUTANEOUS at 21:15

## 2024-08-13 RX ADMIN — METHYLPREDNISOLONE SODIUM SUCCINATE 40 MG: 40 INJECTION, POWDER, FOR SOLUTION INTRAMUSCULAR; INTRAVENOUS at 09:04

## 2024-08-13 RX ADMIN — INSULIN LISPRO 2 UNITS: 100 INJECTION, SOLUTION INTRAVENOUS; SUBCUTANEOUS at 12:58

## 2024-08-13 RX ADMIN — CEFDINIR 300 MG: 300 CAPSULE ORAL at 21:15

## 2024-08-13 RX ADMIN — ENOXAPARIN SODIUM 40 MG: 40 INJECTION SUBCUTANEOUS at 09:05

## 2024-08-13 RX ADMIN — ROFLUMILAST 250 MCG: 500 TABLET ORAL at 09:04

## 2024-08-13 RX ADMIN — INSULIN LISPRO 3 UNITS: 100 INJECTION, SOLUTION INTRAVENOUS; SUBCUTANEOUS at 09:04

## 2024-08-13 RX ADMIN — Medication 10 ML: at 09:04

## 2024-08-13 RX ADMIN — CEFDINIR 300 MG: 300 CAPSULE ORAL at 09:04

## 2024-08-13 RX ADMIN — IPRATROPIUM BROMIDE AND ALBUTEROL SULFATE 3 ML: .5; 2.5 SOLUTION RESPIRATORY (INHALATION) at 18:27

## 2024-08-13 RX ADMIN — METHYLPREDNISOLONE SODIUM SUCCINATE 40 MG: 40 INJECTION, POWDER, FOR SOLUTION INTRAMUSCULAR; INTRAVENOUS at 21:15

## 2024-08-13 RX ADMIN — LISINOPRIL 10 MG: 10 TABLET ORAL at 09:04

## 2024-08-13 RX ADMIN — FUROSEMIDE 40 MG: 10 INJECTION, SOLUTION INTRAMUSCULAR; INTRAVENOUS at 09:04

## 2024-08-13 RX ADMIN — PANTOPRAZOLE SODIUM 40 MG: 40 TABLET, DELAYED RELEASE ORAL at 05:58

## 2024-08-13 RX ADMIN — IPRATROPIUM BROMIDE AND ALBUTEROL SULFATE 3 ML: .5; 2.5 SOLUTION RESPIRATORY (INHALATION) at 07:03

## 2024-08-13 RX ADMIN — IPRATROPIUM BROMIDE AND ALBUTEROL SULFATE 3 ML: .5; 2.5 SOLUTION RESPIRATORY (INHALATION) at 23:28

## 2024-08-13 NOTE — PROGRESS NOTES
Antimicrobial Length of Therapy:    Day 1/4 Omnicef (Day 3 Total antibiotics with Rocephin)    Thank you,    Beverly Mark, PharmD

## 2024-08-13 NOTE — PLAN OF CARE
Goal Outcome Evaluation:         Patient currently resting in bed. A&Ox 4. VSS. No visible s/s of acute distress noted at this time. No requests or complaints at this time. Bed alarm refused. Plan of care ongoing.

## 2024-08-13 NOTE — PROGRESS NOTES
[facility] HOSPITALIST PROGRESS NOTE     Patient Identification:  Name:  Rory Rios Jr.  Age:  64 y.o.  Sex:  male  :  1960  MRN:  [id number]  Visit Number:  [id number]  ROOM: [address] [place] [address]     Primary Care Provider:  Alejandro Monique MD    Length of stay in inpatient status:  2    Subjective     Chief Compliant:    Chief Complaint   Patient presents with   • Shortness of Breath       History of Presenting Illness:    Patient still on 3L NC and only uses PRN at home. Patient reports feeling about the same. Patient's daughter supportive bedside. Patient sitting in bedside chair.       ROS:  Otherwise 10 point ROS negative other than documented above in HPI.     Objective     Current Hospital Meds:Budeson-Glycopyrrol-Formoterol, 2 puff, Inhalation, BID  cefdinir, 300 mg, Oral, BID  [START ON 2024] cholecalciferol, 50,000 Units, Oral, Every Friday  cyanocobalamin, 1,000 mcg, Subcutaneous, Q14 Days  enoxaparin, 40 mg, Subcutaneous, Daily  furosemide, 40 mg, Intravenous, Daily  insulin lispro, 2-7 Units, Subcutaneous, 4x Daily AC & at Bedtime  ipratropium-albuterol, 3 mL, Nebulization, TID - RT  lisinopril, 10 mg, Oral, Daily  methylPREDNISolone sodium succinate, 40 mg, Intravenous, BID  pantoprazole, 40 mg, Oral, Q AM  roflumilast, 250 mcg, Oral, Daily  sodium chloride, 10 mL, Intravenous, Q12H    hold, 1 each        Current Antimicrobial Therapy:  Anti-Infectives (From admission, onward)      Ordered     Dose/Rate Route Frequency Start Stop    24 1429  cefdinir (OMNICEF) capsule 300 mg        Ordering Provider: Oskar Rice DO    300 mg Oral 2 Times Daily 24 2100 24 2059    24 1439  cefTRIAXone (ROCEPHIN) 2,000 mg in sodium chloride 0.9 % 100 mL IVPB-VTB        Ordering Provider: Turner Carballo MD    2,000 mg  200 mL/hr over 30 Minutes Intravenous Once 24 1455 24 1516    24 1230  doxycycline (VIBRAMYCIN) 100 mg in sodium chloride 0.9 % 100  mL IVPB-VTB        Ordering Provider: Turner Carballo MD    100 mg  over 60 Minutes Intravenous Once 08/11/24 1246 08/11/24 1344          Current Diuretic Therapy:  Diuretics (From admission, onward)      Ordered     Dose/Rate Route Frequency Start Stop    08/12/24 1324  furosemide (LASIX) injection 40 mg        Ordering Provider: Oskar Rice, DO    40 mg Intravenous Daily 08/12/24 1415            ----------------------------------------------------------------------------------------------------------------------  Vital Signs:  Temp:  [97.7 °F (36.5 °C)-98.8 °F (37.1 °C)] 97.8 °F (36.6 °C)  Heart Rate:  [] 84  Resp:  [18-20] 20  BP: (105-139)/(52-79) 105/64  SpO2:  [92 %-97 %] 96 %  on  Flow (L/min):  [3] 3;   Device (Oxygen Therapy): humidified;nasal cannula  Body mass index is 33.24 kg/m².    Wt Readings from Last 3 Encounters:   08/13/24 127 kg (280 lb 4.8 oz)   05/14/24 129 kg (285 lb)   05/06/24 130 kg (285 lb 12.8 oz)     Intake & Output (last 3 days)         08/10 0701 08/11 0700 08/11 0701 08/12 0700 08/12 0701 08/13 0700 08/13 0701 08/14 0700    P.O.  360 1260 1080    IV Piggyback  100      Total Intake(mL/kg)  460 (3.6) 1260 (9.9) 1080 (8.5)    Urine (mL/kg/hr)  1775 275 (0.1) 1075 (0.7)    Total Output  6585 893 0899    Net  -1315 +985 +5            Urine Unmeasured Occurrence   1 x           Diet: Regular/House; Fluid Consistency: Thin (IDDSI 0)  ----------------------------------------------------------------------------------------------------------------------  Physical exam:  Constitutional:  Well-developed and well-nourished.  No respiratory distress.Obese.   HENT:  Head:  Normocephalic and atraumatic.  Mouth:  Moist mucous membranes.    Eyes:  Conjunctivae and EOM are normal. No scleral icterus.    Neck:  Neck supple.  No JVD present.    Cardiovascular:  Normal rate, regular rhythm and normal heart sounds with no murmur.  Pulmonary/Chest:  No respiratory distress, no wheezes,  "no crackles, with normal breath sounds and good air movement.  Abdominal:  Soft.  Bowel sounds are normal.  No distension and no tenderness.   Musculoskeletal:  No edema, no tenderness, and no deformity.  No red or swollen joints anywhere.    Neurological:  Alert and oriented to person, place, and time.  No cranial nerve deficit.  No tongue deviation.  No facial droop.  No slurred speech.   Skin:  Skin is warm and dry. No rash noted. No pallor.   Peripheral vascular:  Pulses in all 4 extremities with no clubbing, no cyanosis, no edema.  ----------------------------------------------------------------------------------------------------------------------  Tele:    ----------------------------------------------------------------------------------------------------------------------  Results from last 7 days   Lab Units 08/13/24  0113 08/12/24  0131 08/11/24  1237 08/11/24  1228   LACTATE mmol/L  --   --  1.6  --    WBC 10*3/mm3 19.98* 9.23  --  9.66   HEMOGLOBIN g/dL 13.2 13.6  --  14.2   HEMATOCRIT % 43.8 44.2  --  47.4   MCV fL 83.7 83.6  --  83.9   MCHC g/dL 30.1* 30.8*  --  30.0*   PLATELETS 10*3/mm3 221 241  --  267     Results from last 7 days   Lab Units 08/11/24  1457   PH, ARTERIAL pH units 7.392   PO2 ART mm Hg 72.8*   PCO2, ARTERIAL mm Hg 49.5*   HCO3 ART mmol/L 30.1*     Results from last 7 days   Lab Units 08/13/24  0113 08/12/24  0131 08/11/24  1228   SODIUM mmol/L 139 138 142   POTASSIUM mmol/L 4.6 4.0 4.2   CHLORIDE mmol/L 102 102 103   CO2 mmol/L 27.1 24.9 25.6   BUN mg/dL 19 10 10   CREATININE mg/dL 0.96 0.78 0.85   CALCIUM mg/dL 10.8* 10.1 10.0   GLUCOSE mg/dL 186* 205* 140*   ALBUMIN g/dL  --   --  3.8   BILIRUBIN mg/dL  --   --  0.3   ALK PHOS U/L  --   --  92   AST (SGOT) U/L  --   --  25   ALT (SGPT) U/L  --   --  19   Estimated Creatinine Clearance: 111.3 mL/min (by C-G formula based on SCr of 0.96 mg/dL).  No results found for: \"AMMONIA\"  Results from last 7 days   Lab Units 08/11/24  1228 " "  HSTROP T ng/L 18     Results from last 7 days   Lab Units 08/11/24  1228   PROBNP pg/mL 195.8         Glucose   Date/Time Value Ref Range Status   08/13/2024 1634 208 (H) 70 - 130 mg/dL Final   08/13/2024 1106 158 (H) 70 - 130 mg/dL Final   08/13/2024 0634 232 (H) 70 - 130 mg/dL Final   08/12/2024 2110 128 70 - 130 mg/dL Final   08/12/2024 1707 153 (H) 70 - 130 mg/dL Final   08/12/2024 1126 162 (H) 70 - 130 mg/dL Final   08/12/2024 0712 176 (H) 70 - 130 mg/dL Final   08/12/2024 0433 173 (H) 70 - 130 mg/dL Final     Lab Results   Component Value Date    TSH 2.210 05/10/2023    FREET4 1.33 05/10/2023     No results found for: \"PREGTESTUR\", \"PREGSERUM\", \"HCG\", \"HCGQUANT\"  Pain Management Panel          Latest Ref Rng & Units 5/10/2023   Pain Management Panel   Amphetamine, Urine Qual Negative Negative    Barbiturates Screen, Urine Negative Negative    Benzodiazepine Screen, Urine Negative Negative    Buprenorphine, Screen, Urine Negative Negative    Cocaine Screen, Urine Negative Negative    Methadone Screen , Urine Negative Negative    Methamphetamine, Ur Negative Negative       Details                 Brief Urine Lab Results  (Last result in the past 365 days)        Color   Clarity   Blood   Leuk Est   Nitrite   Protein   CREAT   Urine HCG        04/18/24 0942 Dark Yellow   Cloudy   Negative   Negative   Negative   Negative                 Blood Culture   Date Value Ref Range Status   08/11/2024 No growth at 2 days  Preliminary   08/11/2024 No growth at 2 days  Preliminary     No results found for: \"URINECX\"  No results found for: \"WOUNDCX\"  No results found for: \"STOOLCX\"  No results found for: \"RESPCX\"  No results found for: \"AFBCX\"  Results from last 7 days   Lab Units 08/13/24  1520 08/11/24  1237 08/11/24  1228   PROCALCITONIN ng/mL 0.03  --  0.04   LACTATE mmol/L  --  1.6  --        I have personally looked at the labs and they are summarized " above.  ----------------------------------------------------------------------------------------------------------------------  Detailed radiology reports for the last 24 hours:    Imaging Results (Last 24 Hours)       ** No results found for the last 24 hours. **          Assessment & Plan      #Acute on chronic hypoxic respiratory failure   #COPD exacerbationb  #Coil worker's pneumocoinosis   #Bilateral pleural effusion, R>L and larger than last CT in 4/24  #DM II  #HTN    Personally compared CT this admission to 4/24. Significant lesions contributed to coal worker's pnumocoiniosis similar. Has had left sided pleural drain in the past. This time R sided effusion looks bigger. Will ask radiology to evaluate for diagonstic/therapeutic thoracentesis. Will repeat labs in AM as WBC trended up but suspect steroids. Continue treatment for COPD exacerbation with steroids and nebs. Will also get pulmonology to evaluate given continued dyspnea. Continue to hold abx. Will add ionized calcium to further evaluate mildly elevated calcium level. Send work-up if not better in AM.     Dispo: Pending clinical improvement       Frank Magallon MD  [facility]  08/13/24  18:37 EDT

## 2024-08-13 NOTE — PLAN OF CARE
Pt either sat in recliner or on the side of the bed for the entirety of shift. Pt had no complaints. No s/s of acute distress noted. VSS

## 2024-08-14 ENCOUNTER — APPOINTMENT (OUTPATIENT)
Dept: ULTRASOUND IMAGING | Facility: HOSPITAL | Age: 64
DRG: 193 | End: 2024-08-14
Payer: OTHER MISCELLANEOUS

## 2024-08-14 ENCOUNTER — APPOINTMENT (OUTPATIENT)
Dept: GENERAL RADIOLOGY | Facility: HOSPITAL | Age: 64
DRG: 193 | End: 2024-08-14
Payer: OTHER MISCELLANEOUS

## 2024-08-14 LAB
ALBUMIN SERPL-MCNC: 3.5 G/DL (ref 3.5–5.2)
ALBUMIN/GLOB SERPL: 1.3 G/DL
ALP SERPL-CCNC: 71 U/L (ref 39–117)
ALT SERPL W P-5'-P-CCNC: 45 U/L (ref 1–41)
ANION GAP SERPL CALCULATED.3IONS-SCNC: 11.7 MMOL/L (ref 5–15)
APPEARANCE FLD: ABNORMAL
AST SERPL-CCNC: 39 U/L (ref 1–40)
BASOPHILS # BLD AUTO: 0.02 10*3/MM3 (ref 0–0.2)
BASOPHILS NFR BLD AUTO: 0.1 % (ref 0–1.5)
BILIRUB SERPL-MCNC: 0.2 MG/DL (ref 0–1.2)
BUN SERPL-MCNC: 25 MG/DL (ref 8–23)
BUN/CREAT SERPL: 29.1 (ref 7–25)
CA-I SERPL ISE-MCNC: 1.21 MMOL/L (ref 1.12–1.32)
CALCIUM SPEC-SCNC: 10.3 MG/DL (ref 8.6–10.5)
CHLORIDE SERPL-SCNC: 100 MMOL/L (ref 98–107)
CO2 SERPL-SCNC: 26.3 MMOL/L (ref 22–29)
COLOR FLD: ABNORMAL
CREAT SERPL-MCNC: 0.86 MG/DL (ref 0.76–1.27)
DEPRECATED RDW RBC AUTO: 45.3 FL (ref 37–54)
EGFRCR SERPLBLD CKD-EPI 2021: 95.5 ML/MIN/1.73
EOSINOPHIL # BLD AUTO: 0 10*3/MM3 (ref 0–0.4)
EOSINOPHIL NFR BLD AUTO: 0 % (ref 0.3–6.2)
EOSINOPHIL NFR FLD MANUAL: 2 %
ERYTHROCYTE [DISTWIDTH] IN BLOOD BY AUTOMATED COUNT: 14.9 % (ref 12.3–15.4)
GLOBULIN UR ELPH-MCNC: 2.6 GM/DL
GLUCOSE BLDC GLUCOMTR-MCNC: 167 MG/DL (ref 70–130)
GLUCOSE BLDC GLUCOMTR-MCNC: 194 MG/DL (ref 70–130)
GLUCOSE BLDC GLUCOMTR-MCNC: 270 MG/DL (ref 70–130)
GLUCOSE BLDC GLUCOMTR-MCNC: 296 MG/DL (ref 70–130)
GLUCOSE SERPL-MCNC: 179 MG/DL (ref 65–99)
HCT VFR BLD AUTO: 43.1 % (ref 37.5–51)
HGB BLD-MCNC: 13.2 G/DL (ref 13–17.7)
IMM GRANULOCYTES # BLD AUTO: 0.13 10*3/MM3 (ref 0–0.05)
IMM GRANULOCYTES NFR BLD AUTO: 0.7 % (ref 0–0.5)
INR PPP: 0.99 (ref 0.9–1.1)
LDH SERPL-CCNC: 194 U/L (ref 135–225)
LYMPHOCYTES # BLD AUTO: 0.66 10*3/MM3 (ref 0.7–3.1)
LYMPHOCYTES NFR BLD AUTO: 3.4 % (ref 19.6–45.3)
LYMPHOCYTES NFR FLD MANUAL: 81 %
MAGNESIUM SERPL-MCNC: 2.2 MG/DL (ref 1.6–2.4)
MCH RBC QN AUTO: 25.6 PG (ref 26.6–33)
MCHC RBC AUTO-ENTMCNC: 30.6 G/DL (ref 31.5–35.7)
MCV RBC AUTO: 83.7 FL (ref 79–97)
METHOD: ABNORMAL
MONOCYTES # BLD AUTO: 0.55 10*3/MM3 (ref 0.1–0.9)
MONOCYTES NFR BLD AUTO: 2.8 % (ref 5–12)
MONOS+MACROS NFR FLD: 12 %
NEUTROPHILS NFR BLD AUTO: 18.05 10*3/MM3 (ref 1.7–7)
NEUTROPHILS NFR BLD AUTO: 93 % (ref 42.7–76)
NEUTROPHILS NFR FLD MANUAL: 5 %
NRBC BLD AUTO-RTO: 0 /100 WBC (ref 0–0.2)
NUC CELL # FLD: 3052 /MM3
PHOSPHATE SERPL-MCNC: 4 MG/DL (ref 2.5–4.5)
PLATELET # BLD AUTO: 278 10*3/MM3 (ref 140–450)
PMV BLD AUTO: 9.2 FL (ref 6–12)
POTASSIUM SERPL-SCNC: 4.9 MMOL/L (ref 3.5–5.2)
PROT SERPL-MCNC: 6.1 G/DL (ref 6–8.5)
PROTHROMBIN TIME: 13.2 SECONDS (ref 12.1–14.7)
RBC # BLD AUTO: 5.15 10*6/MM3 (ref 4.14–5.8)
RBC # FLD AUTO: ABNORMAL 10*3/UL
SODIUM SERPL-SCNC: 138 MMOL/L (ref 136–145)
WBC NRBC COR # BLD AUTO: 19.41 10*3/MM3 (ref 3.4–10.8)

## 2024-08-14 PROCEDURE — 87205 SMEAR GRAM STAIN: CPT | Performed by: INTERNAL MEDICINE

## 2024-08-14 PROCEDURE — 25010000002 FUROSEMIDE PER 20 MG: Performed by: INTERNAL MEDICINE

## 2024-08-14 PROCEDURE — 94761 N-INVAS EAR/PLS OXIMETRY MLT: CPT

## 2024-08-14 PROCEDURE — 83615 LACTATE (LD) (LDH) ENZYME: CPT | Performed by: INTERNAL MEDICINE

## 2024-08-14 PROCEDURE — 94799 UNLISTED PULMONARY SVC/PX: CPT

## 2024-08-14 PROCEDURE — 82042 OTHER SOURCE ALBUMIN QUAN EA: CPT | Performed by: INTERNAL MEDICINE

## 2024-08-14 PROCEDURE — 84157 ASSAY OF PROTEIN OTHER: CPT | Performed by: INTERNAL MEDICINE

## 2024-08-14 PROCEDURE — 80053 COMPREHEN METABOLIC PANEL: CPT | Performed by: INTERNAL MEDICINE

## 2024-08-14 PROCEDURE — 87070 CULTURE OTHR SPECIMN AEROBIC: CPT | Performed by: INTERNAL MEDICINE

## 2024-08-14 PROCEDURE — 71045 X-RAY EXAM CHEST 1 VIEW: CPT

## 2024-08-14 PROCEDURE — 89051 BODY FLUID CELL COUNT: CPT | Performed by: INTERNAL MEDICINE

## 2024-08-14 PROCEDURE — 63710000001 INSULIN LISPRO (HUMAN) PER 5 UNITS

## 2024-08-14 PROCEDURE — 99232 SBSQ HOSP IP/OBS MODERATE 35: CPT | Performed by: INTERNAL MEDICINE

## 2024-08-14 PROCEDURE — 83986 ASSAY PH BODY FLUID NOS: CPT | Performed by: INTERNAL MEDICINE

## 2024-08-14 PROCEDURE — 82948 REAGENT STRIP/BLOOD GLUCOSE: CPT

## 2024-08-14 PROCEDURE — 25010000002 METHYLPREDNISOLONE PER 40 MG: Performed by: INTERNAL MEDICINE

## 2024-08-14 PROCEDURE — 88305 TISSUE EXAM BY PATHOLOGIST: CPT

## 2024-08-14 PROCEDURE — 85025 COMPLETE CBC W/AUTO DIFF WBC: CPT | Performed by: INTERNAL MEDICINE

## 2024-08-14 PROCEDURE — 82330 ASSAY OF CALCIUM: CPT | Performed by: INTERNAL MEDICINE

## 2024-08-14 PROCEDURE — 94664 DEMO&/EVAL PT USE INHALER: CPT

## 2024-08-14 PROCEDURE — 0W993ZZ DRAINAGE OF RIGHT PLEURAL CAVITY, PERCUTANEOUS APPROACH: ICD-10-PCS | Performed by: RADIOLOGY

## 2024-08-14 PROCEDURE — 83735 ASSAY OF MAGNESIUM: CPT | Performed by: INTERNAL MEDICINE

## 2024-08-14 PROCEDURE — 85610 PROTHROMBIN TIME: CPT | Performed by: RADIOLOGY

## 2024-08-14 PROCEDURE — 76942 ECHO GUIDE FOR BIOPSY: CPT

## 2024-08-14 PROCEDURE — 99254 IP/OBS CNSLTJ NEW/EST MOD 60: CPT | Performed by: INTERNAL MEDICINE

## 2024-08-14 PROCEDURE — 88112 CYTOPATH CELL ENHANCE TECH: CPT

## 2024-08-14 PROCEDURE — 84100 ASSAY OF PHOSPHORUS: CPT | Performed by: INTERNAL MEDICINE

## 2024-08-14 PROCEDURE — 32555 ASPIRATE PLEURA W/ IMAGING: CPT | Performed by: RADIOLOGY

## 2024-08-14 PROCEDURE — 87015 SPECIMEN INFECT AGNT CONCNTJ: CPT | Performed by: INTERNAL MEDICINE

## 2024-08-14 RX ORDER — METHYLPREDNISOLONE SODIUM SUCCINATE 40 MG/ML
20 INJECTION, POWDER, LYOPHILIZED, FOR SOLUTION INTRAMUSCULAR; INTRAVENOUS 2 TIMES DAILY
Status: DISCONTINUED | OUTPATIENT
Start: 2024-08-14 | End: 2024-08-15 | Stop reason: HOSPADM

## 2024-08-14 RX ADMIN — PANTOPRAZOLE SODIUM 40 MG: 40 TABLET, DELAYED RELEASE ORAL at 06:01

## 2024-08-14 RX ADMIN — Medication 10 ML: at 21:16

## 2024-08-14 RX ADMIN — LISINOPRIL 10 MG: 10 TABLET ORAL at 09:11

## 2024-08-14 RX ADMIN — CEFDINIR 300 MG: 300 CAPSULE ORAL at 21:13

## 2024-08-14 RX ADMIN — METHYLPREDNISOLONE SODIUM SUCCINATE 20 MG: 40 INJECTION, POWDER, FOR SOLUTION INTRAMUSCULAR; INTRAVENOUS at 21:13

## 2024-08-14 RX ADMIN — METHYLPREDNISOLONE SODIUM SUCCINATE 40 MG: 40 INJECTION, POWDER, FOR SOLUTION INTRAMUSCULAR; INTRAVENOUS at 09:11

## 2024-08-14 RX ADMIN — IPRATROPIUM BROMIDE AND ALBUTEROL SULFATE 3 ML: .5; 2.5 SOLUTION RESPIRATORY (INHALATION) at 14:53

## 2024-08-14 RX ADMIN — IPRATROPIUM BROMIDE AND ALBUTEROL SULFATE 3 ML: .5; 2.5 SOLUTION RESPIRATORY (INHALATION) at 23:58

## 2024-08-14 RX ADMIN — INSULIN LISPRO 4 UNITS: 100 INJECTION, SOLUTION INTRAVENOUS; SUBCUTANEOUS at 12:15

## 2024-08-14 RX ADMIN — INSULIN LISPRO 2 UNITS: 100 INJECTION, SOLUTION INTRAVENOUS; SUBCUTANEOUS at 21:13

## 2024-08-14 RX ADMIN — ROFLUMILAST 250 MCG: 500 TABLET ORAL at 09:11

## 2024-08-14 RX ADMIN — IPRATROPIUM BROMIDE AND ALBUTEROL SULFATE 3 ML: .5; 2.5 SOLUTION RESPIRATORY (INHALATION) at 07:11

## 2024-08-14 RX ADMIN — CEFDINIR 300 MG: 300 CAPSULE ORAL at 09:11

## 2024-08-14 RX ADMIN — INSULIN LISPRO 2 UNITS: 100 INJECTION, SOLUTION INTRAVENOUS; SUBCUTANEOUS at 09:11

## 2024-08-14 RX ADMIN — INSULIN LISPRO 4 UNITS: 100 INJECTION, SOLUTION INTRAVENOUS; SUBCUTANEOUS at 18:40

## 2024-08-14 RX ADMIN — FUROSEMIDE 40 MG: 10 INJECTION, SOLUTION INTRAMUSCULAR; INTRAVENOUS at 09:11

## 2024-08-14 RX ADMIN — Medication 10 ML: at 09:12

## 2024-08-14 NOTE — PLAN OF CARE
Goal Outcome Evaluation:      Patient rested most of the evening. 3L nasal cannula. 1+ edema of legs currently. Anticoags being held for 24 hours prior to thoracentesis today. Will continue to follow plan of care

## 2024-08-14 NOTE — CONSULTS
Pulmonary and critical care consultation note  Referring Provider: Hospitalist  Reason for Consultation: Right-sided pleural effusion and pneumoconiosis    Chief complaint -shortness of breath    History of present illness: Patient is a 64-year-old male presented to [facility] emergency room on 11th of this month with chief complaint of shortness of breath.  Patient has a past medical history significant for coal workers pneumoconiosis, type 2 diabetes mellitus, essential hypertension, chews tobacco, chronic hypoxic respiratory failure on 2 L nasal cannula oxygen at home and was up to 4 L on presentation and hypertension.  Presented to ER with increasing shortness of breath and increased oxygen requirements.    Shortness of breath has been progressively getting worse over last 5 to 6 days.  History taken from patient and patient's daughter.  HPI from the time of admission reviewed.    All the labs medications ins and outs vitals since the time of admission reviewed.  Treatment in the er reviewed and then on floor reviewed.  Review of Systems  History obtained from chart review and the patient  General ROS: negative for - chills, fatigue or fever  Psychological ROS: negative for - anxiety or depression  ENT ROS: negative for - headaches, visual changes or vocal changes  Respiratory ROS: positive for - cough and shortness of breath  Cardiovascular ROS: no chest pain or dyspnea on exertion  Gastrointestinal ROS: no abdominal pain, change in bowel habits, or black or bloody stools  Musculoskeletal ROS: negative for - joint pain, joint stiffness or joint swelling  Neurological ROS: no TIA or stroke symptoms  Hematological: no bleeding  Skin: no bruises, no rash        History  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    COPD (chronic obstructive pulmonary disease)     Diabetes mellitus     GERD (gastroesophageal  reflux disease)     Hypertension     Pleural effusion     Pneumonia     Pulmonary fibrosis 04/27/2023    Sleep apnea    ,   Past Surgical History:   Procedure Laterality Date    BRONCHOSCOPY N/A 04/24/2023    Procedure: BRONCHOSCOPY WITH ENDOBRONCHIAL ULTRASOUND;  Surgeon: Kota Willard MD;  Location:  [facility];  Service: Pulmonary;  Laterality: N/A;    FOOT SURGERY Left     hardware present    PLEURAL CATHETER INSERTION Left 05/11/2023    Procedure: PLEURX CATHETER INSERTION;  Surgeon: Brigitte Kolb MD;  Location:  [facility];  Service: General;  Laterality: Left;    VENOUS ACCESS DEVICE (PORT) REMOVAL N/A 5/14/2024    Procedure: PLEURX CATHETER REMOVAL;  Surgeon: Brigitte Kolb MD;  Location:  [facility];  Service: General;  Laterality: N/A;   ,   Family History   Problem Relation Age of Onset    Diabetes Mother     Heart failure Father     Diabetes Sister     Heart disease Brother     Diabetes Brother    ,   Social History     Tobacco Use    Smoking status: Never     Passive exposure: Never    Smokeless tobacco: Current     Types: Chew   Vaping Use    Vaping status: Never Used   Substance Use Topics    Alcohol use: No    Drug use: No   ,   Medications Prior to Admission   Medication Sig Dispense Refill Last Dose    Budeson-Glycopyrrol-Formoterol (BREZTRI) 160-9-4.8 MCG/ACT aerosol inhaler Inhale 2 puffs 2 (Two) Times a Day.   8/10/2024    cefdinir (OMNICEF) 300 MG capsule Take 1 capsule by mouth 2 (Two) Times a Day.   8/11/2024 at AM    cyanocobalamin 1000 MCG/ML injection INJECT 1 ML INTO MUSCLE EVERY 2 WEEKS   Past Month    ipratropium-albuterol (DUO-NEB) 0.5-2.5 mg/3 ml nebulizer Take 3 mL by nebulization 3 (Three) Times a Day.   8/10/2024    lisinopril (PRINIVIL,ZESTRIL) 10 MG tablet Take 1 tablet by mouth Daily.   8/11/2024    omeprazole (priLOSEC) 20 MG capsule Take 1 capsule by mouth 2 (Two) Times a Day.   8/10/2024    roflumilast (DALIRESP) 250 MCG tablet tablet Take 1 tablet by mouth  Daily.   8/10/2024    vitamin D (ERGOCALCIFEROL) 1.25 MG (23860 UT) capsule capsule Take 1 capsule by mouth Every 7 (Seven) Days.   Past Week    albuterol (PROVENTIL HFA;VENTOLIN HFA) 108 (90 BASE) MCG/ACT inhaler Inhale 2 puffs Every 4 (Four) Hours As Needed for Wheezing or Shortness of Air. 1 inhaler 0 Unknown    HYDROcodone-acetaminophen (NORCO)  MG per tablet Take 1 tablet by mouth Every 8 (Eight) Hours As Needed for Moderate Pain.   Unknown   , Scheduled Meds:  Budeson-Glycopyrrol-Formoterol, 2 puff, Inhalation, BID  cefdinir, 300 mg, Oral, BID  [START ON 8/16/2024] cholecalciferol, 50,000 Units, Oral, Every Friday  cyanocobalamin, 1,000 mcg, Subcutaneous, Q14 Days  [Held by provider] enoxaparin, 40 mg, Subcutaneous, Daily  furosemide, 40 mg, Intravenous, Daily  insulin lispro, 2-7 Units, Subcutaneous, 4x Daily AC & at Bedtime  ipratropium-albuterol, 3 mL, Nebulization, TID - RT  lisinopril, 10 mg, Oral, Daily  methylPREDNISolone sodium succinate, 40 mg, Intravenous, BID  pantoprazole, 40 mg, Oral, Q AM  roflumilast, 250 mcg, Oral, Daily  sodium chloride, 10 mL, Intravenous, Q12H    , Continuous Infusions:  hold, 1 each     and Allergies:  Patient has no known allergies.    Objective     Vital Signs   Temp:  [97.5 °F (36.4 °C)-98.1 °F (36.7 °C)] 97.5 °F (36.4 °C)  Heart Rate:  [] 93  Resp:  [18-20] 20  BP: ()/(47-78) 134/78    Physical Exam:             General- normal in appearance, not in any acute distress, wearing nasal cannula oxygen    HEENT- pupils equally reactive to light, normal in size, no scleral icterus    Neck-supple    Respiratory-respirations normal-on auscultation no wheezing no crackles, wearing nasal cannula oxygen    Cardiovascular-NSR  GI-nontender nondistended bowel sounds positive    CNS-nonfocal    Musculoskeletal -no edema  Extremities- no obvious deformity noticed     Psychiatric-mood good, good eye contact, alert awake oriented  Skin- no visible rash                                                                    Results Review:    LABS:    Lab Results   Component Value Date    GLUCOSE 179 (H) 08/14/2024    BUN 25 (H) 08/14/2024    CREATININE 0.86 08/14/2024    EGFRIFNONA 72 07/27/2017    BCR 29.1 (H) 08/14/2024    CO2 26.3 08/14/2024    CALCIUM 10.3 08/14/2024    ALBUMIN 3.5 08/14/2024    AST 39 08/14/2024    ALT 45 (H) 08/14/2024    WBC 19.41 (H) 08/14/2024    HGB 13.2 08/14/2024    HCT 43.1 08/14/2024    MCV 83.7 08/14/2024     08/14/2024     08/14/2024    K 4.9 08/14/2024     08/14/2024    ANIONGAP 11.7 08/14/2024       Lab Results   Component Value Date    INR 0.99 08/14/2024    INR 1.00 04/18/2024    INR 1.00 05/10/2023    PROTIME 13.2 08/14/2024    PROTIME 13.7 04/18/2024    PROTIME 13.7 05/10/2023       Results from last 7 days   Lab Units 08/14/24  0322   INR  0.99          Microbiology Results (last 10 days)       Procedure Component Value - Date/Time    Blood Culture - Blood, Arm, Right [[id number]]  (Normal) Collected: 08/11/24 1237    Lab Status: Preliminary result Specimen: Blood from Arm, Right Updated: 08/13/24 1245     Blood Culture No growth at 2 days    Blood Culture - Blood, Arm, Left [[id number]]  (Normal) Collected: 08/11/24 1237    Lab Status: Preliminary result Specimen: Blood from Arm, Left Updated: 08/13/24 1245     Blood Culture No growth at 2 days           I reviewed the patient's new clinical results.  I reviewed the patient's new imaging results and agree with the interpretation.   Latest Reference Range & Units 08/11/24 14:57   pH, Arterial 7.350 - 7.450 pH units 7.392   pCO2, Arterial 35.0 - 45.0 mm Hg 49.5 (H)   pO2, Arterial 83.0 - 108.0 mm Hg 72.8 (L)   HCO3, Arterial 20.0 - 26.0 mmol/L 30.1 (H)   Base Excess 0.0 - 2.0 mmol/L 4.1 (H)   O2 Saturation, Arterial 94.0 - 99.0 % 94.6   CO2 Content 22 - 33 mmol/L 31.6   A-a DO2 0.0 - 300.0 mmHg 111.1   Carboxyhemoglobin 0 - 5 % 0.9   Methemoglobin 0.00 - 3.00 % 0.10   Oxyhemoglobin  94 - 99 % 93.7 (L)   Hematocrit, Blood Gas 38.0 - 51.0 % 43.0   Hemoglobin, Blood Gas 14 - 18 g/dL 14.0   Site  Left Brachial   Mike's Test  N/A   Modality  Nasal Cannula   FIO2 % 35   Flow Rate lpm 3.5   Ventilator Mode  NA   Barometric Pressure for Blood Gas mmHg 729   (H): Data is abnormally high  (L): Data is abnormally low      Assessment & Plan       Respiratory-acute on chronic hypoxic respiratory failure-likely due to right-sided pleural effusion which is likely due to 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  Case was discussed with primary team and patient's daughter.    Patient will be going for thoracentesis today.    If the pleural fluid reaccumulate's in coming days to weeks recommend insertion of a Pleurx catheter.  Patient did have a Pleurx catheter on the left side and it has been taken out.  Likely patient had auto pleurodesis.    Patient blood pressure and kidney functions are good than diuretics to improve lung compliance and diffusion capacity.  Oxygen requirements reviewed and adjusted to maintain saturation 88 to 92%.      Chest x-ray-latest reviewed and discussed with patient and patient's daughter    ABG-latest reviewed  COPD-will decrease the dose of steroids as patient is not wheezing.    Procalitonin Results:      Lab 08/13/24  1520 08/11/24  1228   PROCALCITONIN 0.03 0.04      Can consider discontinuing antibiotics.      Cardiology- hemodynamically -stable  Continue to monitor HR- rate and rhythm, BP     Nephrology- Cr and BUN stable  I/O-reviewed    GI-continue current diet        ID  Culture-reviewed  And Antibiotics-reviewed    Endrocrinology- Maintain Blood sugar 140 -180      Electrolytes-   Mag and phos-replete as per protocol      DVT prophylaxis-continue    Bedside rounds were done with RT and patient's nurse. All the lab and clinical findings were discussed with them and plan was also discussed in great detail.    Family member present- yes daughter   Who  works as a speech therapist in our hospital.  Case was discussed with her and answered her questions to her satisfaction.        Echo-  Results for orders placed during the hospital encounter of 08/11/24    Adult Transthoracic Echo Complete W/ Cont if Necessary Per Protocol    Interpretation Summary    Normal left ventricular cavity size and wall thickness noted. All left ventricular wall segments contract normally.    Left ventricular ejection fraction appears to be 66 - 70%.    Left ventricular diastolic function is consistent with (grade I) impaired relaxation.    The aortic valve is structurally normal with no regurgitation or stenosis present.    The mitral valve is structurally normal with no regurgitation or significant stenosis present.    There is no evidence of pericardial effusion.           Acute on chronic hypoxic respiratory failure          Isaiah Jacobs MD  08/14/24  11:43 EDT

## 2024-08-14 NOTE — NURSING NOTE
Procedure complete and patient tolerated well. Approximately 130ml blood tinged fluid removed. Vaseline gauze and occlusive dressing applied.

## 2024-08-14 NOTE — PROGRESS NOTES
[facility] HOSPITALIST PROGRESS NOTE     Patient Identification:  Name:  Rory Rios Jr.  Age:  64 y.o.  Sex:  male  :  1960  MRN:  [id number]  Visit Number:  [id number]  ROOM: [address] [place] [address]     Primary Care Provider:  Alejandro Monique MD    Length of stay in inpatient status:  3    Subjective     Chief Compliant:    Chief Complaint   Patient presents with   • Shortness of Breath       History of Presenting Illness:    Patient feeling better after thoracentesis. Turned down to 2L NC. Tolerated well. Nephew supportive bedside.       ROS:  Otherwise 10 point ROS negative other than documented above in HPI.     Objective     Current Hospital Meds:Budeson-Glycopyrrol-Formoterol, 2 puff, Inhalation, BID  cefdinir, 300 mg, Oral, BID  [START ON 2024] cholecalciferol, 50,000 Units, Oral, Every Friday  cyanocobalamin, 1,000 mcg, Subcutaneous, Q14 Days  [Held by provider] enoxaparin, 40 mg, Subcutaneous, Daily  furosemide, 40 mg, Intravenous, Daily  insulin lispro, 2-7 Units, Subcutaneous, 4x Daily AC & at Bedtime  ipratropium-albuterol, 3 mL, Nebulization, TID - RT  lisinopril, 10 mg, Oral, Daily  methylPREDNISolone sodium succinate, 20 mg, Intravenous, BID  pantoprazole, 40 mg, Oral, Q AM  roflumilast, 250 mcg, Oral, Daily  sodium chloride, 10 mL, Intravenous, Q12H    hold, 1 each        Current Antimicrobial Therapy:  Anti-Infectives (From admission, onward)      Ordered     Dose/Rate Route Frequency Start Stop    24 1429  cefdinir (OMNICEF) capsule 300 mg        Ordering Provider: Oskar Rice DO    300 mg Oral 2 Times Daily 24 2100 24 2059    24 1439  cefTRIAXone (ROCEPHIN) 2,000 mg in sodium chloride 0.9 % 100 mL IVPB-VTB        Ordering Provider: Turner Carballo MD    2,000 mg  200 mL/hr over 30 Minutes Intravenous Once 24 1455 24 1516    24 1230  doxycycline (VIBRAMYCIN) 100 mg in sodium chloride 0.9 % 100 mL IVPB-VTB        Ordering  Provider: Turner Carballo MD    100 mg  over 60 Minutes Intravenous Once 08/11/24 1246 08/11/24 1344          Current Diuretic Therapy:  Diuretics (From admission, onward)      Ordered     Dose/Rate Route Frequency Start Stop    08/12/24 1324  furosemide (LASIX) injection 40 mg        Ordering Provider: Oskar Rice, DO    40 mg Intravenous Daily 08/12/24 1415            ----------------------------------------------------------------------------------------------------------------------  Vital Signs:  Temp:  [97.5 °F (36.4 °C)-98.1 °F (36.7 °C)] 97.6 °F (36.4 °C)  Heart Rate:  [] 88  Resp:  [18-20] 18  BP: ()/(47-88) 114/72  SpO2:  [93 %-97 %] 97 %  on  Flow (L/min):  [3] 3;   Device (Oxygen Therapy): nasal cannula;humidified  Body mass index is 33.52 kg/m².    Wt Readings from Last 3 Encounters:   08/14/24 128 kg (282 lb 10.1 oz)   05/14/24 129 kg (285 lb)   05/06/24 130 kg (285 lb 12.8 oz)     Intake & Output (last 3 days)         08/12 0701  08/13 0700 08/13 0701  08/14 0700 08/14 0701  08/15 0700    P.O. 1260 1320 480    IV Piggyback       Total Intake(mL/kg) 1260 (9.9) 1320 (10.3) 480 (3.8)    Urine (mL/kg/hr) 275 (0.1) 1625 (0.5)     Total Output 275 1625     Net +985 -305 +480           Urine Unmeasured Occurrence 1 x  3 x          Diet: Regular/House; Fluid Consistency: Thin (IDDSI 0)  ----------------------------------------------------------------------------------------------------------------------  Physical exam:  Constitutional:  Well-developed and well-nourished.  No respiratory distress.      HENT:  Head:  Normocephalic and atraumatic.  Mouth:  Moist mucous membranes.    Eyes:  Conjunctivae and EOM are normal. No scleral icterus.    Neck:  Neck supple.  No JVD present.    Cardiovascular:  Normal rate, regular rhythm and normal heart sounds with no murmur.  Pulmonary/Chest:  No respiratory distress, no wheezes, no crackles, with normal breath sounds and good air  movement.  Abdominal:  Soft.  Bowel sounds are normal.  No distension and no tenderness.   Musculoskeletal:  No edema, no tenderness, and no deformity.  No red or swollen joints anywhere.    Neurological:  Alert and oriented to person, place, and time.  No cranial nerve deficit.  No tongue deviation.  No facial droop.  No slurred speech.   Skin:  Skin is warm and dry. No rash noted. No pallor.   Peripheral vascular:  Pulses in all 4 extremities with no clubbing, no cyanosis, no edema.  ----------------------------------------------------------------------------------------------------------------------  Tele:    ----------------------------------------------------------------------------------------------------------------------  Results from last 7 days   Lab Units 08/14/24  0322 08/13/24  0113 08/12/24  0131 08/11/24  1237   LACTATE mmol/L  --   --   --  1.6   WBC 10*3/mm3 19.41* 19.98* 9.23  --    HEMOGLOBIN g/dL 13.2 13.2 13.6  --    HEMATOCRIT % 43.1 43.8 44.2  --    MCV fL 83.7 83.7 83.6  --    MCHC g/dL 30.6* 30.1* 30.8*  --    PLATELETS 10*3/mm3 278 221 241  --    INR  0.99  --   --   --      Results from last 7 days   Lab Units 08/11/24  1457   PH, ARTERIAL pH units 7.392   PO2 ART mm Hg 72.8*   PCO2, ARTERIAL mm Hg 49.5*   HCO3 ART mmol/L 30.1*     Results from last 7 days   Lab Units 08/14/24  0322 08/13/24  0113 08/12/24  0131 08/11/24  1228   SODIUM mmol/L 138 139 138 142   POTASSIUM mmol/L 4.9 4.6 4.0 4.2   MAGNESIUM mg/dL 2.2  --   --   --    CHLORIDE mmol/L 100 102 102 103   CO2 mmol/L 26.3 27.1 24.9 25.6   BUN mg/dL 25* 19 10 10   CREATININE mg/dL 0.86 0.96 0.78 0.85   CALCIUM mg/dL 10.3 10.8* 10.1 10.0   IONIZED CALCIUM mmol/L 1.21  --   --   --    PHOSPHORUS mg/dL 4.0  --   --   --    GLUCOSE mg/dL 179* 186* 205* 140*   ALBUMIN g/dL 3.5  --   --  3.8   BILIRUBIN mg/dL 0.2  --   --  0.3   ALK PHOS U/L 71  --   --  92   AST (SGOT) U/L 39  --   --  25   ALT (SGPT) U/L 45*  --   --  19   Estimated  "Creatinine Clearance: 125.5 mL/min (by C-G formula based on SCr of 0.86 mg/dL).  No results found for: \"AMMONIA\"  Results from last 7 days   Lab Units 08/11/24  1228   HSTROP T ng/L 18     Results from last 7 days   Lab Units 08/11/24  1228   PROBNP pg/mL 195.8         Glucose   Date/Time Value Ref Range Status   08/14/2024 1614 270 (H) 70 - 130 mg/dL Final   08/14/2024 1043 296 (H) 70 - 130 mg/dL Final   08/14/2024 0709 167 (H) 70 - 130 mg/dL Final   08/13/2024 2009 159 (H) 70 - 130 mg/dL Final   08/13/2024 1634 208 (H) 70 - 130 mg/dL Final   08/13/2024 1106 158 (H) 70 - 130 mg/dL Final   08/13/2024 0634 232 (H) 70 - 130 mg/dL Final   08/12/2024 2110 128 70 - 130 mg/dL Final     Lab Results   Component Value Date    TSH 2.210 05/10/2023    FREET4 1.33 05/10/2023     No results found for: \"PREGTESTUR\", \"PREGSERUM\", \"HCG\", \"HCGQUANT\"  Pain Management Panel          Latest Ref Rng & Units 5/10/2023   Pain Management Panel   Amphetamine, Urine Qual Negative Negative    Barbiturates Screen, Urine Negative Negative    Benzodiazepine Screen, Urine Negative Negative    Buprenorphine, Screen, Urine Negative Negative    Cocaine Screen, Urine Negative Negative    Methadone Screen , Urine Negative Negative    Methamphetamine, Ur Negative Negative       Details                 Brief Urine Lab Results  (Last result in the past 365 days)        Color   Clarity   Blood   Leuk Est   Nitrite   Protein   CREAT   Urine HCG        04/18/24 0942 Dark Yellow   Cloudy   Negative   Negative   Negative   Negative                 Blood Culture   Date Value Ref Range Status   08/11/2024 No growth at 3 days  Preliminary   08/11/2024 No growth at 3 days  Preliminary     No results found for: \"URINECX\"  No results found for: \"WOUNDCX\"  No results found for: \"STOOLCX\"  No results found for: \"RESPCX\"  No results found for: \"AFBCX\"  Results from last 7 days   Lab Units 08/13/24  1520 08/11/24  1237 08/11/24  1228   PROCALCITONIN ng/mL 0.03  --  " 0.04   LACTATE mmol/L  --  1.6  --        I have personally looked at the labs and they are summarized above.  ----------------------------------------------------------------------------------------------------------------------  Detailed radiology reports for the last 24 hours:    Imaging Results (Last 24 Hours)       Procedure Component Value Units Date/Time    XR Chest 1 View [[id number]] Collected: 08/14/24 1338     Updated: 08/14/24 1341    Narrative:      EXAM:    XR Chest, 1 View     EXAM DATE:    8/14/2024 1:23 PM     CLINICAL HISTORY:    post thoracentesis; J96.21-Acute and chronic respiratory failure with  hypoxia; J96.22-Acute and chronic respiratory failure with hypercapnia;  G14-Ivzibzdiqs's pneumoconiosis; J44.1-Chronic obstructive pulmonary  disease with (acute) exacerbation; J44.9-Chronic obstructive pulmonary  disease, unspecified     TECHNIQUE:    Frontal view of the chest.     COMPARISON:    8/11/2024     FINDINGS:    LUNGS AND PLEURAL SPACES:  Bilateral patchy airspace disease and  masslike opacifications are stable.  Improved now only tiny right  pleural effusion with residual small left pleural effusion noted.  No  pneumothorax.    HEART:  Cardiomegaly again noted.    MEDIASTINUM:  Unremarkable as visualized.  Normal mediastinal contour.    BONES/JOINTS:  Unremarkable as visualized.  No acute fracture.       Impression:      1.  No pneumothorax.  2.  Bilateral patchy airspace disease and masslike opacifications are  stable.  3.  Improved now only tiny right pleural effusion with residual small  left pleural effusion noted.  4.  Cardiomegaly again noted.        This report was finalized on 8/14/2024 1:39 PM by Dr. Sanya Zavala MD.       US Thoracentesis [[id number]] Collected: 08/14/24 1336    Specimen: Body Fluid Updated: 08/14/24 1340    Narrative:      EXAM:    IR Thoracentesis With Image Guidance     EXAM DATE:    8/14/2024 1:00 PM     CLINICAL HISTORY:    dyspnea, larger R effusion;  J96.21-Acute and chronic respiratory  failure with hypoxia; J96.22-Acute and chronic respiratory failure with  hypercapnia; I43-Mxcyhlzpps's pneumoconiosis; J44.1-Chronic obstructive  pulmonary disease with (acute) exacerbation; J44.9-Chronic obstructive  pulmonary disease, unspecified     TECHNIQUE:    Image-guided percutaneous thoracentesis.     COMPARISON:    No relevant prior studies available.     FINDINGS:    CONSENT:  The risks, benefits and alternatives to image-guided  thoracentesis were explained to the patient who understood and elected  to proceed.  The risks discussed included but were not limited to  bleeding, infection, pain and pneumothorax.  Written consent was  obtained.    TIME-OUT:  A time-out was performed immediately prior to the procedure  to confirm the patient's identity, procedure and site/side of procedure.    PRE-PROCEDURE:  Patient was prepped and draped in a sterile fashion.    PROCEDURE:  A 22-gauge needle was used to withdraw about 170 cc of  blood-tinged fluid from the right chest.    COMPLICATIONS:  None.    POST-PROCEDURE:  Patient was discharged to the post-procedure recovery  area.       Impression:        A 22-gauge needle was used to withdraw about 170 cc of blood-tinged  fluid from the right chest.        This report was finalized on 8/14/2024 1:38 PM by Dr. Sanya Zavala MD.             Assessment & Plan    #Acute on chronic hypoxic respiratory failure   #COPD exacerbationb  #Coil worker's pneumocoinosis   #Bilateral pleural effusion, R>L and larger than last CT in 4/24  #DM II  #HTN     Personally compared CT this admission to 4/24. Significant lesions contributed to coal worker's pnumocoiniosis similar. Has had left sided pleural drain in the past. This time R sided effusion looks bigger. Patient had thoracentesis on 8/14 with 170 cc fluid removed. Will f/u fluid labs. Continue treatment for COPD exacerbation with steroids and nebs. Also consulted pulmonology to evaluate  given continued dyspnea. Continue to hold abx, procal still wnl. Repeat calcium wnl. Wean FiO2 as able.      Dispo: Pending clinical improvement     Frank Magallon MD  [facility]  08/14/24  19:15 EDT

## 2024-08-14 NOTE — PLAN OF CARE
Pt sat in chair or sat on side of bed this shift. Pt had no complaints. No s/s of acute distress noted. VSS

## 2024-08-14 NOTE — CASE MANAGEMENT/SOCIAL WORK
Continued Stay Note  CARLOS Mendiola     Patient Name: Rory Rios Jr.  MRN: [id number]  Today's Date: 8/14/2024    Admit Date: 8/11/2024    Plan: Met with pt at bedside and verified discharge plan remains unchanged, will return home with daughter to provide transport once medically stable, no discharge needs identified at this time.  Amanda De Luna RN

## 2024-08-15 ENCOUNTER — READMISSION MANAGEMENT (OUTPATIENT)
Dept: CALL CENTER | Facility: HOSPITAL | Age: 64
End: 2024-08-15
Payer: COMMERCIAL

## 2024-08-15 VITALS
DIASTOLIC BLOOD PRESSURE: 70 MMHG | BODY MASS INDEX: 33.66 KG/M2 | RESPIRATION RATE: 20 BRPM | HEIGHT: 77 IN | HEART RATE: 92 BPM | TEMPERATURE: 97.7 F | WEIGHT: 285.1 LBS | OXYGEN SATURATION: 95 % | SYSTOLIC BLOOD PRESSURE: 116 MMHG

## 2024-08-15 LAB
ALBUMIN FLD-MCNC: 2.2 G/DL
ANION GAP SERPL CALCULATED.3IONS-SCNC: 10.6 MMOL/L (ref 5–15)
BASOPHILS # BLD AUTO: 0.02 10*3/MM3 (ref 0–0.2)
BASOPHILS NFR BLD AUTO: 0.1 % (ref 0–1.5)
BUN SERPL-MCNC: 22 MG/DL (ref 8–23)
BUN/CREAT SERPL: 26.8 (ref 7–25)
CALCIUM SPEC-SCNC: 10.2 MG/DL (ref 8.6–10.5)
CHLORIDE SERPL-SCNC: 101 MMOL/L (ref 98–107)
CO2 SERPL-SCNC: 27.4 MMOL/L (ref 22–29)
CREAT SERPL-MCNC: 0.82 MG/DL (ref 0.76–1.27)
DEPRECATED RDW RBC AUTO: 44.6 FL (ref 37–54)
EGFRCR SERPLBLD CKD-EPI 2021: 96.9 ML/MIN/1.73
EOSINOPHIL # BLD AUTO: 0.02 10*3/MM3 (ref 0–0.4)
EOSINOPHIL NFR BLD AUTO: 0.1 % (ref 0.3–6.2)
ERYTHROCYTE [DISTWIDTH] IN BLOOD BY AUTOMATED COUNT: 14.9 % (ref 12.3–15.4)
GLUCOSE BLDC GLUCOMTR-MCNC: 138 MG/DL (ref 70–130)
GLUCOSE BLDC GLUCOMTR-MCNC: 147 MG/DL (ref 70–130)
GLUCOSE BLDC GLUCOMTR-MCNC: 209 MG/DL (ref 70–130)
GLUCOSE SERPL-MCNC: 168 MG/DL (ref 65–99)
HCT VFR BLD AUTO: 42.8 % (ref 37.5–51)
HGB BLD-MCNC: 13.1 G/DL (ref 13–17.7)
IMM GRANULOCYTES # BLD AUTO: 0.12 10*3/MM3 (ref 0–0.05)
IMM GRANULOCYTES NFR BLD AUTO: 0.7 % (ref 0–0.5)
LDH FLD-CCNC: 118 U/L
LYMPHOCYTES # BLD AUTO: 0.73 10*3/MM3 (ref 0.7–3.1)
LYMPHOCYTES NFR BLD AUTO: 4.3 % (ref 19.6–45.3)
MAGNESIUM SERPL-MCNC: 2.4 MG/DL (ref 1.6–2.4)
MCH RBC QN AUTO: 25.1 PG (ref 26.6–33)
MCHC RBC AUTO-ENTMCNC: 30.6 G/DL (ref 31.5–35.7)
MCV RBC AUTO: 82.1 FL (ref 79–97)
MONOCYTES # BLD AUTO: 0.86 10*3/MM3 (ref 0.1–0.9)
MONOCYTES NFR BLD AUTO: 5.1 % (ref 5–12)
NEUTROPHILS NFR BLD AUTO: 15.26 10*3/MM3 (ref 1.7–7)
NEUTROPHILS NFR BLD AUTO: 89.7 % (ref 42.7–76)
NRBC BLD AUTO-RTO: 0 /100 WBC (ref 0–0.2)
PH FLD: 7.73 [PH]
PLATELET # BLD AUTO: 287 10*3/MM3 (ref 140–450)
PMV BLD AUTO: 9.2 FL (ref 6–12)
POTASSIUM SERPL-SCNC: 5 MMOL/L (ref 3.5–5.2)
PROT FLD-MCNC: 2.7 G/DL
RBC # BLD AUTO: 5.21 10*6/MM3 (ref 4.14–5.8)
REF LAB TEST METHOD: NORMAL
SODIUM SERPL-SCNC: 139 MMOL/L (ref 136–145)
WBC NRBC COR # BLD AUTO: 17.01 10*3/MM3 (ref 3.4–10.8)

## 2024-08-15 PROCEDURE — 94799 UNLISTED PULMONARY SVC/PX: CPT

## 2024-08-15 PROCEDURE — 63710000001 INSULIN LISPRO (HUMAN) PER 5 UNITS

## 2024-08-15 PROCEDURE — 82948 REAGENT STRIP/BLOOD GLUCOSE: CPT

## 2024-08-15 PROCEDURE — 99239 HOSP IP/OBS DSCHRG MGMT >30: CPT | Performed by: INTERNAL MEDICINE

## 2024-08-15 PROCEDURE — 94664 DEMO&/EVAL PT USE INHALER: CPT

## 2024-08-15 PROCEDURE — 80048 BASIC METABOLIC PNL TOTAL CA: CPT | Performed by: INTERNAL MEDICINE

## 2024-08-15 PROCEDURE — 94761 N-INVAS EAR/PLS OXIMETRY MLT: CPT

## 2024-08-15 PROCEDURE — 25010000002 METHYLPREDNISOLONE PER 40 MG: Performed by: INTERNAL MEDICINE

## 2024-08-15 PROCEDURE — 25010000002 FUROSEMIDE PER 20 MG: Performed by: INTERNAL MEDICINE

## 2024-08-15 PROCEDURE — 99232 SBSQ HOSP IP/OBS MODERATE 35: CPT | Performed by: INTERNAL MEDICINE

## 2024-08-15 PROCEDURE — 85025 COMPLETE CBC W/AUTO DIFF WBC: CPT | Performed by: INTERNAL MEDICINE

## 2024-08-15 PROCEDURE — 83735 ASSAY OF MAGNESIUM: CPT | Performed by: INTERNAL MEDICINE

## 2024-08-15 RX ORDER — IPRATROPIUM BROMIDE AND ALBUTEROL SULFATE 2.5; .5 MG/3ML; MG/3ML
SOLUTION RESPIRATORY (INHALATION)
Qty: 18 ML | Refills: 0 | Status: SHIPPED | OUTPATIENT
Start: 2024-08-15 | End: 2024-08-19

## 2024-08-15 RX ORDER — DOXYCYCLINE 100 MG/1
CAPSULE ORAL
Qty: 10 CAPSULE | Refills: 0 | Status: SHIPPED | OUTPATIENT
Start: 2024-08-15 | End: 2024-08-19

## 2024-08-15 RX ORDER — PREDNISONE 20 MG/1
40 TABLET ORAL DAILY
Qty: 10 TABLET | Refills: 0 | Status: SHIPPED | OUTPATIENT
Start: 2024-08-15 | End: 2024-08-19

## 2024-08-15 RX ORDER — FUROSEMIDE 20 MG/1
20 TABLET ORAL DAILY
Qty: 30 TABLET | Refills: 0 | Status: SHIPPED | OUTPATIENT
Start: 2024-08-15 | End: 2024-09-14

## 2024-08-15 RX ADMIN — CEFDINIR 300 MG: 300 CAPSULE ORAL at 08:34

## 2024-08-15 RX ADMIN — LISINOPRIL 10 MG: 10 TABLET ORAL at 08:34

## 2024-08-15 RX ADMIN — PANTOPRAZOLE SODIUM 40 MG: 40 TABLET, DELAYED RELEASE ORAL at 06:03

## 2024-08-15 RX ADMIN — FUROSEMIDE 40 MG: 10 INJECTION, SOLUTION INTRAMUSCULAR; INTRAVENOUS at 08:34

## 2024-08-15 RX ADMIN — IPRATROPIUM BROMIDE AND ALBUTEROL SULFATE 3 ML: .5; 2.5 SOLUTION RESPIRATORY (INHALATION) at 07:24

## 2024-08-15 RX ADMIN — METHYLPREDNISOLONE SODIUM SUCCINATE 20 MG: 40 INJECTION, POWDER, FOR SOLUTION INTRAMUSCULAR; INTRAVENOUS at 08:34

## 2024-08-15 RX ADMIN — IPRATROPIUM BROMIDE AND ALBUTEROL SULFATE 3 ML: .5; 2.5 SOLUTION RESPIRATORY (INHALATION) at 14:30

## 2024-08-15 RX ADMIN — INSULIN LISPRO 3 UNITS: 100 INJECTION, SOLUTION INTRAVENOUS; SUBCUTANEOUS at 12:01

## 2024-08-15 RX ADMIN — Medication 10 ML: at 08:35

## 2024-08-15 RX ADMIN — ROFLUMILAST 250 MCG: 500 TABLET ORAL at 08:34

## 2024-08-15 NOTE — DISCHARGE SUMMARY
[facility] HOSPITALISTS DISCHARGE SUMMARY    Patient Identification:  Name:  Rory Rios Jr.  Age:  64 y.o.  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 male presented on  to [facility] complaining of shortness of breath.  Please see the admitting history and physical for further details.      Mr. Rios is our 63 yo M with hx of coal workers pneumoconiosis, essential hypertension, type 2 diabetes mellitus and chronic hypoxic respiratory failure on 2 L nasal cannula (uses PRN) who presented with 2 weeks of progressive SOB. Patient had been placed on omnicef for sinus infection. Patient had increased his home O2 to 4L NC before coming in. Initial vital signs found patient's blood pressure 135/74, respiratory rate 28, heart rate 100, temperature 97.3 °F and oxygen saturation 88% on 4 L nasal cannula. Initial lab work did include ABG, CBC and CMP. ABG demonstrated pH 7.39, pCO2 49, pO2 72 and bicarb 30 on 4 L nasal cannula. CBC was within normal limits. CMP was within normal limits. Chest x-ray demonstrated coarsened bronchovascular pattern to the lungs with mild pulmonary edema and what appeared to be multifocal pneumonia. Personally compared CT this admission to . Significant lesions contributed to coal worker's pnumocoiniosis similar. Has had left sided pleural drain in the past. This time R sided effusion looks bigger. Patient had thoracentesis on  with 170 cc fluid removed. Barely meets criteria for  exudative but had blood in it. Patient has been treated for COPD exacerbation with nebs and steroids and also reported feeling much better after thoracentesis. Pulmonology consulted and has been following as well.     On day of discharge patient saturating well on room air at rest. I got patient up to walk and dropped to 88% in the hallway on room air but imrproved back to over 90% on 2L NC. Patient noted feeling better and reported he felt ready to go home. Patient has supportive family including daughter who is a speech pathologist. Given patient's significant fibrosis offered referral to lung transplant clinic at . Patient to f/u with pulmonology and PCP. Will prescribe COPD rescue kit. Will also prescribe 20 mg PO daily lasix as he has tolerated while inpatient. Recommend BMP, mag level in 1 week.       VITAL SIGNS:  Temp:  [97.5 °F (36.4 °C)-98.6 °F (37 °C)] 97.6 °F (36.4 °C)  Heart Rate:  [] 94  Resp:  [18-20] 20  BP: (114-146)/(58-88) 119/76  SpO2:  [90 %-98 %] 92 %  on  Flow (L/min):  [3] 3;   Device (Oxygen Therapy): nasal cannula;humidified    Body mass index is 33.81 kg/m².  Wt Readings from Last 3 Encounters:   08/15/24 129 kg (285 lb 1.6 oz)   05/14/24 129 kg (285 lb)   05/06/24 130 kg (285 lb 12.8 oz)       PHYSICAL EXAM:  Constitutional:  Well-developed and well-nourished.  No respiratory distress.      HENT:  Head:  Normocephalic and atraumatic.  Mouth:  Moist mucous membranes.    Eyes:  Conjunctivae and EOM are normal.  Pupils are equal, round, and reactive to light.  No scleral icterus.    Cardiovascular:  Normal rate, regular rhythm and normal heart sounds with no murmur.  Pulmonary/Chest:  No respiratory distress, no wheezes, no crackles, with normal breath sounds and good air movement.  Abdominal:  Soft.  Bowel sounds are normal.  No distension and no tenderness.   Musculoskeletal:  No edema, no tenderness, and no deformity.  No red or swollen joints anywhere.    Neurological:  Alert and  oriented to person, place, and time.  No gross neurological deficit.   Skin:  Skin is warm and dry. No rash noted. No pallor.   Peripheral vascular:  Strong pulses in all 4 extremities with no clubbing, no cyanosis, no edema.    DISCHARGE DISPOSITION   Stable    DISCHARGE MEDICATIONS:     Discharge Medications        New Medications        Instructions Start Date   furosemide 20 MG tablet  Commonly known as: Lasix   20 mg, Oral, Daily             Continue These Medications        Instructions Start Date   albuterol sulfate  (90 Base) MCG/ACT inhaler  Commonly known as: PROVENTIL HFA;VENTOLIN HFA;PROAIR HFA   2 puffs, Inhalation, Every 4 Hours PRN      Budeson-Glycopyrrol-Formoterol 160-9-4.8 MCG/ACT aerosol inhaler  Commonly known as: BREZTRI   2 puffs, Inhalation, 2 Times Daily      cyanocobalamin 1000 MCG/ML injection   INJECT 1 ML INTO MUSCLE EVERY 2 WEEKS      HYDROcodone-acetaminophen  MG per tablet  Commonly known as: NORCO   1 tablet, Oral, Every 8 Hours PRN      ipratropium-albuterol 0.5-2.5 mg/3 ml nebulizer  Commonly known as: DUO-NEB   3 mL, Nebulization, 3 Times Daily - RT      lisinopril 10 MG tablet  Commonly known as: PRINIVIL,ZESTRIL   10 mg, Oral, Daily      omeprazole 20 MG capsule  Commonly known as: priLOSEC   20 mg, Oral, 2 Times Daily      roflumilast 250 MCG tablet tablet  Commonly known as: DALIRESP   250 mcg, Oral, Daily      vitamin D 1.25 MG (52887 UT) capsule capsule  Commonly known as: ERGOCALCIFEROL   50,000 Units, Oral, Every 7 Days             Stop These Medications      cefdinir 300 MG capsule  Commonly known as: OMNICEF                 Follow-up Information       Alejandro Monique MD Follow up in 1 week(s).    Specialty: General Surgery  Why: BMP and magnesium level at visit  Contact information:  [address] [place] [address] [place] [id number] [phone]               [facility] Follow up.    Why: Lung transplant evaluation  Contact  information:  [address] [facility] [address]  [phone]             Tomasz Aguilar MD Follow up in 1 week(s).    Specialty: Pulmonary Disease  Why: Recommend outpatient PFTs if not recently obtained  Contact information:  [address] [place] [address] [phone]                              TEST  RESULTS PENDING AT DISCHARGE  Pending Labs       Order Current Status    Blood Culture - Blood, Arm, Left Preliminary result    Blood Culture - Blood, Arm, Right Preliminary result    Body Fluid Culture - Body Fluid, Pleural Cavity Preliminary result             CODE STATUS  Code Status and Medical Interventions: CPR (Attempt to Resuscitate); Full Support   Ordered at: 08/12/24 0405     Code Status (Patient has no pulse and is not breathing):    CPR (Attempt to Resuscitate)     Medical Interventions (Patient has pulse or is breathing):    Full Support       Frank Magallon MD  [facility]  08/15/24  11:53 EDT    Please note that this discharge summary required more than 30 minutes to complete.

## 2024-08-15 NOTE — PLAN OF CARE
Goal Outcome Evaluation: Patient is being discharged today.

## 2024-08-15 NOTE — PAYOR COMM NOTE
"CONTACT: ZANE ANGULO RN  UTILIZATION MANAGEMENT DEPT.  [facility]  [address] [place] [address]  PHONE: [phone]  FAX: [phone]        CLINICAL UPDATE    case ID# [id number]       Rory Vera Jr. (64 y.o. Male)       Date of Birth   1960    Social Security Number       Address   [address] [place] [id number]    Home Phone   [phone]    MRN   [id number]       [place]   None    Marital Status                               Admission Date   8/11/24    Admission Type   Emergency    Admitting Provider   Oskar Rice DO    Attending Provider   Frank Magallon MD    Department, Room/Bed   [address], [facility]/       Discharge Date       Discharge Disposition       Discharge Destination                                 Attending Provider: Frank Magallon MD    Allergies: No Known Allergies    Isolation: None   Infection: None   Code Status: CPR    Ht: 195.6 cm (77\")   Wt: 129 kg (285 lb 1.6 oz)    Admission Cmt: None   Principal Problem: Acute on chronic hypoxic respiratory failure [J96.21]                   Active Insurance as of 8/11/2024       Primary Coverage       Payor Plan Insurance Group Employer/Plan Group    WORKERS COMPENSATION [employer]       Payor Plan Address Payor Plan Phone Number Payor Plan Fax Number Effective Dates    [address] [phone]  6/25/2009 - None Entered    [facility] [id number]         Subscriber Name Subscriber Birth Date Member ID       RORY VERA JR. 1960 [id number]                     Emergency Contacts        (Rel.) Home Phone Work Phone Mobile Phone    KATIE KENDRICK (Daughter) [phone] -- --              Current Facility-Administered Medications   Medication Dose Route Frequency Provider Last Rate Last Admin    albuterol (PROVENTIL) nebulizer solution 0.083% 2.5 mg/3mL  2.5 mg Nebulization Q4H PRN Oskar Rice DO        " sennosides-docusate (PERICOLACE) 8.6-50 MG per tablet 2 tablet  2 tablet Oral BID PRN Oskar Rice DO        And    polyethylene glycol (MIRALAX) packet 17 g  17 g Oral Daily PRN Oskar Rice DO        And    bisacodyl (DULCOLAX) EC tablet 5 mg  5 mg Oral Daily PRN Oskar Rice DO        And    bisacodyl (DULCOLAX) suppository 10 mg  10 mg Rectal Daily PRN Oskar Rice DO        Budeson-Glycopyrrol-Formoterol (BREZTRI) inhaler 2 puff  2 puff Inhalation BID Oskar Rice DO        cefdinir (OMNICEF) capsule 300 mg  300 mg Oral BID Oskar Rice DO   300 mg at 08/15/24 0834    [START ON 8/16/2024] cholecalciferol (VITAMIN D3) capsule 50,000 Units  50,000 Units Oral Every Friday Oskar Rice DO        cyanocobalamin injection 1,000 mcg  1,000 mcg Subcutaneous Q14 Days Oskar Rice DO   1,000 mcg at 08/12/24 1726    dextrose (D50W) (25 g/50 mL) IV injection 25 g  25 g Intravenous Q15 Min PRN Parminder Dempsey APRN        dextrose (GLUTOSE) oral gel 15 g  15 g Oral Q15 Min PRN Parminder Dempsey APRN        [Held by provider] Enoxaparin Sodium (LOVENOX) syringe 40 mg  40 mg Subcutaneous Daily Oskar Rice DO   40 mg at 08/13/24 0905    furosemide (LASIX) injection 40 mg  40 mg Intravenous Daily Oskar Rice DO   40 mg at 08/15/24 0834    glucagon HCl (Diagnostic) injection 1 mg  1 mg Intramuscular Q15 Min PRN Parminder Dempsey APRN        Hold medication  1 each Does not apply Continuous PRN Frank Magallon MD        HYDROcodone-acetaminophen (NORCO)  MG per tablet 1 tablet  1 tablet Oral Q8H PRN Oskar Rice DO        Insulin Lispro (humaLOG) injection 2-7 Units  2-7 Units Subcutaneous 4x Daily AC & at Bedtime Parminder Dempsey APRN   2 Units at 08/14/24 2113    ipratropium (ATROVENT) nebulizer solution 0.5 mg  0.5 mg Nebulization Q4H PRN Parminder Dempsey APRN   0.5 mg at 08/12/24 0634    ipratropium-albuterol  (DUO-NEB) nebulizer solution 3 mL  3 mL Nebulization TID - RT Oskar Rice DO   3 mL at 08/15/24 0724    lisinopril (PRINIVIL,ZESTRIL) tablet 10 mg  10 mg Oral Daily Oskar Rice DO   10 mg at 08/15/24 0834    methylPREDNISolone sodium succinate (SOLU-Medrol) injection 20 mg  20 mg Intravenous BID Isaiah Jacobs MD   20 mg at 08/15/24 0834    pantoprazole (PROTONIX) EC tablet 40 mg  40 mg Oral Q AM Oskar Rice DO   40 mg at 08/15/24 0603    roflumilast (DALIRESP) tablet 250 mcg  250 mcg Oral Daily Oskar Rice DO   250 mcg at 08/15/24 0834    sodium chloride 0.9 % flush 10 mL  10 mL Intravenous PRN Turner Carballo MD        sodium chloride 0.9 % flush 10 mL  10 mL Intravenous Q12H Oskar Rice DO   10 mL at 08/15/24 0835    sodium chloride 0.9 % flush 10 mL  10 mL Intravenous PRN Oskar Rice DO        sodium chloride 0.9 % infusion 40 mL  40 mL Intravenous PRN Oskar Rice DO         Orders (last 48 hrs)        Start     Ordered    08/16/24 0900  cholecalciferol (VITAMIN D3) capsule 50,000 Units  Every Friday 08/12/24 1429    08/15/24 0720  POC Glucose Once  PROCEDURE ONCE        Comments: Complete no more than 45 minutes prior to patient eating      08/15/24 0713    08/15/24 0600  CBC & Differential  Morning Draw         08/14/24 1918    08/15/24 0600  Basic Metabolic Panel  Morning Draw         08/14/24 1918    08/15/24 0600  Magnesium  Morning Draw         08/14/24 1918    08/15/24 0600  CBC Auto Differential  PROCEDURE ONCE         08/14/24 2202 08/14/24 2100  methylPREDNISolone sodium succinate (SOLU-Medrol) injection 20 mg  2 Times Daily         08/14/24 1153    08/14/24 1931  POC Glucose Once  PROCEDURE ONCE        Comments: Complete no more than 45 minutes prior to patient eating      08/14/24 1922    08/14/24 1620  POC Glucose Once  PROCEDURE ONCE        Comments: Complete no more than 45 minutes prior to  patient eating      08/14/24 1614    08/14/24 1417  Body fluid differential - Pleural Fluid, Pleural Cavity  Once         08/14/24 1416    08/14/24 1303  XR Chest 1 View  1 Time Imaging         08/14/24 1304    08/14/24 1301  Obtain Informed Consent  Once         08/14/24 1301    08/14/24 1301  Body Fluid Cell Count With Differential - Pleural Fluid, Pleural Cavity  STAT         08/14/24 1301    08/14/24 1301  pH, Body Fluid - Pleural Fluid, Pleural Cavity  STAT         08/14/24 1301    08/14/24 1301  Albumin, Fluid - Pleural Fluid, Pleural Cavity  STAT         08/14/24 1301    08/14/24 1301  Protein, Body Fluid - Pleural Fluid, Pleural Cavity  STAT         08/14/24 1301    08/14/24 1301  Lactate Dehydrogenase, Body Fluid - Pleural Fluid, Pleural Cavity  STAT         08/14/24 1301    08/14/24 1301  NON-GYN CYTOLOGY, [facility] ([facility])  STAT         08/14/24 1301    08/14/24 1301  Body Fluid Culture - Body Fluid, Pleural Cavity  STAT         08/14/24 1301    08/14/24 1301  Body fluid cell count - Pleural Fluid, Pleural Cavity  PROCEDURE ONCE         08/14/24 1301    08/14/24 1050  POC Glucose Once  PROCEDURE ONCE        Comments: Complete no more than 45 minutes prior to patient eating      08/14/24 1043    08/14/24 0715  POC Glucose Once  PROCEDURE ONCE        Comments: Complete no more than 45 minutes prior to patient eating      08/14/24 0709    08/14/24 0600  CBC & Differential  Morning Draw         08/13/24 1353    08/14/24 0600  Lactate Dehydrogenase  Morning Draw         08/13/24 1411    08/14/24 0600  Basic Metabolic Panel  Morning Draw,   Status:  Canceled         08/13/24 1411    08/14/24 0600  Comprehensive Metabolic Panel  Morning Draw         08/13/24 1411    08/14/24 0600  Phosphorus  Morning Draw         08/13/24 1412    08/14/24 0600  Magnesium  Morning Draw         08/13/24 1412    08/14/24 0600  Protime-INR  Morning Draw         08/13/24 1541    08/14/24 0600  Platelet Count  Morning Draw,    Status:  Canceled         08/13/24 1541    08/14/24 0600  Calcium, Ionized  Morning Draw         08/13/24 1844    08/14/24 0600  CBC Auto Differential  PROCEDURE ONCE         08/13/24 2202 08/13/24 2017  POC Glucose Once  PROCEDURE ONCE        Comments: Complete no more than 45 minutes prior to patient eating      08/13/24 2009    08/13/24 1642  POC Glucose Once  PROCEDURE ONCE        Comments: Complete no more than 45 minutes prior to patient eating      08/13/24 1634    08/13/24 1411  US Thoracentesis  1 Time Imaging         08/13/24 1411    08/13/24 1410  Hold medication  Continuous PRN         08/13/24 1411    08/13/24 1352  Procalcitonin  Once         08/13/24 1353    08/13/24 1352  Inpatient Pulmonology Consult  Once        Specialty:  Pulmonary Disease  Provider:  Isaiah Jacobs MD    08/13/24 1353    08/13/24 1127  POC Glucose Once  PROCEDURE ONCE        Comments: Complete no more than 45 minutes prior to patient eating      08/13/24 1106    08/13/24 0600  pantoprazole (PROTONIX) EC tablet 40 mg  Every Early Morning         08/12/24 1429    08/12/24 2100  Budeson-Glycopyrrol-Formoterol (BREZTRI) inhaler 2 puff  2 Times Daily         08/12/24 1429    08/12/24 2100  cefdinir (OMNICEF) capsule 300 mg  2 Times Daily         08/12/24 1429    08/12/24 1515  cyanocobalamin injection 1,000 mcg  Every 14 Days         08/12/24 1429    08/12/24 1515  ipratropium-albuterol (DUO-NEB) nebulizer solution 3 mL  3 Times Daily - RT         08/12/24 1429    08/12/24 1515  lisinopril (PRINIVIL,ZESTRIL) tablet 10 mg  Daily         08/12/24 1429    08/12/24 1515  roflumilast (DALIRESP) tablet 250 mcg  Daily         08/12/24 1429    08/12/24 1429  albuterol (PROVENTIL) nebulizer solution 0.083% 2.5 mg/3mL  Every 4 Hours PRN         08/12/24 1429    08/12/24 1429  HYDROcodone-acetaminophen (NORCO)  MG per tablet 1 tablet  Every 8 Hours PRN         08/12/24 1429    08/12/24 1415  furosemide (LASIX) injection 40 mg  Daily   "       08/12/24 1324    08/12/24 0730  Insulin Lispro (humaLOG) injection 2-7 Units  4 Times Daily Before Meals & Nightly         08/12/24 0427    08/12/24 0700  POC Glucose 4x Daily Before Meals & at Bedtime  4 Times Daily Before Meals & at Bedtime      Comments: Complete no more than 45 minutes prior to patient eating      08/12/24 0427    08/12/24 0427  dextrose (GLUTOSE) oral gel 15 g  Every 15 Minutes PRN         08/12/24 0427    08/12/24 0427  dextrose (D50W) (25 g/50 mL) IV injection 25 g  Every 15 Minutes PRN         08/12/24 0427    08/12/24 0427  glucagon HCl (Diagnostic) injection 1 mg  Every 15 Minutes PRN         08/12/24 0427 08/11/24 2319  ipratropium (ATROVENT) nebulizer solution 0.5 mg  Every 4 Hours PRN         08/11/24 2319 08/11/24 2100  sodium chloride 0.9 % flush 10 mL  Every 12 Hours Scheduled         08/11/24 1718 08/11/24 2100  methylPREDNISolone sodium succinate (SOLU-Medrol) injection 40 mg  2 Times Daily,   Status:  Discontinued         08/11/24 1726 08/11/24 2000  Vital Signs  Every 4 Hours       08/11/24 1718 08/11/24 1815  [Held by provider]  Enoxaparin Sodium (LOVENOX) syringe 40 mg  Daily        (On hold since yesterday at 0758 until manually unheld; held by Frank Magallon MDHold Reason: Hold For Procedure)    08/11/24 1718 08/11/24 1800  Oral Care  2 Times Daily       08/11/24 1718 08/11/24 1719  Intake & Output  Every Shift       08/11/24 1718 08/11/24 1718  sodium chloride 0.9 % infusion 40 mL  As Needed         08/11/24 1718 08/11/24 1718  sennosides-docusate (PERICOLACE) 8.6-50 MG per tablet 2 tablet  2 Times Daily PRN        Placed in \"And\" Linked Group    08/11/24 1718    08/11/24 1718  polyethylene glycol (MIRALAX) packet 17 g  Daily PRN        Placed in \"And\" Linked Group    08/11/24 1718 08/11/24 1718  bisacodyl (DULCOLAX) EC tablet 5 mg  Daily PRN        Placed in \"And\" Linked Group    08/11/24 1718 08/11/24 1718  bisacodyl (DULCOLAX) " "suppository 10 mg  Daily PRN        Placed in \"And\" Linked Group    08/11/24 1718    08/11/24 1718  sodium chloride 0.9 % flush 10 mL  As Needed         08/11/24 1718    08/11/24 1205  sodium chloride 0.9 % flush 10 mL  As Needed         08/11/24 1206    Unscheduled  Oxygen Therapy- Nasal Cannula; Titrate 1-6 LPM Per SpO2; 90 - 95%  Continuous PRN       08/11/24 1206    Unscheduled  Up With Assistance  As Needed       08/11/24 1718    Unscheduled  Oxygen Therapy- Nasal Cannula; Titrate 1-6 LPM Per SpO2; 90 - 95%  Continuous PRN       08/11/24 1718    Unscheduled  Follow Hypoglycemia Standing Orders For Blood Glucose <70 & Notify Provider of Treatment  As Needed      Comments: Follow Hypoglycemia Orders As Outlined in Process Instructions (Open Order Report to View Full Instructions)  Notify Provider Any Time Hypoglycemia Treatment is Administered    08/12/24 0427    --  HYDROcodone-acetaminophen (NORCO)  MG per tablet  Every 8 Hours PRN         08/11/24 2024    --  omeprazole (priLOSEC) 20 MG capsule  2 Times Daily         08/11/24 2025    --  cefdinir (OMNICEF) 300 MG capsule  2 Times Daily         08/11/24 2026                     Physician Progress Notes (last 48 hours)        Isaiah Jacobs MD at 08/15/24 1018            Pulmonary and critical care consultation note  Referring Provider: Hospitalist  Reason for Consultation: Right-sided pleural effusion and pneumoconiosis    Chief complaint -shortness of breath    Sub-   Overnight events reviewed.  All the labs medications ins and outs and vitals reviewed.  Patient resting in bed comfortably.  Had thoracentesis yesterday only 130 cc was drained.    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    COPD (chronic obstructive pulmonary disease)     Diabetes mellitus     GERD " (gastroesophageal reflux disease)     Hypertension     Pleural effusion     Pneumonia     Pulmonary fibrosis 04/27/2023    Sleep apnea    ,   Past Surgical History:   Procedure Laterality Date    BRONCHOSCOPY N/A 04/24/2023    Procedure: BRONCHOSCOPY WITH ENDOBRONCHIAL ULTRASOUND;  Surgeon: Kota Willard MD;  Location:  [facility];  Service: Pulmonary;  Laterality: N/A;    FOOT SURGERY Left     hardware present    PLEURAL CATHETER INSERTION Left 05/11/2023    Procedure: PLEURX CATHETER INSERTION;  Surgeon: Brigitte Kolb MD;  Location:  [facility];  Service: General;  Laterality: Left;    VENOUS ACCESS DEVICE (PORT) REMOVAL N/A 5/14/2024    Procedure: PLEURX CATHETER REMOVAL;  Surgeon: Brigitte Kolb MD;  Location:  [facility];  Service: General;  Laterality: N/A;   ,   Family History   Problem Relation Age of Onset    Diabetes Mother     Heart failure Father     Diabetes Sister     Heart disease Brother     Diabetes Brother    ,   Social History     Tobacco Use    Smoking status: Never     Passive exposure: Never    Smokeless tobacco: Current     Types: Chew   Vaping Use    Vaping status: Never Used   Substance Use Topics    Alcohol use: No    Drug use: No   ,   Medications Prior to Admission   Medication Sig Dispense Refill Last Dose    Budeson-Glycopyrrol-Formoterol (BREZTRI) 160-9-4.8 MCG/ACT aerosol inhaler Inhale 2 puffs 2 (Two) Times a Day.   8/10/2024    cefdinir (OMNICEF) 300 MG capsule Take 1 capsule by mouth 2 (Two) Times a Day.   8/11/2024 at AM    cyanocobalamin 1000 MCG/ML injection INJECT 1 ML INTO MUSCLE EVERY 2 WEEKS   Past Month    ipratropium-albuterol (DUO-NEB) 0.5-2.5 mg/3 ml nebulizer Take 3 mL by nebulization 3 (Three) Times a Day.   8/10/2024    lisinopril (PRINIVIL,ZESTRIL) 10 MG tablet Take 1 tablet by mouth Daily.   8/11/2024    omeprazole (priLOSEC) 20 MG capsule Take 1 capsule by mouth 2 (Two) Times a Day.   8/10/2024    roflumilast (DALIRESP) 250 MCG tablet tablet Take 1  tablet by mouth Daily.   8/10/2024    vitamin D (ERGOCALCIFEROL) 1.25 MG (07849 UT) capsule capsule Take 1 capsule by mouth Every 7 (Seven) Days.   Past Week    albuterol (PROVENTIL HFA;VENTOLIN HFA) 108 (90 BASE) MCG/ACT inhaler Inhale 2 puffs Every 4 (Four) Hours As Needed for Wheezing or Shortness of Air. 1 inhaler 0 Unknown    HYDROcodone-acetaminophen (NORCO)  MG per tablet Take 1 tablet by mouth Every 8 (Eight) Hours As Needed for Moderate Pain.   Unknown   , Scheduled Meds:  Budeson-Glycopyrrol-Formoterol, 2 puff, Inhalation, BID  cefdinir, 300 mg, Oral, BID  [START ON 8/16/2024] cholecalciferol, 50,000 Units, Oral, Every Friday  cyanocobalamin, 1,000 mcg, Subcutaneous, Q14 Days  [Held by provider] enoxaparin, 40 mg, Subcutaneous, Daily  furosemide, 40 mg, Intravenous, Daily  insulin lispro, 2-7 Units, Subcutaneous, 4x Daily AC & at Bedtime  ipratropium-albuterol, 3 mL, Nebulization, TID - RT  lisinopril, 10 mg, Oral, Daily  methylPREDNISolone sodium succinate, 20 mg, Intravenous, BID  pantoprazole, 40 mg, Oral, Q AM  roflumilast, 250 mcg, Oral, Daily  sodium chloride, 10 mL, Intravenous, Q12H    , Continuous Infusions:  hold, 1 each     and Allergies:  Patient has no known allergies.    Objective     Vital Signs   Temp:  [97.5 °F (36.4 °C)-98.6 °F (37 °C)] 98.6 °F (37 °C)  Heart Rate:  [] 74  Resp:  [18-20] 20  BP: (114-146)/(58-88) 127/75    Physical Exam:             General- normal in appearance, not in any acute distress    HEENT- pupils equally reactive to light, normal in size, no scleral icterus    Neck-supple    Respiratory-respirations normal-on auscultation no wheezing no crackles    Cardiovascular-NSR  GI-NTND    CNS-nonfocal    Musculoskeletal -no edema  Extremities- no obvious deformity noticed     Psychiatric- alert awake oriented  Skin- no visible rash                                                                   Results Review:    LABS:    Lab Results   Component Value Date     GLUCOSE 168 (H) 08/15/2024    BUN 22 08/15/2024    CREATININE 0.82 08/15/2024    EGFRIFNONA 72 07/27/2017    BCR 26.8 (H) 08/15/2024    CO2 27.4 08/15/2024    CALCIUM 10.2 08/15/2024    ALBUMIN 3.5 08/14/2024    AST 39 08/14/2024    ALT 45 (H) 08/14/2024    WBC 17.01 (H) 08/15/2024    HGB 13.1 08/15/2024    HCT 42.8 08/15/2024    MCV 82.1 08/15/2024     08/15/2024     08/15/2024    K 5.0 08/15/2024     08/15/2024    ANIONGAP 10.6 08/15/2024       Lab Results   Component Value Date    INR 0.99 08/14/2024    INR 1.00 04/18/2024    INR 1.00 05/10/2023    PROTIME 13.2 08/14/2024    PROTIME 13.7 04/18/2024    PROTIME 13.7 05/10/2023       Results from last 7 days   Lab Units 08/14/24  0322   INR  0.99          Microbiology Results (last 10 days)       Procedure Component Value - Date/Time    Body Fluid Culture - Body Fluid, Pleural Cavity [[id number]] Collected: 08/14/24 1317    Lab Status: Preliminary result Specimen: Body Fluid from Pleural Cavity Updated: 08/15/24 0615     Body Fluid Culture No growth at less than 24 hours     Gram Stain WBCs seen      No organisms seen    Blood Culture - Blood, Arm, Right [[id number]]  (Normal) Collected: 08/11/24 1237    Lab Status: Preliminary result Specimen: Blood from Arm, Right Updated: 08/14/24 1245     Blood Culture No growth at 3 days    Blood Culture - Blood, Arm, Left [[id number]]  (Normal) Collected: 08/11/24 1237    Lab Status: Preliminary result Specimen: Blood from Arm, Left Updated: 08/14/24 1245     Blood Culture No growth at 3 days           I reviewed the patient's new clinical results.  I reviewed the patient's new imaging results and agree with the interpretation.   Latest Reference Range & Units 08/11/24 14:57   pH, Arterial 7.350 - 7.450 pH units 7.392   pCO2, Arterial 35.0 - 45.0 mm Hg 49.5 (H)   pO2, Arterial 83.0 - 108.0 mm Hg 72.8 (L)   HCO3, Arterial 20.0 - 26.0 mmol/L 30.1 (H)   Base Excess 0.0 - 2.0 mmol/L 4.1 (H)   O2 Saturation,  Arterial 94.0 - 99.0 % 94.6   CO2 Content 22 - 33 mmol/L 31.6   A-a DO2 0.0 - 300.0 mmHg 111.1   Carboxyhemoglobin 0 - 5 % 0.9   Methemoglobin 0.00 - 3.00 % 0.10   Oxyhemoglobin 94 - 99 % 93.7 (L)   Hematocrit, Blood Gas 38.0 - 51.0 % 43.0   Hemoglobin, Blood Gas 14 - 18 g/dL 14.0   Site  Left Brachial   Mike's Test  N/A   Modality  Nasal Cannula   FIO2 % 35   Flow Rate lpm 3.5   Ventilator Mode  NA   Barometric Pressure for Blood Gas mmHg 729   (H): Data is abnormally high  (L): Data is abnormally low      Assessment & Plan       Respiratory-acute on chronic hypoxic respiratory failure-likely due to right-sided pleural effusion which is likely due to 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  Case was discussed with primary team and patient's daughter.    At thoracocentesis yesterday.  Did well.  Only 1 to 80 cc was drained.  Recommend outpatient PFTs and then only start  The inhalers and nebs especially the steroid nebs-Pulmicort    Patient never smoked and with pulmonary fibrosis due to black lung patient might just have restrictive lung disease.    If the pleural fluid reaccumulate's in coming days to weeks recommend insertion of a Pleurx catheter.  Patient did have a Pleurx catheter on the left side and it has been taken out.  Likely patient had auto pleurodesis.    Patient blood pressure and kidney functions are good than diuretics to improve lung compliance and diffusion capacity.  Oxygen requirements reviewed and adjusted to maintain saturation 88 to 92%.      Chest x-ray-latest reviewed and discussed with patient and patient's daughter    ABG-latest reviewed  COPD-will decrease the dose of steroids as patient is not wheezing.    Procalitonin Results:      Lab 08/13/24  1520 08/11/24  1228   PROCALCITONIN 0.03 0.04      Can consider discontinuing antibiotics.      Cardiology- hemodynamically -stable  Continue to monitor HR- rate and rhythm, BP     Nephrology- Cr and BUN  stable  I/O-reviewed    GI-continue current diet        ID  Culture-reviewed  And Antibiotics-reviewed    Endrocrinology- Maintain Blood sugar 140 -180      Electrolytes-   Mag and phos-replete as per protocol      DVT prophylaxis-continue    Bedside rounds were done with RT and patient's nurse. All the lab and clinical findings were discussed with them and plan was also discussed in great detail.    Family member present- yes daughter   Who works as a speech therapist in our hospital.  Case was discussed with her and answered her questions to her satisfaction.        Echo-  Results for orders placed during the hospital encounter of 24    Adult Transthoracic Echo Complete W/ Cont if Necessary Per Protocol    Interpretation Summary    Normal left ventricular cavity size and wall thickness noted. All left ventricular wall segments contract normally.    Left ventricular ejection fraction appears to be 66 - 70%.    Left ventricular diastolic function is consistent with (grade I) impaired relaxation.    The aortic valve is structurally normal with no regurgitation or stenosis present.    The mitral valve is structurally normal with no regurgitation or significant stenosis present.    There is no evidence of pericardial effusion.           Acute on chronic hypoxic respiratory failure          Isaiah Jacobs MD  08/15/24  10:18 EDT         Electronically signed by Isaiah Jacobs MD at 08/15/24 1021       Frank Magallon MD at 24 1915              [facility] HOSPITALIST PROGRESS NOTE     Patient Identification:  Name:  Rory Rios Jr.  Age:  64 y.o.  Sex:  male  :  1960  MRN:  [id number]  Visit Number:  [id number]  ROOM: [address] [place] [address]     Primary Care Provider:  Alejandro Monique MD    Length of stay in inpatient status:  3    Subjective     Chief Compliant:    Chief Complaint   Patient presents with    Shortness of Breath       History of Presenting Illness:    Patient feeling better after  thoracentesis. Turned down to 2L NC. Tolerated well. Nephew supportive bedside.       ROS:  Otherwise 10 point ROS negative other than documented above in HPI.     Objective     Current Hospital Meds:Budeson-Glycopyrrol-Formoterol, 2 puff, Inhalation, BID  cefdinir, 300 mg, Oral, BID  [START ON 8/16/2024] cholecalciferol, 50,000 Units, Oral, Every Friday  cyanocobalamin, 1,000 mcg, Subcutaneous, Q14 Days  [Held by provider] enoxaparin, 40 mg, Subcutaneous, Daily  furosemide, 40 mg, Intravenous, Daily  insulin lispro, 2-7 Units, Subcutaneous, 4x Daily AC & at Bedtime  ipratropium-albuterol, 3 mL, Nebulization, TID - RT  lisinopril, 10 mg, Oral, Daily  methylPREDNISolone sodium succinate, 20 mg, Intravenous, BID  pantoprazole, 40 mg, Oral, Q AM  roflumilast, 250 mcg, Oral, Daily  sodium chloride, 10 mL, Intravenous, Q12H    hold, 1 each        Current Antimicrobial Therapy:  Anti-Infectives (From admission, onward)      Ordered     Dose/Rate Route Frequency Start Stop    08/12/24 1429  cefdinir (OMNICEF) capsule 300 mg        Ordering Provider: Oskar Rice DO    300 mg Oral 2 Times Daily 08/12/24 2100 08/16/24 2059 08/11/24 1439  cefTRIAXone (ROCEPHIN) 2,000 mg in sodium chloride 0.9 % 100 mL IVPB-VTB        Ordering Provider: Turner Carballo MD    2,000 mg  200 mL/hr over 30 Minutes Intravenous Once 08/11/24 1455 08/11/24 1516    08/11/24 1230  doxycycline (VIBRAMYCIN) 100 mg in sodium chloride 0.9 % 100 mL IVPB-VTB        Ordering Provider: Turner Carballo MD    100 mg  over 60 Minutes Intravenous Once 08/11/24 1246 08/11/24 1344          Current Diuretic Therapy:  Diuretics (From admission, onward)      Ordered     Dose/Rate Route Frequency Start Stop    08/12/24 1324  furosemide (LASIX) injection 40 mg        Ordering Provider: Oskar Rice DO    40 mg Intravenous Daily 08/12/24 1415             ----------------------------------------------------------------------------------------------------------------------  Vital Signs:  Temp:  [97.5 °F (36.4 °C)-98.1 °F (36.7 °C)] 97.6 °F (36.4 °C)  Heart Rate:  [] 88  Resp:  [18-20] 18  BP: ()/(47-88) 114/72  SpO2:  [93 %-97 %] 97 %  on  Flow (L/min):  [3] 3;   Device (Oxygen Therapy): nasal cannula;humidified  Body mass index is 33.52 kg/m².    Wt Readings from Last 3 Encounters:   08/14/24 128 kg (282 lb 10.1 oz)   05/14/24 129 kg (285 lb)   05/06/24 130 kg (285 lb 12.8 oz)     Intake & Output (last 3 days)         08/12 0701 08/13 0700 08/13 0701 08/14 0700 08/14 0701  08/15 0700    P.O. 1260 1320 480    IV Piggyback       Total Intake(mL/kg) 1260 (9.9) 1320 (10.3) 480 (3.8)    Urine (mL/kg/hr) 275 (0.1) 1625 (0.5)     Total Output 275 1625     Net +985 -305 +480           Urine Unmeasured Occurrence 1 x  3 x          Diet: Regular/House; Fluid Consistency: Thin (IDDSI 0)  ----------------------------------------------------------------------------------------------------------------------  Physical exam:  Constitutional:  Well-developed and well-nourished.  No respiratory distress.      HENT:  Head:  Normocephalic and atraumatic.  Mouth:  Moist mucous membranes.    Eyes:  Conjunctivae and EOM are normal. No scleral icterus.    Neck:  Neck supple.  No JVD present.    Cardiovascular:  Normal rate, regular rhythm and normal heart sounds with no murmur.  Pulmonary/Chest:  No respiratory distress, no wheezes, no crackles, with normal breath sounds and good air movement.  Abdominal:  Soft.  Bowel sounds are normal.  No distension and no tenderness.   Musculoskeletal:  No edema, no tenderness, and no deformity.  No red or swollen joints anywhere.    Neurological:  Alert and oriented to person, place, and time.  No cranial nerve deficit.  No tongue deviation.  No facial droop.  No slurred speech.   Skin:  Skin is warm and dry. No rash noted. No pallor.  "  Peripheral vascular:  Pulses in all 4 extremities with no clubbing, no cyanosis, no edema.  ----------------------------------------------------------------------------------------------------------------------  Tele:    ----------------------------------------------------------------------------------------------------------------------  Results from last 7 days   Lab Units 08/14/24 0322 08/13/24 0113 08/12/24 0131 08/11/24  1237   LACTATE mmol/L  --   --   --  1.6   WBC 10*3/mm3 19.41* 19.98* 9.23  --    HEMOGLOBIN g/dL 13.2 13.2 13.6  --    HEMATOCRIT % 43.1 43.8 44.2  --    MCV fL 83.7 83.7 83.6  --    MCHC g/dL 30.6* 30.1* 30.8*  --    PLATELETS 10*3/mm3 278 221 241  --    INR  0.99  --   --   --      Results from last 7 days   Lab Units 08/11/24  1457   PH, ARTERIAL pH units 7.392   PO2 ART mm Hg 72.8*   PCO2, ARTERIAL mm Hg 49.5*   HCO3 ART mmol/L 30.1*     Results from last 7 days   Lab Units 08/14/24 0322 08/13/24 0113 08/12/24 0131 08/11/24  1228   SODIUM mmol/L 138 139 138 142   POTASSIUM mmol/L 4.9 4.6 4.0 4.2   MAGNESIUM mg/dL 2.2  --   --   --    CHLORIDE mmol/L 100 102 102 103   CO2 mmol/L 26.3 27.1 24.9 25.6   BUN mg/dL 25* 19 10 10   CREATININE mg/dL 0.86 0.96 0.78 0.85   CALCIUM mg/dL 10.3 10.8* 10.1 10.0   IONIZED CALCIUM mmol/L 1.21  --   --   --    PHOSPHORUS mg/dL 4.0  --   --   --    GLUCOSE mg/dL 179* 186* 205* 140*   ALBUMIN g/dL 3.5  --   --  3.8   BILIRUBIN mg/dL 0.2  --   --  0.3   ALK PHOS U/L 71  --   --  92   AST (SGOT) U/L 39  --   --  25   ALT (SGPT) U/L 45*  --   --  19   Estimated Creatinine Clearance: 125.5 mL/min (by C-G formula based on SCr of 0.86 mg/dL).  No results found for: \"AMMONIA\"  Results from last 7 days   Lab Units 08/11/24  1228   HSTROP T ng/L 18     Results from last 7 days   Lab Units 08/11/24  1228   PROBNP pg/mL 195.8         Glucose   Date/Time Value Ref Range Status   08/14/2024 1614 270 (H) 70 - 130 mg/dL Final   08/14/2024 1043 296 (H) 70 - 130 mg/dL " "Final   08/14/2024 0709 167 (H) 70 - 130 mg/dL Final   08/13/2024 2009 159 (H) 70 - 130 mg/dL Final   08/13/2024 1634 208 (H) 70 - 130 mg/dL Final   08/13/2024 1106 158 (H) 70 - 130 mg/dL Final   08/13/2024 0634 232 (H) 70 - 130 mg/dL Final   08/12/2024 2110 128 70 - 130 mg/dL Final     Lab Results   Component Value Date    TSH 2.210 05/10/2023    FREET4 1.33 05/10/2023     No results found for: \"PREGTESTUR\", \"PREGSERUM\", \"HCG\", \"HCGQUANT\"  Pain Management Panel          Latest Ref Rng & Units 5/10/2023   Pain Management Panel   Amphetamine, Urine Qual Negative Negative    Barbiturates Screen, Urine Negative Negative    Benzodiazepine Screen, Urine Negative Negative    Buprenorphine, Screen, Urine Negative Negative    Cocaine Screen, Urine Negative Negative    Methadone Screen , Urine Negative Negative    Methamphetamine, Ur Negative Negative       Details                 Brief Urine Lab Results  (Last result in the past 365 days)        Color   Clarity   Blood   Leuk Est   Nitrite   Protein   CREAT   Urine HCG        04/18/24 0942 Dark Yellow   Cloudy   Negative   Negative   Negative   Negative                 Blood Culture   Date Value Ref Range Status   08/11/2024 No growth at 3 days  Preliminary   08/11/2024 No growth at 3 days  Preliminary     No results found for: \"URINECX\"  No results found for: \"WOUNDCX\"  No results found for: \"STOOLCX\"  No results found for: \"RESPCX\"  No results found for: \"AFBCX\"  Results from last 7 days   Lab Units 08/13/24  1520 08/11/24  1237 08/11/24  1228   PROCALCITONIN ng/mL 0.03  --  0.04   LACTATE mmol/L  --  1.6  --        I have personally looked at the labs and they are summarized above.  ----------------------------------------------------------------------------------------------------------------------  Detailed radiology reports for the last 24 hours:    Imaging Results (Last 24 Hours)       Procedure Component Value Units Date/Time    XR Chest 1 View [[id number]] " Collected: 08/14/24 1338     Updated: 08/14/24 1341    Narrative:      EXAM:    XR Chest, 1 View     EXAM DATE:    8/14/2024 1:23 PM     CLINICAL HISTORY:    post thoracentesis; J96.21-Acute and chronic respiratory failure with  hypoxia; J96.22-Acute and chronic respiratory failure with hypercapnia;  N43-Tftcgcecgx's pneumoconiosis; J44.1-Chronic obstructive pulmonary  disease with (acute) exacerbation; J44.9-Chronic obstructive pulmonary  disease, unspecified     TECHNIQUE:    Frontal view of the chest.     COMPARISON:    8/11/2024     FINDINGS:    LUNGS AND PLEURAL SPACES:  Bilateral patchy airspace disease and  masslike opacifications are stable.  Improved now only tiny right  pleural effusion with residual small left pleural effusion noted.  No  pneumothorax.    HEART:  Cardiomegaly again noted.    MEDIASTINUM:  Unremarkable as visualized.  Normal mediastinal contour.    BONES/JOINTS:  Unremarkable as visualized.  No acute fracture.       Impression:      1.  No pneumothorax.  2.  Bilateral patchy airspace disease and masslike opacifications are  stable.  3.  Improved now only tiny right pleural effusion with residual small  left pleural effusion noted.  4.  Cardiomegaly again noted.        This report was finalized on 8/14/2024 1:39 PM by Dr. Sanya Zavala MD.       US Thoracentesis [[id number]] Collected: 08/14/24 1336    Specimen: Body Fluid Updated: 08/14/24 1340    Narrative:      EXAM:    IR Thoracentesis With Image Guidance     EXAM DATE:    8/14/2024 1:00 PM     CLINICAL HISTORY:    dyspnea, larger R effusion; J96.21-Acute and chronic respiratory  failure with hypoxia; J96.22-Acute and chronic respiratory failure with  hypercapnia; N65-Cemnrzeenn's pneumoconiosis; J44.1-Chronic obstructive  pulmonary disease with (acute) exacerbation; J44.9-Chronic obstructive  pulmonary disease, unspecified     TECHNIQUE:    Image-guided percutaneous thoracentesis.     COMPARISON:    No relevant prior studies  available.     FINDINGS:    CONSENT:  The risks, benefits and alternatives to image-guided  thoracentesis were explained to the patient who understood and elected  to proceed.  The risks discussed included but were not limited to  bleeding, infection, pain and pneumothorax.  Written consent was  obtained.    TIME-OUT:  A time-out was performed immediately prior to the procedure  to confirm the patient's identity, procedure and site/side of procedure.    PRE-PROCEDURE:  Patient was prepped and draped in a sterile fashion.    PROCEDURE:  A 22-gauge needle was used to withdraw about 170 cc of  blood-tinged fluid from the right chest.    COMPLICATIONS:  None.    POST-PROCEDURE:  Patient was discharged to the post-procedure recovery  area.       Impression:        A 22-gauge needle was used to withdraw about 170 cc of blood-tinged  fluid from the right chest.        This report was finalized on 8/14/2024 1:38 PM by Dr. Sanya Zavala MD.             Assessment & Plan    #Acute on chronic hypoxic respiratory failure   #COPD exacerbationb  #Coil worker's pneumocoinosis   #Bilateral pleural effusion, R>L and larger than last CT in 4/24  #DM II  #HTN     Personally compared CT this admission to 4/24. Significant lesions contributed to coal worker's pnumocoiniosis similar. Has had left sided pleural drain in the past. This time R sided effusion looks bigger. Patient had thoracentesis on 8/14 with 170 cc fluid removed. Will f/u fluid labs. Continue treatment for COPD exacerbation with steroids and nebs. Also consulted pulmonology to evaluate given continued dyspnea. Continue to hold abx, procal still wnl. Repeat calcium wnl. Wean FiO2 as able.      Dispo: Pending clinical improvement     Frank Magallon MD  [facility]  08/14/24  19:15 EDT    Electronically signed by Frank Magallon MD at 08/14/24 1919       Frank Magallon MD at 08/13/24 4296              [facility]      Patient Identification:  Name:  Rory Rios Jr.  Age:  64 y.o.  Sex:  male  :  1960  MRN:  [id number]  Visit Number:  [id number]  ROOM: [address] [place] [address]     Primary Care Provider:  Alejandro Monique MD    Length of stay in inpatient status:  2    Subjective     Chief Compliant:    Chief Complaint   Patient presents with    Shortness of Breath       History of Presenting Illness:    Patient still on 3L NC and only uses PRN at home. Patient reports feeling about the same. Patient's daughter supportive bedside. Patient sitting in bedside chair.       ROS:  Otherwise 10 point ROS negative other than documented above in HPI.     Objective     Current Hospital Meds:Budeson-Glycopyrrol-Formoterol, 2 puff, Inhalation, BID  cefdinir, 300 mg, Oral, BID  [START ON 2024] cholecalciferol, 50,000 Units, Oral, Every Friday  cyanocobalamin, 1,000 mcg, Subcutaneous, Q14 Days  enoxaparin, 40 mg, Subcutaneous, Daily  furosemide, 40 mg, Intravenous, Daily  insulin lispro, 2-7 Units, Subcutaneous, 4x Daily AC & at Bedtime  ipratropium-albuterol, 3 mL, Nebulization, TID - RT  lisinopril, 10 mg, Oral, Daily  methylPREDNISolone sodium succinate, 40 mg, Intravenous, BID  pantoprazole, 40 mg, Oral, Q AM  roflumilast, 250 mcg, Oral, Daily  sodium chloride, 10 mL, Intravenous, Q12H    hold, 1 each        Current Antimicrobial Therapy:  Anti-Infectives (From admission, onward)      Ordered     Dose/Rate Route Frequency Start Stop    24 1429  cefdinir (OMNICEF) capsule 300 mg        Ordering Provider: Oskar Rice DO    300 mg Oral 2 Times Daily 24 2100 24 2059    24 1439  cefTRIAXone (ROCEPHIN) 2,000 mg in sodium chloride 0.9 % 100 mL IVPB-VTB        Ordering Provider: Turner Carballo MD    2,000 mg  200 mL/hr over 30 Minutes Intravenous Once 24 1455 24 1516    24 1230  doxycycline (VIBRAMYCIN) 100 mg in sodium chloride 0.9 % 100 mL IVPB-VTB        Ordering Provider: Turner Carballo MD     100 mg  over 60 Minutes Intravenous Once 08/11/24 1246 08/11/24 1344          Current Diuretic Therapy:  Diuretics (From admission, onward)      Ordered     Dose/Rate Route Frequency Start Stop    08/12/24 1324  furosemide (LASIX) injection 40 mg        Ordering Provider: Oskar Rice,     40 mg Intravenous Daily 08/12/24 1415            ----------------------------------------------------------------------------------------------------------------------  Vital Signs:  Temp:  [97.7 °F (36.5 °C)-98.8 °F (37.1 °C)] 97.8 °F (36.6 °C)  Heart Rate:  [] 84  Resp:  [18-20] 20  BP: (105-139)/(52-79) 105/64  SpO2:  [92 %-97 %] 96 %  on  Flow (L/min):  [3] 3;   Device (Oxygen Therapy): humidified;nasal cannula  Body mass index is 33.24 kg/m².    Wt Readings from Last 3 Encounters:   08/13/24 127 kg (280 lb 4.8 oz)   05/14/24 129 kg (285 lb)   05/06/24 130 kg (285 lb 12.8 oz)     Intake & Output (last 3 days)         08/10 0701 08/11 0700 08/11 0701 08/12 0700 08/12 0701 08/13 0700 08/13 0701  08/14 0700    P.O.  360 1260 1080    IV Piggyback  100      Total Intake(mL/kg)  460 (3.6) 1260 (9.9) 1080 (8.5)    Urine (mL/kg/hr)  1775 275 (0.1) 1075 (0.7)    Total Output  7357 235 8782    Net  -1315 +985 +5            Urine Unmeasured Occurrence   1 x           Diet: Regular/House; Fluid Consistency: Thin (IDDSI 0)  ----------------------------------------------------------------------------------------------------------------------  Physical exam:  Constitutional:  Well-developed and well-nourished.  No respiratory distress.Obese.   HENT:  Head:  Normocephalic and atraumatic.  Mouth:  Moist mucous membranes.    Eyes:  Conjunctivae and EOM are normal. No scleral icterus.    Neck:  Neck supple.  No JVD present.    Cardiovascular:  Normal rate, regular rhythm and normal heart sounds with no murmur.  Pulmonary/Chest:  No respiratory distress, no wheezes, no crackles, with normal breath sounds and good air  "movement.  Abdominal:  Soft.  Bowel sounds are normal.  No distension and no tenderness.   Musculoskeletal:  No edema, no tenderness, and no deformity.  No red or swollen joints anywhere.    Neurological:  Alert and oriented to person, place, and time.  No cranial nerve deficit.  No tongue deviation.  No facial droop.  No slurred speech.   Skin:  Skin is warm and dry. No rash noted. No pallor.   Peripheral vascular:  Pulses in all 4 extremities with no clubbing, no cyanosis, no edema.  ----------------------------------------------------------------------------------------------------------------------  Tele:    ----------------------------------------------------------------------------------------------------------------------  Results from last 7 days   Lab Units 08/13/24  0113 08/12/24  0131 08/11/24  1237 08/11/24  1228   LACTATE mmol/L  --   --  1.6  --    WBC 10*3/mm3 19.98* 9.23  --  9.66   HEMOGLOBIN g/dL 13.2 13.6  --  14.2   HEMATOCRIT % 43.8 44.2  --  47.4   MCV fL 83.7 83.6  --  83.9   MCHC g/dL 30.1* 30.8*  --  30.0*   PLATELETS 10*3/mm3 221 241  --  267     Results from last 7 days   Lab Units 08/11/24  1457   PH, ARTERIAL pH units 7.392   PO2 ART mm Hg 72.8*   PCO2, ARTERIAL mm Hg 49.5*   HCO3 ART mmol/L 30.1*     Results from last 7 days   Lab Units 08/13/24  0113 08/12/24  0131 08/11/24  1228   SODIUM mmol/L 139 138 142   POTASSIUM mmol/L 4.6 4.0 4.2   CHLORIDE mmol/L 102 102 103   CO2 mmol/L 27.1 24.9 25.6   BUN mg/dL 19 10 10   CREATININE mg/dL 0.96 0.78 0.85   CALCIUM mg/dL 10.8* 10.1 10.0   GLUCOSE mg/dL 186* 205* 140*   ALBUMIN g/dL  --   --  3.8   BILIRUBIN mg/dL  --   --  0.3   ALK PHOS U/L  --   --  92   AST (SGOT) U/L  --   --  25   ALT (SGPT) U/L  --   --  19   Estimated Creatinine Clearance: 111.3 mL/min (by C-G formula based on SCr of 0.96 mg/dL).  No results found for: \"AMMONIA\"  Results from last 7 days   Lab Units 08/11/24  1228   HSTROP T ng/L 18     Results from last 7 days   Lab " "Units 08/11/24  1228   PROBNP pg/mL 195.8         Glucose   Date/Time Value Ref Range Status   08/13/2024 1634 208 (H) 70 - 130 mg/dL Final   08/13/2024 1106 158 (H) 70 - 130 mg/dL Final   08/13/2024 0634 232 (H) 70 - 130 mg/dL Final   08/12/2024 2110 128 70 - 130 mg/dL Final   08/12/2024 1707 153 (H) 70 - 130 mg/dL Final   08/12/2024 1126 162 (H) 70 - 130 mg/dL Final   08/12/2024 0712 176 (H) 70 - 130 mg/dL Final   08/12/2024 0433 173 (H) 70 - 130 mg/dL Final     Lab Results   Component Value Date    TSH 2.210 05/10/2023    FREET4 1.33 05/10/2023     No results found for: \"PREGTESTUR\", \"PREGSERUM\", \"HCG\", \"HCGQUANT\"  Pain Management Panel          Latest Ref Rng & Units 5/10/2023   Pain Management Panel   Amphetamine, Urine Qual Negative Negative    Barbiturates Screen, Urine Negative Negative    Benzodiazepine Screen, Urine Negative Negative    Buprenorphine, Screen, Urine Negative Negative    Cocaine Screen, Urine Negative Negative    Methadone Screen , Urine Negative Negative    Methamphetamine, Ur Negative Negative       Details                 Brief Urine Lab Results  (Last result in the past 365 days)        Color   Clarity   Blood   Leuk Est   Nitrite   Protein   CREAT   Urine HCG        04/18/24 0942 Dark Yellow   Cloudy   Negative   Negative   Negative   Negative                 Blood Culture   Date Value Ref Range Status   08/11/2024 No growth at 2 days  Preliminary   08/11/2024 No growth at 2 days  Preliminary     No results found for: \"URINECX\"  No results found for: \"WOUNDCX\"  No results found for: \"STOOLCX\"  No results found for: \"RESPCX\"  No results found for: \"AFBCX\"  Results from last 7 days   Lab Units 08/13/24  1520 08/11/24  1237 08/11/24  1228   PROCALCITONIN ng/mL 0.03  --  0.04   LACTATE mmol/L  --  1.6  --        I have personally looked at the labs and they are summarized " above.  ----------------------------------------------------------------------------------------------------------------------  Detailed radiology reports for the last 24 hours:    Imaging Results (Last 24 Hours)       ** No results found for the last 24 hours. **          Assessment & Plan      #Acute on chronic hypoxic respiratory failure   #COPD exacerbationb  #Coil worker's pneumocoinosis   #Bilateral pleural effusion, R>L and larger than last CT in 4/24  #DM II  #HTN    Personally compared CT this admission to 4/24. Significant lesions contributed to coal worker's pnumocoiniosis similar. Has had left sided pleural drain in the past. This time R sided effusion looks bigger. Will ask radiology to evaluate for diagonstic/therapeutic thoracentesis. Will repeat labs in AM as WBC trended up but suspect steroids. Continue treatment for COPD exacerbation with steroids and nebs. Will also get pulmonology to evaluate given continued dyspnea. Continue to hold abx. Will add ionized calcium to further evaluate mildly elevated calcium level. Send work-up if not better in AM.     Dispo: Pending clinical improvement       Frank Magallon MD  [facility]ist  08/13/24  18:37 EDT    Electronically signed by Frank Magallon MD at 08/13/24 8556          Consult Notes (last 48 hours)        Isaiah Jacobs MD at 08/14/24 1142            Pulmonary and critical care consultation note  Referring Provider: Hospitalist  Reason for Consultation: Right-sided pleural effusion and pneumoconiosis    Chief complaint -shortness of breath    History of present illness: Patient is a 64-year-old male presented to [facility] emergency room on 11th of this month with chief complaint of shortness of breath.  Patient has a past medical history significant for coal workers pneumoconiosis, type 2 diabetes mellitus, essential hypertension, chews tobacco, chronic hypoxic respiratory failure on 2 L nasal cannula oxygen at  home and was up to 4 L on presentation and hypertension.  Presented to ER with increasing shortness of breath and increased oxygen requirements.    Shortness of breath has been progressively getting worse over last 5 to 6 days.  History taken from patient and patient's daughter.  HPI from the time of admission reviewed.    All the labs medications ins and outs vitals since the time of admission reviewed.  Treatment in the er reviewed and then on floor reviewed.  Review of Systems  History obtained from chart review and the patient  General ROS: negative for - chills, fatigue or fever  Psychological ROS: negative for - anxiety or depression  ENT ROS: negative for - headaches, visual changes or vocal changes  Respiratory ROS: positive for - cough and shortness of breath  Cardiovascular ROS: no chest pain or dyspnea on exertion  Gastrointestinal ROS: no abdominal pain, change in bowel habits, or black or bloody stools  Musculoskeletal ROS: negative for - joint pain, joint stiffness or joint swelling  Neurological ROS: no TIA or stroke symptoms  Hematological: no bleeding  Skin: no bruises, no rash        History  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    COPD (chronic obstructive pulmonary disease)     Diabetes mellitus     GERD (gastroesophageal reflux disease)     Hypertension     Pleural effusion     Pneumonia     Pulmonary fibrosis 04/27/2023    Sleep apnea    ,   Past Surgical History:   Procedure Laterality Date    BRONCHOSCOPY N/A 04/24/2023    Procedure: BRONCHOSCOPY WITH ENDOBRONCHIAL ULTRASOUND;  Surgeon: Kota Willard MD;  Location:  [facility];  Service: Pulmonary;  Laterality: N/A;    FOOT SURGERY Left     hardware present    PLEURAL CATHETER INSERTION Left 05/11/2023    Procedure: PLEURX CATHETER INSERTION;  Surgeon: Brigitte Kolb MD;  Location:  [facility];  Service: General;  Laterality: Left;     VENOUS ACCESS DEVICE (PORT) REMOVAL N/A 5/14/2024    Procedure: PLEURX CATHETER REMOVAL;  Surgeon: Brigitte Kolb MD;  Location: [facility];  Service: General;  Laterality: N/A;   ,   Family History   Problem Relation Age of Onset    Diabetes Mother     Heart failure Father     Diabetes Sister     Heart disease Brother     Diabetes Brother    ,   Social History     Tobacco Use    Smoking status: Never     Passive exposure: Never    Smokeless tobacco: Current     Types: Chew   Vaping Use    Vaping status: Never Used   Substance Use Topics    Alcohol use: No    Drug use: No   ,   Medications Prior to Admission   Medication Sig Dispense Refill Last Dose    Budeson-Glycopyrrol-Formoterol (BREZTRI) 160-9-4.8 MCG/ACT aerosol inhaler Inhale 2 puffs 2 (Two) Times a Day.   8/10/2024    cefdinir (OMNICEF) 300 MG capsule Take 1 capsule by mouth 2 (Two) Times a Day.   8/11/2024 at AM    cyanocobalamin 1000 MCG/ML injection INJECT 1 ML INTO MUSCLE EVERY 2 WEEKS   Past Month    ipratropium-albuterol (DUO-NEB) 0.5-2.5 mg/3 ml nebulizer Take 3 mL by nebulization 3 (Three) Times a Day.   8/10/2024    lisinopril (PRINIVIL,ZESTRIL) 10 MG tablet Take 1 tablet by mouth Daily.   8/11/2024    omeprazole (priLOSEC) 20 MG capsule Take 1 capsule by mouth 2 (Two) Times a Day.   8/10/2024    roflumilast (DALIRESP) 250 MCG tablet tablet Take 1 tablet by mouth Daily.   8/10/2024    vitamin D (ERGOCALCIFEROL) 1.25 MG (45920 UT) capsule capsule Take 1 capsule by mouth Every 7 (Seven) Days.   Past Week    albuterol (PROVENTIL HFA;VENTOLIN HFA) 108 (90 BASE) MCG/ACT inhaler Inhale 2 puffs Every 4 (Four) Hours As Needed for Wheezing or Shortness of Air. 1 inhaler 0 Unknown    HYDROcodone-acetaminophen (NORCO)  MG per tablet Take 1 tablet by mouth Every 8 (Eight) Hours As Needed for Moderate Pain.   Unknown   , Scheduled Meds:  Budeson-Glycopyrrol-Formoterol, 2 puff, Inhalation, BID  cefdinir, 300 mg, Oral, BID  [START ON 8/16/2024]  cholecalciferol, 50,000 Units, Oral, Every Friday  cyanocobalamin, 1,000 mcg, Subcutaneous, Q14 Days  [Held by provider] enoxaparin, 40 mg, Subcutaneous, Daily  furosemide, 40 mg, Intravenous, Daily  insulin lispro, 2-7 Units, Subcutaneous, 4x Daily AC & at Bedtime  ipratropium-albuterol, 3 mL, Nebulization, TID - RT  lisinopril, 10 mg, Oral, Daily  methylPREDNISolone sodium succinate, 40 mg, Intravenous, BID  pantoprazole, 40 mg, Oral, Q AM  roflumilast, 250 mcg, Oral, Daily  sodium chloride, 10 mL, Intravenous, Q12H    , Continuous Infusions:  hold, 1 each     and Allergies:  Patient has no known allergies.    Objective     Vital Signs   Temp:  [97.5 °F (36.4 °C)-98.1 °F (36.7 °C)] 97.5 °F (36.4 °C)  Heart Rate:  [] 93  Resp:  [18-20] 20  BP: ()/(47-78) 134/78    Physical Exam:             General- normal in appearance, not in any acute distress, wearing nasal cannula oxygen    HEENT- pupils equally reactive to light, normal in size, no scleral icterus    Neck-supple    Respiratory-respirations normal-on auscultation no wheezing no crackles, wearing nasal cannula oxygen    Cardiovascular-NSR  GI-nontender nondistended bowel sounds positive    CNS-nonfocal    Musculoskeletal -no edema  Extremities- no obvious deformity noticed     Psychiatric-mood good, good eye contact, alert awake oriented  Skin- no visible rash                                                                   Results Review:    LABS:    Lab Results   Component Value Date    GLUCOSE 179 (H) 08/14/2024    BUN 25 (H) 08/14/2024    CREATININE 0.86 08/14/2024    EGFRIFNONA 72 07/27/2017    BCR 29.1 (H) 08/14/2024    CO2 26.3 08/14/2024    CALCIUM 10.3 08/14/2024    ALBUMIN 3.5 08/14/2024    AST 39 08/14/2024    ALT 45 (H) 08/14/2024    WBC 19.41 (H) 08/14/2024    HGB 13.2 08/14/2024    HCT 43.1 08/14/2024    MCV 83.7 08/14/2024     08/14/2024     08/14/2024    K 4.9 08/14/2024     08/14/2024    ANIONGAP 11.7 08/14/2024        Lab Results   Component Value Date    INR 0.99 08/14/2024    INR 1.00 04/18/2024    INR 1.00 05/10/2023    PROTIME 13.2 08/14/2024    PROTIME 13.7 04/18/2024    PROTIME 13.7 05/10/2023       Results from last 7 days   Lab Units 08/14/24  0322   INR  0.99          Microbiology Results (last 10 days)       Procedure Component Value - Date/Time    Blood Culture - Blood, Arm, Right [[id number]]  (Normal) Collected: 08/11/24 1237    Lab Status: Preliminary result Specimen: Blood from Arm, Right Updated: 08/13/24 1245     Blood Culture No growth at 2 days    Blood Culture - Blood, Arm, Left [[id number]]  (Normal) Collected: 08/11/24 1237    Lab Status: Preliminary result Specimen: Blood from Arm, Left Updated: 08/13/24 1245     Blood Culture No growth at 2 days           I reviewed the patient's new clinical results.  I reviewed the patient's new imaging results and agree with the interpretation.   Latest Reference Range & Units 08/11/24 14:57   pH, Arterial 7.350 - 7.450 pH units 7.392   pCO2, Arterial 35.0 - 45.0 mm Hg 49.5 (H)   pO2, Arterial 83.0 - 108.0 mm Hg 72.8 (L)   HCO3, Arterial 20.0 - 26.0 mmol/L 30.1 (H)   Base Excess 0.0 - 2.0 mmol/L 4.1 (H)   O2 Saturation, Arterial 94.0 - 99.0 % 94.6   CO2 Content 22 - 33 mmol/L 31.6   A-a DO2 0.0 - 300.0 mmHg 111.1   Carboxyhemoglobin 0 - 5 % 0.9   Methemoglobin 0.00 - 3.00 % 0.10   Oxyhemoglobin 94 - 99 % 93.7 (L)   Hematocrit, Blood Gas 38.0 - 51.0 % 43.0   Hemoglobin, Blood Gas 14 - 18 g/dL 14.0   Site  Left Brachial   Mike's Test  N/A   Modality  Nasal Cannula   FIO2 % 35   Flow Rate lpm 3.5   Ventilator Mode  NA   Barometric Pressure for Blood Gas mmHg 729   (H): Data is abnormally high  (L): Data is abnormally low      Assessment & Plan       Respiratory-acute on chronic hypoxic respiratory failure-likely due to right-sided pleural effusion which is likely due to 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  Case was discussed with primary  team and patient's daughter.    Patient will be going for thoracentesis today.    If the pleural fluid reaccumulate's in coming days to weeks recommend insertion of a Pleurx catheter.  Patient did have a Pleurx catheter on the left side and it has been taken out.  Likely patient had auto pleurodesis.    Patient blood pressure and kidney functions are good than diuretics to improve lung compliance and diffusion capacity.  Oxygen requirements reviewed and adjusted to maintain saturation 88 to 92%.      Chest x-ray-latest reviewed and discussed with patient and patient's daughter    ABG-latest reviewed  COPD-will decrease the dose of steroids as patient is not wheezing.    Procalitonin Results:      Lab 08/13/24  1520 08/11/24  1228   PROCALCITONIN 0.03 0.04      Can consider discontinuing antibiotics.      Cardiology- hemodynamically -stable  Continue to monitor HR- rate and rhythm, BP     Nephrology- Cr and BUN stable  I/O-reviewed    GI-continue current diet        ID  Culture-reviewed  And Antibiotics-reviewed    Endrocrinology- Maintain Blood sugar 140 -180      Electrolytes-   Mag and phos-replete as per protocol      DVT prophylaxis-continue    Bedside rounds were done with RT and patient's nurse. All the lab and clinical findings were discussed with them and plan was also discussed in great detail.    Family member present- yes daughter   Who works as a speech therapist in our hospital.  Case was discussed with her and answered her questions to her satisfaction.        Echo-  Results for orders placed during the hospital encounter of 08/11/24    Adult Transthoracic Echo Complete W/ Cont if Necessary Per Protocol    Interpretation Summary    Normal left ventricular cavity size and wall thickness noted. All left ventricular wall segments contract normally.    Left ventricular ejection fraction appears to be 66 - 70%.    Left ventricular diastolic function is consistent with (grade I) impaired relaxation.    The  aortic valve is structurally normal with no regurgitation or stenosis present.    The mitral valve is structurally normal with no regurgitation or significant stenosis present.    There is no evidence of pericardial effusion.           Acute on chronic hypoxic respiratory failure          Isaiah Jacobs MD  08/14/24  11:43 EDT         Electronically signed by Isaiah Jacobs MD at 08/14/24 2928

## 2024-08-15 NOTE — OUTREACH NOTE
Prep Survey      Flowsheet Row Responses   [facility] patient [facility]? [facility]   Is LACE score < 7 ? No   Eligibility Readm Mgmt   Discharge diagnosis Acute on chronic hypoxic respiratory failure, COPD exacerbation   Does the patient have one of the following disease processes/diagnoses(primary or secondary)? COPD   Does the patient have Home health ordered? No   Is there a DME ordered? No   Prep survey completed? Yes            Sophy FREEMAN - Registered Nurse

## 2024-08-15 NOTE — PLAN OF CARE
Goal Outcome Evaluation:   Pt resting in bed this shift. Pt ambulated in room throughout shift with no s/s of distress noted. No complaints voiced at this time. No visible acute s/s of distress noted at this time. Thoracentesis site clean, dry and intact with no s/s of bleeding noted. Plan of care ongoing.

## 2024-08-15 NOTE — PROGRESS NOTES
Pulmonary and critical care consultation note  Referring Provider: Hospitalist  Reason for Consultation: Right-sided pleural effusion and pneumoconiosis    Chief complaint -shortness of breath    Sub-   Overnight events reviewed.  All the labs medications ins and outs and vitals reviewed.  Patient resting in bed comfortably.  Had thoracentesis yesterday only 130 cc was drained.    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    COPD (chronic obstructive pulmonary disease)     Diabetes mellitus     GERD (gastroesophageal reflux disease)     Hypertension     Pleural effusion     Pneumonia     Pulmonary fibrosis 04/27/2023    Sleep apnea    ,   Past Surgical History:   Procedure Laterality Date    BRONCHOSCOPY N/A 04/24/2023    Procedure: BRONCHOSCOPY WITH ENDOBRONCHIAL ULTRASOUND;  Surgeon: Kota Willard MD;  Location:  [facility];  Service: Pulmonary;  Laterality: N/A;    FOOT SURGERY Left     hardware present    PLEURAL CATHETER INSERTION Left 05/11/2023    Procedure: PLEURX CATHETER INSERTION;  Surgeon: Brigitte Kolb MD;  Location:  [facility];  Service: General;  Laterality: Left;    VENOUS ACCESS DEVICE (PORT) REMOVAL N/A 5/14/2024    Procedure: PLEURX CATHETER REMOVAL;  Surgeon: Brigitte Kolb MD;  Location:  [facility];  Service: General;  Laterality: N/A;   ,   Family History   Problem Relation Age of Onset    Diabetes Mother     Heart failure Father     Diabetes Sister     Heart disease Brother     Diabetes Brother    ,   Social History     Tobacco Use    Smoking status: Never     Passive exposure: Never    Smokeless tobacco: Current     Types: Chew   Vaping Use    Vaping status: Never Used   Substance Use Topics    Alcohol use: No    Drug use: No   ,   Medications Prior to Admission   Medication Sig Dispense  Refill Last Dose    Budeson-Glycopyrrol-Formoterol (BREZTRI) 160-9-4.8 MCG/ACT aerosol inhaler Inhale 2 puffs 2 (Two) Times a Day.   8/10/2024    cefdinir (OMNICEF) 300 MG capsule Take 1 capsule by mouth 2 (Two) Times a Day.   8/11/2024 at AM    cyanocobalamin 1000 MCG/ML injection INJECT 1 ML INTO MUSCLE EVERY 2 WEEKS   Past Month    ipratropium-albuterol (DUO-NEB) 0.5-2.5 mg/3 ml nebulizer Take 3 mL by nebulization 3 (Three) Times a Day.   8/10/2024    lisinopril (PRINIVIL,ZESTRIL) 10 MG tablet Take 1 tablet by mouth Daily.   8/11/2024    omeprazole (priLOSEC) 20 MG capsule Take 1 capsule by mouth 2 (Two) Times a Day.   8/10/2024    roflumilast (DALIRESP) 250 MCG tablet tablet Take 1 tablet by mouth Daily.   8/10/2024    vitamin D (ERGOCALCIFEROL) 1.25 MG (00766 UT) capsule capsule Take 1 capsule by mouth Every 7 (Seven) Days.   Past Week    albuterol (PROVENTIL HFA;VENTOLIN HFA) 108 (90 BASE) MCG/ACT inhaler Inhale 2 puffs Every 4 (Four) Hours As Needed for Wheezing or Shortness of Air. 1 inhaler 0 Unknown    HYDROcodone-acetaminophen (NORCO)  MG per tablet Take 1 tablet by mouth Every 8 (Eight) Hours As Needed for Moderate Pain.   Unknown   , Scheduled Meds:  Budeson-Glycopyrrol-Formoterol, 2 puff, Inhalation, BID  cefdinir, 300 mg, Oral, BID  [START ON 8/16/2024] cholecalciferol, 50,000 Units, Oral, Every Friday  cyanocobalamin, 1,000 mcg, Subcutaneous, Q14 Days  [Held by provider] enoxaparin, 40 mg, Subcutaneous, Daily  furosemide, 40 mg, Intravenous, Daily  insulin lispro, 2-7 Units, Subcutaneous, 4x Daily AC & at Bedtime  ipratropium-albuterol, 3 mL, Nebulization, TID - RT  lisinopril, 10 mg, Oral, Daily  methylPREDNISolone sodium succinate, 20 mg, Intravenous, BID  pantoprazole, 40 mg, Oral, Q AM  roflumilast, 250 mcg, Oral, Daily  sodium chloride, 10 mL, Intravenous, Q12H    , Continuous Infusions:  hold, 1 each     and Allergies:  Patient has no known allergies.    Objective     Vital Signs   Temp:   [97.5 °F (36.4 °C)-98.6 °F (37 °C)] 98.6 °F (37 °C)  Heart Rate:  [] 74  Resp:  [18-20] 20  BP: (114-146)/(58-88) 127/75    Physical Exam:             General- normal in appearance, not in any acute distress    HEENT- pupils equally reactive to light, normal in size, no scleral icterus    Neck-supple    Respiratory-respirations normal-on auscultation no wheezing no crackles    Cardiovascular-NSR  GI-NTND    CNS-nonfocal    Musculoskeletal -no edema  Extremities- no obvious deformity noticed     Psychiatric- alert awake oriented  Skin- no visible rash                                                                   Results Review:    LABS:    Lab Results   Component Value Date    GLUCOSE 168 (H) 08/15/2024    BUN 22 08/15/2024    CREATININE 0.82 08/15/2024    EGFRIFNONA 72 07/27/2017    BCR 26.8 (H) 08/15/2024    CO2 27.4 08/15/2024    CALCIUM 10.2 08/15/2024    ALBUMIN 3.5 08/14/2024    AST 39 08/14/2024    ALT 45 (H) 08/14/2024    WBC 17.01 (H) 08/15/2024    HGB 13.1 08/15/2024    HCT 42.8 08/15/2024    MCV 82.1 08/15/2024     08/15/2024     08/15/2024    K 5.0 08/15/2024     08/15/2024    ANIONGAP 10.6 08/15/2024       Lab Results   Component Value Date    INR 0.99 08/14/2024    INR 1.00 04/18/2024    INR 1.00 05/10/2023    PROTIME 13.2 08/14/2024    PROTIME 13.7 04/18/2024    PROTIME 13.7 05/10/2023       Results from last 7 days   Lab Units 08/14/24  0322   INR  0.99          Microbiology Results (last 10 days)       Procedure Component Value - Date/Time    Body Fluid Culture - Body Fluid, Pleural Cavity [[id number]] Collected: 08/14/24 1317    Lab Status: Preliminary result Specimen: Body Fluid from Pleural Cavity Updated: 08/15/24 0615     Body Fluid Culture No growth at less than 24 hours     Gram Stain WBCs seen      No organisms seen    Blood Culture - Blood, Arm, Right [[id number]]  (Normal) Collected: 08/11/24 1237    Lab Status: Preliminary result Specimen: Blood from Arm, Right  Updated: 08/14/24 1245     Blood Culture No growth at 3 days    Blood Culture - Blood, Arm, Left [[id number]]  (Normal) Collected: 08/11/24 1237    Lab Status: Preliminary result Specimen: Blood from Arm, Left Updated: 08/14/24 1245     Blood Culture No growth at 3 days           I reviewed the patient's new clinical results.  I reviewed the patient's new imaging results and agree with the interpretation.   Latest Reference Range & Units 08/11/24 14:57   pH, Arterial 7.350 - 7.450 pH units 7.392   pCO2, Arterial 35.0 - 45.0 mm Hg 49.5 (H)   pO2, Arterial 83.0 - 108.0 mm Hg 72.8 (L)   HCO3, Arterial 20.0 - 26.0 mmol/L 30.1 (H)   Base Excess 0.0 - 2.0 mmol/L 4.1 (H)   O2 Saturation, Arterial 94.0 - 99.0 % 94.6   CO2 Content 22 - 33 mmol/L 31.6   A-a DO2 0.0 - 300.0 mmHg 111.1   Carboxyhemoglobin 0 - 5 % 0.9   Methemoglobin 0.00 - 3.00 % 0.10   Oxyhemoglobin 94 - 99 % 93.7 (L)   Hematocrit, Blood Gas 38.0 - 51.0 % 43.0   Hemoglobin, Blood Gas 14 - 18 g/dL 14.0   Site  Left Brachial   Mike's Test  N/A   Modality  Nasal Cannula   FIO2 % 35   Flow Rate lpm 3.5   Ventilator Mode  NA   Barometric Pressure for Blood Gas mmHg 729   (H): Data is abnormally high  (L): Data is abnormally low      Assessment & Plan       Respiratory-acute on chronic hypoxic respiratory failure-likely due to right-sided pleural effusion which is likely due to 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  Case was discussed with primary team and patient's daughter.    At thoracocentesis yesterday.  Did well.  Only 1 to 80 cc was drained.  Recommend outpatient PFTs and then only start  The inhalers and nebs especially the steroid nebs-Pulmicort    Patient never smoked and with pulmonary fibrosis due to black lung patient might just have restrictive lung disease.    If the pleural fluid reaccumulate's in coming days to weeks recommend insertion of a Pleurx catheter.  Patient did have a Pleurx catheter on the left side and it has  been taken out.  Likely patient had auto pleurodesis.    Patient blood pressure and kidney functions are good than diuretics to improve lung compliance and diffusion capacity.  Oxygen requirements reviewed and adjusted to maintain saturation 88 to 92%.      Chest x-ray-latest reviewed and discussed with patient and patient's daughter    ABG-latest reviewed  COPD-will decrease the dose of steroids as patient is not wheezing.    Procalitonin Results:      Lab 08/13/24  1520 08/11/24  1228   PROCALCITONIN 0.03 0.04      Can consider discontinuing antibiotics.      Cardiology- hemodynamically -stable  Continue to monitor HR- rate and rhythm, BP     Nephrology- Cr and BUN stable  I/O-reviewed    GI-continue current diet        ID  Culture-reviewed  And Antibiotics-reviewed    Endrocrinology- Maintain Blood sugar 140 -180      Electrolytes-   Mag and phos-replete as per protocol      DVT prophylaxis-continue    Bedside rounds were done with RT and patient's nurse. All the lab and clinical findings were discussed with them and plan was also discussed in great detail.    Family member present- yes daughter   Who works as a speech therapist in our hospital.  Case was discussed with her and answered her questions to her satisfaction.        Echo-  Results for orders placed during the hospital encounter of 08/11/24    Adult Transthoracic Echo Complete W/ Cont if Necessary Per Protocol    Interpretation Summary    Normal left ventricular cavity size and wall thickness noted. All left ventricular wall segments contract normally.    Left ventricular ejection fraction appears to be 66 - 70%.    Left ventricular diastolic function is consistent with (grade I) impaired relaxation.    The aortic valve is structurally normal with no regurgitation or stenosis present.    The mitral valve is structurally normal with no regurgitation or significant stenosis present.    There is no evidence of pericardial effusion.           Acute on  chronic hypoxic respiratory failure          Isaiah Jacobs MD  08/15/24  10:18 EDT

## 2024-08-15 NOTE — DISCHARGE INSTRUCTIONS
Please take medications as prescribed. Please go to follow-up appointments as recommended. Please seek medical attention if you have worsening symptoms. Please use 2L NC on exertion. Recommend outpatient PFTs if not recently performed.

## 2024-08-15 NOTE — PLAN OF CARE
Goal Outcome Evaluation:         Patient to be discharged on this date. IV and tele removed. Patients daughter will provide transportation this afternoon.

## 2024-08-16 LAB
BACTERIA SPEC AEROBE CULT: NORMAL
BACTERIA SPEC AEROBE CULT: NORMAL

## 2024-08-16 NOTE — PAYOR COMM NOTE
"CONTACT: ZANE ANGULO RN  UTILIZATION MANAGEMENT DEPT.  [facility]  [address] [place] [address]  PHONE: [phone]  FAX: [phone]        DISCHARGE NOTIFICATION  DC DATE: 8/15/24 TO HOME    case ID# [id number]         Rory Rios Jr. (64 y.o. Male)       Date of Birth   1960    Social Security Number       Address   [address] [facility] [address]    Home Phone   [phone]    N   [id number]       Confucianist   None    Marital Status                               Admission Date   24    Admission Type   Emergency    Admitting Provider   Oskar Rice DO    Attending Provider       Department, Room/Bed   [address], [address]/       Discharge Date   8/15/2024    Discharge Disposition   Home or Self Care    Discharge Destination                                 Attending Provider: (none)   Allergies: No Known Allergies    Isolation: None   Infection: None   Code Status: Prior    Ht: 195.6 cm (77\")   Wt: 129 kg (285 lb 1.6 oz)    Admission Cmt: None   Principal Problem: Acute on chronic hypoxic respiratory failure [J96.21]                   Active Insurance as of 2024       Primary Coverage       Payor Plan Insurance Group Employer/Plan Group    WORKERS COMPENSATION [employer]       Payor Plan Address Payor Plan Phone Number Payor Plan Fax Number Effective Dates    [address] [phone]  2009 - None Entered    [facility] [id number]         Subscriber Name Subscriber Birth Date Member ID       RORY RIOS JR. 1960 [id number]                     Emergency Contacts        (Rel.) Home Phone Work Phone Mobile Phone    KATIE KENDRICK (Daughter) [phone] -- --                 Discharge Summary        Frank Magallon MD at 08/15/24 [facility]              [facility] HOSPITALISTS DISCHARGE SUMMARY    Patient Identification:  Name:  Rory Rios Jr.  Age:  64 y.o.  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 male presented on 8/11 to [facility] complaining of shortness of breath.  Please see the admitting history and physical for further details.      Mr. Rios is our 65 yo M with hx of coal workers pneumoconiosis, essential hypertension, type 2 diabetes mellitus and chronic hypoxic respiratory failure on 2 L nasal cannula (uses PRN) who presented with 2 weeks of progressive SOB. Patient had been placed on omnicef for sinus infection. Patient had increased his home O2 to 4L NC before coming in. Initial vital signs found patient's blood pressure 135/74, respiratory rate 28, heart rate 100, temperature 97.3 °F and oxygen saturation 88% on 4 L nasal cannula. Initial lab work did include ABG, CBC and CMP. ABG demonstrated pH 7.39, pCO2 49, pO2 72 and bicarb 30 on 4 L nasal cannula. CBC was within normal limits. CMP was within normal limits. Chest x-ray demonstrated coarsened bronchovascular pattern to the lungs with mild pulmonary edema and what appeared to be multifocal pneumonia. Personally compared CT this admission to 4/24. Significant lesions contributed to coal worker's pnumocoiniosis similar. Has had left sided pleural drain in the past. This time R sided effusion looks bigger. Patient had thoracentesis on 8/14 with 170 cc fluid removed. Barely meets criteria for exudative but had blood in it. Patient has been treated for COPD exacerbation with nebs and steroids and also reported feeling much better after  thoracentesis. Pulmonology consulted and has been following as well.     On day of discharge patient saturating well on room air at rest. I got patient up to walk and dropped to 88% in the hallway on room air but imrproved back to over 90% on 2L NC. Patient noted feeling better and reported he felt ready to go home. Patient has supportive family including daughter who is a speech pathologist. Given patient's significant fibrosis offered referral to lung transplant clinic at . Patient to f/u with pulmonology and PCP. Will prescribe COPD rescue kit. Will also prescribe 20 mg PO daily lasix as he has tolerated while inpatient. Recommend BMP, mag level in 1 week.       VITAL SIGNS:  Temp:  [97.5 °F (36.4 °C)-98.6 °F (37 °C)] 97.6 °F (36.4 °C)  Heart Rate:  [] 94  Resp:  [18-20] 20  BP: (114-146)/(58-88) 119/76  SpO2:  [90 %-98 %] 92 %  on  Flow (L/min):  [3] 3;   Device (Oxygen Therapy): nasal cannula;humidified    Body mass index is 33.81 kg/m².  Wt Readings from Last 3 Encounters:   08/15/24 129 kg (285 lb 1.6 oz)   05/14/24 129 kg (285 lb)   05/06/24 130 kg (285 lb 12.8 oz)       PHYSICAL EXAM:  Constitutional:  Well-developed and well-nourished.  No respiratory distress.      HENT:  Head:  Normocephalic and atraumatic.  Mouth:  Moist mucous membranes.    Eyes:  Conjunctivae and EOM are normal.  Pupils are equal, round, and reactive to light.  No scleral icterus.    Cardiovascular:  Normal rate, regular rhythm and normal heart sounds with no murmur.  Pulmonary/Chest:  No respiratory distress, no wheezes, no crackles, with normal breath sounds and good air movement.  Abdominal:  Soft.  Bowel sounds are normal.  No distension and no tenderness.   Musculoskeletal:  No edema, no tenderness, and no deformity.  No red or swollen joints anywhere.    Neurological:  Alert and oriented to person, place, and time.  No gross neurological deficit.   Skin:  Skin is warm and dry. No rash noted. No pallor.   Peripheral  vascular:  Strong pulses in all 4 extremities with no clubbing, no cyanosis, no edema.    DISCHARGE DISPOSITION   Stable    DISCHARGE MEDICATIONS:     Discharge Medications        New Medications        Instructions Start Date   furosemide 20 MG tablet  Commonly known as: Lasix   20 mg, Oral, Daily             Continue These Medications        Instructions Start Date   albuterol sulfate  (90 Base) MCG/ACT inhaler  Commonly known as: PROVENTIL HFA;VENTOLIN HFA;PROAIR HFA   2 puffs, Inhalation, Every 4 Hours PRN      Budeson-Glycopyrrol-Formoterol 160-9-4.8 MCG/ACT aerosol inhaler  Commonly known as: BREZTRI   2 puffs, Inhalation, 2 Times Daily      cyanocobalamin 1000 MCG/ML injection   INJECT 1 ML INTO MUSCLE EVERY 2 WEEKS      HYDROcodone-acetaminophen  MG per tablet  Commonly known as: NORCO   1 tablet, Oral, Every 8 Hours PRN      ipratropium-albuterol 0.5-2.5 mg/3 ml nebulizer  Commonly known as: DUO-NEB   3 mL, Nebulization, 3 Times Daily - RT      lisinopril 10 MG tablet  Commonly known as: PRINIVIL,ZESTRIL   10 mg, Oral, Daily      omeprazole 20 MG capsule  Commonly known as: priLOSEC   20 mg, Oral, 2 Times Daily      roflumilast 250 MCG tablet tablet  Commonly known as: DALIRESP   250 mcg, Oral, Daily      vitamin D 1.25 MG (19985 UT) capsule capsule  Commonly known as: ERGOCALCIFEROL   50,000 Units, Oral, Every 7 Days             Stop These Medications      cefdinir 300 MG capsule  Commonly known as: OMNICEF                 Follow-up Information       Alejandro Monique MD Follow up in 1 week(s).    Specialty: General Surgery  Why: BMP and magnesium level at visit  Contact information:  [address] [facility] [address]  [facility] [address] [phone]               [facility] Follow up.    Why: Lung transplant evaluation  Contact information:  [address] [facility] [address] [phone]             Tomasz Aguilar MD Follow up in 1 week(s).    Specialty:  Pulmonary Disease  Why: Recommend outpatient PFTs if not recently obtained  Contact information:  [address] [place] [address] [phone]                              TEST  RESULTS PENDING AT DISCHARGE  Pending Labs       Order Current Status    Blood Culture - Blood, Arm, Left Preliminary result    Blood Culture - Blood, Arm, Right Preliminary result    Body Fluid Culture - Body Fluid, Pleural Cavity Preliminary result             CODE STATUS  Code Status and Medical Interventions: CPR (Attempt to Resuscitate); Full Support   Ordered at: 08/12/24 0405     Code Status (Patient has no pulse and is not breathing):    CPR (Attempt to Resuscitate)     Medical Interventions (Patient has pulse or is breathing):    Full Support       Westley Magallon MD  [facility]  08/15/24  11:53 EDT    Please note that this discharge summary required more than 30 minutes to complete.      Electronically signed by Westley Magallon MD at 08/15/24 1251       Discharge Order (From admission, onward)       Start     Ordered    08/15/24 1135  Discharge patient  Once        Expected Discharge Date: 08/15/24   Expected Discharge Time: Morning   Discharge Disposition: Home or Self Care   Physician of Record for Attribution - Please select from Treatment Team: WESTLEY MAGALLON [[id number]]   Review needed by CMO to determine Physician of Record: No      Question Answer Comment   Physician of Record for Attribution - Please select from Treatment Team WESTLEY MAGALLON    Review needed by CMO to determine Physician of Record No        08/15/24 8597

## 2024-08-19 ENCOUNTER — READMISSION MANAGEMENT (OUTPATIENT)
Dept: CALL CENTER | Facility: HOSPITAL | Age: 64
End: 2024-08-19
Payer: COMMERCIAL

## 2024-08-19 LAB
BACTERIA FLD CULT: NORMAL
GRAM STN SPEC: NORMAL
GRAM STN SPEC: NORMAL

## 2024-08-19 RX ORDER — IPRATROPIUM BROMIDE AND ALBUTEROL SULFATE 2.5; .5 MG/3ML; MG/3ML
SOLUTION RESPIRATORY (INHALATION)
Qty: 18 ML | Refills: 0 | Status: SHIPPED | OUTPATIENT
Start: 2024-08-19 | End: 2024-08-22

## 2024-08-19 RX ORDER — PREDNISONE 20 MG/1
40 TABLET ORAL DAILY
Qty: 10 TABLET | Refills: 0 | Status: SHIPPED | OUTPATIENT
Start: 2024-08-19 | End: 2024-08-22

## 2024-08-19 RX ORDER — DOXYCYCLINE 100 MG/1
CAPSULE ORAL
Qty: 10 CAPSULE | Refills: 0 | Status: SHIPPED | OUTPATIENT
Start: 2024-08-19 | End: 2024-08-22

## 2024-08-19 NOTE — OUTREACH NOTE
COPD/PN Week 1 Survey      Flowsheet Row Responses   [facility] patient discharged from? [facility]   Does the patient have one of the following disease processes/diagnoses(primary or secondary)? COPD   Week 1 attempt successful? Yes   Call start time 1236   Call end time 1239   Discharge diagnosis Acute on chronic hypoxic respiratory failure, COPD exacerbation   Person spoke with today (if not patient) and relationship Emilia, daughter   Meds reviewed with patient/caregiver? Yes   Is the patient having any side effects they believe may be caused by any medication additions or changes? No   Does the patient have all medications ordered at discharge? Yes   Is the patient taking all medications as directed (includes completed medication regime)? Yes   Does the patient have a primary care provider?  Yes   Does the patient have an appointment with their PCP or specialist within 7 days of discharge? Yes   Has the patient kept scheduled appointments due by today? N/A   Has home health visited the patient within 72 hours of discharge? N/A   Pulse Ox monitoring Intermittent   Pulse Ox device source Patient   O2 Sat comments 93% on 2L O2   O2 Sat: education provided Sat levels   Psychosocial issues? No   Did the patient receive a copy of their discharge instructions? Yes   Nursing interventions Reviewed instructions with patient   What is the patient's perception of their health status since discharge? Improving   Nursing Interventions Nurse provided patient education   Are the patient's immunizations up to date?  Yes   Is the patient/caregiver able to teach back the hierarchy of who to call/visit for symptoms/problems? PCP, Specialist, Home health nurse, Urgent Care, ED, 911 Yes   Is the patient able to teach back COPD zones? Yes   Patient reports what zone on this call? Green Zone   Green Zone Reports doing well, Breathing without shortness of breath, Usual activity and exercise level, Usual amounts of cough and  phlegm/mucous, Slept well last night, Appetite is good   Green Zone interventions: Take daily medications, Use oxygen as prescribed, Continue regular exercise/diet plan   Week 1 call completed? Yes   Call end time 8675            Victoria FREEMAN - Registered Nurse

## 2024-08-22 RX ORDER — PREDNISONE 20 MG/1
40 TABLET ORAL DAILY
Qty: 10 TABLET | Refills: 0 | Status: SHIPPED | OUTPATIENT
Start: 2024-08-22

## 2024-08-22 RX ORDER — IPRATROPIUM BROMIDE AND ALBUTEROL SULFATE 2.5; .5 MG/3ML; MG/3ML
SOLUTION RESPIRATORY (INHALATION)
Qty: 18 ML | Refills: 0 | Status: SHIPPED | OUTPATIENT
Start: 2024-08-22

## 2024-08-22 RX ORDER — DOXYCYCLINE 100 MG/1
CAPSULE ORAL
Qty: 10 CAPSULE | Refills: 0 | Status: SHIPPED | OUTPATIENT
Start: 2024-08-22

## 2024-08-27 ENCOUNTER — READMISSION MANAGEMENT (OUTPATIENT)
Dept: CALL CENTER | Facility: HOSPITAL | Age: 64
End: 2024-08-27
Payer: COMMERCIAL

## 2024-08-27 NOTE — OUTREACH NOTE
COPD/PN Week 2 Survey      Flowsheet Row Responses   [facility] patient discharged from? [facility]   Does the patient have one of the following disease processes/diagnoses(primary or secondary)? COPD   Week 2 attempt successful? Yes   Call start time 1432   Call end time 1433   Discharge diagnosis Acute on chronic hypoxic respiratory failure, COPD exacerbation   Is patient permission given to speak with other caregiver? Yes   List who call center can speak with Emilia daughter   Person spoke with today (if not patient) and relationship Emilia daughter   Meds reviewed with patient/caregiver? Yes   Is the patient taking all medications as directed (includes completed medication regime)? Yes   Does the patient have a primary care provider?  Yes   Has the patient kept scheduled appointments due by today? Yes  [Pulm doc since hosp dc]   Pulse Ox monitoring Intermittent   Pulse Ox device source Patient   O2 Sat comments 93% on 2L O2   O2 Sat: education provided Sat levels, Monitoring frequency, When to seek care   What is the patient's perception of their health status since discharge? Improving   If the patient is a current smoker, are they able to teach back resources for cessation? Not a smoker   Is the patient able to teach back COPD zones? Yes   Week 2 call completed? Yes   Graduated Yes   Graduated/Revoked comments Daughter states pt is improving-no issues or concerns during call ,  pt has had a FU apt with his pulm doc since hosp dc   Call end time 1433            Jessika GUO - Registered Nurse

## 2024-09-12 ENCOUNTER — OFFICE VISIT (OUTPATIENT)
Dept: PULMONOLOGY | Facility: CLINIC | Age: 64
End: 2024-09-12
Payer: COMMERCIAL

## 2024-09-12 VITALS
HEART RATE: 92 BPM | BODY MASS INDEX: 33.42 KG/M2 | HEIGHT: 77 IN | OXYGEN SATURATION: 90 % | TEMPERATURE: 96.4 F | DIASTOLIC BLOOD PRESSURE: 68 MMHG | WEIGHT: 283 LBS | SYSTOLIC BLOOD PRESSURE: 132 MMHG

## 2024-09-12 DIAGNOSIS — R06.02 SOB (SHORTNESS OF BREATH): Primary | ICD-10-CM

## 2024-09-12 DIAGNOSIS — J60 COAL WORKERS PNEUMOCONIOSIS: ICD-10-CM

## 2024-09-12 DIAGNOSIS — J60 BLACK LUNG: ICD-10-CM

## 2024-09-12 DIAGNOSIS — J84.10 PULMONARY FIBROSIS: ICD-10-CM

## 2024-09-12 DIAGNOSIS — J44.9 CHRONIC OBSTRUCTIVE PULMONARY DISEASE, UNSPECIFIED COPD TYPE: ICD-10-CM

## 2024-09-12 DIAGNOSIS — J96.21 ACUTE ON CHRONIC HYPOXIC RESPIRATORY FAILURE: ICD-10-CM

## 2024-09-12 RX ORDER — HYDROXYZINE HYDROCHLORIDE 25 MG/1
25 TABLET, FILM COATED ORAL
COMMUNITY
Start: 2024-09-04

## 2024-09-12 RX ORDER — ARFORMOTEROL TARTRATE 15 UG/2ML
15 SOLUTION RESPIRATORY (INHALATION)
COMMUNITY

## 2024-09-12 RX ORDER — GLIPIZIDE 5 MG/1
5 TABLET, FILM COATED, EXTENDED RELEASE ORAL
COMMUNITY
Start: 2024-09-04

## 2024-09-12 RX ORDER — BUSPIRONE HYDROCHLORIDE 10 MG/1
TABLET ORAL 3 TIMES DAILY
COMMUNITY

## 2024-09-12 RX ORDER — ROFLUMILAST 500 UG/1
500 TABLET ORAL DAILY
Qty: 30 TABLET | Refills: 8 | Status: SHIPPED | OUTPATIENT
Start: 2024-09-12

## 2024-09-12 RX ORDER — FUROSEMIDE 20 MG
20 TABLET ORAL DAILY
Qty: 30 TABLET | Refills: 0 | Status: SHIPPED | OUTPATIENT
Start: 2024-09-12 | End: 2024-10-12

## 2024-09-12 NOTE — PROGRESS NOTES
Subjective    Rory Rios . presents for the following Acute on chronic hypoxic respiratory failure  .    History of Present Illness   Were you born premature?  no    Any Childhood infections? no      Breathing problems when you were a child? no    Any childhood allergies?    no             At what age did you begin smoking? no    Smoking marijuana? no    Any IV drugs? no    How many packs per day?     Lung Function Test? no  Chest X-Ray? yes    CT Chest? yes Allergy Test? no    Family hx of Lung disease or Lung Cancer?no    If FHx is posivitive for lung cancer, what is the relationship of the family member?     Any hospitalization in the last year? yes    How far can you walk without getting short of breath? 20 feet without o2    Any coughing? yes    Any wheezing? no    Acid Reflux? no    Do you snore? yes    Daytime Fatigue? yes    Any pets? no   Any pet allergies? no    Occupation? 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    What inhalers are you currently using? Albuterol Breztri     Have you had the Influenza Vaccine? yes    Would you like to receive this Vaccine today? no    Have you had the Pneumonia Vaccine?  yes  Would you like to receive this Vaccine today? No  Above-mentioned questionnaire was reviewed by me in great detail.  Case was reviewed with patient's daughter who is a speech therapist in our hospital.    Review of Systems  Positive for sob and cough   Otherwise review of systems were reviewed and negative.  Active Problems:  Problem List Items Addressed This Visit          Pulmonary and Pneumonias    Black lung    Relevant Medications    arformoterol (BROVANA) 15 MCG/2ML nebulizer solution    roflumilast (Daliresp) 500 MCG tablet tablet    Chronic obstructive pulmonary disease    Relevant Medications    arformoterol (BROVANA) 15 MCG/2ML nebulizer solution    roflumilast (Daliresp) 500 MCG tablet tablet    Coal workers pneumoconiosis     Relevant Medications    arformoterol (BROVANA) 15 MCG/2ML nebulizer solution    roflumilast (Daliresp) 500 MCG tablet tablet    Pulmonary fibrosis    Relevant Medications    arformoterol (BROVANA) 15 MCG/2ML nebulizer solution    roflumilast (Daliresp) 500 MCG tablet tablet       Other    Acute on chronic hypoxic respiratory failure     Other Visit Diagnoses       SOB (shortness of breath)    -  Primary    Relevant Orders    Complete PFT - Pre & Post Bronchodilator            Past Medical History:  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    COPD (chronic obstructive pulmonary disease)     Diabetes mellitus     GERD (gastroesophageal reflux disease)     Hypertension     Pleural effusion     Pneumonia     Pulmonary fibrosis 04/27/2023    Sleep apnea        Family History:  Family History   Problem Relation Age of Onset    Diabetes Mother     Heart failure Father     Diabetes Sister     Heart disease Brother     Diabetes Brother        Social History:  Social History     Tobacco Use    Smoking status: Never     Passive exposure: Never    Smokeless tobacco: Current     Types: Chew   Substance Use Topics    Alcohol use: No       Current Medications:  Current Outpatient Medications   Medication Sig Dispense Refill    albuterol (PROVENTIL HFA;VENTOLIN HFA) 108 (90 BASE) MCG/ACT inhaler Inhale 2 puffs Every 4 (Four) Hours As Needed for Wheezing or Shortness of Air. 1 inhaler 0    arformoterol (BROVANA) 15 MCG/2ML nebulizer solution Take 2 mL by nebulization 2 (Two) Times a Day.      Budeson-Glycopyrrol-Formoterol (BREZTRI) 160-9-4.8 MCG/ACT aerosol inhaler Inhale 2 puffs 2 (Two) Times a Day.      busPIRone (BUSPAR) 10 MG tablet Take  by mouth 3 (Three) Times a Day.      cyanocobalamin 1000 MCG/ML injection INJECT 1 ML INTO MUSCLE EVERY 2 WEEKS      doxycycline (MONODOX) 100 MG capsule Take 1 capsule by mouth every 12 hours for 5  "days as part of COPD Rescue Kit. (Only Start if in YELLOW ZONE.) 10 capsule 0    furosemide (Lasix) 20 MG tablet Take 1 tablet by mouth Daily for 30 days. 30 tablet 0    glipizide (GLUCOTROL XL) 5 MG ER tablet 1 tablet.      HYDROcodone-acetaminophen (NORCO)  MG per tablet Take 1 tablet by mouth Every 8 (Eight) Hours As Needed for Moderate Pain.      hydrOXYzine (ATARAX) 25 MG tablet 1 tablet.      ipratropium-albuterol (DUO-NEB) 0.5-2.5 mg/3 ml nebulizer Take 3 mL by nebulization 3 (Three) Times a Day.      ipratropium-albuterol (DUO-NEB) 0.5-2.5 mg/3 ml nebulizer Take 3 mL by neb every 30 minutes as needed for shortness of air for up to 6 doses. Part of COPD Rescue Kit. (Only Start if in YELLOW ZONE.) 18 mL 0    lisinopril (PRINIVIL,ZESTRIL) 10 MG tablet Take 1 tablet by mouth Daily.      metFORMIN (GLUCOPHAGE) 500 MG tablet 1 tablet.      omeprazole (priLOSEC) 20 MG capsule Take 1 capsule by mouth 2 (Two) Times a Day.      predniSONE (DELTASONE) 20 MG tablet Take 2 tablets by mouth Daily. Take 2 tablets daily for 5 days as part of COPD Rescue Kit. (Only Start if in YELLOW ZONE.) 10 tablet 0    vitamin D (ERGOCALCIFEROL) 1.25 MG (24627 UT) capsule capsule Take 1 capsule by mouth Every 7 (Seven) Days.      roflumilast (Daliresp) 500 MCG tablet tablet Take 1 tablet by mouth Daily. 30 tablet 8     No current facility-administered medications for this visit.       Allergies:  No Known Allergies    Vitals:  /68   Pulse 92   Temp 96.4 °F (35.8 °C)   Ht 195.6 cm (77.01\")   Wt 128 kg (283 lb)   SpO2 90%   BMI 33.55 kg/m²     Imaging:    Imaging Results (Most Recent)       None            Pulmonary Functions Testing Results:    No results found for: \"FEV1\", \"FVC\", \"ADP1GKP\", \"TLC\", \"DLCO\"    Results for orders placed or performed during the hospital encounter of 08/11/24   Blood Culture - Blood, Arm, Right    Specimen: Arm, Right; Blood   Result Value Ref Range    Blood Culture No growth at 5 days  "   Blood Culture - Blood, Arm, Left    Specimen: Arm, Left; Blood   Result Value Ref Range    Blood Culture No growth at 5 days    Body Fluid Culture - Body Fluid, Pleural Cavity    Specimen: Pleural Cavity; Body Fluid   Result Value Ref Range    Body Fluid Culture No growth at 5 days     Gram Stain WBCs seen     Gram Stain No organisms seen    Comprehensive Metabolic Panel    Specimen: Blood   Result Value Ref Range    Glucose 140 (H) 65 - 99 mg/dL    BUN 10 8 - 23 mg/dL    Creatinine 0.85 0.76 - 1.27 mg/dL    Sodium 142 136 - 145 mmol/L    Potassium 4.2 3.5 - 5.2 mmol/L    Chloride 103 98 - 107 mmol/L    CO2 25.6 22.0 - 29.0 mmol/L    Calcium 10.0 8.6 - 10.5 mg/dL    Total Protein 6.8 6.0 - 8.5 g/dL    Albumin 3.8 3.5 - 5.2 g/dL    ALT (SGPT) 19 1 - 41 U/L    AST (SGOT) 25 1 - 40 U/L    Alkaline Phosphatase 92 39 - 117 U/L    Total Bilirubin 0.3 0.0 - 1.2 mg/dL    Globulin 3.0 gm/dL    A/G Ratio 1.3 g/dL    BUN/Creatinine Ratio 11.8 7.0 - 25.0    Anion Gap 13.4 5.0 - 15.0 mmol/L    eGFR 95.8 >60.0 mL/min/1.73   BNP    Specimen: Blood   Result Value Ref Range    proBNP 195.8 0.0 - 900.0 pg/mL   Single High Sensitivity Troponin T    Specimen: Blood   Result Value Ref Range    HS Troponin T 18 <22 ng/L   CBC Auto Differential    Specimen: Blood   Result Value Ref Range    WBC 9.66 3.40 - 10.80 10*3/mm3    RBC 5.65 4.14 - 5.80 10*6/mm3    Hemoglobin 14.2 13.0 - 17.7 g/dL    Hematocrit 47.4 37.5 - 51.0 %    MCV 83.9 79.0 - 97.0 fL    MCH 25.1 (L) 26.6 - 33.0 pg    MCHC 30.0 (L) 31.5 - 35.7 g/dL    RDW 14.6 12.3 - 15.4 %    RDW-SD 44.5 37.0 - 54.0 fl    MPV 9.1 6.0 - 12.0 fL    Platelets 267 140 - 450 10*3/mm3    Neutrophil % 74.4 42.7 - 76.0 %    Lymphocyte % 13.0 (L) 19.6 - 45.3 %    Monocyte % 8.7 5.0 - 12.0 %    Eosinophil % 3.1 0.3 - 6.2 %    Basophil % 0.6 0.0 - 1.5 %    Immature Grans % 0.2 0.0 - 0.5 %    Neutrophils, Absolute 7.18 (H) 1.70 - 7.00 10*3/mm3    Lymphocytes, Absolute 1.26 0.70 - 3.10 10*3/mm3     Monocytes, Absolute 0.84 0.10 - 0.90 10*3/mm3    Eosinophils, Absolute 0.30 0.00 - 0.40 10*3/mm3    Basophils, Absolute 0.06 0.00 - 0.20 10*3/mm3    Immature Grans, Absolute 0.02 0.00 - 0.05 10*3/mm3    nRBC 0.0 0.0 - 0.2 /100 WBC   Lactic Acid, Plasma    Specimen: Arm, Left; Blood   Result Value Ref Range    Lactate 1.6 0.5 - 2.0 mmol/L   Blood Gas, Arterial With Co-Ox    Specimen: Arterial Blood   Result Value Ref Range    Site Left Brachial     Mike's Test N/A     pH, Arterial 7.392 7.350 - 7.450 pH units    pCO2, Arterial 49.5 (H) 35.0 - 45.0 mm Hg    pO2, Arterial 72.8 (L) 83.0 - 108.0 mm Hg    HCO3, Arterial 30.1 (H) 20.0 - 26.0 mmol/L    Base Excess, Arterial 4.1 (H) 0.0 - 2.0 mmol/L    O2 Saturation, Arterial 94.6 94.0 - 99.0 %    Hemoglobin, Blood Gas 14.0 14 - 18 g/dL    Hematocrit, Blood Gas 43.0 38.0 - 51.0 %    Oxyhemoglobin 93.7 (L) 94 - 99 %    Methemoglobin 0.10 0.00 - 3.00 %    Carboxyhemoglobin 0.9 0 - 5 %    A-a DO2 111.1 0.0 - 300.0 mmHg    CO2 Content 31.6 22 - 33 mmol/L    Barometric Pressure for Blood Gas 729 mmHg    Modality Nasal Cannula     FIO2 35 %    Flow Rate 3.5 lpm    Ventilator Mode NA     Collected by [id number]     pH, Temp Corrected      pCO2, Temperature Corrected      pO2, Temperature Corrected     Procalcitonin    Specimen: Blood   Result Value Ref Range    Procalcitonin 0.04 0.00 - 0.25 ng/mL   CBC (No Diff)    Specimen: Blood   Result Value Ref Range    WBC 9.23 3.40 - 10.80 10*3/mm3    RBC 5.29 4.14 - 5.80 10*6/mm3    Hemoglobin 13.6 13.0 - 17.7 g/dL    Hematocrit 44.2 37.5 - 51.0 %    MCV 83.6 79.0 - 97.0 fL    MCH 25.7 (L) 26.6 - 33.0 pg    MCHC 30.8 (L) 31.5 - 35.7 g/dL    RDW 14.6 12.3 - 15.4 %    RDW-SD 44.3 37.0 - 54.0 fl    MPV 9.3 6.0 - 12.0 fL    Platelets 241 140 - 450 10*3/mm3   Basic Metabolic Panel    Specimen: Blood   Result Value Ref Range    Glucose 205 (H) 65 - 99 mg/dL    BUN 10 8 - 23 mg/dL    Creatinine 0.78 0.76 - 1.27 mg/dL    Sodium 138 136 - 145 mmol/L     Potassium 4.0 3.5 - 5.2 mmol/L    Chloride 102 98 - 107 mmol/L    CO2 24.9 22.0 - 29.0 mmol/L    Calcium 10.1 8.6 - 10.5 mg/dL    BUN/Creatinine Ratio 12.8 7.0 - 25.0    Anion Gap 11.1 5.0 - 15.0 mmol/L    eGFR 98.4 >60.0 mL/min/1.73   CBC (No Diff)    Specimen: Blood   Result Value Ref Range    WBC 19.98 (H) 3.40 - 10.80 10*3/mm3    RBC 5.23 4.14 - 5.80 10*6/mm3    Hemoglobin 13.2 13.0 - 17.7 g/dL    Hematocrit 43.8 37.5 - 51.0 %    MCV 83.7 79.0 - 97.0 fL    MCH 25.2 (L) 26.6 - 33.0 pg    MCHC 30.1 (L) 31.5 - 35.7 g/dL    RDW 14.8 12.3 - 15.4 %    RDW-SD 45.0 37.0 - 54.0 fl    MPV 10.9 6.0 - 12.0 fL    Platelets 221 140 - 450 10*3/mm3   Basic Metabolic Panel    Specimen: Blood   Result Value Ref Range    Glucose 186 (H) 65 - 99 mg/dL    BUN 19 8 - 23 mg/dL    Creatinine 0.96 0.76 - 1.27 mg/dL    Sodium 139 136 - 145 mmol/L    Potassium 4.6 3.5 - 5.2 mmol/L    Chloride 102 98 - 107 mmol/L    CO2 27.1 22.0 - 29.0 mmol/L    Calcium 10.8 (H) 8.6 - 10.5 mg/dL    BUN/Creatinine Ratio 19.8 7.0 - 25.0    Anion Gap 9.9 5.0 - 15.0 mmol/L    eGFR 87.2 >60.0 mL/min/1.73   Procalcitonin    Specimen: Blood   Result Value Ref Range    Procalcitonin 0.03 0.00 - 0.25 ng/mL   Lactate Dehydrogenase    Specimen: Blood   Result Value Ref Range     135 - 225 U/L   Comprehensive Metabolic Panel    Specimen: Blood   Result Value Ref Range    Glucose 179 (H) 65 - 99 mg/dL    BUN 25 (H) 8 - 23 mg/dL    Creatinine 0.86 0.76 - 1.27 mg/dL    Sodium 138 136 - 145 mmol/L    Potassium 4.9 3.5 - 5.2 mmol/L    Chloride 100 98 - 107 mmol/L    CO2 26.3 22.0 - 29.0 mmol/L    Calcium 10.3 8.6 - 10.5 mg/dL    Total Protein 6.1 6.0 - 8.5 g/dL    Albumin 3.5 3.5 - 5.2 g/dL    ALT (SGPT) 45 (H) 1 - 41 U/L    AST (SGOT) 39 1 - 40 U/L    Alkaline Phosphatase 71 39 - 117 U/L    Total Bilirubin 0.2 0.0 - 1.2 mg/dL    Globulin 2.6 gm/dL    A/G Ratio 1.3 g/dL    BUN/Creatinine Ratio 29.1 (H) 7.0 - 25.0    Anion Gap 11.7 5.0 - 15.0 mmol/L    eGFR 95.5  >60.0 mL/min/1.73   Phosphorus    Specimen: Blood   Result Value Ref Range    Phosphorus 4.0 2.5 - 4.5 mg/dL   Magnesium    Specimen: Blood   Result Value Ref Range    Magnesium 2.2 1.6 - 2.4 mg/dL   Protime-INR    Specimen: Blood   Result Value Ref Range    Protime 13.2 12.1 - 14.7 Seconds    INR 0.99 0.90 - 1.10   Calcium, Ionized    Specimen: Blood   Result Value Ref Range    Ionized Calcium 1.21 1.12 - 1.32 mmol/L   CBC Auto Differential    Specimen: Blood   Result Value Ref Range    WBC 19.41 (H) 3.40 - 10.80 10*3/mm3    RBC 5.15 4.14 - 5.80 10*6/mm3    Hemoglobin 13.2 13.0 - 17.7 g/dL    Hematocrit 43.1 37.5 - 51.0 %    MCV 83.7 79.0 - 97.0 fL    MCH 25.6 (L) 26.6 - 33.0 pg    MCHC 30.6 (L) 31.5 - 35.7 g/dL    RDW 14.9 12.3 - 15.4 %    RDW-SD 45.3 37.0 - 54.0 fl    MPV 9.2 6.0 - 12.0 fL    Platelets 278 140 - 450 10*3/mm3    Neutrophil % 93.0 (H) 42.7 - 76.0 %    Lymphocyte % 3.4 (L) 19.6 - 45.3 %    Monocyte % 2.8 (L) 5.0 - 12.0 %    Eosinophil % 0.0 (L) 0.3 - 6.2 %    Basophil % 0.1 0.0 - 1.5 %    Immature Grans % 0.7 (H) 0.0 - 0.5 %    Neutrophils, Absolute 18.05 (H) 1.70 - 7.00 10*3/mm3    Lymphocytes, Absolute 0.66 (L) 0.70 - 3.10 10*3/mm3    Monocytes, Absolute 0.55 0.10 - 0.90 10*3/mm3    Eosinophils, Absolute 0.00 0.00 - 0.40 10*3/mm3    Basophils, Absolute 0.02 0.00 - 0.20 10*3/mm3    Immature Grans, Absolute 0.13 (H) 0.00 - 0.05 10*3/mm3    nRBC 0.0 0.0 - 0.2 /100 WBC   pH, Body Fluid - Pleural Fluid, Pleural Cavity    Specimen: Pleural Cavity; Pleural Fluid   Result Value Ref Range    pH, Fluid 7.73    Albumin, Fluid - Pleural Fluid, Pleural Cavity    Specimen: Pleural Cavity; Pleural Fluid   Result Value Ref Range    Albumin, Fluid 2.20 g/dL   Protein, Body Fluid - Pleural Fluid, Pleural Cavity    Specimen: Pleural Cavity; Pleural Fluid   Result Value Ref Range    Protein, Total, Fluid 2.7 g/dL   Lactate Dehydrogenase, Body Fluid - Pleural Fluid, Pleural Cavity    Specimen: Pleural Cavity; Pleural  Fluid   Result Value Ref Range    Lactate Dehydrogenase (LD), Fluid 118 U/L   Body fluid cell count - Pleural Fluid, Pleural Cavity    Specimen: Pleural Cavity; Pleural Fluid   Result Value Ref Range    Color, Fluid Dark Yellow     Appearance, Fluid Lt Turbid (A) Clear    RBC, Fluid      Nucleated Cells, Fluid 3,052 /mm3    Method: Automated Sysmex XN Method    Body fluid differential - Pleural Fluid, Pleural Cavity    Specimen: Pleural Cavity; Pleural Fluid   Result Value Ref Range    Neutrophils, Fluid 5 %    Lymphocytes, Fluid 81 %    Eosinophils, Fluid 2 %    Mononuclear, Fluid 12 %   Basic Metabolic Panel    Specimen: Blood   Result Value Ref Range    Glucose 168 (H) 65 - 99 mg/dL    BUN 22 8 - 23 mg/dL    Creatinine 0.82 0.76 - 1.27 mg/dL    Sodium 139 136 - 145 mmol/L    Potassium 5.0 3.5 - 5.2 mmol/L    Chloride 101 98 - 107 mmol/L    CO2 27.4 22.0 - 29.0 mmol/L    Calcium 10.2 8.6 - 10.5 mg/dL    BUN/Creatinine Ratio 26.8 (H) 7.0 - 25.0    Anion Gap 10.6 5.0 - 15.0 mmol/L    eGFR 96.9 >60.0 mL/min/1.73   Magnesium    Specimen: Blood   Result Value Ref Range    Magnesium 2.4 1.6 - 2.4 mg/dL   CBC Auto Differential    Specimen: Blood   Result Value Ref Range    WBC 17.01 (H) 3.40 - 10.80 10*3/mm3    RBC 5.21 4.14 - 5.80 10*6/mm3    Hemoglobin 13.1 13.0 - 17.7 g/dL    Hematocrit 42.8 37.5 - 51.0 %    MCV 82.1 79.0 - 97.0 fL    MCH 25.1 (L) 26.6 - 33.0 pg    MCHC 30.6 (L) 31.5 - 35.7 g/dL    RDW 14.9 12.3 - 15.4 %    RDW-SD 44.6 37.0 - 54.0 fl    MPV 9.2 6.0 - 12.0 fL    Platelets 287 140 - 450 10*3/mm3    Neutrophil % 89.7 (H) 42.7 - 76.0 %    Lymphocyte % 4.3 (L) 19.6 - 45.3 %    Monocyte % 5.1 5.0 - 12.0 %    Eosinophil % 0.1 (L) 0.3 - 6.2 %    Basophil % 0.1 0.0 - 1.5 %    Immature Grans % 0.7 (H) 0.0 - 0.5 %    Neutrophils, Absolute 15.26 (H) 1.70 - 7.00 10*3/mm3    Lymphocytes, Absolute 0.73 0.70 - 3.10 10*3/mm3    Monocytes, Absolute 0.86 0.10 - 0.90 10*3/mm3    Eosinophils, Absolute 0.02 0.00 - 0.40  10*3/mm3    Basophils, Absolute 0.02 0.00 - 0.20 10*3/mm3    Immature Grans, Absolute 0.12 (H) 0.00 - 0.05 10*3/mm3    nRBC 0.0 0.0 - 0.2 /100 WBC   POC Glucose Once    Specimen: Blood   Result Value Ref Range    Glucose 173 (H) 70 - 130 mg/dL   POC Glucose Once    Specimen: Blood   Result Value Ref Range    Glucose 176 (H) 70 - 130 mg/dL   POC Glucose Once    Specimen: Blood   Result Value Ref Range    Glucose 162 (H) 70 - 130 mg/dL   POC Glucose Once    Specimen: Blood   Result Value Ref Range    Glucose 153 (H) 70 - 130 mg/dL   POC Glucose Once    Specimen: Blood   Result Value Ref Range    Glucose 128 70 - 130 mg/dL   POC Glucose Once    Specimen: Blood   Result Value Ref Range    Glucose 232 (H) 70 - 130 mg/dL   POC Glucose Once    Specimen: Blood   Result Value Ref Range    Glucose 158 (H) 70 - 130 mg/dL   POC Glucose Once    Specimen: Blood   Result Value Ref Range    Glucose 208 (H) 70 - 130 mg/dL   POC Glucose Once    Specimen: Blood   Result Value Ref Range    Glucose 159 (H) 70 - 130 mg/dL   POC Glucose Once    Specimen: Blood   Result Value Ref Range    Glucose 167 (H) 70 - 130 mg/dL   POC Glucose Once    Specimen: Blood   Result Value Ref Range    Glucose 296 (H) 70 - 130 mg/dL   POC Glucose Once    Specimen: Blood   Result Value Ref Range    Glucose 270 (H) 70 - 130 mg/dL   POC Glucose Once    Specimen: Blood   Result Value Ref Range    Glucose 194 (H) 70 - 130 mg/dL   POC Glucose Once    Specimen: Blood   Result Value Ref Range    Glucose 138 (H) 70 - 130 mg/dL   POC Glucose Once    Specimen: Blood   Result Value Ref Range    Glucose 209 (H) 70 - 130 mg/dL   POC Glucose Once    Specimen: Blood   Result Value Ref Range    Glucose 147 (H) 70 - 130 mg/dL   ECG 12 Lead ED Triage Standing Order; SOA   Result Value Ref Range    QT Interval 332 ms    QTC Interval 432 ms   Adult Transthoracic Echo Complete W/ Cont if Necessary Per Protocol   Result Value Ref Range    LVIDd 4.9 cm    LVIDs 3.0 cm    IVSd 1.23  cm    LVPWd 1.23 cm    FS 39.0 %    IVS/LVPW 1.00 cm    ESV(cubed) 26.3 ml    LV Sys Vol (BSA corrected) 11.9 cm2    EDV(cubed) 115.9 ml    LV Mcnamara Vol (BSA corrected) 28.2 cm2    LV mass(C)d 231.2 grams    LVOT area 3.5 cm2    LVOT diam 2.10 cm    EDV(MOD-sp4) 73.0 ml    ESV(MOD-sp4) 30.9 ml    SV(MOD-sp4) 42.1 ml    SVi(MOD-SP4) 16.3 ml/m2    EF(MOD-sp4) 57.7 %    MV E max milton 32.6 cm/sec    MV A max milton 96.0 cm/sec    MV E/A 0.34     LA ESV Index (BP) 6.4 ml/m2    Med Peak E' Milton 7.4 cm/sec    Lat Peak E' Milton 7.5 cm/sec    Avg E/e' ratio 4.38     LA dimension (2D)  2.8 cm    Ao pk milton 147.0 cm/sec    Ao max PG 8.6 mmHg    Ao mean PG 5.0 mmHg    Ao V2 VTI 28.1 cm    PA acc time 0.05 sec    Ao root diam 3.1 cm    ACS 2.10 cm   [facility] CYTOLOGY, [facility] ([facility]Y,[facility],[place])    Specimen: Pleural Cavity; Pleural Fluid   Result Value Ref Range    Reference Lab Report       [facility]  [address] [place] [address]  Phone: [phone] or [phone]  Fax: [phone]  Magan Sylvester M.D., Medical Director    PATIENT NAME                                 LABORATORY NO.  [id number]   EVGENY VERA                                [id number]  [id number]                                     AGE                SEX     SSN            CLIENT REF #  [facility] EULALIO                          64       1960   M       [id number]    [id number]  [address]                                 REQUESTING M.D.       ATTENDING [place]..        [place] [address]                               WESTLEY ALEMAN  DATE COLLECTED        DATE RECEIVED          DATE REPORTED  2024            2024             08/15/2024    DIAGNOSIS:  PLEURAL FLUID:  Negative for malignant cells.    MICROSCOPIC DESCRIPTION:  Mesothelial cells are present    Professional interpretation rendered by Magan Sylvester M.D., F.C.A.P. at  [facility], [address] [facility], [place] [address].    CLINICAL  HISTORY:  Acute on chronic hypoxic respiratory failure with hypercapnia  Coal worker's pneumoconiosis  Acute exacerbation of chronic obstructive pulmonary disease  COPD    SPECIMENS SUBMITTED:  PLEURAL FLUID    GROSS SPECIMEN DESCRIPTION:  40 ccs of hazy, straw-colored fluid, scant sediment, received in fixative  Cell block has been examined.    REVIEWED, DIAGNOSED AND ELECTRONICALLY  SIGNED BY:    Magan Sylvester M.D., MARCELLE  CPT CODES:  45983, [id number]     Green Top (Gel)   Result Value Ref Range    Extra Tube Hold for add-ons.    Lavender Top   Result Value Ref Range    Extra Tube hold for add-on    Gold Top - SST   Result Value Ref Range    Extra Tube Hold for add-ons.    Light Blue Top   Result Value Ref Range    Extra Tube Hold for add-ons.        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  CT chest from 2023 reviewed and compared.  Pulmonary fibrosis seems to be stable.  Will continue Lasix to improve lung compliance and diffusion capacity as in the past patient has had bilateral pleural effusions.    COPD-continue current inhalers    Will get PFTs and treat the patient accordingly.  Change the Daliresp dose.  Continue oxygen to maintain saturation 88 to 92%.  Continue DuoNebs  Resting tremors-likely has Parkinson's-recommend neurology consult.    Obstructive sleep apnea-as per the patient he is pretty compliant with the  CPAP machine.   Will review patient's compliance and CPAP settings.    Acute on chronic hypoxic respiratory failure-continue nasal cannula oxygen.  Advised to check pulse ox on a regular basis and try and maintain it above 88.     was educated that if  does not use oxygen  can feel lightheaded dizzy and can't pass out hit head and can die.    Patient understood the conversation very well and will try and use oxygen all the time.     Obesity-did diet and exercise counseling.  Body mass index is 33.55 kg/m².         ICD-10-CM ICD-9-CM   1. SOB (shortness of breath)  R06.02 786.05   2. Pulmonary fibrosis  J84.10 515   3. Coal workers pneumoconiosis  J60 500   4. Chronic obstructive pulmonary disease, unspecified COPD type  J44.9 496   5. Black lung  J60 500   6. Acute on chronic hypoxic respiratory failure  J96.21 518.84     799.02       Return in about 2 months (around 11/12/2024).

## 2024-09-18 ENCOUNTER — TELEPHONE (OUTPATIENT)
Dept: PULMONOLOGY | Facility: CLINIC | Age: 64
End: 2024-09-18
Payer: COMMERCIAL

## 2024-10-18 ENCOUNTER — HOSPITAL ENCOUNTER (OUTPATIENT)
Dept: RESPIRATORY THERAPY | Facility: HOSPITAL | Age: 64
Discharge: HOME | End: 2024-10-18
Payer: OTHER MISCELLANEOUS

## 2024-10-18 VITALS — OXYGEN SATURATION: 97 % | HEART RATE: 109 BPM | RESPIRATION RATE: 20 BRPM

## 2024-10-18 DIAGNOSIS — R06.02 SOB (SHORTNESS OF BREATH): ICD-10-CM

## 2024-10-18 PROCEDURE — 94729 DIFFUSING CAPACITY: CPT

## 2024-10-18 PROCEDURE — 94060 EVALUATION OF WHEEZING: CPT

## 2024-10-18 PROCEDURE — 94799 UNLISTED PULMONARY SVC/PX: CPT

## 2024-10-18 PROCEDURE — 94726 PLETHYSMOGRAPHY LUNG VOLUMES: CPT

## 2024-10-18 PROCEDURE — 94640 AIRWAY INHALATION TREATMENT: CPT

## 2024-10-18 RX ORDER — ALBUTEROL SULFATE 0.83 MG/ML
2.5 SOLUTION RESPIRATORY (INHALATION) ONCE
Status: COMPLETED | OUTPATIENT
Start: 2024-10-18 | End: 2024-10-18

## 2024-10-18 RX ADMIN — ALBUTEROL SULFATE 2.5 MG: 2.5 SOLUTION RESPIRATORY (INHALATION) at 15:16

## 2024-10-30 DIAGNOSIS — R06.02 SOB (SHORTNESS OF BREATH): Primary | ICD-10-CM

## 2024-11-19 ENCOUNTER — OFFICE VISIT (OUTPATIENT)
Dept: PULMONOLOGY | Facility: CLINIC | Age: 64
End: 2024-11-19
Payer: MEDICARE

## 2024-11-19 VITALS
HEIGHT: 77 IN | DIASTOLIC BLOOD PRESSURE: 78 MMHG | BODY MASS INDEX: 33.63 KG/M2 | WEIGHT: 284.8 LBS | OXYGEN SATURATION: 96 % | HEART RATE: 67 BPM | SYSTOLIC BLOOD PRESSURE: 126 MMHG | TEMPERATURE: 97.6 F

## 2024-11-19 DIAGNOSIS — J60 COAL WORKERS PNEUMOCONIOSIS: ICD-10-CM

## 2024-11-19 DIAGNOSIS — R06.02 SOB (SHORTNESS OF BREATH): Primary | ICD-10-CM

## 2024-11-19 DIAGNOSIS — J96.21 ACUTE ON CHRONIC HYPOXIC RESPIRATORY FAILURE: ICD-10-CM

## 2024-11-19 DIAGNOSIS — J90 RECURRENT LEFT PLEURAL EFFUSION: ICD-10-CM

## 2024-11-19 DIAGNOSIS — J60 BLACK LUNG: ICD-10-CM

## 2024-11-19 DIAGNOSIS — J84.10 PULMONARY FIBROSIS: ICD-10-CM

## 2024-11-19 PROCEDURE — 3074F SYST BP LT 130 MM HG: CPT | Performed by: INTERNAL MEDICINE

## 2024-11-19 PROCEDURE — 99214 OFFICE O/P EST MOD 30 MIN: CPT | Performed by: INTERNAL MEDICINE

## 2024-11-19 PROCEDURE — 3078F DIAST BP <80 MM HG: CPT | Performed by: INTERNAL MEDICINE

## 2024-11-19 RX ORDER — BUDESONIDE 0.5 MG/2ML
0.5 INHALANT ORAL
Qty: 30 EACH | Refills: 6 | Status: SHIPPED | OUTPATIENT
Start: 2024-11-19

## 2024-11-19 RX ORDER — ESOMEPRAZOLE MAGNESIUM 40 MG/1
1 CAPSULE, DELAYED RELEASE ORAL DAILY
COMMUNITY
Start: 2024-10-16

## 2024-11-19 RX ORDER — ARFORMOTEROL TARTRATE 15 UG/2ML
15 SOLUTION RESPIRATORY (INHALATION)
Qty: 120 ML | Refills: 5 | Status: SHIPPED | OUTPATIENT
Start: 2024-11-19

## 2024-11-19 RX ORDER — IBUPROFEN 800 MG/1
1 TABLET, FILM COATED ORAL 3 TIMES DAILY
COMMUNITY
Start: 2024-10-16

## 2024-11-19 RX ORDER — BUDESONIDE 0.5 MG/2ML
0.5 INHALANT ORAL
COMMUNITY
End: 2024-11-19 | Stop reason: SDUPTHER

## 2024-11-19 NOTE — PROGRESS NOTES
Interval history since last visit: Needs a script for [facility] meds to [facility]    Recent hospitalizations: NA    Investigations (imaging, PFT's, labs, sleep study, record requests, etc.) PFT    Have you had the Influenza Vaccine? yes    Would you like to receive this Vaccine today? no    Have you had the Pneumonia Vaccine?  yes   Would you like to receive this Vaccine today? no    Subjective    Rory Rios Jr. presents for the following Shortness of Breath  .    History of Present Illness   Patient is here for a follow-up visit.  Accompanied by daughter.  Shortness of breath remains at baseline.  Wearing nasal cannula oxygen.  Not in any respiratory distress.  Review of Systems  Positive for shortness of breath otherwise negative.  Active Problems:  Problem List Items Addressed This Visit          Pulmonary and Pneumonias    Recurrent left pleural effusion    Relevant Medications    arformoterol (BROVANA) 15 MCG/2ML nebulizer solution    budesonide (PULMICORT) 0.5 MG/2ML nebulizer solution    revefenacin (YUPELRI) 175 MCG/3ML nebulizer solution    Black lung    Relevant Medications    arformoterol (BROVANA) 15 MCG/2ML nebulizer solution    budesonide (PULMICORT) 0.5 MG/2ML nebulizer solution    revefenacin (YUPELRI) 175 MCG/3ML nebulizer solution    Coal workers pneumoconiosis    Relevant Medications    arformoterol (BROVANA) 15 MCG/2ML nebulizer solution    budesonide (PULMICORT) 0.5 MG/2ML nebulizer solution    revefenacin (YUPELRI) 175 MCG/3ML nebulizer solution    Pulmonary fibrosis    Relevant Medications    arformoterol (BROVANA) 15 MCG/2ML nebulizer solution    budesonide (PULMICORT) 0.5 MG/2ML nebulizer solution    revefenacin (YUPELRI) 175 MCG/3ML nebulizer solution       Other    Acute on chronic hypoxic respiratory failure     Other Visit Diagnoses       SOB (shortness of breath)    -  Primary    Relevant Orders    Alpha - 1 - Antitrypsin            Past Medical History:  Past Medical History:   Diagnosis  Date    Arthritis     Black lung     Black lung disease     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    COPD (chronic obstructive pulmonary disease)     Diabetes mellitus     GERD (gastroesophageal reflux disease)     Hypertension     Pleural effusion     Pneumonia     Pulmonary fibrosis 04/27/2023    Sleep apnea        Family History:  Family History   Problem Relation Age of Onset    Diabetes Mother     Heart failure Father     Diabetes Sister     Heart disease Brother     Diabetes Brother        Social History:  Social History     Tobacco Use    Smoking status: Never     Passive exposure: Never    Smokeless tobacco: Current     Types: Chew   Substance Use Topics    Alcohol use: No       Current Medications:  Current Outpatient Medications   Medication Sig Dispense Refill    albuterol (PROVENTIL HFA;VENTOLIN HFA) 108 (90 BASE) MCG/ACT inhaler Inhale 2 puffs Every 4 (Four) Hours As Needed for Wheezing or Shortness of Air. 1 inhaler 0    arformoterol (BROVANA) 15 MCG/2ML nebulizer solution Take 2 mL by nebulization 2 (Two) Times a Day. 120 mL 5    Budeson-Glycopyrrol-Formoterol (BREZTRI) 160-9-4.8 MCG/ACT aerosol inhaler Inhale 2 puffs 2 (Two) Times a Day.      budesonide (PULMICORT) 0.5 MG/2ML nebulizer solution Take 2 mL by nebulization Daily. 30 each 6    busPIRone (BUSPAR) 10 MG tablet Take  by mouth 3 (Three) Times a Day.      cyanocobalamin 1000 MCG/ML injection INJECT 1 ML INTO MUSCLE EVERY 2 WEEKS      esomeprazole (nexIUM) 40 MG capsule Take 1 capsule by mouth Daily.      glipizide (GLUCOTROL XL) 5 MG ER tablet 1 tablet.      HYDROcodone-acetaminophen (NORCO)  MG per tablet Take 1 tablet by mouth Every 8 (Eight) Hours As Needed for Moderate Pain.      hydrOXYzine (ATARAX) 25 MG tablet 1 tablet.      ibuprofen (ADVIL,MOTRIN) 800 MG tablet Take 1 tablet by mouth 3 times a day.      ipratropium-albuterol (DUO-NEB) 0.5-2.5 mg/3 ml nebulizer Take 3 mL by nebulization 3 (Three) Times a Day.       "ipratropium-albuterol (DUO-NEB) 0.5-2.5 mg/3 ml nebulizer Take 3 mL by neb every 30 minutes as needed for shortness of air for up to 6 doses. Part of COPD Rescue Kit. (Only Start if in YELLOW ZONE.) 18 mL 0    lisinopril (PRINIVIL,ZESTRIL) 10 MG tablet Take 1 tablet by mouth Daily.      metFORMIN (GLUCOPHAGE) 500 MG tablet 1 tablet.      nabumetone (RELAFEN) 750 MG tablet Take 1 tablet by mouth Every 12 (Twelve) Hours.      omeprazole (priLOSEC) 20 MG capsule Take 1 capsule by mouth 2 (Two) Times a Day.      revefenacin (YUPELRI) 175 MCG/3ML nebulizer solution Take 3 mL by nebulization Daily. 90 mL 5    roflumilast (Daliresp) 500 MCG tablet tablet Take 1 tablet by mouth Daily. 30 tablet 8    vitamin D (ERGOCALCIFEROL) 1.25 MG (06186 UT) capsule capsule Take 1 capsule by mouth Every 7 (Seven) Days.      doxycycline (MONODOX) 100 MG capsule Take 1 capsule by mouth every 12 hours for 5 days as part of COPD Rescue Kit. (Only Start if in YELLOW ZONE.) (Patient not taking: Reported on 11/19/2024) 10 capsule 0    furosemide (Lasix) 20 MG tablet Take 1 tablet by mouth Daily for 30 days. 30 tablet 0    predniSONE (DELTASONE) 20 MG tablet Take 2 tablets by mouth Daily. Take 2 tablets daily for 5 days as part of COPD Rescue Kit. (Only Start if in YELLOW ZONE.) (Patient not taking: Reported on 11/19/2024) 10 tablet 0     No current facility-administered medications for this visit.       Allergies:  No Known Allergies    Vitals:  /78   Pulse 67   Temp 97.6 °F (36.4 °C)   Ht 195.6 cm (77\")   Wt 129 kg (284 lb 12.8 oz)   SpO2 96% Comment: 2L POC  BMI 33.77 kg/m²     Imaging:    Imaging Results (Most Recent)       None            Pulmonary Functions Testing Results:    No results found for: \"FEV1\", \"FVC\", \"HAL2PTH\", \"TLC\", \"DLCO\"    Results for orders placed or performed during the hospital encounter of 08/11/24   ECG 12 Lead ED Triage Standing Order; SOA    Collection Time: 08/11/24 12:07 PM   Result Value Ref Range    " QT Interval 332 ms    QTC Interval 432 ms   Comprehensive Metabolic Panel    Collection Time: 08/11/24 12:28 PM    Specimen: Blood   Result Value Ref Range    Glucose 140 (H) 65 - 99 mg/dL    BUN 10 8 - 23 mg/dL    Creatinine 0.85 0.76 - 1.27 mg/dL    Sodium 142 136 - 145 mmol/L    Potassium 4.2 3.5 - 5.2 mmol/L    Chloride 103 98 - 107 mmol/L    CO2 25.6 22.0 - 29.0 mmol/L    Calcium 10.0 8.6 - 10.5 mg/dL    Total Protein 6.8 6.0 - 8.5 g/dL    Albumin 3.8 3.5 - 5.2 g/dL    ALT (SGPT) 19 1 - 41 U/L    AST (SGOT) 25 1 - 40 U/L    Alkaline Phosphatase 92 39 - 117 U/L    Total Bilirubin 0.3 0.0 - 1.2 mg/dL    Globulin 3.0 gm/dL    A/G Ratio 1.3 g/dL    BUN/Creatinine Ratio 11.8 7.0 - 25.0    Anion Gap 13.4 5.0 - 15.0 mmol/L    eGFR 95.8 >60.0 mL/min/1.73   BNP    Collection Time: 08/11/24 12:28 PM    Specimen: Blood   Result Value Ref Range    proBNP 195.8 0.0 - 900.0 pg/mL   Single High Sensitivity Troponin T    Collection Time: 08/11/24 12:28 PM    Specimen: Blood   Result Value Ref Range    HS Troponin T 18 <22 ng/L   CBC Auto Differential    Collection Time: 08/11/24 12:28 PM    Specimen: Blood   Result Value Ref Range    WBC 9.66 3.40 - 10.80 10*3/mm3    RBC 5.65 4.14 - 5.80 10*6/mm3    Hemoglobin 14.2 13.0 - 17.7 g/dL    Hematocrit 47.4 37.5 - 51.0 %    MCV 83.9 79.0 - 97.0 fL    MCH 25.1 (L) 26.6 - 33.0 pg    MCHC 30.0 (L) 31.5 - 35.7 g/dL    RDW 14.6 12.3 - 15.4 %    RDW-SD 44.5 37.0 - 54.0 fl    MPV 9.1 6.0 - 12.0 fL    Platelets 267 140 - 450 10*3/mm3    Neutrophil % 74.4 42.7 - 76.0 %    Lymphocyte % 13.0 (L) 19.6 - 45.3 %    Monocyte % 8.7 5.0 - 12.0 %    Eosinophil % 3.1 0.3 - 6.2 %    Basophil % 0.6 0.0 - 1.5 %    Immature Grans % 0.2 0.0 - 0.5 %    Neutrophils, Absolute 7.18 (H) 1.70 - 7.00 10*3/mm3    Lymphocytes, Absolute 1.26 0.70 - 3.10 10*3/mm3    Monocytes, Absolute 0.84 0.10 - 0.90 10*3/mm3    Eosinophils, Absolute 0.30 0.00 - 0.40 10*3/mm3    Basophils, Absolute 0.06 0.00 - 0.20 10*3/mm3     Immature Grans, Absolute 0.02 0.00 - 0.05 10*3/mm3    nRBC 0.0 0.0 - 0.2 /100 WBC   Procalcitonin    Collection Time: 08/11/24 12:28 PM    Specimen: Blood   Result Value Ref Range    Procalcitonin 0.04 0.00 - 0.25 ng/mL   Green Top (Gel)    Collection Time: 08/11/24 12:28 PM   Result Value Ref Range    Extra Tube Hold for add-ons.    Lavender Top    Collection Time: 08/11/24 12:28 PM   Result Value Ref Range    Extra Tube hold for add-on    Gold Top - SST    Collection Time: 08/11/24 12:28 PM   Result Value Ref Range    Extra Tube Hold for add-ons.    Light Blue Top    Collection Time: 08/11/24 12:28 PM   Result Value Ref Range    Extra Tube Hold for add-ons.    Blood Culture - Blood, Arm, Right    Collection Time: 08/11/24 12:37 PM    Specimen: Arm, Right; Blood   Result Value Ref Range    Blood Culture No growth at 5 days    Blood Culture - Blood, Arm, Left    Collection Time: 08/11/24 12:37 PM    Specimen: Arm, Left; Blood   Result Value Ref Range    Blood Culture No growth at 5 days    Lactic Acid, Plasma    Collection Time: 08/11/24 12:37 PM    Specimen: Arm, Left; Blood   Result Value Ref Range    Lactate 1.6 0.5 - 2.0 mmol/L   Blood Gas, Arterial With Co-Ox    Collection Time: 08/11/24  2:57 PM    Specimen: Arterial Blood   Result Value Ref Range    Site Left Brachial     Mike's Test N/A     pH, Arterial 7.392 7.350 - 7.450 pH units    pCO2, Arterial 49.5 (H) 35.0 - 45.0 mm Hg    pO2, Arterial 72.8 (L) 83.0 - 108.0 mm Hg    HCO3, Arterial 30.1 (H) 20.0 - 26.0 mmol/L    Base Excess, Arterial 4.1 (H) 0.0 - 2.0 mmol/L    O2 Saturation, Arterial 94.6 94.0 - 99.0 %    Hemoglobin, Blood Gas 14.0 14 - 18 g/dL    Hematocrit, Blood Gas 43.0 38.0 - 51.0 %    Oxyhemoglobin 93.7 (L) 94 - 99 %    Methemoglobin 0.10 0.00 - 3.00 %    Carboxyhemoglobin 0.9 0 - 5 %    A-a DO2 111.1 0.0 - 300.0 mmHg    CO2 Content 31.6 22 - 33 mmol/L    Barometric Pressure for Blood Gas 729 mmHg    Modality Nasal Cannula     FIO2 35 %    Flow  Rate 3.5 lpm    Ventilator Mode NA     Collected by [id number]     pH, Temp Corrected      pCO2, Temperature Corrected      pO2, Temperature Corrected     CBC (No Diff)    Collection Time: 08/12/24  1:31 AM    Specimen: Blood   Result Value Ref Range    WBC 9.23 3.40 - 10.80 10*3/mm3    RBC 5.29 4.14 - 5.80 10*6/mm3    Hemoglobin 13.6 13.0 - 17.7 g/dL    Hematocrit 44.2 37.5 - 51.0 %    MCV 83.6 79.0 - 97.0 fL    MCH 25.7 (L) 26.6 - 33.0 pg    MCHC 30.8 (L) 31.5 - 35.7 g/dL    RDW 14.6 12.3 - 15.4 %    RDW-SD 44.3 37.0 - 54.0 fl    MPV 9.3 6.0 - 12.0 fL    Platelets 241 140 - 450 10*3/mm3   Basic Metabolic Panel    Collection Time: 08/12/24  1:31 AM    Specimen: Blood   Result Value Ref Range    Glucose 205 (H) 65 - 99 mg/dL    BUN 10 8 - 23 mg/dL    Creatinine 0.78 0.76 - 1.27 mg/dL    Sodium 138 136 - 145 mmol/L    Potassium 4.0 3.5 - 5.2 mmol/L    Chloride 102 98 - 107 mmol/L    CO2 24.9 22.0 - 29.0 mmol/L    Calcium 10.1 8.6 - 10.5 mg/dL    BUN/Creatinine Ratio 12.8 7.0 - 25.0    Anion Gap 11.1 5.0 - 15.0 mmol/L    eGFR 98.4 >60.0 mL/min/1.73   POC Glucose Once    Collection Time: 08/12/24  4:33 AM    Specimen: Blood   Result Value Ref Range    Glucose 173 (H) 70 - 130 mg/dL   POC Glucose Once    Collection Time: 08/12/24  7:12 AM    Specimen: Blood   Result Value Ref Range    Glucose 176 (H) 70 - 130 mg/dL   POC Glucose Once    Collection Time: 08/12/24 11:26 AM    Specimen: Blood   Result Value Ref Range    Glucose 162 (H) 70 - 130 mg/dL   Adult Transthoracic Echo Complete W/ Cont if Necessary Per Protocol    Collection Time: 08/12/24  4:05 PM   Result Value Ref Range    LVIDd 4.9 cm    LVIDs 3.0 cm    IVSd 1.23 cm    LVPWd 1.23 cm    FS 39.0 %    IVS/LVPW 1.00 cm    ESV(cubed) 26.3 ml    LV Sys Vol (BSA corrected) 11.9 cm2    EDV(cubed) 115.9 ml    LV Mcnamara Vol (BSA corrected) 28.2 cm2    LV mass(C)d 231.2 grams    LVOT area 3.5 cm2    LVOT diam 2.10 cm    EDV(MOD-sp4) 73.0 ml    ESV(MOD-sp4) 30.9 ml     SV(MOD-sp4) 42.1 ml    SVi(MOD-SP4) 16.3 ml/m2    EF(MOD-sp4) 57.7 %    MV E max milton 32.6 cm/sec    MV A max milton 96.0 cm/sec    MV E/A 0.34     LA ESV Index (BP) 6.4 ml/m2    Med Peak E' Milton 7.4 cm/sec    Lat Peak E' [place] 7.5 cm/sec    Avg E/e' ratio 4.38     LA dimension (2D)  2.8 cm    Ao pk milton 147.0 cm/sec    Ao max PG 8.6 mmHg    Ao mean PG 5.0 mmHg    Ao V2 VTI 28.1 cm    PA acc time 0.05 sec    Ao root diam 3.1 cm    ACS 2.10 cm   POC Glucose Once    Collection Time: 08/12/24  5:07 PM    Specimen: Blood   Result Value Ref Range    Glucose 153 (H) 70 - 130 mg/dL   POC Glucose Once    Collection Time: 08/12/24  9:10 PM    Specimen: Blood   Result Value Ref Range    Glucose 128 70 - 130 mg/dL   CBC (No Diff)    Collection Time: 08/13/24  1:13 AM    Specimen: Blood   Result Value Ref Range    WBC 19.98 (H) 3.40 - 10.80 10*3/mm3    RBC 5.23 4.14 - 5.80 10*6/mm3    Hemoglobin 13.2 13.0 - 17.7 g/dL    Hematocrit 43.8 37.5 - 51.0 %    MCV 83.7 79.0 - 97.0 fL    MCH 25.2 (L) 26.6 - 33.0 pg    MCHC 30.1 (L) 31.5 - 35.7 g/dL    RDW 14.8 12.3 - 15.4 %    RDW-SD 45.0 37.0 - 54.0 fl    MPV 10.9 6.0 - 12.0 fL    Platelets 221 140 - 450 10*3/mm3   Basic Metabolic Panel    Collection Time: 08/13/24  1:13 AM    Specimen: Blood   Result Value Ref Range    Glucose 186 (H) 65 - 99 mg/dL    BUN 19 8 - 23 mg/dL    Creatinine 0.96 0.76 - 1.27 mg/dL    Sodium 139 136 - 145 mmol/L    Potassium 4.6 3.5 - 5.2 mmol/L    Chloride 102 98 - 107 mmol/L    CO2 27.1 22.0 - 29.0 mmol/L    Calcium 10.8 (H) 8.6 - 10.5 mg/dL    BUN/Creatinine Ratio 19.8 7.0 - 25.0    Anion Gap 9.9 5.0 - 15.0 mmol/L    eGFR 87.2 >60.0 mL/min/1.73   POC Glucose Once    Collection Time: 08/13/24  6:34 AM    Specimen: Blood   Result Value Ref Range    Glucose 232 (H) 70 - 130 mg/dL   POC Glucose Once    Collection Time: 08/13/24 11:06 AM    Specimen: Blood   Result Value Ref Range    Glucose 158 (H) 70 - 130 mg/dL   Procalcitonin    Collection Time: 08/13/24  3:20 PM     Specimen: Blood   Result Value Ref Range    Procalcitonin 0.03 0.00 - 0.25 ng/mL   POC Glucose Once    Collection Time: 08/13/24  4:34 PM    Specimen: Blood   Result Value Ref Range    Glucose 208 (H) 70 - 130 mg/dL   POC Glucose Once    Collection Time: 08/13/24  8:09 PM    Specimen: Blood   Result Value Ref Range    Glucose 159 (H) 70 - 130 mg/dL   Lactate Dehydrogenase    Collection Time: 08/14/24  3:22 AM    Specimen: Blood   Result Value Ref Range     135 - 225 U/L   Comprehensive Metabolic Panel    Collection Time: 08/14/24  3:22 AM    Specimen: Blood   Result Value Ref Range    Glucose 179 (H) 65 - 99 mg/dL    BUN 25 (H) 8 - 23 mg/dL    Creatinine 0.86 0.76 - 1.27 mg/dL    Sodium 138 136 - 145 mmol/L    Potassium 4.9 3.5 - 5.2 mmol/L    Chloride 100 98 - 107 mmol/L    CO2 26.3 22.0 - 29.0 mmol/L    Calcium 10.3 8.6 - 10.5 mg/dL    Total Protein 6.1 6.0 - 8.5 g/dL    Albumin 3.5 3.5 - 5.2 g/dL    ALT (SGPT) 45 (H) 1 - 41 U/L    AST (SGOT) 39 1 - 40 U/L    Alkaline Phosphatase 71 39 - 117 U/L    Total Bilirubin 0.2 0.0 - 1.2 mg/dL    Globulin 2.6 gm/dL    A/G Ratio 1.3 g/dL    BUN/Creatinine Ratio 29.1 (H) 7.0 - 25.0    Anion Gap 11.7 5.0 - 15.0 mmol/L    eGFR 95.5 >60.0 mL/min/1.73   Phosphorus    Collection Time: 08/14/24  3:22 AM    Specimen: Blood   Result Value Ref Range    Phosphorus 4.0 2.5 - 4.5 mg/dL   Magnesium    Collection Time: 08/14/24  3:22 AM    Specimen: Blood   Result Value Ref Range    Magnesium 2.2 1.6 - 2.4 mg/dL   Protime-INR    Collection Time: 08/14/24  3:22 AM    Specimen: Blood   Result Value Ref Range    Protime 13.2 12.1 - 14.7 Seconds    INR 0.99 0.90 - 1.10   Calcium, Ionized    Collection Time: 08/14/24  3:22 AM    Specimen: Blood   Result Value Ref Range    Ionized Calcium 1.21 1.12 - 1.32 mmol/L   CBC Auto Differential    Collection Time: 08/14/24  3:22 AM    Specimen: Blood   Result Value Ref Range    WBC 19.41 (H) 3.40 - 10.80 10*3/mm3    RBC 5.15 4.14 - 5.80  10*6/mm3    Hemoglobin 13.2 13.0 - 17.7 g/dL    Hematocrit 43.1 37.5 - 51.0 %    MCV 83.7 79.0 - 97.0 fL    MCH 25.6 (L) 26.6 - 33.0 pg    MCHC 30.6 (L) 31.5 - 35.7 g/dL    RDW 14.9 12.3 - 15.4 %    RDW-SD 45.3 37.0 - 54.0 fl    MPV 9.2 6.0 - 12.0 fL    Platelets 278 140 - 450 10*3/mm3    Neutrophil % 93.0 (H) 42.7 - 76.0 %    Lymphocyte % 3.4 (L) 19.6 - 45.3 %    Monocyte % 2.8 (L) 5.0 - 12.0 %    Eosinophil % 0.0 (L) 0.3 - 6.2 %    Basophil % 0.1 0.0 - 1.5 %    Immature Grans % 0.7 (H) 0.0 - 0.5 %    Neutrophils, Absolute 18.05 (H) 1.70 - 7.00 10*3/mm3    Lymphocytes, Absolute 0.66 (L) 0.70 - 3.10 10*3/mm3    Monocytes, Absolute 0.55 0.10 - 0.90 10*3/mm3    Eosinophils, Absolute 0.00 0.00 - 0.40 10*3/mm3    Basophils, Absolute 0.02 0.00 - 0.20 10*3/mm3    Immature Grans, Absolute 0.13 (H) 0.00 - 0.05 10*3/mm3    nRBC 0.0 0.0 - 0.2 /100 WBC   POC Glucose Once    Collection Time: 08/14/24  7:09 AM    Specimen: Blood   Result Value Ref Range    Glucose 167 (H) 70 - 130 mg/dL   POC Glucose Once    Collection Time: 08/14/24 10:43 AM    Specimen: Blood   Result Value Ref Range    Glucose 296 (H) 70 - 130 mg/dL   Body Fluid Culture - Body Fluid, Pleural Cavity    Collection Time: 08/14/24  1:17 PM    Specimen: Pleural Cavity; Body Fluid   Result Value Ref Range    Body Fluid Culture No growth at 5 days     Gram Stain WBCs seen     Gram Stain No organisms seen    pH, Body Fluid - Pleural Fluid, Pleural Cavity    Collection Time: 08/14/24  1:17 PM    Specimen: Pleural Cavity; Pleural Fluid   Result Value Ref Range    pH, Fluid 7.73    Albumin, Fluid - Pleural Fluid, Pleural Cavity    Collection Time: 08/14/24  1:17 PM    Specimen: Pleural Cavity; Pleural Fluid   Result Value Ref Range    Albumin, Fluid 2.20 g/dL   Protein, Body Fluid - Pleural Fluid, Pleural Cavity    Collection Time: 08/14/24  1:17 PM    Specimen: Pleural Cavity; Pleural Fluid   Result Value Ref Range    Protein, Total, Fluid 2.7 g/dL   Lactate  Dehydrogenase, Body Fluid - Pleural Fluid, Pleural Cavity    Collection Time: 24  1:17 PM    Specimen: Pleural Cavity; Pleural Fluid   Result Value Ref Range    Lactate Dehydrogenase (LD), Fluid 118 U/L   Body fluid cell count - Pleural Fluid, Pleural Cavity    Collection Time: 24  1:17 PM    Specimen: Pleural Cavity; Pleural Fluid   Result Value Ref Range    Color, Fluid Dark Yellow     Appearance, Fluid Lt Turbid (A) Clear    RBC, Fluid      Nucleated Cells, Fluid 3,052 /mm3    Method: Automated Sysmex XN Method    Body fluid differential - Pleural Fluid, Pleural Cavity    Collection Time: 24  1:17 PM    Specimen: Pleural Cavity; Pleural Fluid   Result Value Ref Range    Neutrophils, Fluid 5 %    Lymphocytes, Fluid 81 %    Eosinophils, Fluid 2 %    Mononuclear, Fluid 12 %   [facility]TOLOGY, [facility] ([facility],[place])    Collection Time: 24  1:17 PM    Specimen: Pleural Cavity; Pleural Fluid   Result Value Ref Range    Reference Lab Report       [facility]  [address] [place] [address]  Phone: [phone] or [phone]  Fax: [phone]  Magan Sylvester M.D., Medical Director    PATIENT NAME                                 LABORATORY NO.  [id number]   EVGENY VERA                                [id number]  [id number]                                     AGE                SEX     N            CLIENT REF #  [facility] EULALIO                          64       1960          [id number]    [id number]  [address]                                 REQUESTING M.D.       ATTENDING M.D..        [place] [address]                               WESTLEY ALEMAN  DATE COLLECTED        DATE RECEIVED          DATE REPORTED  2024            2024             08/15/2024    DIAGNOSIS:  PLEURAL FLUID:  Negative for malignant cells.    MICROSCOPIC DESCRIPTION:  Mesothelial cells are present    Professional interpretation rendered by Magan  PAYAL Sylvester M.D., MARCELLE at  [email or web address] [email or web address], [address] [place] [address].    CLINICAL HISTORY:  Acute on chronic hypoxic respiratory failure with hypercapnia  Coal worker's pneumoconiosis  Acute exacerbation of chronic obstructive pulmonary disease  COPD    SPECIMENS SUBMITTED:  PLEURAL FLUID    GROSS SPECIMEN DESCRIPTION:  40 ccs of hazy, straw-colored fluid, scant sediment, received in fixative  Cell block has been examined.    REVIEWED, DIAGNOSED AND ELECTRONICALLY  SIGNED BY:    Magan Sylvester M.D., MARCELLE  CPT CODES:  42839, 48057     POC Glucose Once    Collection Time: 08/14/24  4:14 PM    Specimen: Blood   Result Value Ref Range    Glucose 270 (H) 70 - 130 mg/dL   POC Glucose Once    Collection Time: 08/14/24  7:22 PM    Specimen: Blood   Result Value Ref Range    Glucose 194 (H) 70 - 130 mg/dL   Basic Metabolic Panel    Collection Time: 08/15/24  2:58 AM    Specimen: Blood   Result Value Ref Range    Glucose 168 (H) 65 - 99 mg/dL    BUN 22 8 - 23 mg/dL    Creatinine 0.82 0.76 - 1.27 mg/dL    Sodium 139 136 - 145 mmol/L    Potassium 5.0 3.5 - 5.2 mmol/L    Chloride 101 98 - 107 mmol/L    CO2 27.4 22.0 - 29.0 mmol/L    Calcium 10.2 8.6 - 10.5 mg/dL    BUN/Creatinine Ratio 26.8 (H) 7.0 - 25.0    Anion Gap 10.6 5.0 - 15.0 mmol/L    eGFR 96.9 >60.0 mL/min/1.73   Magnesium    Collection Time: 08/15/24  2:58 AM    Specimen: Blood   Result Value Ref Range    Magnesium 2.4 1.6 - 2.4 mg/dL   CBC Auto Differential    Collection Time: 08/15/24  2:58 AM    Specimen: Blood   Result Value Ref Range    WBC 17.01 (H) 3.40 - 10.80 10*3/mm3    RBC 5.21 4.14 - 5.80 10*6/mm3    Hemoglobin 13.1 13.0 - 17.7 g/dL    Hematocrit 42.8 37.5 - 51.0 %    MCV 82.1 79.0 - 97.0 fL    MCH 25.1 (L) 26.6 - 33.0 pg    MCHC 30.6 (L) 31.5 - 35.7 g/dL    RDW 14.9 12.3 - 15.4 %    RDW-SD 44.6 37.0 - 54.0 fl    MPV 9.2 6.0 - 12.0 fL    Platelets 287 140 - 450 10*3/mm3    Neutrophil % 89.7 (H) 42.7 - 76.0 %    Lymphocyte % 4.3 (L)  19.6 - 45.3 %    Monocyte % 5.1 5.0 - 12.0 %    Eosinophil % 0.1 (L) 0.3 - 6.2 %    Basophil % 0.1 0.0 - 1.5 %    Immature Grans % 0.7 (H) 0.0 - 0.5 %    Neutrophils, Absolute 15.26 (H) 1.70 - 7.00 10*3/mm3    Lymphocytes, Absolute 0.73 0.70 - 3.10 10*3/mm3    Monocytes, Absolute 0.86 0.10 - 0.90 10*3/mm3    Eosinophils, Absolute 0.02 0.00 - 0.40 10*3/mm3    Basophils, Absolute 0.02 0.00 - 0.20 10*3/mm3    Immature Grans, Absolute 0.12 (H) 0.00 - 0.05 10*3/mm3    nRBC 0.0 0.0 - 0.2 /100 WBC   POC Glucose Once    Collection Time: 08/15/24  7:13 AM    Specimen: Blood   Result Value Ref Range    Glucose 138 (H) 70 - 130 mg/dL   POC Glucose Once    Collection Time: 08/15/24 11:12 AM    Specimen: Blood   Result Value Ref Range    Glucose 209 (H) 70 - 130 mg/dL   POC Glucose Once    Collection Time: 08/15/24  3:47 PM    Specimen: Blood   Result Value Ref Range    Glucose 147 (H) 70 - 130 mg/dL       Objective   Physical Exam  General- normal in appearance, not in any acute distress, wearing nasal cannula oxygen    HEENT- pupils equally reactive to light, normal in size, no scleral icterus    Neck-supple    Respiratory-respirations normal-on auscultation no wheezing no crackles,     Cardiovascular-NSR    GI-nontender nondistended bowel sounds positive    CNS-nonfocal    Musculoskeletal -no edema  Extremities- no obvious deformity noticed     Psychiatric-mood good, good eye contact, alert awake oriented  Skin- no visible rash          Assessment & Plan   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  CT chest from 2023 reviewed and compared.  Pulmonary fibrosis seems to be stable.  Continue diuretics to improve lung compliance and diffusion capacity.     COPD-continue current inhalers     PFTs were shown and discussed with patient and patient's daughter and answered their questions to their satisfaction.  Change the Daliresp dose.  Inhalers refilled.  Continue oxygen to  maintain saturation 88 to 92%.  Continue DuoNebs       Obstructive sleep apnea-as per the patient he is pretty compliant with the CPAP machine.   Continue CPAP.  Encouraged to stay compliant.     Acute on chronic hypoxic respiratory failure-continue nasal cannula oxygen.  Advised to check pulse ox on a regular basis and try and maintain it above 88.      was educated that if  does not use oxygen  can feel lightheaded dizzy and can't pass out hit head and can die.    Patient understood the conversation very well and will try and use oxygen all the time.      Obesity-advised to stay active.  Body mass index is 33.55 kg/m².      Abnormal allele type of alpha antitrypsin- will get blood levels of alpha antitrypsin.       ICD-10-CM ICD-9-CM   1. SOB (shortness of breath)  R06.02 786.05   2. 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 Pulmonary fibrosis  J84.10 515   5. Recurrent left pleural effusion  J90 511.9   6. Acute on chronic hypoxic respiratory failure  J96.21 518.84     799.02       Return in about 3 months (around 2/19/2025).

## 2024-12-02 ENCOUNTER — TRANSCRIBE ORDERS (OUTPATIENT)
Dept: ADMINISTRATIVE | Facility: HOSPITAL | Age: 64
End: 2024-12-02
Payer: COMMERCIAL

## 2024-12-02 DIAGNOSIS — R10.84 GENERALIZED ABDOMINAL PAIN: Primary | ICD-10-CM

## 2024-12-10 ENCOUNTER — HOSPITAL ENCOUNTER (OUTPATIENT)
Dept: CT IMAGING | Facility: HOSPITAL | Age: 64
Discharge: HOME | End: 2024-12-10
Admitting: INTERNAL MEDICINE
Payer: MEDICARE

## 2024-12-10 DIAGNOSIS — R10.84 GENERALIZED ABDOMINAL PAIN: ICD-10-CM

## 2024-12-10 LAB — CREAT BLDA-MCNC: 0.9 MG/DL (ref 0.6–1.3)

## 2024-12-10 PROCEDURE — 82565 ASSAY OF CREATININE: CPT

## 2024-12-10 PROCEDURE — 25510000001 IOPAMIDOL 61 % SOLUTION: Performed by: INTERNAL MEDICINE

## 2024-12-10 PROCEDURE — 74178 CT ABD&PLV WO CNTR FLWD CNTR: CPT

## 2024-12-10 RX ORDER — IOPAMIDOL 612 MG/ML
100 INJECTION, SOLUTION INTRAVASCULAR
Status: COMPLETED | OUTPATIENT
Start: 2024-12-10 | End: 2024-12-10

## 2024-12-10 RX ADMIN — IOPAMIDOL 80 ML: 612 INJECTION, SOLUTION INTRAVENOUS at 11:51

## 2025-03-11 ENCOUNTER — OFFICE VISIT (OUTPATIENT)
Dept: PULMONOLOGY | Facility: CLINIC | Age: 65
End: 2025-03-11
Payer: MEDICARE

## 2025-03-11 VITALS
DIASTOLIC BLOOD PRESSURE: 78 MMHG | TEMPERATURE: 97.4 F | OXYGEN SATURATION: 93 % | SYSTOLIC BLOOD PRESSURE: 152 MMHG | HEART RATE: 95 BPM | WEIGHT: 253.6 LBS | HEIGHT: 77 IN | BODY MASS INDEX: 29.94 KG/M2

## 2025-03-11 DIAGNOSIS — J96.21 ACUTE ON CHRONIC HYPOXIC RESPIRATORY FAILURE: Primary | ICD-10-CM

## 2025-03-11 DIAGNOSIS — J44.9 CHRONIC OBSTRUCTIVE PULMONARY DISEASE, UNSPECIFIED COPD TYPE: ICD-10-CM

## 2025-03-11 DIAGNOSIS — R25.1 TREMORS OF NERVOUS SYSTEM: ICD-10-CM

## 2025-03-11 DIAGNOSIS — J84.10 PULMONARY FIBROSIS: ICD-10-CM

## 2025-03-11 DIAGNOSIS — E66.9 MILDLY OBESE: ICD-10-CM

## 2025-03-11 RX ORDER — ROFLUMILAST 500 UG/1
500 TABLET ORAL DAILY
Qty: 30 TABLET | Refills: 8 | Status: SHIPPED | OUTPATIENT
Start: 2025-03-11

## 2025-03-11 NOTE — PROGRESS NOTES
Interval history since last visit:    Recent hospitalizations:    Investigations (imaging, PFT's, labs, sleep study, record requests, etc.)    Have you had the Influenza Vaccine? yes    Would you like to receive this Vaccine today? no    Have you had the Pneumonia Vaccine?  yes   Would you like to receive this Vaccine today? no    Subjective    Rory Rios Jr. presents for the following Shortness of Breath  .    History of Present Illness   Patient is here for a follow-up visit.  Remains compliant with his oxygen.  Lost some weight.  Primary care physician is doing the workup.  So far negative.  Accompanied by daughter.  Review of Systems  Positive for shortness of breath on exertion otherwise negative  Active Problems:  Problem List Items Addressed This Visit          Endocrine and Metabolic    Mildly obese       Neuro    Tremors of nervous system       Pulmonary and Pneumonias    Chronic obstructive pulmonary disease    Relevant Medications    roflumilast (Daliresp) 500 MCG tablet tablet    Other Relevant Orders    Alpha - 1 - Antitrypsin    Pulmonary fibrosis    Relevant Medications    roflumilast (Daliresp) 500 MCG tablet tablet    Other Relevant Orders    Alpha - 1 - Antitrypsin       Other    Acute on chronic hypoxic respiratory failure - Primary    Relevant Orders    Alpha - 1 - Antitrypsin       Past Medical History:  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    COPD (chronic obstructive pulmonary disease)     Diabetes mellitus     GERD (gastroesophageal reflux disease)     Hypertension     Pleural effusion     Pneumonia     Pulmonary fibrosis 04/27/2023    Sleep apnea        Family History:  Family History   Problem Relation Age of Onset    Diabetes Mother     Heart failure Father     Diabetes Sister     Heart disease Brother     Diabetes Brother        Social History:  Social History     Tobacco Use    Smoking  status: Never     Passive exposure: Never    Smokeless tobacco: Current     Types: Chew   Substance Use Topics    Alcohol use: No       Current Medications:  Current Outpatient Medications   Medication Sig Dispense Refill    albuterol (PROVENTIL HFA;VENTOLIN HFA) 108 (90 BASE) MCG/ACT inhaler Inhale 2 puffs Every 4 (Four) Hours As Needed for Wheezing or Shortness of Air. 1 inhaler 0    arformoterol (BROVANA) 15 MCG/2ML nebulizer solution Take 2 mL by nebulization 2 (Two) Times a Day. 120 mL 5    Budeson-Glycopyrrol-Formoterol (BREZTRI) 160-9-4.8 MCG/ACT aerosol inhaler Inhale 2 puffs 2 (Two) Times a Day.      budesonide (PULMICORT) 0.5 MG/2ML nebulizer solution Take 2 mL by nebulization Daily. 30 each 6    busPIRone (BUSPAR) 10 MG tablet Take  by mouth 3 (Three) Times a Day.      cyanocobalamin 1000 MCG/ML injection INJECT 1 ML INTO MUSCLE EVERY 2 WEEKS      esomeprazole (nexIUM) 40 MG capsule Take 1 capsule by mouth Daily.      glipizide (GLUCOTROL XL) 5 MG ER tablet 1 tablet.      HYDROcodone-acetaminophen (NORCO)  MG per tablet Take 1 tablet by mouth Every 8 (Eight) Hours As Needed for Moderate Pain.      hydrOXYzine (ATARAX) 25 MG tablet 1 tablet.      ibuprofen (ADVIL,MOTRIN) 800 MG tablet Take 1 tablet by mouth 3 times a day.      ipratropium-albuterol (DUO-NEB) 0.5-2.5 mg/3 ml nebulizer Take 3 mL by nebulization 3 (Three) Times a Day.      ipratropium-albuterol (DUO-NEB) 0.5-2.5 mg/3 ml nebulizer Take 3 mL by neb every 30 minutes as needed for shortness of air for up to 6 doses. Part of COPD Rescue Kit. (Only Start if in YELLOW ZONE.) 18 mL 0    lisinopril (PRINIVIL,ZESTRIL) 10 MG tablet Take 1 tablet by mouth Daily.      metFORMIN (GLUCOPHAGE) 500 MG tablet 1 tablet.      nabumetone (RELAFEN) 750 MG tablet Take 1 tablet by mouth Every 12 (Twelve) Hours.      omeprazole (priLOSEC) 20 MG capsule Take 1 capsule by mouth 2 (Two) Times a Day.      revefenacin (YUPELRI) 175 MCG/3ML nebulizer solution Take 3  "mL by nebulization Daily. 90 mL 5    roflumilast (Daliresp) 500 MCG tablet tablet Take 1 tablet by mouth Daily. 30 tablet 8    vitamin D (ERGOCALCIFEROL) 1.25 MG (29630 UT) capsule capsule Take 1 capsule by mouth Every 7 (Seven) Days.      doxycycline (MONODOX) 100 MG capsule Take 1 capsule by mouth every 12 hours for 5 days as part of COPD Rescue Kit. (Only Start if in YELLOW ZONE.) (Patient not taking: Reported on 3/11/2025) 10 capsule 0    furosemide (Lasix) 20 MG tablet Take 1 tablet by mouth Daily for 30 days. 30 tablet 0    predniSONE (DELTASONE) 20 MG tablet Take 2 tablets by mouth Daily. Take 2 tablets daily for 5 days as part of COPD Rescue Kit. (Only Start if in YELLOW ZONE.) (Patient not taking: Reported on 3/11/2025) 10 tablet 0     No current facility-administered medications for this visit.       Allergies:  No Known Allergies    Vitals:  /78   Pulse 95   Temp 97.4 °F (36.3 °C)   Ht 195.6 cm (77.01\")   Wt 115 kg (253 lb 9.6 oz)   SpO2 93%   BMI 30.07 kg/m²     Imaging:    Imaging Results (Most Recent)       None            Pulmonary Functions Testing Results:    No results found for: \"FEV1\", \"FVC\", \"JNZ9XYO\", \"TLC\", \"DLCO\"    Results for orders placed or performed during the hospital encounter of 12/10/24   POC Creatinine    Collection Time: 12/10/24 11:34 AM    Specimen: Blood   Result Value Ref Range    Creatinine 0.90 0.60 - 1.30 mg/dL       Objective   Physical Exam  General- normal in appearance, not in any acute distress    HEENT- pupils equally reactive to light, normal in size, no scleral icterus    Neck-supple    Respiratory-respirations normal-on auscultation no wheezing no crackles,     Cardiovascular-  Normal S1 and S2. No S3, S4 or murmurs. 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    Reviewed.  Will repeat PFTs on next visit.     COPD-continue current inhalers.  Inhalers reviewed     PFTs were shown and discussed with patient and patient's daughter and answered their questions to their satisfaction.  Continue Daliresp.  Medication refilled  Continue current inhalers.  As per the patient he is compliant with them and rinses his mouth after using it.  Them.  Continue oxygen to maintain saturation 88 to 92%.    Tremors-likely due to Parkinson's.  Will take him off the albuterol for 2 weeks and see how he does.  If tremors continues then I asked him and his daughter to follow-up with neurology as patient could have Parkinson's disease.  Continue CPAP machine.        Obstructive sleep apnea-as per the patient he is pretty compliant with the CPAP machine.   Continue CPAP.  Encouraged to stay compliant.     Acute on chronic hypoxic respiratory failure-compliant with oxygen.  Was saturating 98% on 2 L today.      Was educated that if  does not use oxygen  can feel lightheaded dizzy and can't pass out hit head and can die.    Patient understood the conversation very well and will try and use oxygen all the time.      Obesity-continue to stay active.  Losing weight.       Abnormal allele type of alpha antitrypsin-test ordered.        ICD-10-CM ICD-9-CM   1. Acute on chronic hypoxic respiratory failure  J96.21 518.84     799.02   2. Pulmonary fibrosis  J84.10 515   3. Chronic obstructive pulmonary disease, unspecified COPD type  J44.9 496   4. Mildly obese  E66.9 278.00   5. Tremors of nervous system  R25.1 781.0       Return in about 4 months (around 7/11/2025).

## 2025-04-11 RX ORDER — IPRATROPIUM BROMIDE AND ALBUTEROL SULFATE 2.5; .5 MG/3ML; MG/3ML
SOLUTION RESPIRATORY (INHALATION)
Qty: 18 ML | Refills: 0 | Status: SHIPPED | OUTPATIENT
Start: 2025-04-11

## 2025-04-11 RX ORDER — IPRATROPIUM BROMIDE AND ALBUTEROL SULFATE 2.5; .5 MG/3ML; MG/3ML
3 SOLUTION RESPIRATORY (INHALATION)
Qty: 360 ML | Refills: 5 | Status: SHIPPED | OUTPATIENT
Start: 2025-04-11

## 2025-04-11 RX ORDER — BUDESONIDE 0.5 MG/2ML
0.5 INHALANT ORAL
Qty: 30 EACH | Refills: 6 | Status: SHIPPED | OUTPATIENT
Start: 2025-04-11

## 2025-04-29 DIAGNOSIS — R06.02 SOB (SHORTNESS OF BREATH): ICD-10-CM

## 2025-04-29 DIAGNOSIS — J96.21 ACUTE ON CHRONIC HYPOXIC RESPIRATORY FAILURE: Primary | ICD-10-CM

## 2025-04-29 DIAGNOSIS — J44.9 CHRONIC OBSTRUCTIVE PULMONARY DISEASE, UNSPECIFIED COPD TYPE: ICD-10-CM

## 2025-04-29 DIAGNOSIS — J84.10 PULMONARY FIBROSIS: ICD-10-CM

## 2025-05-14 DIAGNOSIS — J96.21 ACUTE ON CHRONIC HYPOXIC RESPIRATORY FAILURE: Primary | ICD-10-CM

## 2025-06-27 RX ORDER — ARFORMOTEROL TARTRATE 15 UG/2ML
15 SOLUTION RESPIRATORY (INHALATION)
Qty: 120 ML | Refills: 5 | Status: SHIPPED | OUTPATIENT
Start: 2025-06-27

## 2025-06-27 RX ORDER — BUDESONIDE 0.5 MG/2ML
0.5 INHALANT ORAL
Qty: 30 EACH | Refills: 6 | Status: SHIPPED | OUTPATIENT
Start: 2025-06-27

## 2025-06-27 NOTE — TELEPHONE ENCOUNTER
Caller: [facility]    Relationship: PHARMACY    Best call back number:     Requested Prescriptions:   Requested Prescriptions     Pending Prescriptions Disp Refills    budesonide (PULMICORT) 0.5 MG/2ML nebulizer solution 30 each 6     Sig: Take 2 mL by nebulization Daily.    arformoterol (BROVANA) 15 MCG/2ML nebulizer solution 120 mL 5     Sig: Take 2 mL by nebulization 2 (Two) Times a Day.    [facility]    Pharmacy where request should be sent:    [address] - [phone]  - [phone] FX   Last office visit with prescribing clinician: 3/11/2025   Last telemedicine visit with prescribing clinician: Visit date not found   Next office visit with prescribing clinician: 7/2/2025         Isabell Hardwick   06/27/25 10:42 EDT

## 2025-07-02 ENCOUNTER — OFFICE VISIT (OUTPATIENT)
Dept: PULMONOLOGY | Facility: CLINIC | Age: 65
End: 2025-07-02
Payer: MEDICARE

## 2025-07-02 VITALS
TEMPERATURE: 96.9 F | SYSTOLIC BLOOD PRESSURE: 132 MMHG | OXYGEN SATURATION: 92 % | BODY MASS INDEX: 29.35 KG/M2 | HEIGHT: 77 IN | DIASTOLIC BLOOD PRESSURE: 76 MMHG | WEIGHT: 248.6 LBS | HEART RATE: 91 BPM

## 2025-07-02 DIAGNOSIS — J60 COAL WORKERS PNEUMOCONIOSIS: Primary | ICD-10-CM

## 2025-07-02 PROCEDURE — 3078F DIAST BP <80 MM HG: CPT | Performed by: INTERNAL MEDICINE

## 2025-07-02 PROCEDURE — 3075F SYST BP GE 130 - 139MM HG: CPT | Performed by: INTERNAL MEDICINE

## 2025-07-02 PROCEDURE — 99214 OFFICE O/P EST MOD 30 MIN: CPT | Performed by: INTERNAL MEDICINE

## 2025-07-02 NOTE — PROGRESS NOTES
Interval history since last visit:    Recent hospitalizations:    Investigations (imaging, PFT's, labs, sleep study, record requests, etc.)    Have you had the Influenza Vaccine? no   Would you like to receive this Vaccine today? no    Have you had the Pneumonia Vaccine?  no  Would you like to receive this Vaccine today? no    Subjective    Rory Rios Jr. presents for the following Acute on chronic hypoxic respiratory failure  .    History of Present Illness   Patient is here for management of his chronic hypoxic respiratory failure, COPD and black lung.    Review of Systems  Positive for shortness of breath especially on exertion otherwise negative.  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    COPD (chronic obstructive pulmonary disease)     Diabetes mellitus     GERD (gastroesophageal reflux disease)     Hypertension     Pleural effusion     Pneumonia     Pulmonary fibrosis 04/27/2023    Sleep apnea        Family History:  Family History   Problem Relation Age of Onset    Diabetes Mother     Heart failure Father     Diabetes Sister     Heart disease Brother     Diabetes Brother        Social History:  Social History     Tobacco Use    Smoking status: Never     Passive exposure: Never    Smokeless tobacco: Current     Types: Chew   Substance Use Topics    Alcohol use: No       Current Medications:  Current Outpatient Medications   Medication Sig Dispense Refill    albuterol (PROVENTIL HFA;VENTOLIN HFA) 108 (90 BASE) MCG/ACT inhaler Inhale 2 puffs Every 4 (Four) Hours As Needed for Wheezing or Shortness of Air. 1 inhaler 0    arformoterol (BROVANA) 15 MCG/2ML nebulizer solution Take 2 mL by nebulization 2 (Two) Times a Day. 120 mL 5    budesonide (PULMICORT) 0.5 MG/2ML nebulizer solution Take 2 mL by nebulization Daily. 30 each 6     busPIRone (BUSPAR) 10 MG tablet Take  by mouth 3 (Three) Times a Day.      cyanocobalamin 1000 MCG/ML injection INJECT 1 ML INTO MUSCLE EVERY 2 WEEKS      esomeprazole (nexIUM) 40 MG capsule Take 1 capsule by mouth Daily.      glipizide (GLUCOTROL XL) 5 MG ER tablet 1 tablet.      HYDROcodone-acetaminophen (NORCO)  MG per tablet Take 1 tablet by mouth Every 8 (Eight) Hours As Needed for Moderate Pain.      hydrOXYzine (ATARAX) 25 MG tablet 1 tablet.      ibuprofen (ADVIL,MOTRIN) 800 MG tablet Take 1 tablet by mouth 3 times a day.      ipratropium-albuterol (DUO-NEB) 0.5-2.5 mg/3 ml nebulizer Take 3 mL by nebulization 3 (Three) Times a Day. 360 mL 5    ipratropium-albuterol (DUO-NEB) 0.5-2.5 mg/3 ml nebulizer Take 3 mL by neb every 30 minutes as needed for shortness of air for up to 6 doses. Part of COPD Rescue Kit. (Only Start if in YELLOW ZONE.) 18 mL 0    lisinopril (PRINIVIL,ZESTRIL) 10 MG tablet Take 1 tablet by mouth Daily.      metFORMIN (GLUCOPHAGE) 500 MG tablet 1 tablet.      nabumetone (RELAFEN) 750 MG tablet Take 1 tablet by mouth Every 12 (Twelve) Hours.      omeprazole (priLOSEC) 20 MG capsule Take 1 capsule by mouth 2 (Two) Times a Day.      roflumilast (Daliresp) 500 MCG tablet tablet Take 1 tablet by mouth Daily. 30 tablet 8    vitamin D (ERGOCALCIFEROL) 1.25 MG (95094 UT) capsule capsule Take 1 capsule by mouth Every 7 (Seven) Days.      doxycycline (MONODOX) 100 MG capsule Take 1 capsule by mouth every 12 hours for 5 days as part of COPD Rescue Kit. (Only Start if in YELLOW ZONE.) (Patient not taking: Reported on 11/19/2024) 10 capsule 0    furosemide (Lasix) 20 MG tablet Take 1 tablet by mouth Daily for 30 days. 30 tablet 0    predniSONE (DELTASONE) 20 MG tablet Take 2 tablets by mouth Daily. Take 2 tablets daily for 5 days as part of COPD Rescue Kit. (Only Start if in YELLOW ZONE.) (Patient not taking: Reported on 11/19/2024) 10 tablet 0     No current facility-administered medications  "for this visit.       Allergies:  No Known Allergies    Vitals:  /76   Pulse 91   Temp 96.9 °F (36.1 °C)   Ht 195.6 cm (77.01\")   Wt 113 kg (248 lb 9.6 oz)   SpO2 92%   BMI 29.47 kg/m²     Imaging:    Imaging Results (Most Recent)       None            Pulmonary Functions Testing Results:    No results found for: \"FEV1\", \"FVC\", \"QES9CNI\", \"TLC\", \"DLCO\"    Results for orders placed or performed during the hospital encounter of 12/10/24   POC Creatinine    Collection Time: 12/10/24 11:34 AM    Specimen: Blood   Result Value Ref Range    Creatinine 0.90 0.60 - 1.30 mg/dL       Objective   Physical Exam  General- normal in appearance, not in any acute distress, wearing nasal cannula oxygen    HEENT- pupils equally reactive to light, normal in size, no scleral icterus    Neck-supple    Respiratory-respirations normal-on auscultation no wheezing no crackles,     Cardiovascular-  Normal S1 and S2. No S3, S4 or murmurs. No JVD, no carotid bruit and no edema, pulses normal bilaterally     GI-nontender nondistended bowel sounds positive    CNS-nonfocal    Musculoskeletal -no edema  Extremities- no obvious deformity noticed     Psychiatric-mood good, good eye contact, alert awake oriented  Skin- no visible rash      Assessment & Plan   Coal workers pneumoconiosis-continue current inhalers.    Does not need any refills    Will get PFTs to look for the progression of disease.  Plan discussed with patient and answered his questions to satisfaction.     COPD-continue current inhalers.  Will repeat PFTs.  Will continue Daliresp.  Asked him to rinse his mouth after using steroid inhaler.  As per the patient is compliant with oxygen.  Educated to continue using oxygen to maintain saturation 88 to 92%.       Tremors-again advised to see a neurologist.    Obstructive sleep apnea-continue the CPAP machine.  As per the patient is compliant.  was educated about ill health effects of sleep apnea and what all effects it can have " on health in long-term.  Which includes blood clots, arrhythmias, stroke, depression, hypothyroidism. was also educated about the pathophysiology of sleep apnea and how weight contributes in it.  Patient understood the conversation well and will try and do exercise and eat right kind of food to lose weight.        Acute on chronic hypoxic respiratory failure-compliant with oxygen.  Continue using oxygen.      was educated that if  does not use oxygen  can feel lightheaded dizzy and can't pass out hit head and can die.  Patient understood the conversation very well and will try and use oxygen all the time.      Overweight-encouraged to lose some more weight.     Abnormal allele type of alpha antitrypsin-test ordered.     No diagnosis found.    No follow-ups on file.

## 2025-07-11 ENCOUNTER — TELEPHONE (OUTPATIENT)
Dept: PULMONOLOGY | Facility: CLINIC | Age: 65
End: 2025-07-11

## 2025-08-01 ENCOUNTER — TELEPHONE (OUTPATIENT)
Dept: PULMONOLOGY | Facility: CLINIC | Age: 65
End: 2025-08-01
Payer: MEDICARE

## 2025-08-01 NOTE — TELEPHONE ENCOUNTER
----- Message from Isaiah Jacobs sent at 8/1/2025  3:31 AM EDT -----  Ok ask the patient and we can do accordingly.    Ty  ss  ----- Message -----  From: Kaci Mark MA  Sent: 7/31/2025  11:14 AM EDT  To: Isaiah Jacobs MD    [facility] called to request an escript of Yupelri for this pt. It looks like he is using a combo of Brovana and Pulmicort but I didn't see the Yupelri in there to refill.

## 2025-08-11 ENCOUNTER — TELEPHONE (OUTPATIENT)
Dept: PULMONOLOGY | Facility: CLINIC | Age: 65
End: 2025-08-11
Payer: MEDICARE

## 2025-08-18 ENCOUNTER — HOSPITAL ENCOUNTER (OUTPATIENT)
Dept: RESPIRATORY THERAPY | Facility: HOSPITAL | Age: 65
Discharge: HOME | End: 2025-08-18
Payer: OTHER MISCELLANEOUS

## 2025-08-18 VITALS — HEART RATE: 77 BPM | RESPIRATION RATE: 16 BRPM | OXYGEN SATURATION: 95 %

## 2025-08-18 DIAGNOSIS — J60 COAL WORKERS PNEUMOCONIOSIS: ICD-10-CM

## 2025-08-18 PROCEDURE — 94799 UNLISTED PULMONARY SVC/PX: CPT

## 2025-08-18 PROCEDURE — 94729 DIFFUSING CAPACITY: CPT

## 2025-08-18 PROCEDURE — 94726 PLETHYSMOGRAPHY LUNG VOLUMES: CPT

## 2025-08-18 PROCEDURE — 94060 EVALUATION OF WHEEZING: CPT

## 2025-08-18 PROCEDURE — 94640 AIRWAY INHALATION TREATMENT: CPT

## 2025-08-18 RX ORDER — ALBUTEROL SULFATE 0.83 MG/ML
2.5 SOLUTION RESPIRATORY (INHALATION) ONCE
Status: COMPLETED | OUTPATIENT
Start: 2025-08-18 | End: 2025-08-18

## 2025-08-18 RX ADMIN — ALBUTEROL SULFATE 2.5 MG: 2.5 SOLUTION RESPIRATORY (INHALATION) at 14:03

## 2025-08-19 PROCEDURE — 94726 PLETHYSMOGRAPHY LUNG VOLUMES: CPT | Performed by: INTERNAL MEDICINE

## 2025-08-19 PROCEDURE — 94060 EVALUATION OF WHEEZING: CPT | Performed by: INTERNAL MEDICINE

## 2025-08-19 PROCEDURE — 94729 DIFFUSING CAPACITY: CPT | Performed by: INTERNAL MEDICINE

## (undated) DEVICE — PATIENT RETURN ELECTRODE, SINGLE-USE, CONTACT QUALITY MONITORING, ADULT, WITH 9FT CORD, FOR PATIENTS WEIGING OVER 33LBS. (15KG): Brand: MEGADYNE

## (undated) DEVICE — ADHS LIQ MASTISOL 2/3ML

## (undated) DEVICE — KT CENTRL LN DRS CHNG W/BIOPATCH CUST

## (undated) DEVICE — ST EXT MICROBORE FIX M LL 38IN

## (undated) DEVICE — ELECTRD RETRN/GRND MEGADYNE 2/PLT DISP

## (undated) DEVICE — ADAPT SWVL FIBROPTIC BRONCH

## (undated) DEVICE — SYR LUERLOK 30CC

## (undated) DEVICE — APPL CHLORAPREP HI/LITE 26ML ORNG

## (undated) DEVICE — ST NDL BRONCH ASP VIZISHOT 2 FLX 19GA

## (undated) DEVICE — PICC LINE DRESSING CHANGE TRAY: Brand: MEDLINE

## (undated) DEVICE — GLV SURG PREMIERPRO MIC LTX PF SZ7 BRN

## (undated) DEVICE — SYR LL TP 10ML STRL

## (undated) DEVICE — ELECTRD BLD EZ CLN MOD 4IN

## (undated) DEVICE — DBD-DRAPE,LAP,CHOLE,W/TROUGHS,STERILE: Brand: MEDLINE

## (undated) DEVICE — THE HOBBS MICROBIOLOGY BRUSH IS INTENDED TO BE USED IN COMBINATION WITH A COMPATIBLE FLEXIBLE ENDOSCOPE TO COLLECT CELLS OF THE MUCOSA FOR PATHOLOGICAL DIAGNOSIS DURING ENDOSCOPY. IT IS INSERTED THROUGH THE WORKING CHANNEL OF THE ENDOSCOPE AND CONSISTS OF A FLEXIBLE INSERTION PORTION MADE OF A METAL COIL INSIDE A PLASTIC TUBE, WHOSE DISTAL END IS EQUIPPED WITH PLASTIC BRISTLES FOR HARVESTING AND PROTECTING MUCOSAL CELLS, E.G., DURING ENDOSCOPY. THIS IS A SINGLE-USE DEVICE.: Brand: HOBBS MICROBIOLOGY BRUSH

## (undated) DEVICE — TRAP,MUCUS SPECIMEN,40CC: Brand: MEDLINE

## (undated) DEVICE — SYRINGE, LUER LOCK, 5ML: Brand: MEDLINE

## (undated) DEVICE — KT DRN PLEURL PLEURX NO/CATH 1BT/500M

## (undated) DEVICE — SUT VIC 3/0 SH 27IN J416H

## (undated) DEVICE — GLV SURG SENSICARE W/ALOE PF LF 7.5 STRL

## (undated) DEVICE — SOL IRR NACL 0.9PCT BT 1000ML

## (undated) DEVICE — LUER-LOK 360°: Brand: CONNECTA, LUER-LOK

## (undated) DEVICE — SINGLE USE BIOPSY VALVE MAJ-210: Brand: SINGLE USE BIOPSY VALVE (STERILE)

## (undated) DEVICE — SHEET,DRAPE,53X77,STERILE: Brand: MEDLINE

## (undated) DEVICE — DRAPE,T,LAPARO,TRANS,STERILE: Brand: MEDLINE

## (undated) DEVICE — HOLDER: Brand: DEROYAL

## (undated) DEVICE — DRP C/ARM W/BAND W/CLIPS 41X74IN

## (undated) DEVICE — SUT MNCRYL PLS ANTIB UD 4/0 PS2 18IN

## (undated) DEVICE — PK BASIC 70

## (undated) DEVICE — CANNULA,OXY,ADULT,SUPERSOFT,W/7'TUB,UC: Brand: MEDLINE

## (undated) DEVICE — FRCP BX RADJAW4 PULM WO NDL STD1.8X2 100

## (undated) DEVICE — SYR SLPTP 20CC

## (undated) DEVICE — BOWL UTIL STRL 32OZ

## (undated) DEVICE — GLV SURG PREMIERPRO MIC LTX PF SZ8 BRN

## (undated) DEVICE — ST LINER SAFECAP GRN RED CP STRL

## (undated) DEVICE — Device: Brand: BALLOON

## (undated) DEVICE — KT CATH DRN PLEURL PLEURX/SAFE/T SIL 15.5F 66CM 4BT/1000ML

## (undated) DEVICE — CONNECTOR,STRAIGHT,F/02 SUPPLY TUBING: Brand: MEDLINE

## (undated) DEVICE — SYR LUER SLPTP 50ML

## (undated) DEVICE — SINGLE USE SUCTION VALVE MAJ-209: Brand: SINGLE USE SUCTION VALVE (STERILE)

## (undated) DEVICE — PENCL ES MEGADINE EZ/CLEAN BUTN W/HOLSTR 10FT

## (undated) DEVICE — SUT SILK 0 FSL 18IN 678G

## (undated) DEVICE — LUBE GEL ENDOGLIDE 1.1OZ

## (undated) DEVICE — BNDG ADHS CURAD FLX/FABRC 2X4IN STRL LF